# Patient Record
Sex: MALE | Race: WHITE | Employment: OTHER | URBAN - METROPOLITAN AREA
[De-identification: names, ages, dates, MRNs, and addresses within clinical notes are randomized per-mention and may not be internally consistent; named-entity substitution may affect disease eponyms.]

---

## 2018-11-08 ENCOUNTER — HOSPITAL ENCOUNTER (OUTPATIENT)
Dept: MRI IMAGING | Age: 73
Discharge: HOME | End: 2018-11-08
Payer: MEDICARE

## 2018-11-08 DIAGNOSIS — M79.604 RIGHT LEG PAIN: ICD-10-CM

## 2018-11-08 DIAGNOSIS — M54.50 LUMBAR PAIN: ICD-10-CM

## 2018-11-08 PROCEDURE — 72148 MRI LUMBAR SPINE W/O DYE: CPT

## 2018-11-20 PROBLEM — I71.40 ABDOMINAL AORTIC ANEURYSM (AAA) WITHOUT RUPTURE: Status: ACTIVE | Noted: 2018-11-20

## 2018-11-28 ENCOUNTER — HOSPITAL ENCOUNTER (OUTPATIENT)
Dept: CT IMAGING | Age: 73
Discharge: HOME | End: 2018-11-28
Payer: MEDICARE

## 2018-11-28 DIAGNOSIS — Z87.891 PERSONAL HISTORY OF TOBACCO USE: ICD-10-CM

## 2018-11-28 DIAGNOSIS — I71.40 ABDOMINAL AORTIC ANEURYSM WITHOUT RUPTURE: ICD-10-CM

## 2018-11-28 DIAGNOSIS — I71.40 ABDOMINAL AORTIC ANEURYSM (AAA) WITHOUT RUPTURE: ICD-10-CM

## 2018-11-28 DIAGNOSIS — Z87.891 PERSONAL HISTORY OF TOBACCO USE, PRESENTING HAZARDS TO HEALTH: ICD-10-CM

## 2018-11-28 LAB
GFR AFRICAN AMERICAN: >60 ML/MIN/1.73M2
GFR NON-AFRICAN AMERICAN: >60 ML/MIN/1.73M2
POC CREATININE: 0.8 MG/DL (ref 0.9–1.3)

## 2018-11-28 PROCEDURE — G0297 LDCT FOR LUNG CA SCREEN: HCPCS

## 2018-11-28 PROCEDURE — 6360000004 HC RX CONTRAST MEDICATION: Performed by: SURGERY

## 2018-11-28 PROCEDURE — 2580000003 HC RX 258: Performed by: SURGERY

## 2018-11-28 PROCEDURE — 74174 CTA ABD&PLVS W/CONTRAST: CPT

## 2018-11-28 RX ORDER — SODIUM CHLORIDE 0.9 % (FLUSH) 0.9 %
10 SYRINGE (ML) INJECTION PRN
Status: DISCONTINUED | OUTPATIENT
Start: 2018-11-28 | End: 2018-11-29 | Stop reason: HOSPADM

## 2018-11-28 RX ADMIN — SODIUM CHLORIDE, PRESERVATIVE FREE 10 ML: 5 INJECTION INTRAVENOUS at 12:14

## 2018-11-28 RX ADMIN — IOPAMIDOL 100 ML: 755 INJECTION, SOLUTION INTRAVENOUS at 12:14

## 2018-12-12 PROBLEM — Z72.0 TOBACCO ABUSE: Status: ACTIVE | Noted: 2018-12-12

## 2019-02-28 LAB
ALBUMIN SERPL-MCNC: NORMAL G/DL
ALP BLD-CCNC: NORMAL U/L
ALT SERPL-CCNC: NORMAL U/L
ANION GAP SERPL CALCULATED.3IONS-SCNC: NORMAL MMOL/L
AST SERPL-CCNC: NORMAL U/L
BILIRUB SERPL-MCNC: NORMAL MG/DL (ref 0.1–1.4)
BUN BLDV-MCNC: NORMAL MG/DL
CALCIUM SERPL-MCNC: NORMAL MG/DL
CHLORIDE BLD-SCNC: NORMAL MMOL/L
CHOLESTEROL, TOTAL: 179 MG/DL
CHOLESTEROL/HDL RATIO: NORMAL
CO2: NORMAL MMOL/L
CREAT SERPL-MCNC: 0.8 MG/DL
GFR CALCULATED: NORMAL
GLUCOSE BLD-MCNC: NORMAL MG/DL
HDLC SERPL-MCNC: 38 MG/DL (ref 35–70)
LDL CHOLESTEROL CALCULATED: 62 MG/DL (ref 0–160)
POTASSIUM SERPL-SCNC: 4 MMOL/L
SODIUM BLD-SCNC: NORMAL MMOL/L
TOTAL PROTEIN: NORMAL
TRIGL SERPL-MCNC: 397 MG/DL
VLDLC SERPL CALC-MCNC: 79 MG/DL

## 2019-03-11 ENCOUNTER — HOSPITAL ENCOUNTER (OUTPATIENT)
Dept: CARDIAC REHAB | Age: 74
Discharge: HOME | End: 2019-03-11
Payer: MEDICARE

## 2019-03-11 LAB
LV EF: 58 %
LVEF MODALITY: NORMAL

## 2019-03-11 PROCEDURE — 93306 TTE W/DOPPLER COMPLETE: CPT

## 2019-04-16 ENCOUNTER — HOSPITAL ENCOUNTER (OUTPATIENT)
Dept: GENERAL RADIOLOGY | Age: 74
Discharge: HOME | End: 2019-04-16
Payer: MEDICARE

## 2019-04-16 ENCOUNTER — HOSPITAL ENCOUNTER (OUTPATIENT)
Age: 74
Discharge: HOME | End: 2019-04-16
Payer: MEDICARE

## 2019-04-16 DIAGNOSIS — R07.81 RIB PAIN ON RIGHT SIDE: ICD-10-CM

## 2019-04-16 PROCEDURE — 71101 X-RAY EXAM UNILAT RIBS/CHEST: CPT

## 2019-06-05 RX ORDER — LISINOPRIL AND HYDROCHLOROTHIAZIDE 20; 12.5 MG/1; MG/1
1 TABLET ORAL DAILY
Qty: 90 TABLET | Refills: 0 | Status: SHIPPED | OUTPATIENT
Start: 2019-06-05 | End: 2019-07-24 | Stop reason: SDUPTHER

## 2019-06-05 RX ORDER — SIMVASTATIN 40 MG
TABLET ORAL
Qty: 90 TABLET | Refills: 0 | Status: SHIPPED | OUTPATIENT
Start: 2019-06-05 | End: 2019-07-24 | Stop reason: SDUPTHER

## 2019-07-05 DIAGNOSIS — K76.0 NON-ALCOHOLIC FATTY LIVER DISEASE: ICD-10-CM

## 2019-07-05 DIAGNOSIS — C44.311 BASAL CELL CARCINOMA OF NOSE: ICD-10-CM

## 2019-07-05 DIAGNOSIS — I10 ESSENTIAL HYPERTENSION: ICD-10-CM

## 2019-07-05 DIAGNOSIS — F17.200 SMOKER: ICD-10-CM

## 2019-07-05 RX ORDER — HYDROCODONE BITARTRATE AND ACETAMINOPHEN 7.5; 325 MG/1; MG/1
1 TABLET ORAL EVERY 4 HOURS PRN
COMMUNITY
End: 2019-07-24

## 2019-07-22 LAB
A/G RATIO: 2.6 (ref 1.1–2.2)
ALBUMIN SERPL-MCNC: 4.7 G/DL (ref 3.4–5)
ALP BLD-CCNC: 70 U/L (ref 40–129)
ALT SERPL-CCNC: 25 U/L (ref 10–40)
ANION GAP SERPL CALCULATED.3IONS-SCNC: 16 MMOL/L (ref 3–16)
AST SERPL-CCNC: 21 U/L (ref 15–37)
BASOPHILS ABSOLUTE: 0.1 K/UL (ref 0–0.2)
BASOPHILS RELATIVE PERCENT: 0.7 %
BILIRUB SERPL-MCNC: 0.4 MG/DL (ref 0–1)
BUN BLDV-MCNC: 11 MG/DL (ref 7–20)
CALCIUM SERPL-MCNC: 10.3 MG/DL (ref 8.3–10.6)
CHLORIDE BLD-SCNC: 102 MMOL/L (ref 99–110)
CHOLESTEROL, FASTING: 162 MG/DL (ref 0–199)
CO2: 23 MMOL/L (ref 21–32)
CREAT SERPL-MCNC: 0.7 MG/DL (ref 0.8–1.3)
EOSINOPHILS ABSOLUTE: 0 K/UL (ref 0–0.6)
EOSINOPHILS RELATIVE PERCENT: 0.5 %
GFR AFRICAN AMERICAN: >60
GFR NON-AFRICAN AMERICAN: >60
GLOBULIN: 1.8 G/DL
GLUCOSE FASTING: 151 MG/DL (ref 70–99)
HCT VFR BLD CALC: 45.9 % (ref 40.5–52.5)
HDLC SERPL-MCNC: 44 MG/DL (ref 40–60)
HEMOGLOBIN: 15.4 G/DL (ref 13.5–17.5)
LDL CHOLESTEROL CALCULATED: 87 MG/DL
LYMPHOCYTES ABSOLUTE: 2.4 K/UL (ref 1–5.1)
LYMPHOCYTES RELATIVE PERCENT: 32.7 %
MCH RBC QN AUTO: 32.1 PG (ref 26–34)
MCHC RBC AUTO-ENTMCNC: 33.5 G/DL (ref 31–36)
MCV RBC AUTO: 95.9 FL (ref 80–100)
MONOCYTES ABSOLUTE: 0.4 K/UL (ref 0–1.3)
MONOCYTES RELATIVE PERCENT: 5.8 %
NEUTROPHILS ABSOLUTE: 4.3 K/UL (ref 1.7–7.7)
NEUTROPHILS RELATIVE PERCENT: 60.3 %
PDW BLD-RTO: 13.4 % (ref 12.4–15.4)
PLATELET # BLD: 116 K/UL (ref 135–450)
PMV BLD AUTO: 10.8 FL (ref 5–10.5)
POTASSIUM SERPL-SCNC: 4.1 MMOL/L (ref 3.5–5.1)
RBC # BLD: 4.79 M/UL (ref 4.2–5.9)
SODIUM BLD-SCNC: 141 MMOL/L (ref 136–145)
TOTAL PROTEIN: 6.5 G/DL (ref 6.4–8.2)
TRIGLYCERIDE, FASTING: 153 MG/DL (ref 0–150)
TSH SERPL DL<=0.05 MIU/L-ACNC: 2 UIU/ML (ref 0.27–4.2)
VLDLC SERPL CALC-MCNC: 31 MG/DL
WBC # BLD: 7.2 K/UL (ref 4–11)

## 2019-07-23 LAB
ESTIMATED AVERAGE GLUCOSE: 151.3 MG/DL
HBA1C MFR BLD: 6.9 %

## 2019-07-24 ENCOUNTER — OFFICE VISIT (OUTPATIENT)
Dept: FAMILY MEDICINE CLINIC | Age: 74
End: 2019-07-24
Payer: MEDICARE

## 2019-07-24 VITALS
WEIGHT: 175.2 LBS | SYSTOLIC BLOOD PRESSURE: 152 MMHG | HEART RATE: 72 BPM | BODY MASS INDEX: 29.19 KG/M2 | DIASTOLIC BLOOD PRESSURE: 78 MMHG | HEIGHT: 65 IN

## 2019-07-24 DIAGNOSIS — F17.200 SMOKER: ICD-10-CM

## 2019-07-24 DIAGNOSIS — E11.9 TYPE 2 DIABETES MELLITUS WITHOUT COMPLICATION, WITHOUT LONG-TERM CURRENT USE OF INSULIN (HCC): ICD-10-CM

## 2019-07-24 DIAGNOSIS — E78.49 OTHER HYPERLIPIDEMIA: ICD-10-CM

## 2019-07-24 DIAGNOSIS — D69.6 THROMBOCYTOPENIA (HCC): ICD-10-CM

## 2019-07-24 DIAGNOSIS — I10 ESSENTIAL HYPERTENSION: Primary | ICD-10-CM

## 2019-07-24 PROCEDURE — 99214 OFFICE O/P EST MOD 30 MIN: CPT | Performed by: FAMILY MEDICINE

## 2019-07-24 RX ORDER — SIMVASTATIN 40 MG
40 TABLET ORAL NIGHTLY
Qty: 90 TABLET | Refills: 1 | Status: SHIPPED | OUTPATIENT
Start: 2019-07-24 | End: 2019-12-04 | Stop reason: SDUPTHER

## 2019-07-24 RX ORDER — GABAPENTIN 300 MG/1
300 CAPSULE ORAL 3 TIMES DAILY
Qty: 270 CAPSULE | Refills: 1 | Status: SHIPPED | OUTPATIENT
Start: 2019-07-24 | End: 2020-02-11

## 2019-07-24 RX ORDER — LISINOPRIL AND HYDROCHLOROTHIAZIDE 20; 12.5 MG/1; MG/1
1 TABLET ORAL DAILY
Qty: 90 TABLET | Refills: 1 | Status: SHIPPED | OUTPATIENT
Start: 2019-07-24 | End: 2019-12-04 | Stop reason: SDUPTHER

## 2019-07-24 ASSESSMENT — ENCOUNTER SYMPTOMS
ABDOMINAL PAIN: 0
CHEST TIGHTNESS: 0
SHORTNESS OF BREATH: 0

## 2019-07-24 ASSESSMENT — PATIENT HEALTH QUESTIONNAIRE - PHQ9
SUM OF ALL RESPONSES TO PHQ9 QUESTIONS 1 & 2: 0
SUM OF ALL RESPONSES TO PHQ QUESTIONS 1-9: 0
1. LITTLE INTEREST OR PLEASURE IN DOING THINGS: 0
2. FEELING DOWN, DEPRESSED OR HOPELESS: 0
SUM OF ALL RESPONSES TO PHQ QUESTIONS 1-9: 0

## 2019-07-24 NOTE — PROGRESS NOTES
7/24/2019    Digna Barnes    Chief Complaint   Patient presents with    3 Month Follow-Up     WOULD LIKE TO DISCUSS AMITRYPTILLINE. SHOULD HE STILL TAKE? HAD LABS DRAWN 7/22/19.  Hypertension    Hyperlipidemia       HPI  History was obtained from patient. Petra Mark is a 72 y.o. male who presents today with 3 of routine follow-up. Denies unusual chest pain or shortness of breath. Still unfortunately smoking daily. Patient remains active and had labs done in the last couple days. All labs look good- A1c was 6.9% on diet control. CBC was normal other than a platelet count= 743,575 no history of bleeding diathesis. We are out of a Pneumovax 23, but he will need that with next visit. Please note, he saw the vascular surgeon 6 months ago in terms of his abdominal aortic aneurysm and was told he was good for a year for recheck. REVIEW OF SYMPTOMS    Review of Systems   Constitutional: Negative for appetite change and fatigue. Respiratory: Negative for chest tightness and shortness of breath. Cardiovascular: Negative for chest pain. Gastrointestinal: Negative for abdominal pain. Musculoskeletal: Negative for arthralgias and myalgias. Skin: Negative for rash. Neurological: Negative for dizziness, speech difficulty and headaches. Psychiatric/Behavioral: Negative for confusion, decreased concentration and dysphoric mood. The patient is not nervous/anxious. PAST MEDICAL HISTORY  Past Medical History:   Diagnosis Date    Basal cell carcinoma of nose     Dr Glen Leyva hypertension     Hyperlipidemia     Hypertension     Non-alcoholic fatty liver disease      ?  Osteoarthritis     Smoker     Spinal stenosis     Type 2 diabetes mellitus without complication (HCC)        FAMILY HISTORY  No family history on file.     SOCIAL HISTORY  Social History     Socioeconomic History    Marital status:      Spouse name: None    Number of children: None    Years of education: None    Highest education level: None   Occupational History    None   Social Needs    Financial resource strain: None    Food insecurity:     Worry: None     Inability: None    Transportation needs:     Medical: None     Non-medical: None   Tobacco Use    Smoking status: Current Every Day Smoker     Packs/day: 1.00     Years: 56.00     Pack years: 56.00     Types: Cigarettes    Smokeless tobacco: Never Used   Substance and Sexual Activity    Alcohol use: Yes     Comment: occas    Drug use: No    Sexual activity: None   Lifestyle    Physical activity:     Days per week: None     Minutes per session: None    Stress: None   Relationships    Social connections:     Talks on phone: None     Gets together: None     Attends Zoroastrianism service: None     Active member of club or organization: None     Attends meetings of clubs or organizations: None     Relationship status: None    Intimate partner violence:     Fear of current or ex partner: None     Emotionally abused: None     Physically abused: None     Forced sexual activity: None   Other Topics Concern    None   Social History Narrative    None        SURGICAL HISTORY  Past Surgical History:   Procedure Laterality Date    OTHER SURGICAL HISTORY  12/09/2016    excision of basal cell carcinoma of nose with complex repair    OTHER SURGICAL HISTORY      epidual steroid injections L3-L4    SINUS SURGERY         Quality & Risk Score Accuracy    Last edited 07/24/19 14:35 EDT by Javid Jacobs MD               CURRENT MEDICATIONS  Current Outpatient Medications   Medication Sig Dispense Refill    lisinopril-hydrochlorothiazide (PRINZIDE;ZESTORETIC) 20-12.5 MG per tablet Take 1 tablet by mouth daily 90 tablet 1    simvastatin (ZOCOR) 40 MG tablet Take 1 tablet by mouth nightly 90 tablet 1    gabapentin (NEURONTIN) 300 MG capsule Take 1 capsule by mouth 3 times daily for 180 days.  270 capsule 1    aspirin 81 MG tablet Take 81 mg by mouth daily       No

## 2019-08-27 ENCOUNTER — HOSPITAL ENCOUNTER (OUTPATIENT)
Age: 74
Discharge: HOME | End: 2019-08-27
Payer: MEDICARE

## 2019-08-27 LAB
BASOPHILS ABSOLUTE: 0.1 K/CU MM
BASOPHILS RELATIVE PERCENT: 0.7 % (ref 0–1)
DIFFERENTIAL TYPE: ABNORMAL
EOSINOPHILS ABSOLUTE: 0 K/CU MM
EOSINOPHILS RELATIVE PERCENT: 0.4 % (ref 0–3)
HCT VFR BLD CALC: 45.8 % (ref 42–52)
HEMOGLOBIN: 15.6 GM/DL (ref 13.5–18)
IMMATURE NEUTROPHIL %: 0.4 % (ref 0–0.43)
LYMPHOCYTES ABSOLUTE: 2.9 K/CU MM
LYMPHOCYTES RELATIVE PERCENT: 36.1 % (ref 24–44)
MCH RBC QN AUTO: 31.8 PG (ref 27–31)
MCHC RBC AUTO-ENTMCNC: 34.1 % (ref 32–36)
MCV RBC AUTO: 93.5 FL (ref 78–100)
MONOCYTES ABSOLUTE: 0.7 K/CU MM
MONOCYTES RELATIVE PERCENT: 9 % (ref 0–4)
PDW BLD-RTO: 12.3 % (ref 11.7–14.9)
PLATELET # BLD: 130 K/CU MM (ref 140–440)
PMV BLD AUTO: 10.6 FL (ref 7.5–11.1)
RBC # BLD: 4.9 M/CU MM (ref 4.6–6.2)
SEGMENTED NEUTROPHILS ABSOLUTE COUNT: 4.4 K/CU MM
SEGMENTED NEUTROPHILS RELATIVE PERCENT: 53.4 % (ref 36–66)
TOTAL IMMATURE NEUTOROPHIL: 0.03 K/CU MM
WBC # BLD: 8.1 K/CU MM (ref 4–10.5)

## 2019-08-27 PROCEDURE — 36415 COLL VENOUS BLD VENIPUNCTURE: CPT

## 2019-08-27 PROCEDURE — 85025 COMPLETE CBC W/AUTO DIFF WBC: CPT

## 2019-12-04 ENCOUNTER — TELEPHONE (OUTPATIENT)
Dept: FAMILY MEDICINE CLINIC | Age: 74
End: 2019-12-04

## 2019-12-04 RX ORDER — LISINOPRIL AND HYDROCHLOROTHIAZIDE 20; 12.5 MG/1; MG/1
1 TABLET ORAL DAILY
Qty: 90 TABLET | Refills: 0 | Status: SHIPPED | OUTPATIENT
Start: 2019-12-04 | End: 2020-02-11 | Stop reason: SDUPTHER

## 2019-12-04 RX ORDER — SIMVASTATIN 40 MG
40 TABLET ORAL NIGHTLY
Qty: 90 TABLET | Refills: 0 | Status: SHIPPED | OUTPATIENT
Start: 2019-12-04 | End: 2020-02-11 | Stop reason: SDUPTHER

## 2019-12-12 ENCOUNTER — HOSPITAL ENCOUNTER (OUTPATIENT)
Dept: CT IMAGING | Age: 74
Discharge: HOME | End: 2019-12-12
Payer: MEDICARE

## 2019-12-12 DIAGNOSIS — Z87.891 PERSONAL HISTORY OF TOBACCO USE: ICD-10-CM

## 2019-12-12 PROCEDURE — G0297 LDCT FOR LUNG CA SCREEN: HCPCS

## 2019-12-18 PROBLEM — I35.0 NONRHEUMATIC AORTIC VALVE STENOSIS: Status: ACTIVE | Noted: 2019-12-18

## 2019-12-27 ENCOUNTER — HOSPITAL ENCOUNTER (OUTPATIENT)
Dept: NON INVASIVE DIAGNOSTICS | Age: 74
Discharge: HOME | End: 2019-12-27
Payer: MEDICARE

## 2019-12-27 DIAGNOSIS — I35.0 NONRHEUMATIC AORTIC VALVE STENOSIS: ICD-10-CM

## 2019-12-27 LAB
LV EF: 53 %
LVEF MODALITY: NORMAL

## 2019-12-27 PROCEDURE — 93306 TTE W/DOPPLER COMPLETE: CPT

## 2019-12-31 ENCOUNTER — TELEPHONE (OUTPATIENT)
Dept: CARDIOLOGY CLINIC | Age: 74
End: 2019-12-31

## 2020-02-11 ENCOUNTER — OFFICE VISIT (OUTPATIENT)
Dept: FAMILY MEDICINE CLINIC | Age: 75
End: 2020-02-11
Payer: MEDICARE

## 2020-02-11 VITALS
BODY MASS INDEX: 26.84 KG/M2 | SYSTOLIC BLOOD PRESSURE: 128 MMHG | HEART RATE: 68 BPM | WEIGHT: 167 LBS | DIASTOLIC BLOOD PRESSURE: 80 MMHG | OXYGEN SATURATION: 94 % | HEIGHT: 66 IN

## 2020-02-11 DIAGNOSIS — I10 ESSENTIAL HYPERTENSION: ICD-10-CM

## 2020-02-11 DIAGNOSIS — Z12.5 ENCOUNTER FOR SCREENING FOR MALIGNANT NEOPLASM OF PROSTATE: ICD-10-CM

## 2020-02-11 DIAGNOSIS — E11.9 TYPE 2 DIABETES MELLITUS WITHOUT COMPLICATION, WITHOUT LONG-TERM CURRENT USE OF INSULIN (HCC): ICD-10-CM

## 2020-02-11 DIAGNOSIS — E78.49 OTHER HYPERLIPIDEMIA: ICD-10-CM

## 2020-02-11 DIAGNOSIS — R35.1 NOCTURIA: ICD-10-CM

## 2020-02-11 LAB
A/G RATIO: 1.9 (ref 1.1–2.2)
ALBUMIN SERPL-MCNC: 4.5 G/DL (ref 3.4–5)
ALP BLD-CCNC: 71 U/L (ref 40–129)
ALT SERPL-CCNC: 32 U/L (ref 10–40)
ANION GAP SERPL CALCULATED.3IONS-SCNC: 13 MMOL/L (ref 3–16)
AST SERPL-CCNC: 19 U/L (ref 15–37)
BASOPHILS ABSOLUTE: 0 K/UL (ref 0–0.2)
BASOPHILS RELATIVE PERCENT: 0.3 %
BILIRUB SERPL-MCNC: 0.4 MG/DL (ref 0–1)
BUN BLDV-MCNC: 18 MG/DL (ref 7–20)
CALCIUM SERPL-MCNC: 11 MG/DL (ref 8.3–10.6)
CHLORIDE BLD-SCNC: 99 MMOL/L (ref 99–110)
CHOLESTEROL, TOTAL: 223 MG/DL (ref 0–199)
CO2: 28 MMOL/L (ref 21–32)
CREAT SERPL-MCNC: 0.7 MG/DL (ref 0.8–1.3)
EOSINOPHILS ABSOLUTE: 0 K/UL (ref 0–0.6)
EOSINOPHILS RELATIVE PERCENT: 0.1 %
GFR AFRICAN AMERICAN: >60
GFR NON-AFRICAN AMERICAN: >60
GLOBULIN: 2.4 G/DL
GLUCOSE BLD-MCNC: 161 MG/DL (ref 70–99)
HCT VFR BLD CALC: 47.5 % (ref 40.5–52.5)
HDLC SERPL-MCNC: 50 MG/DL (ref 40–60)
HEMOGLOBIN: 16.1 G/DL (ref 13.5–17.5)
LDL CHOLESTEROL CALCULATED: 115 MG/DL
LYMPHOCYTES ABSOLUTE: 3 K/UL (ref 1–5.1)
LYMPHOCYTES RELATIVE PERCENT: 26.7 %
MCH RBC QN AUTO: 31.8 PG (ref 26–34)
MCHC RBC AUTO-ENTMCNC: 33.9 G/DL (ref 31–36)
MCV RBC AUTO: 93.8 FL (ref 80–100)
MONOCYTES ABSOLUTE: 0.8 K/UL (ref 0–1.3)
MONOCYTES RELATIVE PERCENT: 7.4 %
NEUTROPHILS ABSOLUTE: 7.4 K/UL (ref 1.7–7.7)
NEUTROPHILS RELATIVE PERCENT: 65.5 %
PDW BLD-RTO: 13.3 % (ref 12.4–15.4)
PLATELET # BLD: 142 K/UL (ref 135–450)
PMV BLD AUTO: 9.2 FL (ref 5–10.5)
POTASSIUM SERPL-SCNC: 4 MMOL/L (ref 3.5–5.1)
PROSTATE SPECIFIC ANTIGEN: 0.61 NG/ML (ref 0–4)
RBC # BLD: 5.07 M/UL (ref 4.2–5.9)
SODIUM BLD-SCNC: 140 MMOL/L (ref 136–145)
TOTAL PROTEIN: 6.9 G/DL (ref 6.4–8.2)
TRIGL SERPL-MCNC: 289 MG/DL (ref 0–150)
TSH REFLEX FT4: 1.37 UIU/ML (ref 0.27–4.2)
VLDLC SERPL CALC-MCNC: 58 MG/DL
WBC # BLD: 11.4 K/UL (ref 4–11)

## 2020-02-11 PROCEDURE — 99214 OFFICE O/P EST MOD 30 MIN: CPT | Performed by: PHYSICIAN ASSISTANT

## 2020-02-11 RX ORDER — GABAPENTIN 300 MG/1
CAPSULE ORAL
Qty: 450 CAPSULE | Refills: 0
Start: 2020-02-11 | End: 2020-03-12

## 2020-02-11 RX ORDER — METHYLPREDNISOLONE 4 MG/1
2 TABLET ORAL DAILY
COMMUNITY
Start: 2020-02-11 | End: 2020-02-11 | Stop reason: CLARIF

## 2020-02-11 RX ORDER — LISINOPRIL AND HYDROCHLOROTHIAZIDE 20; 12.5 MG/1; MG/1
1 TABLET ORAL DAILY
Qty: 90 TABLET | Refills: 0 | Status: ON HOLD
Start: 2020-02-11 | End: 2020-03-09 | Stop reason: HOSPADM

## 2020-02-11 RX ORDER — GABAPENTIN 300 MG/1
300 CAPSULE ORAL 3 TIMES DAILY
Qty: 270 CAPSULE | Refills: 1 | Status: CANCELLED | OUTPATIENT
Start: 2020-02-11 | End: 2020-08-09

## 2020-02-11 RX ORDER — SIMVASTATIN 40 MG
40 TABLET ORAL NIGHTLY
Qty: 90 TABLET | Refills: 0 | Status: SHIPPED
Start: 2020-02-11 | End: 2020-02-12 | Stop reason: ALTCHOICE

## 2020-02-11 ASSESSMENT — PATIENT HEALTH QUESTIONNAIRE - PHQ9
1. LITTLE INTEREST OR PLEASURE IN DOING THINGS: 0
2. FEELING DOWN, DEPRESSED OR HOPELESS: 0
SUM OF ALL RESPONSES TO PHQ QUESTIONS 1-9: 0
SUM OF ALL RESPONSES TO PHQ QUESTIONS 1-9: 0
SUM OF ALL RESPONSES TO PHQ9 QUESTIONS 1 & 2: 0

## 2020-02-11 ASSESSMENT — ENCOUNTER SYMPTOMS
CONSTIPATION: 0
BLOOD IN STOOL: 0
SHORTNESS OF BREATH: 0
DIARRHEA: 0

## 2020-02-11 NOTE — PROGRESS NOTES
Physical Exam  Vitals signs and nursing note reviewed. Constitutional:       Appearance: Normal appearance. He is normal weight. HENT:      Head: Normocephalic and atraumatic. Cardiovascular:      Rate and Rhythm: Normal rate and regular rhythm. Pulses: Normal pulses. Heart sounds: Normal heart sounds. Pulmonary:      Effort: Pulmonary effort is normal.      Breath sounds: Normal breath sounds. Neurological:      Mental Status: He is oriented to person, place, and time. Mental status is at baseline. Psychiatric:         Mood and Affect: Mood normal.         Behavior: Behavior normal.         DM Foot exam:  Visual inspection:  Deformity/amputation: absent  Skin lesions/pre-ulcerative calluses: absent  Edema: right- negative, left- negative    Sensory exam:  Monofilament sensation: normal  (minimum of 5 random plantar locations tested, avoiding callused areas - > 1 area with absence of sensation is + for neuropathy)    Plus at least one of the following:  Pulses: normal    ASSESSMENT & PLAN    1. Type 2 diabetes mellitus without complication, without long-term current use of insulin (HCC)  Reevaluate control of diabetes on blood work. Patient encouraged to get his diabetic eye exam completed. Diabetic foot exam completed today without any abnormalities  - Comprehensive Metabolic Panel; Future  - TSH WITH REFLEX TO FT4; Future  - Hemoglobin A1C; Future    2. Essential hypertension  Currently well controlled, refill medications  - lisinopril-hydrochlorothiazide (PRINZIDE;ZESTORETIC) 20-12.5 MG per tablet; Take 1 tablet by mouth daily MUST MAKE APPT FOR FURTHER REFILLS  Dispense: 90 tablet; Refill: 0  - CBC Auto Differential; Future  - Comprehensive Metabolic Panel; Future    3. Tobacco abuse  Patient encouraged in complete tobacco cessation. 4. Other hyperlipidemia  Reevaluate on blood work. - simvastatin (ZOCOR) 40 MG tablet;  Take 1 tablet by mouth nightly MUST MAKE APPT FOR FURTHER REFILLS  Dispense: 90 tablet; Refill: 0  - Lipid Panel; Future  - TSH WITH REFLEX TO FT4; Future    5. Nocturia  - Psa screening; Future    6. Spinal stenosis of lumbar region without neurogenic claudication  Adjusted sick on the gabapentin since the 600 mg in the morning and night worked better for the patient in regards to his pain. - gabapentin (NEURONTIN) 300 MG capsule; Take 2 pills AM and PM, and 1 pill mid-day  Dispense: 450 capsule; Refill: 0    7. Abdominal aortic aneurysm (AAA) without rupture [facility])  I sent a message to his cardiovascular surgeon, Dr. Brina Desouza, about reimaging and when he is due for reevaluation. 8. Encounter for screening for malignant neoplasm of prostate   - Psa screening; Future         Electronically signed by Gerardo Granados PA-C on 2/11/2020    Please note that this chart was generated using dragon dictation software. Although every effort was made to ensure the accuracy of this automated transcription, some errors in transcription may have occurred.

## 2020-02-12 LAB
ESTIMATED AVERAGE GLUCOSE: 200.1 MG/DL
HBA1C MFR BLD: 8.6 %

## 2020-02-12 RX ORDER — ATORVASTATIN CALCIUM 40 MG/1
40 TABLET, FILM COATED ORAL DAILY
Qty: 30 TABLET | Refills: 2 | Status: SHIPPED | OUTPATIENT
Start: 2020-02-12 | End: 2020-05-15 | Stop reason: SDUPTHER

## 2020-02-13 ENCOUNTER — INITIAL CONSULT (OUTPATIENT)
Dept: CARDIOLOGY CLINIC | Age: 75
End: 2020-02-13
Payer: MEDICARE

## 2020-02-13 VITALS
HEIGHT: 66 IN | DIASTOLIC BLOOD PRESSURE: 80 MMHG | WEIGHT: 171.2 LBS | HEART RATE: 88 BPM | SYSTOLIC BLOOD PRESSURE: 120 MMHG | BODY MASS INDEX: 27.51 KG/M2

## 2020-02-13 PROCEDURE — G8482 FLU IMMUNIZE ORDER/ADMIN: HCPCS | Performed by: INTERNAL MEDICINE

## 2020-02-13 PROCEDURE — 99214 OFFICE O/P EST MOD 30 MIN: CPT | Performed by: INTERNAL MEDICINE

## 2020-02-13 PROCEDURE — G8417 CALC BMI ABV UP PARAM F/U: HCPCS | Performed by: INTERNAL MEDICINE

## 2020-02-13 PROCEDURE — 93000 ELECTROCARDIOGRAM COMPLETE: CPT | Performed by: INTERNAL MEDICINE

## 2020-02-13 PROCEDURE — G8427 DOCREV CUR MEDS BY ELIG CLIN: HCPCS | Performed by: INTERNAL MEDICINE

## 2020-02-13 RX ORDER — SIMVASTATIN 40 MG
40 TABLET ORAL NIGHTLY
COMMUNITY
End: 2020-03-03

## 2020-02-13 NOTE — LETTER
CLINICAL STAFF DOCUMENTATION    Irene Joseph  1945  [id number]    Have you had any Chest Pain - Yes, aching pain not constant.  Last a few minutes     Have you had any Shortness of Breath - No    Have you had any dizziness - No    Have you had any palpitations - No    Do you have any edema - No    Check Venous \"LEG PROBLEM Questionnaire\"    Do you have a surgery or procedure scheduled in the near future - No  BE SURE TO GIVE THIS INFORMATION to [facility]    Ask patient if they want to sign up for [facility] if they are not already signed up    Check to see if we have an E-MAIL on file for the patient    Check medication list thoroughly!!!  BE SURE TO ASK PATIENT IF THEY NEED MEDICATION REFILLS

## 2020-02-13 NOTE — PROGRESS NOTES
abuse Z72.0    Essential hypertension I10    Smoker Z21.613    Non-alcoholic fatty liver disease K76.0    Basal cell carcinoma of nose C44.311    Type 2 diabetes mellitus without complication, without long-term current use of insulin (HCC) E11.9    Other hyperlipidemia E78.49    Nonrheumatic aortic valve stenosis I35.0       Assessment & Plan:    - CP; C    - VHD  Has severe AS  Lt and tr hc    -  Hypertension: Patients blood pressure is normal. Patient is advised about low sodium diet. Present medical regimen will not be changed. -  LIPID MANAGEMENT:  Available lipid  lab data reviewed  and patient was given dietary advice. NCEP- ATP III guidelines reviewed with patient. -   Changes  in medicines made: No                         -   DIABETES MELLITUS: Available pertinent lab data reviewed   and  patient was given dietary advice . Advised to check blood glucose level on a regular basis. -   Changes  in medicines made:  Shruit Ortiz MA  [facility]

## 2020-02-17 ENCOUNTER — TELEPHONE (OUTPATIENT)
Dept: FAMILY MEDICINE CLINIC | Age: 75
End: 2020-02-17

## 2020-02-17 NOTE — TELEPHONE ENCOUNTER
Reduce to one pill daily, if symptoms improve may just need to taper up gradually.  If he wants he can stop until procedure and then restart at just one a day and then give call report on how he is feeling after a week

## 2020-02-18 ENCOUNTER — HOSPITAL ENCOUNTER (OUTPATIENT)
Dept: GENERAL RADIOLOGY | Age: 75
Discharge: HOME | End: 2020-02-18
Payer: MEDICARE

## 2020-02-18 ENCOUNTER — HOSPITAL ENCOUNTER (OUTPATIENT)
Age: 75
Discharge: HOME | End: 2020-02-18
Payer: MEDICARE

## 2020-02-18 LAB
ABO/RH: NORMAL
ANION GAP SERPL CALCULATED.3IONS-SCNC: 12 MMOL/L (ref 4–16)
ANTIBODY SCREEN: NEGATIVE
APTT: 25.9 SECONDS (ref 25.1–37.1)
BASOPHILS ABSOLUTE: 0 K/CU MM
BASOPHILS RELATIVE PERCENT: 0.4 % (ref 0–1)
BUN BLDV-MCNC: 15 MG/DL (ref 6–23)
CALCIUM SERPL-MCNC: 10.1 MG/DL (ref 8.3–10.6)
CHLORIDE BLD-SCNC: 96 MMOL/L (ref 99–110)
CO2: 28 MMOL/L (ref 21–32)
COMMENT: NORMAL
CREAT SERPL-MCNC: 0.8 MG/DL (ref 0.9–1.3)
DIFFERENTIAL TYPE: ABNORMAL
EOSINOPHILS ABSOLUTE: 0 K/CU MM
EOSINOPHILS RELATIVE PERCENT: 0.3 % (ref 0–3)
GFR AFRICAN AMERICAN: >60 ML/MIN/1.73M2
GFR NON-AFRICAN AMERICAN: >60 ML/MIN/1.73M2
GLUCOSE BLD-MCNC: 195 MG/DL (ref 70–99)
HCT VFR BLD CALC: 46.8 % (ref 42–52)
HEMOGLOBIN: 15.4 GM/DL (ref 13.5–18)
IMMATURE NEUTROPHIL %: 0.8 % (ref 0–0.43)
INR BLD: 0.85 INDEX
LYMPHOCYTES ABSOLUTE: 2.3 K/CU MM
LYMPHOCYTES RELATIVE PERCENT: 23.6 % (ref 24–44)
MCH RBC QN AUTO: 31.4 PG (ref 27–31)
MCHC RBC AUTO-ENTMCNC: 32.9 % (ref 32–36)
MCV RBC AUTO: 95.5 FL (ref 78–100)
MONOCYTES ABSOLUTE: 0.7 K/CU MM
MONOCYTES RELATIVE PERCENT: 7.1 % (ref 0–4)
NUCLEATED RBC %: 0 %
PDW BLD-RTO: 12.7 % (ref 11.7–14.9)
PLATELET # BLD: 133 K/CU MM (ref 140–440)
PMV BLD AUTO: 12.2 FL (ref 7.5–11.1)
POTASSIUM SERPL-SCNC: 4.1 MMOL/L (ref 3.5–5.1)
PROTHROMBIN TIME: 10.3 SECONDS (ref 11.7–14.5)
RBC # BLD: 4.9 M/CU MM (ref 4.6–6.2)
SEGMENTED NEUTROPHILS ABSOLUTE COUNT: 6.7 K/CU MM
SEGMENTED NEUTROPHILS RELATIVE PERCENT: 67.8 % (ref 36–66)
SODIUM BLD-SCNC: 136 MMOL/L (ref 135–145)
TOTAL IMMATURE NEUTOROPHIL: 0.08 K/CU MM
TOTAL NUCLEATED RBC: 0 K/CU MM
WBC # BLD: 9.9 K/CU MM (ref 4–10.5)

## 2020-02-18 PROCEDURE — 85025 COMPLETE CBC W/AUTO DIFF WBC: CPT

## 2020-02-18 PROCEDURE — 80048 BASIC METABOLIC PNL TOTAL CA: CPT

## 2020-02-18 PROCEDURE — 71046 X-RAY EXAM CHEST 2 VIEWS: CPT

## 2020-02-18 PROCEDURE — 85610 PROTHROMBIN TIME: CPT

## 2020-02-18 PROCEDURE — 86850 RBC ANTIBODY SCREEN: CPT

## 2020-02-18 PROCEDURE — 86900 BLOOD TYPING SEROLOGIC ABO: CPT

## 2020-02-18 PROCEDURE — 36415 COLL VENOUS BLD VENIPUNCTURE: CPT

## 2020-02-18 PROCEDURE — 86901 BLOOD TYPING SEROLOGIC RH(D): CPT

## 2020-02-18 PROCEDURE — 85730 THROMBOPLASTIN TIME PARTIAL: CPT

## 2020-02-19 ENCOUNTER — TELEPHONE (OUTPATIENT)
Dept: CARDIOLOGY CLINIC | Age: 75
End: 2020-02-19

## 2020-02-19 NOTE — TELEPHONE ENCOUNTER
Returned patient's call and answered questions about procedure. Patient originally scheduled for 1 PM, arrival @ 11 AM.  Due to cath lab availability, needing to change to procedure @ 11 AM, arrival @ 9 AM.    Patient not agreeable. States he can't call and change his transportation and he can't get up early and drive here from [facility]. Arrival time can not be before 10 AM.  Patient also states that he feels completely fine, everyone is always asking him if he has CP or SOB and he does not. Rescheduled procedure for :    2/26 @ 2 PM, arrival @ 12 PM    Patient states this is fine.

## 2020-02-25 ENCOUNTER — TELEPHONE (OUTPATIENT)
Dept: CARDIOLOGY CLINIC | Age: 75
End: 2020-02-25

## 2020-02-26 ENCOUNTER — HOSPITAL ENCOUNTER (OUTPATIENT)
Dept: CARDIAC CATH/INVASIVE PROCEDURES | Age: 75
Discharge: HOME | End: 2020-02-26
Attending: INTERNAL MEDICINE | Admitting: INTERNAL MEDICINE
Payer: MEDICARE

## 2020-02-26 VITALS
DIASTOLIC BLOOD PRESSURE: 118 MMHG | OXYGEN SATURATION: 96 % | WEIGHT: 171 LBS | SYSTOLIC BLOOD PRESSURE: 145 MMHG | HEIGHT: 66 IN | HEART RATE: 87 BPM | RESPIRATION RATE: 18 BRPM | BODY MASS INDEX: 27.48 KG/M2 | TEMPERATURE: 98 F

## 2020-02-26 LAB
BASE EXCESS MIXED: ABNORMAL (ref 0–1.2)
CARBON MONOXIDE, BLOOD: 9.5 % (ref 0–5)
CO2 CONTENT: 28.6 MMOL/L (ref 19–24)
COMMENT: ABNORMAL
HCO3 ARTERIAL: 27.3 MMOL/L (ref 18–23)
METHEMOGLOBIN ARTERIAL: 1 %
O2 SATURATION: 86.7 % (ref 96–97)
PCO2 ARTERIAL: 43 MMHG (ref 32–45)
PH BLOOD: 7.41 (ref 7.34–7.45)
PO2 ARTERIAL: 62 MMHG (ref 75–100)

## 2020-02-26 PROCEDURE — 6360000002 HC RX W HCPCS

## 2020-02-26 PROCEDURE — C1751 CATH, INF, PER/CENT/MIDLINE: HCPCS

## 2020-02-26 PROCEDURE — 93460 R&L HRT ART/VENTRICLE ANGIO: CPT

## 2020-02-26 PROCEDURE — 2580000003 HC RX 258: Performed by: INTERNAL MEDICINE

## 2020-02-26 PROCEDURE — 6360000004 HC RX CONTRAST MEDICATION

## 2020-02-26 PROCEDURE — 2709999900 HC NON-CHARGEABLE SUPPLY

## 2020-02-26 PROCEDURE — C1769 GUIDE WIRE: HCPCS

## 2020-02-26 PROCEDURE — C1894 INTRO/SHEATH, NON-LASER: HCPCS

## 2020-02-26 PROCEDURE — 2500000003 HC RX 250 WO HCPCS

## 2020-02-26 PROCEDURE — 82803 BLOOD GASES ANY COMBINATION: CPT

## 2020-02-26 PROCEDURE — 6370000000 HC RX 637 (ALT 250 FOR IP): Performed by: INTERNAL MEDICINE

## 2020-02-26 RX ORDER — SODIUM CHLORIDE 9 MG/ML
INJECTION, SOLUTION INTRAVENOUS CONTINUOUS
Status: DISCONTINUED | OUTPATIENT
Start: 2020-02-26 | End: 2020-02-26 | Stop reason: HOSPADM

## 2020-02-26 RX ORDER — DIAZEPAM 5 MG/1
5 TABLET ORAL ONCE
Status: COMPLETED | OUTPATIENT
Start: 2020-02-26 | End: 2020-02-26

## 2020-02-26 RX ORDER — DIPHENHYDRAMINE HCL 25 MG
25 TABLET ORAL ONCE
Status: COMPLETED | OUTPATIENT
Start: 2020-02-26 | End: 2020-02-26

## 2020-02-26 RX ADMIN — DIPHENHYDRAMINE HYDROCHLORIDE 25 MG: 25 TABLET ORAL at 12:20

## 2020-02-26 RX ADMIN — DIAZEPAM 5 MG: 5 TABLET ORAL at 12:20

## 2020-02-26 RX ADMIN — SODIUM CHLORIDE: 9 INJECTION, SOLUTION INTRAVENOUS at 12:20

## 2020-02-26 NOTE — FLOWSHEET NOTE
Discharge instructions reviewed with patient. Voices understanding. Ambulated without difficulty. Pt now states son in law will be picking him and his wife up at 36. States will not stay later. Risk of bleeding/groin management reinforced to pt. Pt acknowledges understanding.  Wife remains at bedside during d/c instructions

## 2020-02-26 NOTE — PROGRESS NOTES
Sat patient up in bed to 45degrees, will monitor right groin closely. Educated patient to let nurse know if he feels warm/wet on his groin. Dr. Traci Sharpe arrived and at bedside educating patient on CABG procedure.

## 2020-02-26 NOTE — CONSULTS
tablet 650 mg, 650 mg, Oral, Q4H PRN  atropine injection 0.5 mg, 0.5 mg, Intravenous, Once PRN  morphine (PF) injection 1 mg, 1 mg, Intravenous, Once PRN  furosemide (LASIX) 100 mg in dextrose 5 % 100 mL infusion, 5 mg/hr, Intravenous, Continuous  carvedilol (COREG) tablet 6.25 mg, 6.25 mg, Oral, BID WC  atorvastatin (LIPITOR) tablet 20 mg, 20 mg, Oral, Nightly  sodium chloride flush 0.9 % injection 10 mL, 10 mL, Intravenous, 2 times per day  sodium chloride flush 0.9 % injection 10 mL, 10 mL, Intravenous, PRN  nitroGLYCERIN (NITROSTAT) SL tablet 0.4 mg, 0.4 mg, Sublingual, Q5 Min PRN  aspirin EC tablet 81 mg, 81 mg, Oral, Daily  amiodarone (CORDARONE) tablet 200 mg, 200 mg, Oral, BID  [START ON 4/18/2017] fleet rectal enema 1 enema, 1 enema, Rectal, Once  Allergies:  Lithium and Naproxen    Social History:   Social History     Social History    Marital status: Single     Spouse name: N/A    Number of children: N/A    Years of education: N/A     Occupational History    Not on file. Social History Main Topics    Smoking status: Current Every Day Smoker     Packs/day: 1.25     Years: 35.00     Types: Cigarettes    Smokeless tobacco: Never Used    Alcohol use 0.0 oz/week     0 Standard drinks or equivalent per week      Comment: no more than 3-4 day    Drug use: No    Sexual activity: Not on file     Other Topics Concern    Not on file     Social History Narrative     Family History:  History reviewed. No pertinent family history.     REVIEW OF SYSTEMS:   Review of Systems - General ROS: negative  Psychological ROS: negative  Ophthalmic ROS: negative  ENT ROS: negative  Allergy and Immunology ROS: negative  Hematological and Lymphatic ROS: negative  Endocrine ROS: negative  Respiratory ROS: See HPI  Cardiovascular ROS: See HPI  Gastrointestinal ROS: negative  Genito-Urinary ROS: negative  Musculoskeletal ROS: negative  Neurological ROS: negative  Dermatological ROS: negative      Active Ambulatory Problems Diagnosis Date Noted    COPD, mild ([facility] [address] 11/25/2014    Interstitial lung disease ([facility] 11/25/2014    CHF (congestive heart failure) (HCC)     Hypertension     COPD (chronic obstructive pulmonary disease) (HCC)     Elevated brain natriuretic peptide (BNP) level 01/30/2017     Resolved Ambulatory Problems     Diagnosis Date Noted    No Resolved Ambulatory Problems     No Additional Past Medical History       PHYSICAL EXAM:  Physical Exam  VITALS:  BP (!) 183/103  Pulse 78  Temp 98.1 °F (36.7 °C) (Oral)   Resp 22  Ht 6' (1.829 m)  Wt 205 lb (93 kg)  SpO2 94%  BMI 27.8 kg/m2  24HR INTAKE/OUTPUT:    Intake/Output Summary (Last 24 hours) at 04/17/17 1714  Last data filed at 04/17/17 1502   Gross per 24 hour   Intake                0 ml   Output              400 ml   Net             -400 ml       General appearance: No apparent distress, appears stated age and cooperative. Skin: unremarkable  HEENT Normocephalic, atraumatic without obvious deformity. Neck: Supple, Trachea midline   Lungs: Good respiratory effort. Clear to auscultation, bilaterally  Heart: Regular rate/ rhythm, significant KATI  Abdomen: Soft, non-tender or non-distended   Extremities: No lower extremity edema warm well perfused  Neurologic: Alert, grossly intact  Mental status: normal affect        DATA:  Cardiac catheterization and echocardiogram reviewed in detail with cardiology. I then also discussed these findings in detail with the patient and his wife. IMPRESSION  There is no problem list on file for this patient.       Severe multivessel coronary disease and severe aortic stenosis with probable bicuspid aortic valve      RECOMMENDATIONS:    The risks and benefits of aortic valve replacement with coronary artery bypass grafting were discussed in detail with the patient he understands and agrees to proceed the risks include but not limited to infection, bleeding, stroke, death, pulmonary complications, renal complications, liver complications. We tentatively have him scheduled on Thursday for surgery. I did discuss with him that a tissue valve would be his best option due to his age. He understands and agrees to this. The patient has been followed by Dr. Shauna Warren who will assume his care. The patient and wife know this.

## 2020-02-26 NOTE — PROGRESS NOTES
Educated patient on keeping head flat on bed and not bending right leg. Despite education patient continues to raise head up off of bed. Patient stated that he is driving himself and his wife home today. Education given to patient regarding safety after procedure and reasons for not being able to drive. Suggested that a friend, family member, neighbor be called to ask for a ride. Patient continues to state that he can safely drive his wife home despite education given. Patient stated that he can just tell us his wifes daughter is coming to get them, and still drive himself home. Educated on discharge instructions multiple times. Dr. Dung Jasso aware of situation.

## 2020-02-26 NOTE — H&P
Cliff Cantu is a 69 y.o. male who was seen today for management of  htn              Here for [facility]                      HPI:   Patient is here for    - Hypertension,is  well controlled, pt is  compliant with medicines  - Hyperlipidimea, lipids are in acceptable range. Pt  is  compliant with medicines  - Diabetes mellitus, blood glucose level is  well controlled.  Pt is compliant with meds and diet'                   The patient does  have cardiac complaints of recurrent moderate exertional SOB , has mild mid sternal CP as well for sometime.     Elsi Arita has the following history recorded in care path:       Patient Active Problem List     Diagnosis Date Noted    Nonrheumatic aortic valve stenosis 12/18/2019    Type 2 diabetes mellitus without complication, without long-term current use of insulin ([facility] [address] 07/24/2019    Other hyperlipidemia 07/24/2019    Essential hypertension      Smoker      Non-alcoholic fatty liver disease      Basal cell carcinoma of nose      Tobacco abuse 12/12/2018    Abdominal aortic aneurysm (AAA) without rupture ([facility] [address] 11/20/2018      Current Facility-Administered Medications          Current Outpatient Medications   Medication Sig Dispense Refill    Omega-3 Fatty Acids (FISH OIL OMEGA-3 PO) Take by mouth        simvastatin (ZOCOR) 40 MG tablet Take 40 mg by mouth nightly        metFORMIN (GLUCOPHAGE) 500 MG tablet Take 2 tablets by mouth 2 times daily (with meals) 120 tablet 2    atorvastatin (LIPITOR) 40 MG tablet Take 1 tablet by mouth daily 30 tablet 2    lisinopril-hydrochlorothiazide (PRINZIDE;ZESTORETIC) 20-12.5 MG per tablet Take 1 tablet by mouth daily MUST MAKE APPT FOR FURTHER REFILLS 90 tablet 0    gabapentin (NEURONTIN) 300 MG capsule Take 2 pills AM and PM, and 1 pill mid-day 450 capsule 0    aspirin 81 MG tablet Take 81 mg by mouth daily          No current facility-administered medications for this visit.          Allergies: Amoxicillin and Other  Past Medical History        Past Medical History:   Diagnosis Date    Basal cell carcinoma of nose       Dr Peter Barba hypertension      Hyperlipidemia      Hypertension      Non-alcoholic fatty liver disease        ?    Osteoarthritis      Smoker      Spinal stenosis      Type 2 diabetes mellitus without complication (HCC)           Past Surgical History         Past Surgical History:   Procedure Laterality Date    OTHER SURGICAL HISTORY   12/09/2016     excision of basal cell carcinoma of nose with complex repair    OTHER SURGICAL HISTORY         epidual steroid injections L3-L4    SINUS SURGERY              As reviewed   Family History   History reviewed. No pertinent family history. Social History            Tobacco Use    Smoking status: Current Every Day Smoker       Packs/day: 1.00       Years: 56.00       Pack years: 56.00       Types: Cigarettes    Smokeless tobacco: Never Used   Substance Use Topics    Alcohol use: Yes       Comment: occas      Review of Systems:    Constitutional: Negative for diaphoresis and fatigue  Psychological:Negative for anxiety or depression  HENT: Negative for headaches, nasal congestion, sinus pain or vertigo  Eyes: Negative for visual disturbance.    Endocrine: Negative for polydipsia/polyuria  Respiratory: Negative for shortness of breath  Cardiovascular:  CP, SOB  Gastrointestinal: Negative for abdominal pain or heartburn  Genito-Urinary: Negative for urinary frequency/urgency  Musculoskeletal: Negative for muscle pain, muscular weakness, negative for pain in arm and leg or swelling in foot and leg  Neurological: Negative for dizziness, headaches, memory loss, numbness/tingling, visual changes, syncope  Dermatological: Negative for rash     Objective:  /80   Pulse 88   Ht 5' 6\" (1.676 m)   Wt 171 lb 3.2 oz (77.7 kg)   BMI 27.63 kg/m²       Wt Readings from Last 3 Encounters:   02/13/20 171 lb 3.2 oz (77.7 kg)   02/11/20 167 lb (75.8 kg)   01/15/20 170 lb (77.1 kg)      Body mass index is 27.63 kg/m². GENERAL - Alert, oriented, pleasant, in no apparent distress. EYES: No jaundice, no conjunctival pallor. SKIN: It is warm & dry. No rashes. No Echhymosis    HEENT - No clinically significant abnormalities seen. Neck - Supple. No jugular venous distention noted. No carotid bruits. Cardiovascular - Normal S1 and S2 with 3/6 KATI. Extremities - No cyanosis, clubbing, or significant edema. Pulmonary - No respiratory distress. No wheezes or rales. Abdomen - No masses, tenderness, or organomegaly. Musculoskeletal - No significant edema. No joint deformities. No muscle wasting. Neurologic - Cranial nerves II through XII are grossly intact. There were no gross focal neurologic abnormalities.     Lab Review   No results found for: CKTOTAL, CKMB, CKMBINDEX, TROPONINT  BNP:  No results found for: BNP  PT/INR:  No results found for: INR        Lab Results   Component Value Date     LABA1C 8.6 02/11/2020     LABA1C 6.9 07/22/2019            Lab Results   Component Value Date     WBC 11.4 (H) 02/11/2020     HCT 47.5 02/11/2020     MCV 93.8 02/11/2020      02/11/2020            Lab Results   Component Value Date     CHOL 223 (H) 02/11/2020     TRIG 289 (H) 02/11/2020     HDL 50 02/11/2020     LDLCALC 115 (H) 02/11/2020            Lab Results   Component Value Date     ALT 32 02/11/2020     AST 19 02/11/2020      BMP:    Lab Results   Component Value Date      02/11/2020     K 4.0 02/11/2020     CL 99 02/11/2020     CO2 28 02/11/2020     BUN 18 02/11/2020     CREATININE 0.7 02/11/2020      CMP:         Lab Results   Component Value Date      02/11/2020     K 4.0 02/11/2020     CL 99 02/11/2020     CO2 28 02/11/2020     BUN 18 02/11/2020     PROT 6.9 02/11/2020      TSH:          Lab Results   Component Value Date     TSH 2.00 07/22/2019         .     Impression:    1.  Essential hypertension            Patient Active Problem List   Diagnosis Code    Abdominal aortic aneurysm (AAA) without rupture (HCC) I71.4    Tobacco abuse Z72.0    Essential hypertension I10    Smoker D46.694    Non-alcoholic fatty liver disease K76.0    Basal cell carcinoma of nose C44.311    Type 2 diabetes mellitus without complication, without long-term current use of insulin (HCC) E11.9    Other hyperlipidemia E78.49    Nonrheumatic aortic valve stenosis I35.0         Assessment & Plan:     - CP; LHC     - VHD  Has severe AS  Lt and tr hc     -  Hypertension: Patients blood pressure is normal. Patient is advised about low sodium diet. Present medical regimen will not be changed. -  LIPID MANAGEMENT:  Available lipid  lab data reviewed  and patient was given dietary advice. NCEP- ATP III guidelines reviewed with patient. -   Changes  in medicines made: No                         -   DIABETES MELLITUS: Available pertinent lab data reviewed   and  patient was given dietary advice . Advised to check blood glucose level on a regular basis. -   Changes  in medicines made:  No

## 2020-02-27 ENCOUNTER — TELEPHONE (OUTPATIENT)
Dept: FAMILY MEDICINE CLINIC | Age: 75
End: 2020-02-27

## 2020-02-27 NOTE — TELEPHONE ENCOUNTER
I spoke with patient to get more information. He only took 3 doses of the metformin and had explosive diarrhea that was watery with abdominal pain and nausea. He would like to be placed on something different for him to start after his CABG. I will work on seeing what we can get covered for the patient and get it sent into his pharmacy.

## 2020-02-28 ENCOUNTER — ANESTHESIA EVENT (OUTPATIENT)
Dept: OPERATING ROOM | Age: 75
DRG: 220 | End: 2020-02-28
Payer: MEDICARE

## 2020-02-28 RX ORDER — CHLORHEXIDINE GLUCONATE 0.12 MG/ML
30 RINSE ORAL ONCE
Status: CANCELLED | OUTPATIENT
Start: 2020-03-04

## 2020-02-28 RX ORDER — SIMVASTATIN 40 MG
40 TABLET ORAL ONCE
Status: CANCELLED | OUTPATIENT
Start: 2020-03-04

## 2020-02-28 RX ORDER — CARVEDILOL 3.12 MG/1
3.12 TABLET ORAL ONCE
Status: CANCELLED | OUTPATIENT
Start: 2020-03-04

## 2020-02-28 NOTE — ANESTHESIA PRE PROCEDURE
Department of Anesthesiology  Preprocedure Note       Name:  Claudy Born   Age:  69 y.o.  :  1945                                          MRN:  [id number]         Date:  2020      Surgeon: Monica Cazares):  Tonny Snell MD    Procedure: CABG CORONARY ARTERY BYPASS AORTIC VALVE REP (N/A )    Medications prior to admission:   Prior to Admission medications    Medication Sig Start Date End Date Taking? Authorizing Provider   empagliflozin (JARDIANCE) 10 MG tablet Take 1 tablet by mouth daily 20   Gerardo Granados PA-C   Omega-3 Fatty Acids (FISH OIL OMEGA-3 PO) Take by mouth    Historical Provider, MD   simvastatin (ZOCOR) 40 MG tablet Take 40 mg by mouth nightly    Historical Provider, MD   atorvastatin (LIPITOR) 40 MG tablet Take 1 tablet by mouth daily 20   Gerardo Granados PA-C   lisinopril-hydrochlorothiazide (PRINZIDE;ZESTORETIC) 20-12.5 MG per tablet Take 1 tablet by mouth daily MUST MAKE APPT FOR FURTHER REFILLS 20   Gerarod Granados PA-C   gabapentin (NEURONTIN) 300 MG capsule Take 2 pills AM and PM, and 1 pill mid-day 20  Gerardo Granados PA-C   aspirin 81 MG tablet Take 81 mg by mouth daily    Historical Provider, MD       Current medications:    Current Outpatient Medications   Medication Sig Dispense Refill    empagliflozin (JARDIANCE) 10 MG tablet Take 1 tablet by mouth daily 30 tablet 2    Omega-3 Fatty Acids (FISH OIL OMEGA-3 PO) Take by mouth      simvastatin (ZOCOR) 40 MG tablet Take 40 mg by mouth nightly      atorvastatin (LIPITOR) 40 MG tablet Take 1 tablet by mouth daily 30 tablet 2    lisinopril-hydrochlorothiazide (PRINZIDE;ZESTORETIC) 20-12.5 MG per tablet Take 1 tablet by mouth daily MUST MAKE APPT FOR FURTHER REFILLS 90 tablet 0    gabapentin (NEURONTIN) 300 MG capsule Take 2 pills AM and PM, and 1 pill mid-day 450 capsule 0    aspirin 81 MG tablet Take 81 mg by mouth daily       No current facility-administered medications for this encounter. bilaterally, measuring up to 2.5 cm on the right and 2.0 cm on the left. CT abdm 3/3/2020  Pending results      Carotid US 3/3/2020  The right internal carotid artery demonstrates 0-50% stenosis .     The left internal carotid artery demonstrates borderline velocities within  the proximal and distal segments of the left internal carotid artery,  measuring just over 125 centimeters/second, though with a normal ratio and no  definite significant stenosis by grayscale imaging.  Findings are felt likely  to represent 0-50% stenosis.     Bilateral vertebral arteries are patent with flow in the normal direction. .                 Anesthesia Plan      general     ASA 4       Induction: intravenous. arterial line, central line, CVP, PA catheter and CHRISTOPHER  MIPS: Postoperative ventilation. Anesthetic plan and risks discussed with patient. MADDY Arreola - CNP Chart reviewed, pt. Interviewed and examined. Anesthesia Evaluation will follow by a certified practitioner prior to surgery. 2/28/2020      Pre Anesthesia Evaluation complete. Anesthesia plan, risks, benefits, alternatives, and personnel discussed with patient and/or legal guardian. Patient and/or legal guardian verbalized an understanding and agreed to proceed. Anesthesia plan discussed with care team members and agreed upon.   MADDY Aldridge - CRNA  3/4/2020

## 2020-03-03 ENCOUNTER — HOSPITAL ENCOUNTER (OUTPATIENT)
Dept: PULMONOLOGY | Age: 75
Discharge: HOME | DRG: 220 | End: 2020-03-03
Payer: MEDICARE

## 2020-03-03 ENCOUNTER — HOSPITAL ENCOUNTER (OUTPATIENT)
Dept: CT IMAGING | Age: 75
Discharge: HOME | DRG: 220 | End: 2020-03-03
Payer: MEDICARE

## 2020-03-03 ENCOUNTER — HOSPITAL ENCOUNTER (OUTPATIENT)
Dept: ULTRASOUND IMAGING | Age: 75
Discharge: HOME | DRG: 220 | End: 2020-03-03
Payer: MEDICARE

## 2020-03-03 ENCOUNTER — HOSPITAL ENCOUNTER (OUTPATIENT)
Dept: PREADMISSION TESTING | Age: 75
Setting detail: SURGERY ADMIT
Discharge: HOME | DRG: 220 | End: 2020-03-07
Payer: MEDICARE

## 2020-03-03 VITALS
SYSTOLIC BLOOD PRESSURE: 159 MMHG | OXYGEN SATURATION: 96 % | WEIGHT: 171 LBS | HEART RATE: 81 BPM | HEIGHT: 64 IN | BODY MASS INDEX: 29.19 KG/M2 | TEMPERATURE: 98.8 F | DIASTOLIC BLOOD PRESSURE: 84 MMHG | RESPIRATION RATE: 16 BRPM

## 2020-03-03 LAB
ANION GAP SERPL CALCULATED.3IONS-SCNC: 11 MMOL/L (ref 4–16)
APTT: 26.1 SECONDS (ref 25.1–37.1)
BACTERIA: ABNORMAL /HPF
BASOPHILS ABSOLUTE: 0.1 K/CU MM
BASOPHILS RELATIVE PERCENT: 1 % (ref 0–1)
BILIRUBIN URINE: NEGATIVE MG/DL
BLOOD, URINE: NEGATIVE
BUN BLDV-MCNC: 12 MG/DL (ref 6–23)
CALCIUM SERPL-MCNC: 9.9 MG/DL (ref 8.3–10.6)
CHLORIDE BLD-SCNC: 103 MMOL/L (ref 99–110)
CLARITY: CLEAR
CO2: 25 MMOL/L (ref 21–32)
COLOR: YELLOW
CREAT SERPL-MCNC: 0.7 MG/DL (ref 0.9–1.3)
DIFFERENTIAL TYPE: ABNORMAL
EOSINOPHILS ABSOLUTE: 0 K/CU MM
EOSINOPHILS RELATIVE PERCENT: 0.6 % (ref 0–3)
GFR AFRICAN AMERICAN: >60 ML/MIN/1.73M2
GFR NON-AFRICAN AMERICAN: >60 ML/MIN/1.73M2
GLUCOSE BLD-MCNC: 239 MG/DL (ref 70–99)
GLUCOSE, URINE: >500 MG/DL
HCT VFR BLD CALC: 44.8 % (ref 42–52)
HEMOGLOBIN: 14.8 GM/DL (ref 13.5–18)
IMMATURE NEUTROPHIL %: 0.9 % (ref 0–0.43)
INR BLD: 0.91 INDEX
KETONES, URINE: NEGATIVE MG/DL
LEUKOCYTE ESTERASE, URINE: NEGATIVE
LYMPHOCYTES ABSOLUTE: 1.9 K/CU MM
LYMPHOCYTES RELATIVE PERCENT: 27.2 % (ref 24–44)
MAGNESIUM: 1.8 MG/DL (ref 1.8–2.4)
MCH RBC QN AUTO: 31.2 PG (ref 27–31)
MCHC RBC AUTO-ENTMCNC: 33 % (ref 32–36)
MCV RBC AUTO: 94.5 FL (ref 78–100)
MONOCYTES ABSOLUTE: 0.6 K/CU MM
MONOCYTES RELATIVE PERCENT: 8.6 % (ref 0–4)
MUCUS: ABNORMAL HPF
NITRITE URINE, QUANTITATIVE: NEGATIVE
NON SQUAM EPI CELLS: <1 /HPF
NUCLEATED RBC %: 0 %
PDW BLD-RTO: 12.8 % (ref 11.7–14.9)
PH, URINE: 6 (ref 5–8)
PLATELET # BLD: 165 K/CU MM (ref 140–440)
PMV BLD AUTO: 10.9 FL (ref 7.5–11.1)
POTASSIUM SERPL-SCNC: 3.9 MMOL/L (ref 3.5–5.1)
PROTEIN UA: NEGATIVE MG/DL
PROTHROMBIN TIME: 11 SECONDS (ref 11.7–14.5)
RBC # BLD: 4.74 M/CU MM (ref 4.6–6.2)
RBC URINE: ABNORMAL /HPF (ref 0–3)
SEGMENTED NEUTROPHILS ABSOLUTE COUNT: 4.3 K/CU MM
SEGMENTED NEUTROPHILS RELATIVE PERCENT: 61.7 % (ref 36–66)
SODIUM BLD-SCNC: 139 MMOL/L (ref 135–145)
SPECIFIC GRAVITY UA: 1.01 (ref 1–1.03)
SQUAMOUS EPITHELIAL: <1 /HPF
TOTAL IMMATURE NEUTOROPHIL: 0.06 K/CU MM
TOTAL NUCLEATED RBC: 0 K/CU MM
TRICHOMONAS: ABNORMAL /HPF
UROBILINOGEN, URINE: NORMAL MG/DL (ref 0.2–1)
WBC # BLD: 7 K/CU MM (ref 4–10.5)
WBC UA: ABNORMAL /HPF (ref 0–2)

## 2020-03-03 PROCEDURE — 94010 BREATHING CAPACITY TEST: CPT

## 2020-03-03 PROCEDURE — 74150 CT ABDOMEN W/O CONTRAST: CPT

## 2020-03-03 PROCEDURE — 85610 PROTHROMBIN TIME: CPT

## 2020-03-03 PROCEDURE — 93880 EXTRACRANIAL BILAT STUDY: CPT

## 2020-03-03 PROCEDURE — 86901 BLOOD TYPING SEROLOGIC RH(D): CPT

## 2020-03-03 PROCEDURE — 86922 COMPATIBILITY TEST ANTIGLOB: CPT

## 2020-03-03 PROCEDURE — 85025 COMPLETE CBC W/AUTO DIFF WBC: CPT

## 2020-03-03 PROCEDURE — 80048 BASIC METABOLIC PNL TOTAL CA: CPT

## 2020-03-03 PROCEDURE — P9016 RBC LEUKOCYTES REDUCED: HCPCS

## 2020-03-03 PROCEDURE — 93971 EXTREMITY STUDY: CPT

## 2020-03-03 PROCEDURE — 36415 COLL VENOUS BLD VENIPUNCTURE: CPT

## 2020-03-03 PROCEDURE — 83735 ASSAY OF MAGNESIUM: CPT

## 2020-03-03 PROCEDURE — 81001 URINALYSIS AUTO W/SCOPE: CPT

## 2020-03-03 PROCEDURE — 86900 BLOOD TYPING SEROLOGIC ABO: CPT

## 2020-03-03 PROCEDURE — 87081 CULTURE SCREEN ONLY: CPT

## 2020-03-03 PROCEDURE — 85730 THROMBOPLASTIN TIME PARTIAL: CPT

## 2020-03-03 PROCEDURE — 86850 RBC ANTIBODY SCREEN: CPT

## 2020-03-03 RX ORDER — CEFAZOLIN SODIUM 2 G/100ML
2 INJECTION, SOLUTION INTRAVENOUS ONCE
Status: CANCELLED | OUTPATIENT
Start: 2020-03-04

## 2020-03-03 ASSESSMENT — LIFESTYLE VARIABLES: SMOKING_STATUS: 1

## 2020-03-03 NOTE — PROGRESS NOTES
Seen by cardiac rehab status post cardiac cath and prior to CABG AVR scheduled for tomorrow   Met with patient in [place]  Introduced self and cardiac education program  Escorted to TAVR office for teaching session   Patient states he has not had any heart issues until recently and has never had heart surgery   Teaching done on A&P of coronary arteries  On location of lesions  On formation of CAD  On meaning of ejection fraction  On symptoms of heart disease   On normal and abnormal valve function  On repair vs replacement  On tissue vs mechanical valve  On anticoagulation therapy  And on surgery to be performed      Teaching done on pre op routine including pre op testing and infection control   Discussed what to expect on day of surgery such as length of surgery   intra op updates   Immediate post op period   weaning from ventilator and  medication and equipment to expect   explained the partnership in developing a plan of care to manage post op pain and the importance of communication with health care providers    instructed on importance of deep breathing and using IS followed by controlled coughing and sternal precautions    explained the multidisciplinary approach and daily routine in CVICU   stressed commitment to following recovery program and projected length of stay      Discussed activity guidelines and weight restrictions   Explained the importance of lifestyle changes that enhance recovery   Stressed the need for regular follow up with cardiologist surgeon and PCP  Discussed the benefits of family support in home recovery  Teaching done on role of home health after discharge   Offered benefits of out patient cardiac after appropriate time frame   Discussed the return to wellness and full benefits of surgery      Patient is  and lives with wife  States she will be here day of surgery  She is able to cook and daughter will be able to take her to store  Wife does not drive  Discussed home recovery

## 2020-03-03 NOTE — PROGRESS NOTES
Spoke with Dr Josie Garcai notified of risk score of 3  1 point for age of 69  1 point for diabetes  1 point for aortic valve surgery   Notified of creat 0.7  Hct of 44.8  Of fact patient smokes daily but has normal spirometry  Of EF 50-55%  Of Aic 8.6  Of results of CT of Abd which shows aneurysm  4.7x4.3 cm No new orders at this time

## 2020-03-03 NOTE — PROGRESS NOTES
Cardiac Surgery Risk Factor Worksheet      STS Criteria  Possible Score Yes/No Score ICD 10 Notes   Emergency Case 6 No 0  Mag 1.8   Serum Creatinine      Ca 9.9   >1.2 0 No 0     >1.6 to <1.8mg/dl 1 No 0  Na 139   >. 1.9 4 No 0  K 3.9   Acute/Chronic Renal Failure/Renal Insufficiency 0   No 0 GFR  Stage of CKD   BUN 12  Creat 0.7   Left Ventricular Dysfunction(EF<40%) 3   No 0  EF 50-55%   Operative Mitral Valve Insufficiency 3   No 0  Moderate MR   Reoperation 3 No 0     Age >71 and <74 years 1  Yes   1  Age 69   Age >75 years 2 No 0     Prior Vascular Surgery 2   No 0     COPD +History of Patient Use of Bronchodilators 2   No 0 Acuity of  COPD Normal spirometry per flow loop   COPD 0 No 0     Current Smoker of History of Smoking  0   Yes 0  Smokes 1 and 1/2 pack per day    Anemia (Hemotocrit<0.34) 2   No 0  Hgb 14.8  Hct 44.8   ASA / Anticoagulants/ Bleeding Tendiencies / Antiplatelets 0   No 0  PT 11.0  INR 0.91   PTT 26.2  Plt 165   Operative Aortic Valve Stenosis 1 Yes 1  Severe aortic stenosis     Weight<143 lbs (  BMI<18.5) 1   No 0 Obesity with  BMI Ht 5'4\"  Wt 77.5 kg   BMI 29.4   Weight >200 lbs (BMI >30    0   No 0     Diabetes Oral or Insulin Therapy 1   Yes 1 Type & Control A1c 8.6   Cerebrovascular Disease 1   No 0  0-50% bilateral    Impaired Mobility 0 No 0     Walker/Cane/Wheelchair 0 No 0     Recent MI 0 No 0     Hypertension 0 Yes 0     Peripheral Vascular Disease 0 No 0     Lives Alone 0 No 0     Other (GI Auto Immune Hepatic etc) 0 Yes 0 Malnutrition &   Acuity    CHF & Type & Acuity Spinal stenosis   Fatty liver disease     Abdominal aortic aneurysm    TOTAL   3     Note The surgeon must be notified for all risk scores >7    Notified by Mehran Jovel RN    3/3/2020

## 2020-03-04 ENCOUNTER — APPOINTMENT (OUTPATIENT)
Dept: GENERAL RADIOLOGY | Age: 75
DRG: 220 | End: 2020-03-04
Attending: SURGERY
Payer: MEDICARE

## 2020-03-04 ENCOUNTER — ANESTHESIA (OUTPATIENT)
Dept: OPERATING ROOM | Age: 75
DRG: 220 | End: 2020-03-04
Payer: MEDICARE

## 2020-03-04 ENCOUNTER — HOSPITAL ENCOUNTER (INPATIENT)
Age: 75
LOS: 5 days | Discharge: HOME HEALTH | DRG: 220 | End: 2020-03-09
Attending: SURGERY | Admitting: SURGERY
Payer: MEDICARE

## 2020-03-04 VITALS
RESPIRATION RATE: 6 BRPM | DIASTOLIC BLOOD PRESSURE: 120 MMHG | OXYGEN SATURATION: 100 % | SYSTOLIC BLOOD PRESSURE: 159 MMHG

## 2020-03-04 PROBLEM — I25.10 CAD IN NATIVE ARTERY: Status: ACTIVE | Noted: 2020-03-04

## 2020-03-04 LAB
ANION GAP SERPL CALCULATED.3IONS-SCNC: 6 MMOL/L (ref 4–16)
APTT: 27.2 SECONDS (ref 25.1–37.1)
BASE EXCESS MIXED: 1 (ref 0–1.2)
BASE EXCESS MIXED: 1.1 (ref 0–1.2)
BASE EXCESS MIXED: 1.2 (ref 0–1.2)
BASE EXCESS MIXED: 1.2 (ref 0–1.2)
BASE EXCESS MIXED: 1.7 (ref 0–1.2)
BASE EXCESS MIXED: 2.4 (ref 0–1.2)
BASE EXCESS MIXED: 2.5 (ref 0–1.2)
BASE EXCESS MIXED: 2.9 (ref 0–1.2)
BASE EXCESS MIXED: 3.3 (ref 0–1.2)
BASE EXCESS MIXED: 3.5 (ref 0–1.2)
BASE EXCESS MIXED: 4 (ref 0–1.2)
BASE EXCESS MIXED: 4.3 (ref 0–1.2)
BASE EXCESS MIXED: 4.3 (ref 0–1.2)
BASE EXCESS MIXED: 4.7 (ref 0–1.2)
BASE EXCESS: ABNORMAL (ref 0–3.3)
BUN BLDV-MCNC: 14 MG/DL (ref 6–23)
CALCIUM SERPL-MCNC: 8.6 MG/DL (ref 8.3–10.6)
CHLORIDE BLD-SCNC: 113 MMOL/L (ref 99–110)
CO2 CONTENT: 22.2 MMOL/L (ref 19–24)
CO2 CONTENT: 22.9 MMOL/L (ref 19–24)
CO2 CONTENT: 22.9 MMOL/L (ref 19–24)
CO2 CONTENT: 23.1 MMOL/L (ref 19–24)
CO2 CONTENT: 23.4 MMOL/L (ref 19–24)
CO2: 21 MMOL/L (ref 21–32)
CO2: 22 MMOL/L (ref 21–32)
CO2: 23 MMOL/L (ref 21–32)
CO2: 24 MMOL/L (ref 21–32)
CO2: 26 MMOL/L (ref 21–32)
CO2: 27 MMOL/L (ref 21–32)
CO2: 27 MMOL/L (ref 21–32)
CO2: 28 MMOL/L (ref 21–32)
CO2: 28 MMOL/L (ref 21–32)
CREAT SERPL-MCNC: 0.7 MG/DL (ref 0.9–1.3)
CULTURE: NORMAL
GFR AFRICAN AMERICAN: >60 ML/MIN/1.73M2
GFR AFRICAN AMERICAN: >60 ML/MIN/1.73M2
GFR NON-AFRICAN AMERICAN: >60 ML/MIN/1.73M2
GFR NON-AFRICAN AMERICAN: >60 ML/MIN/1.73M2
GLUCOSE BLD-MCNC: 100 MG/DL (ref 70–99)
GLUCOSE BLD-MCNC: 104 MG/DL (ref 70–99)
GLUCOSE BLD-MCNC: 111 MG/DL (ref 70–99)
GLUCOSE BLD-MCNC: 128 MG/DL (ref 70–99)
GLUCOSE BLD-MCNC: 137 MG/DL (ref 70–99)
GLUCOSE BLD-MCNC: 138 MG/DL (ref 70–99)
GLUCOSE BLD-MCNC: 144 MG/DL (ref 70–99)
GLUCOSE BLD-MCNC: 157 MG/DL (ref 70–99)
GLUCOSE BLD-MCNC: 178 MG/DL (ref 70–99)
GLUCOSE BLD-MCNC: 182 MG/DL (ref 70–99)
GLUCOSE BLD-MCNC: 186 MG/DL (ref 70–99)
GLUCOSE BLD-MCNC: 195 MG/DL (ref 70–99)
GLUCOSE BLD-MCNC: 202 MG/DL (ref 70–99)
GLUCOSE BLD-MCNC: 208 MG/DL (ref 70–99)
HCO3 ARTERIAL: 21 MMOL/L (ref 18–23)
HCO3 ARTERIAL: 21.2 MMOL/L (ref 18–23)
HCO3 ARTERIAL: 21.6 MMOL/L (ref 18–23)
HCO3 ARTERIAL: 21.7 MMOL/L (ref 18–23)
HCO3 ARTERIAL: 21.9 MMOL/L (ref 18–23)
HCO3 ARTERIAL: 21.9 MMOL/L (ref 18–23)
HCO3 ARTERIAL: 22.1 MMOL/L (ref 18–23)
HCO3 ARTERIAL: 22.4 MMOL/L (ref 18–23)
HCO3 ARTERIAL: 22.6 MMOL/L (ref 18–23)
HCO3 ARTERIAL: 22.7 MMOL/L (ref 18–23)
HCO3 ARTERIAL: 24.6 MMOL/L (ref 18–23)
HCO3 ARTERIAL: 25.7 MMOL/L (ref 18–23)
HCO3 ARTERIAL: 25.9 MMOL/L (ref 18–23)
HCO3 ARTERIAL: 26.4 MMOL/L (ref 18–23)
HCO3 ARTERIAL: 26.4 MMOL/L (ref 18–23)
HCT VFR BLD CALC: 24 % (ref 42–52)
HCT VFR BLD CALC: 25 % (ref 42–52)
HCT VFR BLD CALC: 26 % (ref 42–52)
HCT VFR BLD CALC: 27 % (ref 42–52)
HCT VFR BLD CALC: 28 % (ref 42–52)
HCT VFR BLD CALC: 29 % (ref 42–52)
HCT VFR BLD CALC: 29 % (ref 42–52)
HCT VFR BLD CALC: 30 % (ref 42–52)
HCT VFR BLD CALC: 31 % (ref 42–52)
HCT VFR BLD CALC: 33 % (ref 42–52)
HCT VFR BLD CALC: 33 % (ref 42–52)
HCT VFR BLD CALC: 35.6 % (ref 42–52)
HCT VFR BLD CALC: 36 % (ref 42–52)
HCT VFR BLD CALC: 37 % (ref 42–52)
HEMOGLOBIN: 10.1 GM/DL (ref 13.5–18)
HEMOGLOBIN: 10.5 GM/DL (ref 13.5–18)
HEMOGLOBIN: 11.2 GM/DL (ref 13.5–18)
HEMOGLOBIN: 11.4 GM/DL (ref 13.5–18)
HEMOGLOBIN: 11.7 GM/DL (ref 13.5–18)
HEMOGLOBIN: 12.1 GM/DL (ref 13.5–18)
HEMOGLOBIN: 12.5 GM/DL (ref 13.5–18)
HEMOGLOBIN: 8.3 GM/DL (ref 13.5–18)
HEMOGLOBIN: 8.5 GM/DL (ref 13.5–18)
HEMOGLOBIN: 8.8 GM/DL (ref 13.5–18)
HEMOGLOBIN: 9.1 GM/DL (ref 13.5–18)
HEMOGLOBIN: 9.4 GM/DL (ref 13.5–18)
HEMOGLOBIN: 9.7 GM/DL (ref 13.5–18)
HEMOGLOBIN: 9.9 GM/DL (ref 13.5–18)
INR BLD: 1.08 INDEX
Lab: NORMAL
MAGNESIUM: 2.7 MG/DL (ref 1.8–2.4)
MCH RBC QN AUTO: 31.2 PG (ref 27–31)
MCHC RBC AUTO-ENTMCNC: 32.9 % (ref 32–36)
MCV RBC AUTO: 94.9 FL (ref 78–100)
O2 SATURATION: 100 % (ref 96–97)
O2 SATURATION: 93.6 % (ref 96–97)
O2 SATURATION: 94.3 % (ref 96–97)
O2 SATURATION: 94.4 % (ref 96–97)
O2 SATURATION: 94.6 % (ref 96–97)
O2 SATURATION: 94.6 % (ref 96–97)
O2 SATURATION: 94.7 % (ref 96–97)
O2 SATURATION: 94.7 % (ref 96–97)
O2 SATURATION: 96.3 % (ref 96–97)
O2 SATURATION: 96.6 % (ref 96–97)
O2 SATURATION: 97 % (ref 96–97)
O2 SATURATION: 99.9 % (ref 96–97)
O2 SATURATION: 99.9 % (ref 96–97)
PCO2 ARTERIAL: 33.2 MMHG (ref 32–45)
PCO2 ARTERIAL: 36.1 MMHG (ref 32–45)
PCO2 ARTERIAL: 38.1 MMHG (ref 32–45)
PCO2 ARTERIAL: 38.4 MMHG (ref 32–45)
PCO2 ARTERIAL: 38.4 MMHG (ref 32–45)
PCO2 ARTERIAL: 38.6 MMHG (ref 32–45)
PCO2 ARTERIAL: 38.8 MMHG (ref 32–45)
PCO2 ARTERIAL: 38.9 MMHG (ref 32–45)
PCO2 ARTERIAL: 39.2 MMHG (ref 32–45)
PCO2 ARTERIAL: 40.1 MMHG (ref 32–45)
PCO2 ARTERIAL: 41.5 MMHG (ref 32–45)
PCO2 ARTERIAL: 42 MMHG (ref 32–45)
PCO2 ARTERIAL: 43.2 MMHG (ref 32–45)
PCO2 ARTERIAL: 43.6 MMHG (ref 32–45)
PCO2 ARTERIAL: 45.9 MMHG (ref 32–45)
PDW BLD-RTO: 12.8 % (ref 11.7–14.9)
PH BLOOD: 7.29 (ref 7.34–7.45)
PH BLOOD: 7.32 (ref 7.34–7.45)
PH BLOOD: 7.35 (ref 7.34–7.45)
PH BLOOD: 7.36 (ref 7.34–7.45)
PH BLOOD: 7.37 (ref 7.34–7.45)
PH BLOOD: 7.37 (ref 7.34–7.45)
PH BLOOD: 7.38 (ref 7.34–7.45)
PH BLOOD: 7.39 (ref 7.34–7.45)
PH BLOOD: 7.39 (ref 7.34–7.45)
PH BLOOD: 7.4 (ref 7.34–7.45)
PH BLOOD: 7.41 (ref 7.34–7.45)
PH BLOOD: 7.42 (ref 7.34–7.45)
PH BLOOD: 7.48 (ref 7.34–7.45)
PLATELET # BLD: ABNORMAL K/CU MM (ref 140–440)
PMV BLD AUTO: 10.9 FL (ref 7.5–11.1)
PO2 ARTERIAL: 271.9 MMHG (ref 75–100)
PO2 ARTERIAL: 317.9 MMHG (ref 75–100)
PO2 ARTERIAL: 535.5 MMHG (ref 75–100)
PO2 ARTERIAL: 543.3 MMHG (ref 75–100)
PO2 ARTERIAL: 601.3 MMHG (ref 75–100)
PO2 ARTERIAL: 72.8 MMHG (ref 75–100)
PO2 ARTERIAL: 73.1 MMHG (ref 75–100)
PO2 ARTERIAL: 74.7 MMHG (ref 75–100)
PO2 ARTERIAL: 75.4 MMHG (ref 75–100)
PO2 ARTERIAL: 75.7 MMHG (ref 75–100)
PO2 ARTERIAL: 76.7 MMHG (ref 75–100)
PO2 ARTERIAL: 81.7 MMHG (ref 75–100)
PO2 ARTERIAL: 88.4 MMHG (ref 75–100)
PO2 ARTERIAL: 92.3 MMHG (ref 75–100)
PO2 ARTERIAL: 93.7 MMHG (ref 75–100)
POC ACT PLUS: 105 SEC
POC ACT PLUS: 393 SEC
POC ACT PLUS: 399 SEC
POC ACT PLUS: 421 SEC
POC ACT PLUS: 440 SEC
POC ACT PLUS: 457 SEC
POC ACT PLUS: 97 SEC
POC CALCIUM: 1.09 MMOL/L (ref 1.12–1.32)
POC CALCIUM: 1.16 MMOL/L (ref 1.12–1.32)
POC CALCIUM: 1.29 MMOL/L (ref 1.12–1.32)
POC CALCIUM: 1.3 MMOL/L (ref 1.12–1.32)
POC CALCIUM: 1.33 MMOL/L (ref 1.12–1.32)
POC CALCIUM: 1.35 MMOL/L (ref 1.12–1.32)
POC CALCIUM: 1.35 MMOL/L (ref 1.12–1.32)
POC CALCIUM: 1.39 MMOL/L (ref 1.12–1.32)
POC CALCIUM: 1.4 MMOL/L (ref 1.12–1.32)
POC CALCIUM: 1.45 MMOL/L (ref 1.12–1.32)
POC CALCIUM: 1.46 MMOL/L (ref 1.12–1.32)
POC CALCIUM: 1.51 MMOL/L (ref 1.12–1.32)
POC CALCIUM: 1.56 MMOL/L (ref 1.12–1.32)
POC CHLORIDE: 104 MMOL/L (ref 98–109)
POC CHLORIDE: 106 MMOL/L (ref 98–109)
POC CHLORIDE: 107 MMOL/L (ref 98–109)
POC CHLORIDE: 107 MMOL/L (ref 98–109)
POC CHLORIDE: 108 MMOL/L (ref 98–109)
POC CHLORIDE: 109 MMOL/L (ref 98–109)
POC CHLORIDE: 110 MMOL/L (ref 98–109)
POC CHLORIDE: 112 MMOL/L (ref 98–109)
POC CHLORIDE: 113 MMOL/L (ref 98–109)
POC CHLORIDE: 113 MMOL/L (ref 98–109)
POC CREATININE: 0.8 MG/DL (ref 0.9–1.3)
POC CREATININE: 0.9 MG/DL (ref 0.9–1.3)
POC CREATININE: 0.9 MG/DL (ref 0.9–1.3)
POC CREATININE: 1.1 MG/DL (ref 0.9–1.3)
POC CREATININE: 1.3 MG/DL (ref 0.9–1.3)
POC CREATININE: 1.4 MG/DL (ref 0.9–1.3)
POTASSIUM SERPL-SCNC: 3.2 MMOL/L (ref 3.5–4.5)
POTASSIUM SERPL-SCNC: 3.4 MMOL/L (ref 3.5–4.5)
POTASSIUM SERPL-SCNC: 3.5 MMOL/L (ref 3.5–4.5)
POTASSIUM SERPL-SCNC: 3.5 MMOL/L (ref 3.5–5.1)
POTASSIUM SERPL-SCNC: 3.6 MMOL/L (ref 3.5–4.5)
POTASSIUM SERPL-SCNC: 3.6 MMOL/L (ref 3.5–4.5)
POTASSIUM SERPL-SCNC: 3.7 MMOL/L (ref 3.5–4.5)
POTASSIUM SERPL-SCNC: 3.9 MMOL/L (ref 3.5–4.5)
POTASSIUM SERPL-SCNC: 4 MMOL/L (ref 3.5–4.5)
POTASSIUM SERPL-SCNC: 4 MMOL/L (ref 3.5–4.5)
POTASSIUM SERPL-SCNC: 4.1 MMOL/L (ref 3.5–4.5)
POTASSIUM SERPL-SCNC: 4.2 MMOL/L (ref 3.5–4.5)
POTASSIUM SERPL-SCNC: 4.3 MMOL/L (ref 3.5–4.5)
POTASSIUM SERPL-SCNC: 4.6 MMOL/L (ref 3.5–4.5)
PROTHROMBIN TIME: 13.1 SECONDS (ref 11.7–14.5)
RBC # BLD: 3.75 M/CU MM (ref 4.6–6.2)
SODIUM BLD-SCNC: 140 MMOL/L (ref 135–145)
SODIUM BLD-SCNC: 141 MMOL/L (ref 138–146)
SODIUM BLD-SCNC: 142 MMOL/L (ref 138–146)
SODIUM BLD-SCNC: 143 MMOL/L (ref 138–146)
SODIUM BLD-SCNC: 145 MMOL/L (ref 138–146)
SODIUM BLD-SCNC: 146 MMOL/L (ref 138–146)
SOURCE, BLOOD GAS: ABNORMAL
SPECIMEN: NORMAL
WBC # BLD: 13.3 K/CU MM (ref 4–10.5)

## 2020-03-04 PROCEDURE — 06BQ4ZZ EXCISION OF LEFT SAPHENOUS VEIN, PERCUTANEOUS ENDOSCOPIC APPROACH: ICD-10-PCS | Performed by: SURGERY

## 2020-03-04 PROCEDURE — 6360000002 HC RX W HCPCS: Performed by: SURGERY

## 2020-03-04 PROCEDURE — 6360000002 HC RX W HCPCS

## 2020-03-04 PROCEDURE — 02HP32Z INSERTION OF MONITORING DEVICE INTO PULMONARY TRUNK, PERCUTANEOUS APPROACH: ICD-10-PCS | Performed by: SURGERY

## 2020-03-04 PROCEDURE — 82962 GLUCOSE BLOOD TEST: CPT

## 2020-03-04 PROCEDURE — 82803 BLOOD GASES ANY COMBINATION: CPT

## 2020-03-04 PROCEDURE — 3600000018 HC SURGERY OHS ADDTL 15MIN: Performed by: SURGERY

## 2020-03-04 PROCEDURE — 85018 HEMOGLOBIN: CPT

## 2020-03-04 PROCEDURE — 6370000000 HC RX 637 (ALT 250 FOR IP): Performed by: PHYSICIAN ASSISTANT

## 2020-03-04 PROCEDURE — 4A1239Z MONITORING OF CARDIAC OUTPUT, PERCUTANEOUS APPROACH: ICD-10-PCS | Performed by: SURGERY

## 2020-03-04 PROCEDURE — 2580000003 HC RX 258: Performed by: SURGERY

## 2020-03-04 PROCEDURE — 3700000001 HC ADD 15 MINUTES (ANESTHESIA): Performed by: SURGERY

## 2020-03-04 PROCEDURE — 4A133B3 MONITORING OF ARTERIAL PRESSURE, PULMONARY, PERCUTANEOUS APPROACH: ICD-10-PCS | Performed by: SURGERY

## 2020-03-04 PROCEDURE — 71045 X-RAY EXAM CHEST 1 VIEW: CPT

## 2020-03-04 PROCEDURE — 2580000003 HC RX 258: Performed by: PHYSICIAN ASSISTANT

## 2020-03-04 PROCEDURE — 3600000008 HC SURGERY OHS BASE: Performed by: SURGERY

## 2020-03-04 PROCEDURE — 94750 HC PULMONARY COMPLIANCE STUDY: CPT

## 2020-03-04 PROCEDURE — 83735 ASSAY OF MAGNESIUM: CPT

## 2020-03-04 PROCEDURE — 85347 COAGULATION TIME ACTIVATED: CPT

## 2020-03-04 PROCEDURE — 94640 AIRWAY INHALATION TREATMENT: CPT

## 2020-03-04 PROCEDURE — C1729 CATH, DRAINAGE: HCPCS | Performed by: SURGERY

## 2020-03-04 PROCEDURE — 3700000000 HC ANESTHESIA ATTENDED CARE: Performed by: SURGERY

## 2020-03-04 PROCEDURE — 6370000000 HC RX 637 (ALT 250 FOR IP)

## 2020-03-04 PROCEDURE — 2709999900 HC NON-CHARGEABLE SUPPLY: Performed by: SURGERY

## 2020-03-04 PROCEDURE — 2720000010 HC SURG SUPPLY STERILE: Performed by: SURGERY

## 2020-03-04 PROCEDURE — 85027 COMPLETE CBC AUTOMATED: CPT

## 2020-03-04 PROCEDURE — 5A1221Z PERFORMANCE OF CARDIAC OUTPUT, CONTINUOUS: ICD-10-PCS | Performed by: SURGERY

## 2020-03-04 PROCEDURE — P9047 ALBUMIN (HUMAN), 25%, 50ML: HCPCS

## 2020-03-04 PROCEDURE — 94664 DEMO&/EVAL PT USE INHALER: CPT

## 2020-03-04 PROCEDURE — 7100000000 HC PACU RECOVERY - FIRST 15 MIN

## 2020-03-04 PROCEDURE — 94002 VENT MGMT INPAT INIT DAY: CPT

## 2020-03-04 PROCEDURE — 6370000000 HC RX 637 (ALT 250 FOR IP): Performed by: SURGERY

## 2020-03-04 PROCEDURE — P9045 ALBUMIN (HUMAN), 5%, 250 ML: HCPCS

## 2020-03-04 PROCEDURE — 02100Z9 BYPASS CORONARY ARTERY, ONE ARTERY FROM LEFT INTERNAL MAMMARY, OPEN APPROACH: ICD-10-PCS | Performed by: SURGERY

## 2020-03-04 PROCEDURE — 2580000003 HC RX 258

## 2020-03-04 PROCEDURE — 2700000000 HC OXYGEN THERAPY PER DAY

## 2020-03-04 PROCEDURE — 85610 PROTHROMBIN TIME: CPT

## 2020-03-04 PROCEDURE — 2500000003 HC RX 250 WO HCPCS: Performed by: PHYSICIAN ASSISTANT

## 2020-03-04 PROCEDURE — 82330 ASSAY OF CALCIUM: CPT

## 2020-03-04 PROCEDURE — 80048 BASIC METABOLIC PNL TOTAL CA: CPT

## 2020-03-04 PROCEDURE — 02RF0JZ REPLACEMENT OF AORTIC VALVE WITH SYNTHETIC SUBSTITUTE, OPEN APPROACH: ICD-10-PCS | Performed by: SURGERY

## 2020-03-04 PROCEDURE — B24BZZ4 ULTRASONOGRAPHY OF HEART WITH AORTA, TRANSESOPHAGEAL: ICD-10-PCS | Performed by: SURGERY

## 2020-03-04 PROCEDURE — P9045 ALBUMIN (HUMAN), 5%, 250 ML: HCPCS | Performed by: SURGERY

## 2020-03-04 PROCEDURE — 84132 ASSAY OF SERUM POTASSIUM: CPT

## 2020-03-04 PROCEDURE — 85730 THROMBOPLASTIN TIME PARTIAL: CPT

## 2020-03-04 PROCEDURE — C1751 CATH, INF, PER/CENT/MIDLINE: HCPCS | Performed by: SURGERY

## 2020-03-04 PROCEDURE — 6360000002 HC RX W HCPCS: Performed by: NURSE ANESTHETIST, CERTIFIED REGISTERED

## 2020-03-04 PROCEDURE — 6360000002 HC RX W HCPCS: Performed by: PHYSICIAN ASSISTANT

## 2020-03-04 PROCEDURE — 88305 TISSUE EXAM BY PATHOLOGIST: CPT

## 2020-03-04 PROCEDURE — 2100000000 HC CCU R&B

## 2020-03-04 PROCEDURE — 94761 N-INVAS EAR/PLS OXIMETRY MLT: CPT

## 2020-03-04 PROCEDURE — 2500000003 HC RX 250 WO HCPCS

## 2020-03-04 PROCEDURE — 85014 HEMATOCRIT: CPT

## 2020-03-04 PROCEDURE — 2580000003 HC RX 258: Performed by: NURSE ANESTHETIST, CERTIFIED REGISTERED

## 2020-03-04 PROCEDURE — C1894 INTRO/SHEATH, NON-LASER: HCPCS | Performed by: SURGERY

## 2020-03-04 PROCEDURE — P9045 ALBUMIN (HUMAN), 5%, 250 ML: HCPCS | Performed by: PHYSICIAN ASSISTANT

## 2020-03-04 PROCEDURE — 2500000003 HC RX 250 WO HCPCS: Performed by: SURGERY

## 2020-03-04 PROCEDURE — 84295 ASSAY OF SERUM SODIUM: CPT

## 2020-03-04 PROCEDURE — 2500000003 HC RX 250 WO HCPCS: Performed by: NURSE ANESTHETIST, CERTIFIED REGISTERED

## 2020-03-04 PROCEDURE — 2780000006 HC MISC HEART VALVE: Performed by: SURGERY

## 2020-03-04 DEVICE — VALVE AORT DIA25MM H17.5MM ORIFICE DIA22.5MM SUT RNG: Type: IMPLANTABLE DEVICE | Status: FUNCTIONAL

## 2020-03-04 RX ORDER — AMIODARONE HYDROCHLORIDE 200 MG/1
200 TABLET ORAL DAILY
Status: DISCONTINUED | OUTPATIENT
Start: 2020-03-08 | End: 2020-03-07

## 2020-03-04 RX ORDER — ATORVASTATIN CALCIUM 40 MG/1
40 TABLET, FILM COATED ORAL ONCE
Status: DISCONTINUED | OUTPATIENT
Start: 2020-03-04 | End: 2020-03-04 | Stop reason: SDUPTHER

## 2020-03-04 RX ORDER — ONDANSETRON 2 MG/ML
4 INJECTION INTRAMUSCULAR; INTRAVENOUS EVERY 8 HOURS PRN
Status: DISCONTINUED | OUTPATIENT
Start: 2020-03-04 | End: 2020-03-09 | Stop reason: HOSPADM

## 2020-03-04 RX ORDER — DEXTROSE MONOHYDRATE 25 G/50ML
12.5 INJECTION, SOLUTION INTRAVENOUS PRN
Status: DISCONTINUED | OUTPATIENT
Start: 2020-03-04 | End: 2020-03-09 | Stop reason: HOSPADM

## 2020-03-04 RX ORDER — PROTAMINE SULFATE 10 MG/ML
INJECTION, SOLUTION INTRAVENOUS PRN
Status: DISCONTINUED | OUTPATIENT
Start: 2020-03-04 | End: 2020-03-04 | Stop reason: SDUPTHER

## 2020-03-04 RX ORDER — ATORVASTATIN CALCIUM 20 MG/1
20 TABLET, FILM COATED ORAL NIGHTLY
Status: DISCONTINUED | OUTPATIENT
Start: 2020-03-05 | End: 2020-03-09 | Stop reason: HOSPADM

## 2020-03-04 RX ORDER — ASPIRIN 81 MG/1
81 TABLET ORAL DAILY
Status: DISCONTINUED | OUTPATIENT
Start: 2020-03-04 | End: 2020-03-09 | Stop reason: HOSPADM

## 2020-03-04 RX ORDER — PANTOPRAZOLE SODIUM 40 MG/1
40 TABLET, DELAYED RELEASE ORAL DAILY
Status: DISCONTINUED | OUTPATIENT
Start: 2020-03-04 | End: 2020-03-09 | Stop reason: HOSPADM

## 2020-03-04 RX ORDER — M-VIT,TX,IRON,MINS/CALC/FOLIC 27MG-0.4MG
1 TABLET ORAL
Status: DISCONTINUED | OUTPATIENT
Start: 2020-03-05 | End: 2020-03-09 | Stop reason: HOSPADM

## 2020-03-04 RX ORDER — SODIUM CHLORIDE 0.9 % (FLUSH) 0.9 %
10 SYRINGE (ML) INJECTION EVERY 12 HOURS SCHEDULED
Status: DISCONTINUED | OUTPATIENT
Start: 2020-03-04 | End: 2020-03-09 | Stop reason: HOSPADM

## 2020-03-04 RX ORDER — PHENYLEPHRINE HYDROCHLORIDE 10 MG/ML
INJECTION INTRAVENOUS PRN
Status: DISCONTINUED | OUTPATIENT
Start: 2020-03-04 | End: 2020-03-04 | Stop reason: SDUPTHER

## 2020-03-04 RX ORDER — CEFAZOLIN SODIUM 2 G/100ML
2 INJECTION, SOLUTION INTRAVENOUS EVERY 8 HOURS
Status: COMPLETED | OUTPATIENT
Start: 2020-03-04 | End: 2020-03-05

## 2020-03-04 RX ORDER — DEXAMETHASONE SODIUM PHOSPHATE 4 MG/ML
INJECTION, SOLUTION INTRA-ARTICULAR; INTRALESIONAL; INTRAMUSCULAR; INTRAVENOUS; SOFT TISSUE PRN
Status: DISCONTINUED | OUTPATIENT
Start: 2020-03-04 | End: 2020-03-04 | Stop reason: SDUPTHER

## 2020-03-04 RX ORDER — METOPROLOL TARTRATE 5 MG/5ML
2.5 INJECTION INTRAVENOUS ONCE
Status: COMPLETED | OUTPATIENT
Start: 2020-03-04 | End: 2020-03-04

## 2020-03-04 RX ORDER — SODIUM CHLORIDE 0.9 % (FLUSH) 0.9 %
10 SYRINGE (ML) INJECTION PRN
Status: DISCONTINUED | OUTPATIENT
Start: 2020-03-04 | End: 2020-03-09 | Stop reason: HOSPADM

## 2020-03-04 RX ORDER — ALBUMIN, HUMAN INJ 5% 5 %
25 SOLUTION INTRAVENOUS PRN
Status: DISCONTINUED | OUTPATIENT
Start: 2020-03-04 | End: 2020-03-09 | Stop reason: HOSPADM

## 2020-03-04 RX ORDER — METOPROLOL TARTRATE 5 MG/5ML
2.5 INJECTION INTRAVENOUS EVERY 10 MIN PRN
Status: DISCONTINUED | OUTPATIENT
Start: 2020-03-04 | End: 2020-03-09 | Stop reason: HOSPADM

## 2020-03-04 RX ORDER — SIMVASTATIN 40 MG
40 TABLET ORAL ONCE
Status: COMPLETED | OUTPATIENT
Start: 2020-03-04 | End: 2020-03-04

## 2020-03-04 RX ORDER — ALBUMIN, HUMAN INJ 5% 5 %
SOLUTION INTRAVENOUS
Status: COMPLETED
Start: 2020-03-04 | End: 2020-03-04

## 2020-03-04 RX ORDER — MIDAZOLAM HYDROCHLORIDE 1 MG/ML
INJECTION INTRAMUSCULAR; INTRAVENOUS PRN
Status: DISCONTINUED | OUTPATIENT
Start: 2020-03-04 | End: 2020-03-04 | Stop reason: SDUPTHER

## 2020-03-04 RX ORDER — ACETAMINOPHEN 650 MG/1
650 SUPPOSITORY RECTAL EVERY 4 HOURS PRN
Status: DISCONTINUED | OUTPATIENT
Start: 2020-03-04 | End: 2020-03-09 | Stop reason: HOSPADM

## 2020-03-04 RX ORDER — FUROSEMIDE 10 MG/ML
20 INJECTION INTRAMUSCULAR; INTRAVENOUS
Status: ACTIVE | OUTPATIENT
Start: 2020-03-04 | End: 2020-03-04

## 2020-03-04 RX ORDER — HEPARIN SODIUM 1000 [USP'U]/ML
INJECTION, SOLUTION INTRAVENOUS; SUBCUTANEOUS PRN
Status: DISCONTINUED | OUTPATIENT
Start: 2020-03-04 | End: 2020-03-04 | Stop reason: SDUPTHER

## 2020-03-04 RX ORDER — CHLORHEXIDINE GLUCONATE 0.12 MG/ML
30 RINSE ORAL ONCE
Status: COMPLETED | OUTPATIENT
Start: 2020-03-04 | End: 2020-03-04

## 2020-03-04 RX ORDER — ACETAMINOPHEN 325 MG/1
650 TABLET ORAL EVERY 4 HOURS PRN
Status: DISCONTINUED | OUTPATIENT
Start: 2020-03-04 | End: 2020-03-09 | Stop reason: HOSPADM

## 2020-03-04 RX ORDER — SODIUM CHLORIDE 9 MG/ML
INJECTION, SOLUTION INTRAVENOUS CONTINUOUS PRN
Status: DISCONTINUED | OUTPATIENT
Start: 2020-03-04 | End: 2020-03-04 | Stop reason: SDUPTHER

## 2020-03-04 RX ORDER — FENTANYL CITRATE 50 UG/ML
25 INJECTION, SOLUTION INTRAMUSCULAR; INTRAVENOUS
Status: ACTIVE | OUTPATIENT
Start: 2020-03-04 | End: 2020-03-05

## 2020-03-04 RX ORDER — POLYETHYLENE GLYCOL 3350 17 G/17G
17 POWDER, FOR SOLUTION ORAL DAILY
Status: DISCONTINUED | OUTPATIENT
Start: 2020-03-04 | End: 2020-03-09 | Stop reason: HOSPADM

## 2020-03-04 RX ORDER — ACETAMINOPHEN 650 MG/1
SUPPOSITORY RECTAL
Status: DISPENSED
Start: 2020-03-04 | End: 2020-03-05

## 2020-03-04 RX ORDER — SODIUM CHLORIDE, SODIUM LACTATE, POTASSIUM CHLORIDE, CALCIUM CHLORIDE 600; 310; 30; 20 MG/100ML; MG/100ML; MG/100ML; MG/100ML
INJECTION, SOLUTION INTRAVENOUS ONCE
Status: COMPLETED | OUTPATIENT
Start: 2020-03-04 | End: 2020-03-04

## 2020-03-04 RX ORDER — CARVEDILOL 3.12 MG/1
3.12 TABLET ORAL ONCE
Status: COMPLETED | OUTPATIENT
Start: 2020-03-04 | End: 2020-03-04

## 2020-03-04 RX ORDER — SUFENTANIL CITRATE 50 UG/ML
INJECTION EPIDURAL; INTRAVENOUS PRN
Status: DISCONTINUED | OUTPATIENT
Start: 2020-03-04 | End: 2020-03-04 | Stop reason: SDUPTHER

## 2020-03-04 RX ORDER — HYDRALAZINE HYDROCHLORIDE 20 MG/ML
5 INJECTION INTRAMUSCULAR; INTRAVENOUS EVERY 5 MIN PRN
Status: DISCONTINUED | OUTPATIENT
Start: 2020-03-04 | End: 2020-03-09 | Stop reason: HOSPADM

## 2020-03-04 RX ORDER — KETOROLAC TROMETHAMINE 30 MG/ML
15 INJECTION, SOLUTION INTRAMUSCULAR; INTRAVENOUS EVERY 6 HOURS PRN
Status: DISPENSED | OUTPATIENT
Start: 2020-03-04 | End: 2020-03-09

## 2020-03-04 RX ORDER — MAGNESIUM SULFATE IN WATER 40 MG/ML
2 INJECTION, SOLUTION INTRAVENOUS PRN
Status: DISCONTINUED | OUTPATIENT
Start: 2020-03-04 | End: 2020-03-09 | Stop reason: HOSPADM

## 2020-03-04 RX ORDER — ALBUMIN, HUMAN INJ 5% 5 %
12.5 SOLUTION INTRAVENOUS ONCE
Status: COMPLETED | OUTPATIENT
Start: 2020-03-04 | End: 2020-03-04

## 2020-03-04 RX ORDER — PROPOFOL 10 MG/ML
INJECTION, EMULSION INTRAVENOUS PRN
Status: DISCONTINUED | OUTPATIENT
Start: 2020-03-04 | End: 2020-03-04 | Stop reason: SDUPTHER

## 2020-03-04 RX ORDER — ROCURONIUM BROMIDE 10 MG/ML
INJECTION, SOLUTION INTRAVENOUS PRN
Status: DISCONTINUED | OUTPATIENT
Start: 2020-03-04 | End: 2020-03-04 | Stop reason: SDUPTHER

## 2020-03-04 RX ORDER — HYDROCODONE BITARTRATE AND ACETAMINOPHEN 5; 325 MG/1; MG/1
1 TABLET ORAL EVERY 4 HOURS PRN
Status: DISCONTINUED | OUTPATIENT
Start: 2020-03-04 | End: 2020-03-07

## 2020-03-04 RX ORDER — AMIODARONE HYDROCHLORIDE 200 MG/1
200 TABLET ORAL 3 TIMES DAILY
Status: DISPENSED | OUTPATIENT
Start: 2020-03-04 | End: 2020-03-08

## 2020-03-04 RX ORDER — IPRATROPIUM BROMIDE AND ALBUTEROL SULFATE 2.5; .5 MG/3ML; MG/3ML
1 SOLUTION RESPIRATORY (INHALATION)
Status: DISCONTINUED | OUTPATIENT
Start: 2020-03-04 | End: 2020-03-09 | Stop reason: HOSPADM

## 2020-03-04 RX ORDER — ACETAMINOPHEN 325 MG/1
650 TABLET ORAL EVERY 4 HOURS PRN
Status: DISCONTINUED | OUTPATIENT
Start: 2020-03-04 | End: 2020-03-04

## 2020-03-04 RX ORDER — DEXTROSE MONOHYDRATE 50 MG/ML
100 INJECTION, SOLUTION INTRAVENOUS PRN
Status: DISCONTINUED | OUTPATIENT
Start: 2020-03-04 | End: 2020-03-09 | Stop reason: HOSPADM

## 2020-03-04 RX ORDER — BISACODYL 10 MG
10 SUPPOSITORY, RECTAL RECTAL DAILY PRN
Status: DISCONTINUED | OUTPATIENT
Start: 2020-03-04 | End: 2020-03-09 | Stop reason: HOSPADM

## 2020-03-04 RX ORDER — POTASSIUM CHLORIDE 29.8 MG/ML
20 INJECTION INTRAVENOUS PRN
Status: DISCONTINUED | OUTPATIENT
Start: 2020-03-04 | End: 2020-03-09 | Stop reason: HOSPADM

## 2020-03-04 RX ORDER — LIDOCAINE HYDROCHLORIDE 20 MG/ML
INJECTION, SOLUTION INTRAVENOUS PRN
Status: DISCONTINUED | OUTPATIENT
Start: 2020-03-04 | End: 2020-03-04 | Stop reason: SDUPTHER

## 2020-03-04 RX ORDER — SODIUM CHLORIDE, SODIUM LACTATE, POTASSIUM CHLORIDE, CALCIUM CHLORIDE 600; 310; 30; 20 MG/100ML; MG/100ML; MG/100ML; MG/100ML
INJECTION, SOLUTION INTRAVENOUS CONTINUOUS PRN
Status: DISCONTINUED | OUTPATIENT
Start: 2020-03-04 | End: 2020-03-04 | Stop reason: SDUPTHER

## 2020-03-04 RX ORDER — FENTANYL CITRATE 50 UG/ML
INJECTION, SOLUTION INTRAMUSCULAR; INTRAVENOUS
Status: DISPENSED
Start: 2020-03-04 | End: 2020-03-05

## 2020-03-04 RX ORDER — SODIUM CHLORIDE 450 MG/100ML
INJECTION, SOLUTION INTRAVENOUS CONTINUOUS
Status: DISCONTINUED | OUTPATIENT
Start: 2020-03-04 | End: 2020-03-09 | Stop reason: HOSPADM

## 2020-03-04 RX ORDER — FUROSEMIDE 10 MG/ML
20 INJECTION INTRAMUSCULAR; INTRAVENOUS ONCE
Status: COMPLETED | OUTPATIENT
Start: 2020-03-04 | End: 2020-03-04

## 2020-03-04 RX ORDER — SODIUM CHLORIDE, SODIUM LACTATE, POTASSIUM CHLORIDE, CALCIUM CHLORIDE 600; 310; 30; 20 MG/100ML; MG/100ML; MG/100ML; MG/100ML
INJECTION, SOLUTION INTRAVENOUS
Status: COMPLETED
Start: 2020-03-04 | End: 2020-03-04

## 2020-03-04 RX ORDER — HYDROCODONE BITARTRATE AND ACETAMINOPHEN 5; 325 MG/1; MG/1
2 TABLET ORAL EVERY 4 HOURS PRN
Status: DISCONTINUED | OUTPATIENT
Start: 2020-03-04 | End: 2020-03-07

## 2020-03-04 RX ORDER — NITROGLYCERIN 20 MG/100ML
10 INJECTION INTRAVENOUS CONTINUOUS PRN
Status: DISCONTINUED | OUTPATIENT
Start: 2020-03-04 | End: 2020-03-09 | Stop reason: HOSPADM

## 2020-03-04 RX ORDER — SODIUM PHOSPHATE, DIBASIC AND SODIUM PHOSPHATE, MONOBASIC 7; 19 G/133ML; G/133ML
1 ENEMA RECTAL DAILY PRN
Status: DISCONTINUED | OUTPATIENT
Start: 2020-03-04 | End: 2020-03-09 | Stop reason: HOSPADM

## 2020-03-04 RX ORDER — ONDANSETRON 2 MG/ML
INJECTION INTRAMUSCULAR; INTRAVENOUS PRN
Status: DISCONTINUED | OUTPATIENT
Start: 2020-03-04 | End: 2020-03-04 | Stop reason: SDUPTHER

## 2020-03-04 RX ORDER — CARVEDILOL 3.12 MG/1
3.12 TABLET ORAL 2 TIMES DAILY
Status: DISCONTINUED | OUTPATIENT
Start: 2020-03-04 | End: 2020-03-09 | Stop reason: HOSPADM

## 2020-03-04 RX ORDER — CEFAZOLIN SODIUM 2 G/50ML
2 SOLUTION INTRAVENOUS ONCE
Status: COMPLETED | OUTPATIENT
Start: 2020-03-04 | End: 2020-03-04

## 2020-03-04 RX ORDER — NICOTINE POLACRILEX 4 MG
15 LOZENGE BUCCAL PRN
Status: DISCONTINUED | OUTPATIENT
Start: 2020-03-04 | End: 2020-03-09 | Stop reason: HOSPADM

## 2020-03-04 RX ADMIN — NOREPINEPHRINE BITARTRATE 2 MCG/MIN: 1 INJECTION, SOLUTION, CONCENTRATE INTRAVENOUS at 18:25

## 2020-03-04 RX ADMIN — IPRATROPIUM BROMIDE AND ALBUTEROL SULFATE 1 AMPULE: .5; 3 SOLUTION RESPIRATORY (INHALATION) at 19:23

## 2020-03-04 RX ADMIN — POTASSIUM CHLORIDE 20 MEQ: 29.8 INJECTION, SOLUTION INTRAVENOUS at 15:11

## 2020-03-04 RX ADMIN — HYDROCODONE BITARTRATE AND ACETAMINOPHEN 2 TABLET: 5; 325 TABLET ORAL at 22:36

## 2020-03-04 RX ADMIN — SODIUM CHLORIDE, PRESERVATIVE FREE 10 ML: 5 INJECTION INTRAVENOUS at 22:03

## 2020-03-04 RX ADMIN — CEFAZOLIN SODIUM 2 G: 2 SOLUTION INTRAVENOUS at 10:14

## 2020-03-04 RX ADMIN — ACETAMINOPHEN 650 MG: 650 SUPPOSITORY RECTAL at 15:00

## 2020-03-04 RX ADMIN — SODIUM CHLORIDE 2 UNITS/HR: 9 INJECTION, SOLUTION INTRAVENOUS at 15:14

## 2020-03-04 RX ADMIN — PROPOFOL 20 MG: 10 INJECTION, EMULSION INTRAVENOUS at 10:05

## 2020-03-04 RX ADMIN — SUFENTANIL CITRATE 10 MCG: 50 INJECTION EPIDURAL; INTRAVENOUS at 10:11

## 2020-03-04 RX ADMIN — SUFENTANIL CITRATE 20 MCG: 50 INJECTION EPIDURAL; INTRAVENOUS at 10:05

## 2020-03-04 RX ADMIN — SUFENTANIL CITRATE 10 MCG: 50 INJECTION EPIDURAL; INTRAVENOUS at 10:34

## 2020-03-04 RX ADMIN — MAGNESIUM SULFATE HEPTAHYDRATE 2 G: 40 INJECTION, SOLUTION INTRAVENOUS at 19:33

## 2020-03-04 RX ADMIN — SUFENTANIL CITRATE 10 MCG: 50 INJECTION EPIDURAL; INTRAVENOUS at 10:08

## 2020-03-04 RX ADMIN — FENTANYL CITRATE 25 MCG: 50 INJECTION INTRAMUSCULAR; INTRAVENOUS at 14:49

## 2020-03-04 RX ADMIN — SODIUM CHLORIDE, POTASSIUM CHLORIDE, SODIUM LACTATE AND CALCIUM CHLORIDE: 600; 310; 30; 20 INJECTION, SOLUTION INTRAVENOUS at 08:10

## 2020-03-04 RX ADMIN — ALBUMIN (HUMAN) 12.5 G: 12.5 INJECTION, SOLUTION INTRAVENOUS at 20:29

## 2020-03-04 RX ADMIN — DEXAMETHASONE SODIUM PHOSPHATE 4 MG: 4 INJECTION, SOLUTION INTRAMUSCULAR; INTRAVENOUS at 10:51

## 2020-03-04 RX ADMIN — AMINOCAPROIC ACID 5 G: 250 INJECTION, SOLUTION INTRAVENOUS at 10:26

## 2020-03-04 RX ADMIN — AMIODARONE HYDROCHLORIDE 150 MG: 50 INJECTION, SOLUTION INTRAVENOUS at 22:12

## 2020-03-04 RX ADMIN — SUFENTANIL CITRATE 10 MCG: 50 INJECTION EPIDURAL; INTRAVENOUS at 10:10

## 2020-03-04 RX ADMIN — AMINOCAPROIC ACID 1 G: 250 INJECTION, SOLUTION INTRAVENOUS at 12:26

## 2020-03-04 RX ADMIN — Medication: at 23:38

## 2020-03-04 RX ADMIN — MIDAZOLAM 1 MG: 1 INJECTION INTRAMUSCULAR; INTRAVENOUS at 09:55

## 2020-03-04 RX ADMIN — SIMVASTATIN 40 MG: 40 TABLET, FILM COATED ORAL at 08:16

## 2020-03-04 RX ADMIN — PROTAMINE SULFATE 300 MG: 10 INJECTION, SOLUTION INTRAVENOUS at 13:29

## 2020-03-04 RX ADMIN — ONDANSETRON 4 MG: 2 INJECTION INTRAMUSCULAR; INTRAVENOUS at 13:55

## 2020-03-04 RX ADMIN — FUROSEMIDE 20 MG: 10 INJECTION, SOLUTION INTRAVENOUS at 22:05

## 2020-03-04 RX ADMIN — HEPARIN SODIUM 30000 UNITS: 1000 INJECTION INTRAVENOUS; SUBCUTANEOUS at 11:01

## 2020-03-04 RX ADMIN — POTASSIUM CHLORIDE 20 MEQ: 29.8 INJECTION, SOLUTION INTRAVENOUS at 19:32

## 2020-03-04 RX ADMIN — MIDAZOLAM 2 MG: 1 INJECTION INTRAMUSCULAR; INTRAVENOUS at 13:20

## 2020-03-04 RX ADMIN — CHLORHEXIDINE GLUCONATE 0.12% ORAL RINSE 30 ML: 1.2 LIQUID ORAL at 08:09

## 2020-03-04 RX ADMIN — SUFENTANIL CITRATE 10 MCG: 50 INJECTION EPIDURAL; INTRAVENOUS at 10:07

## 2020-03-04 RX ADMIN — SUFENTANIL CITRATE 10 MCG: 50 INJECTION EPIDURAL; INTRAVENOUS at 10:35

## 2020-03-04 RX ADMIN — ROCURONIUM BROMIDE 30 MG: 10 INJECTION INTRAVENOUS at 13:20

## 2020-03-04 RX ADMIN — LIDOCAINE HYDROCHLORIDE 100 MG: 20 INJECTION, SOLUTION INTRAVENOUS at 10:05

## 2020-03-04 RX ADMIN — METOPROLOL TARTRATE 2.5 MG: 5 INJECTION, SOLUTION INTRAVENOUS at 19:00

## 2020-03-04 RX ADMIN — CEFAZOLIN SODIUM 2 G: 2 INJECTION, SOLUTION INTRAVENOUS at 19:46

## 2020-03-04 RX ADMIN — ROCURONIUM BROMIDE 60 MG: 10 INJECTION INTRAVENOUS at 10:07

## 2020-03-04 RX ADMIN — SODIUM CHLORIDE: 4.5 INJECTION, SOLUTION INTRAVENOUS at 15:19

## 2020-03-04 RX ADMIN — PHENYLEPHRINE HYDROCHLORIDE 50 MCG: 10 INJECTION INTRAVENOUS at 10:53

## 2020-03-04 RX ADMIN — ALBUMIN (HUMAN) 25 G: 12.5 INJECTION, SOLUTION INTRAVENOUS at 17:40

## 2020-03-04 RX ADMIN — KETOROLAC TROMETHAMINE 15 MG: 30 INJECTION, SOLUTION INTRAMUSCULAR; INTRAVENOUS at 17:54

## 2020-03-04 RX ADMIN — SODIUM CHLORIDE, POTASSIUM CHLORIDE, SODIUM LACTATE AND CALCIUM CHLORIDE: 600; 310; 30; 20 INJECTION, SOLUTION INTRAVENOUS at 10:28

## 2020-03-04 RX ADMIN — SUFENTANIL CITRATE 20 MCG: 50 INJECTION EPIDURAL; INTRAVENOUS at 10:06

## 2020-03-04 RX ADMIN — ALBUMIN (HUMAN) 12.5 G: 12.5 INJECTION, SOLUTION INTRAVENOUS at 15:20

## 2020-03-04 RX ADMIN — AMIODARONE HYDROCHLORIDE 200 MG: 200 TABLET ORAL at 21:50

## 2020-03-04 RX ADMIN — CARVEDILOL 3.12 MG: 3.12 TABLET, FILM COATED ORAL at 08:09

## 2020-03-04 RX ADMIN — ROCURONIUM BROMIDE 60 MG: 10 INJECTION INTRAVENOUS at 11:10

## 2020-03-04 RX ADMIN — SODIUM CHLORIDE, SODIUM LACTATE, POTASSIUM CHLORIDE, CALCIUM CHLORIDE: 600; 310; 30; 20 INJECTION, SOLUTION INTRAVENOUS at 08:10

## 2020-03-04 RX ADMIN — MIDAZOLAM 1 MG: 1 INJECTION INTRAMUSCULAR; INTRAVENOUS at 10:05

## 2020-03-04 RX ADMIN — SODIUM CHLORIDE: 9 INJECTION, SOLUTION INTRAVENOUS at 10:05

## 2020-03-04 RX ADMIN — PHENYLEPHRINE HYDROCHLORIDE 50 MCG: 10 INJECTION INTRAVENOUS at 10:51

## 2020-03-04 RX ADMIN — AMINOCAPROIC ACID 1 G: 250 INJECTION, SOLUTION INTRAVENOUS at 11:26

## 2020-03-04 RX ADMIN — PROPOFOL 20 MG: 10 INJECTION, EMULSION INTRAVENOUS at 10:07

## 2020-03-04 RX ADMIN — AMINOCAPROIC ACID 1 G: 250 INJECTION, SOLUTION INTRAVENOUS at 13:26

## 2020-03-04 RX ADMIN — CARVEDILOL 3.12 MG: 3.12 TABLET, FILM COATED ORAL at 21:50

## 2020-03-04 RX ADMIN — Medication 1 EACH: at 08:09

## 2020-03-04 RX ADMIN — IPRATROPIUM BROMIDE AND ALBUTEROL SULFATE 1 AMPULE: .5; 3 SOLUTION RESPIRATORY (INHALATION) at 17:15

## 2020-03-04 RX ADMIN — PROPOFOL 20 MG: 10 INJECTION, EMULSION INTRAVENOUS at 10:35

## 2020-03-04 ASSESSMENT — PULMONARY FUNCTION TESTS
PIF_VALUE: 22
PIF_VALUE: 1
PIF_VALUE: 0
PIF_VALUE: 19
PIF_VALUE: 0
PIF_VALUE: 20
PIF_VALUE: 15
PIF_VALUE: 20
PIF_VALUE: 19
PIF_VALUE: 2
PIF_VALUE: 0
PIF_VALUE: 20
PIF_VALUE: 1
PIF_VALUE: 0
PIF_VALUE: 0
PIF_VALUE: 14
PIF_VALUE: 14
PIF_VALUE: 21
PIF_VALUE: 0
PIF_VALUE: 19
PIF_VALUE: 15
PIF_VALUE: 20
PIF_VALUE: 1
PIF_VALUE: 20
PIF_VALUE: 0
PIF_VALUE: 20
PIF_VALUE: 0
PIF_VALUE: 0
PIF_VALUE: 6
PIF_VALUE: 0
PIF_VALUE: 20
PIF_VALUE: 0
PIF_VALUE: 8
PIF_VALUE: 20
PIF_VALUE: 0
PIF_VALUE: 1
PIF_VALUE: 14
PIF_VALUE: 23
PIF_VALUE: 15
PIF_VALUE: 16
PIF_VALUE: 16
PIF_VALUE: 19
PIF_VALUE: 15
PIF_VALUE: 19
PIF_VALUE: 0
PIF_VALUE: 0
PIF_VALUE: 27
PIF_VALUE: 0
PIF_VALUE: 0
PIF_VALUE: 12
PIF_VALUE: 14
PIF_VALUE: 0
PIF_VALUE: 0
PIF_VALUE: 14
PIF_VALUE: 0
PIF_VALUE: 0
PIF_VALUE: 18
PIF_VALUE: 21
PIF_VALUE: 19
PIF_VALUE: 0
PIF_VALUE: 19
PIF_VALUE: 14
PIF_VALUE: 22
PIF_VALUE: 0
PIF_VALUE: 18
PIF_VALUE: 15
PIF_VALUE: 0
PIF_VALUE: 23
PIF_VALUE: 0
PIF_VALUE: 20
PIF_VALUE: 0
PIF_VALUE: 0
PIF_VALUE: 14
PIF_VALUE: 15
PIF_VALUE: 0
PIF_VALUE: 19
PIF_VALUE: 0
PIF_VALUE: 19
PIF_VALUE: 18
PIF_VALUE: 0
PIF_VALUE: 0
PIF_VALUE: 22
PIF_VALUE: 0
PIF_VALUE: 6
PIF_VALUE: 20
PIF_VALUE: 15
PIF_VALUE: 16
PIF_VALUE: 23
PIF_VALUE: 0
PIF_VALUE: 15
PIF_VALUE: 14
PIF_VALUE: 1
PIF_VALUE: 9
PIF_VALUE: 21
PIF_VALUE: 23
PIF_VALUE: 0
PIF_VALUE: 1
PIF_VALUE: 0
PIF_VALUE: 19
PIF_VALUE: 0
PIF_VALUE: 21
PIF_VALUE: 19
PIF_VALUE: 0
PIF_VALUE: 0
PIF_VALUE: 17
PIF_VALUE: 26
PIF_VALUE: 16
PIF_VALUE: 0
PIF_VALUE: 14
PIF_VALUE: 23
PIF_VALUE: 0
PIF_VALUE: 15
PIF_VALUE: 0
PIF_VALUE: 20
PIF_VALUE: 0
PIF_VALUE: 20
PIF_VALUE: 19
PIF_VALUE: 0
PIF_VALUE: 0
PIF_VALUE: 16
PIF_VALUE: 20
PIF_VALUE: 20
PIF_VALUE: 1
PIF_VALUE: 0
PIF_VALUE: 15
PIF_VALUE: 21
PIF_VALUE: 19
PIF_VALUE: 0
PIF_VALUE: 5
PIF_VALUE: 14
PIF_VALUE: 19
PIF_VALUE: 19
PIF_VALUE: 0
PIF_VALUE: 0
PIF_VALUE: 16
PIF_VALUE: 0
PIF_VALUE: 20
PIF_VALUE: 0
PIF_VALUE: 0
PIF_VALUE: 10
PIF_VALUE: 0
PIF_VALUE: 0
PIF_VALUE: 23
PIF_VALUE: 0
PIF_VALUE: 0
PIF_VALUE: 23
PIF_VALUE: 0
PIF_VALUE: 14
PIF_VALUE: 0
PIF_VALUE: 14
PIF_VALUE: 19
PIF_VALUE: 22
PIF_VALUE: 0
PIF_VALUE: 0
PIF_VALUE: 1
PIF_VALUE: 0
PIF_VALUE: 1
PIF_VALUE: 0
PIF_VALUE: 0
PIF_VALUE: 18
PIF_VALUE: 23
PIF_VALUE: 28
PIF_VALUE: 1
PIF_VALUE: 14
PIF_VALUE: 19
PIF_VALUE: 0
PIF_VALUE: 0
PIF_VALUE: 22
PIF_VALUE: 19
PIF_VALUE: 0
PIF_VALUE: 15
PIF_VALUE: 22
PIF_VALUE: 19
PIF_VALUE: 11
PIF_VALUE: 0
PIF_VALUE: 14
PIF_VALUE: 15
PIF_VALUE: 0
PIF_VALUE: 26
PIF_VALUE: 5
PIF_VALUE: 19
PIF_VALUE: 0
PIF_VALUE: 21
PIF_VALUE: 31
PIF_VALUE: 16
PIF_VALUE: 23
PIF_VALUE: 21
PIF_VALUE: 0
PIF_VALUE: 6
PIF_VALUE: 0
PIF_VALUE: 22
PIF_VALUE: 14
PIF_VALUE: 14
PIF_VALUE: 16
PIF_VALUE: 10
PIF_VALUE: 0
PIF_VALUE: 19
PIF_VALUE: 14
PIF_VALUE: 0
PIF_VALUE: 20
PIF_VALUE: 14
PIF_VALUE: 0
PIF_VALUE: 20
PIF_VALUE: 0
PIF_VALUE: 0
PIF_VALUE: 19
PIF_VALUE: 0
PIF_VALUE: 23
PIF_VALUE: 0
PIF_VALUE: 18
PIF_VALUE: 15
PIF_VALUE: 14
PIF_VALUE: 0
PIF_VALUE: 14
PIF_VALUE: 15
PIF_VALUE: 1
PIF_VALUE: 0
PIF_VALUE: 15
PIF_VALUE: 0
PIF_VALUE: 20
PIF_VALUE: 0
PIF_VALUE: 28
PIF_VALUE: 19
PIF_VALUE: 19
PIF_VALUE: 1
PIF_VALUE: 0
PIF_VALUE: 17
PIF_VALUE: 18
PIF_VALUE: 16
PIF_VALUE: 0
PIF_VALUE: 19
PIF_VALUE: 23
PIF_VALUE: 0
PIF_VALUE: 18
PIF_VALUE: 0
PIF_VALUE: 20
PIF_VALUE: 18
PIF_VALUE: 2
PIF_VALUE: 0
PIF_VALUE: 14
PIF_VALUE: 22
PIF_VALUE: 0
PIF_VALUE: 24
PIF_VALUE: 0
PIF_VALUE: 0
PIF_VALUE: 18
PIF_VALUE: 1
PIF_VALUE: 0
PIF_VALUE: 0
PIF_VALUE: 20
PIF_VALUE: 0
PIF_VALUE: 0
PIF_VALUE: 15
PIF_VALUE: 0
PIF_VALUE: 19
PIF_VALUE: 24
PIF_VALUE: 23
PIF_VALUE: 0
PIF_VALUE: 20
PIF_VALUE: 0
PIF_VALUE: 18
PIF_VALUE: 18

## 2020-03-04 ASSESSMENT — PAIN DESCRIPTION - PAIN TYPE
TYPE: SURGICAL PAIN
TYPE: SURGICAL PAIN;CHRONIC PAIN
TYPE: SURGICAL PAIN
TYPE: SURGICAL PAIN

## 2020-03-04 ASSESSMENT — PAIN DESCRIPTION - ONSET
ONSET: GRADUAL

## 2020-03-04 ASSESSMENT — PAIN SCALES - GENERAL
PAINLEVEL_OUTOF10: 3
PAINLEVEL_OUTOF10: 9
PAINLEVEL_OUTOF10: 9
PAINLEVEL_OUTOF10: 0
PAINLEVEL_OUTOF10: 3
PAINLEVEL_OUTOF10: 4
PAINLEVEL_OUTOF10: 0
PAINLEVEL_OUTOF10: 0
PAINLEVEL_OUTOF10: 7
PAINLEVEL_OUTOF10: 3

## 2020-03-04 ASSESSMENT — PAIN DESCRIPTION - ORIENTATION
ORIENTATION: MID

## 2020-03-04 ASSESSMENT — PAIN DESCRIPTION - LOCATION
LOCATION: STERNUM
LOCATION: STERNUM
LOCATION: STERNUM;BACK
LOCATION: STERNUM

## 2020-03-04 ASSESSMENT — PAIN DESCRIPTION - FREQUENCY
FREQUENCY: CONTINUOUS
FREQUENCY: INTERMITTENT

## 2020-03-04 ASSESSMENT — PAIN - FUNCTIONAL ASSESSMENT
PAIN_FUNCTIONAL_ASSESSMENT: ACTIVITIES ARE NOT PREVENTED
PAIN_FUNCTIONAL_ASSESSMENT: ACTIVITIES ARE NOT PREVENTED
PAIN_FUNCTIONAL_ASSESSMENT: 0-10
PAIN_FUNCTIONAL_ASSESSMENT: PREVENTS OR INTERFERES SOME ACTIVE ACTIVITIES AND ADLS

## 2020-03-04 ASSESSMENT — PAIN DESCRIPTION - PROGRESSION
CLINICAL_PROGRESSION: GRADUALLY WORSENING
CLINICAL_PROGRESSION: GRADUALLY IMPROVING
CLINICAL_PROGRESSION: GRADUALLY WORSENING
CLINICAL_PROGRESSION: GRADUALLY WORSENING

## 2020-03-04 ASSESSMENT — PAIN DESCRIPTION - DESCRIPTORS
DESCRIPTORS: ACHING;SORE
DESCRIPTORS: ACHING;SORE
DESCRIPTORS: ACHING;DISCOMFORT
DESCRIPTORS: ACHING;DISCOMFORT

## 2020-03-04 NOTE — PROGRESS NOTES
Patient heart rate 150 sinus tach, blood pressure 68/43. Patient awake, alert sitting up in bed talking to this nurse and his wife. Updated Dr. Kalpesh Shepherd is updated and aware. New order to give one bottle of Albumin.

## 2020-03-04 NOTE — PLAN OF CARE
Problem: Discharge Planning:  Goal: Discharged to appropriate level of care  Description  Discharged to appropriate level of care  Outcome: Ongoing     Problem: Cardiac Output - Decreased:  Goal: Cardiac output within specified parameters  Description  Cardiac output within specified parameters  Outcome: Ongoing  Goal: Hemodynamic stability will improve  Description  Hemodynamic stability will improve  Outcome: Ongoing     Problem: Infection - Surgical Site:  Goal: Will show no infection signs and symptoms  Description  Will show no infection signs and symptoms  Outcome: Ongoing     Problem: Pain - Acute:  Goal: Pain level will decrease  Description  Pain level will decrease  Outcome: Ongoing     Problem: Venous Thromboembolism:  Goal: Will show no signs or symptoms of venous thromboembolism  Description  Will show no signs or symptoms of venous thromboembolism  Outcome: Ongoing  Goal: Absence of signs or symptoms of impaired coagulation  Description  Absence of signs or symptoms of impaired coagulation  Outcome: Ongoing     Problem: Anxiety:  Goal: Level of anxiety will decrease  Description  Level of anxiety will decrease  Outcome: Ongoing     Problem: Fluid Volume - Imbalance:  Goal: Ability to achieve a balanced intake and output will improve  Description  Ability to achieve a balanced intake and output will improve  Outcome: Ongoing  Goal: Chest tube drainage is within specified parameters  Description  Chest tube drainage is within specified parameters  Outcome: Ongoing     Problem: Gas Exchange - Impaired:  Goal: Levels of oxygenation will improve  Description  Levels of oxygenation will improve  Outcome: Ongoing  Goal: Ability to maintain adequate ventilation will improve  Description  Ability to maintain adequate ventilation will improve  Outcome: Ongoing     Problem: Tissue Perfusion - Cardiopulmonary, Altered:  Goal: Absence of angina  Description  Absence of angina  Outcome: Ongoing  Goal: Hemodynamic stability will improve  Description  Hemodynamic stability will improve  Outcome: Ongoing  Goal: Will show no evidence of cardiac arrhythmias  Description  Will show no evidence of cardiac arrhythmias  Outcome: Ongoing     Problem: Pain:  Goal: Pain level will decrease  Description  Pain level will decrease  Outcome: Ongoing  Goal: Control of acute pain  Description  Control of acute pain  Outcome: Ongoing  Goal: Control of chronic pain  Description  Control of chronic pain  Outcome: Ongoing     Problem: Tobacco Use:  Goal: Will participate in inpatient tobacco-use cessation counseling  Description  Will participate in inpatient tobacco-use cessation counseling  Outcome: Ongoing     Problem: Tissue Perfusion - Cardiopulmonary, Altered:  Goal: Absence of angina  Description  Absence of angina  Outcome: Ongoing  Goal: Hemodynamic stability will improve  Description  Hemodynamic stability will improve  Outcome: Ongoing  Goal: Will show no evidence of cardiac arrhythmias  Description  Will show no evidence of cardiac arrhythmias  Outcome: Ongoing

## 2020-03-04 NOTE — PROGRESS NOTES
Spoke with Dr. Abdullahi Cortes over phone, new order to give 2.5 mg IV Lopressor.  BP- 83/50, HR- 140

## 2020-03-04 NOTE — PROGRESS NOTES
Patient extubated to 3 lpm nc and given a breathing treatment. Belkis Bruner RN at bedside. Will continue to monitor.

## 2020-03-04 NOTE — PROGRESS NOTES
Patient meets parameters  for extubation. Patient is able to follow commands, able to lift head off of pillow, and Nif -20 and RSBI 32  at this time. Patient tolerating CPAP. ABG's WNL. Patient educated on Extubation criteria. Respiratory at bedside. Patient extubated, 4 liters of oxygen applied per NC. Breathing treatment administered per MAR. Vital signs per protocol. Will continue to monitor patient.      Electronically signed by Melissa Cisneros RN on 3/4/2020 at 5:15 PM

## 2020-03-05 ENCOUNTER — APPOINTMENT (OUTPATIENT)
Dept: GENERAL RADIOLOGY | Age: 75
DRG: 220 | End: 2020-03-05
Attending: SURGERY
Payer: MEDICARE

## 2020-03-05 LAB
ANION GAP SERPL CALCULATED.3IONS-SCNC: 7 MMOL/L (ref 4–16)
BUN BLDV-MCNC: 16 MG/DL (ref 6–23)
CALCIUM IONIZED: 5.08 MG/DL (ref 4.48–5.28)
CALCIUM SERPL-MCNC: 8.6 MG/DL (ref 8.3–10.6)
CHLORIDE BLD-SCNC: 109 MMOL/L (ref 99–110)
CO2: 22 MMOL/L (ref 21–32)
CREAT SERPL-MCNC: 0.9 MG/DL (ref 0.9–1.3)
GFR AFRICAN AMERICAN: >60 ML/MIN/1.73M2
GFR NON-AFRICAN AMERICAN: >60 ML/MIN/1.73M2
GLUCOSE BLD-MCNC: 128 MG/DL (ref 70–99)
GLUCOSE BLD-MCNC: 144 MG/DL (ref 70–99)
GLUCOSE BLD-MCNC: 148 MG/DL (ref 70–99)
GLUCOSE BLD-MCNC: 150 MG/DL (ref 70–99)
GLUCOSE BLD-MCNC: 176 MG/DL (ref 70–99)
GLUCOSE BLD-MCNC: 179 MG/DL (ref 70–99)
GLUCOSE BLD-MCNC: 183 MG/DL (ref 70–99)
GLUCOSE BLD-MCNC: 245 MG/DL (ref 70–99)
GLUCOSE BLD-MCNC: 78 MG/DL (ref 70–99)
GLUCOSE BLD-MCNC: 79 MG/DL (ref 70–99)
GLUCOSE BLD-MCNC: 85 MG/DL (ref 70–99)
GLUCOSE BLD-MCNC: 85 MG/DL (ref 70–99)
GLUCOSE BLD-MCNC: 87 MG/DL (ref 70–99)
GLUCOSE BLD-MCNC: 87 MG/DL (ref 70–99)
GLUCOSE BLD-MCNC: 92 MG/DL (ref 70–99)
GLUCOSE BLD-MCNC: 94 MG/DL (ref 70–99)
GLUCOSE BLD-MCNC: 96 MG/DL (ref 70–99)
HCT VFR BLD CALC: 26.9 % (ref 42–52)
HEMOGLOBIN: 9.1 GM/DL (ref 13.5–18)
IONIZED CA: 1.27 MMOL/L (ref 1.12–1.32)
MAGNESIUM: 2.3 MG/DL (ref 1.8–2.4)
MCH RBC QN AUTO: 32.2 PG (ref 27–31)
MCHC RBC AUTO-ENTMCNC: 33.8 % (ref 32–36)
MCV RBC AUTO: 95.1 FL (ref 78–100)
PDW BLD-RTO: 13.3 % (ref 11.7–14.9)
PLATELET # BLD: 89 K/CU MM (ref 140–440)
PMV BLD AUTO: 11.6 FL (ref 7.5–11.1)
POTASSIUM SERPL-SCNC: 3.8 MMOL/L (ref 3.5–5.1)
RBC # BLD: 2.83 M/CU MM (ref 4.6–6.2)
SODIUM BLD-SCNC: 138 MMOL/L (ref 135–145)
WBC # BLD: 13.7 K/CU MM (ref 4–10.5)

## 2020-03-05 PROCEDURE — 6370000000 HC RX 637 (ALT 250 FOR IP): Performed by: PHYSICIAN ASSISTANT

## 2020-03-05 PROCEDURE — 94761 N-INVAS EAR/PLS OXIMETRY MLT: CPT

## 2020-03-05 PROCEDURE — 97166 OT EVAL MOD COMPLEX 45 MIN: CPT

## 2020-03-05 PROCEDURE — 97530 THERAPEUTIC ACTIVITIES: CPT

## 2020-03-05 PROCEDURE — 94150 VITAL CAPACITY TEST: CPT

## 2020-03-05 PROCEDURE — 2700000000 HC OXYGEN THERAPY PER DAY

## 2020-03-05 PROCEDURE — 97162 PT EVAL MOD COMPLEX 30 MIN: CPT

## 2020-03-05 PROCEDURE — 97116 GAIT TRAINING THERAPY: CPT

## 2020-03-05 PROCEDURE — 2580000003 HC RX 258: Performed by: PHYSICIAN ASSISTANT

## 2020-03-05 PROCEDURE — 6360000002 HC RX W HCPCS: Performed by: PHYSICIAN ASSISTANT

## 2020-03-05 PROCEDURE — 82330 ASSAY OF CALCIUM: CPT

## 2020-03-05 PROCEDURE — 2100000000 HC CCU R&B

## 2020-03-05 PROCEDURE — 82962 GLUCOSE BLOOD TEST: CPT

## 2020-03-05 PROCEDURE — 71045 X-RAY EXAM CHEST 1 VIEW: CPT

## 2020-03-05 PROCEDURE — 83735 ASSAY OF MAGNESIUM: CPT

## 2020-03-05 PROCEDURE — 94640 AIRWAY INHALATION TREATMENT: CPT

## 2020-03-05 PROCEDURE — 85027 COMPLETE CBC AUTOMATED: CPT

## 2020-03-05 PROCEDURE — 2580000003 HC RX 258

## 2020-03-05 PROCEDURE — 80048 BASIC METABOLIC PNL TOTAL CA: CPT

## 2020-03-05 PROCEDURE — 6360000002 HC RX W HCPCS

## 2020-03-05 PROCEDURE — 6370000000 HC RX 637 (ALT 250 FOR IP): Performed by: SURGERY

## 2020-03-05 PROCEDURE — 6360000002 HC RX W HCPCS: Performed by: SURGERY

## 2020-03-05 RX ORDER — LISINOPRIL AND HYDROCHLOROTHIAZIDE 20; 12.5 MG/1; MG/1
TABLET ORAL
Status: ON HOLD | COMMUNITY
Start: 2019-07-24 | End: 2020-03-09 | Stop reason: HOSPADM

## 2020-03-05 RX ORDER — FUROSEMIDE 10 MG/ML
20 INJECTION INTRAMUSCULAR; INTRAVENOUS ONCE
Status: COMPLETED | OUTPATIENT
Start: 2020-03-05 | End: 2020-03-05

## 2020-03-05 RX ORDER — LIDOCAINE 4 G/G
1 PATCH TOPICAL DAILY
Status: DISCONTINUED | OUTPATIENT
Start: 2020-03-05 | End: 2020-03-09 | Stop reason: HOSPADM

## 2020-03-05 RX ORDER — GABAPENTIN 300 MG/1
600 CAPSULE ORAL 2 TIMES DAILY
COMMUNITY
Start: 2019-07-24 | End: 2020-05-18

## 2020-03-05 RX ORDER — SODIUM CHLORIDE 9 MG/ML
INJECTION, SOLUTION INTRAVENOUS
Status: COMPLETED
Start: 2020-03-05 | End: 2020-03-05

## 2020-03-05 RX ORDER — POTASSIUM CHLORIDE 29.8 MG/ML
20 INJECTION INTRAVENOUS ONCE
Status: COMPLETED | OUTPATIENT
Start: 2020-03-05 | End: 2020-03-05

## 2020-03-05 RX ORDER — ATORVASTATIN CALCIUM 40 MG/1
TABLET, FILM COATED ORAL
Status: ON HOLD | COMMUNITY
Start: 2020-02-12 | End: 2020-03-09 | Stop reason: HOSPADM

## 2020-03-05 RX ORDER — FUROSEMIDE 10 MG/ML
INJECTION INTRAMUSCULAR; INTRAVENOUS
Status: COMPLETED
Start: 2020-03-05 | End: 2020-03-05

## 2020-03-05 RX ADMIN — KETOROLAC TROMETHAMINE 15 MG: 30 INJECTION, SOLUTION INTRAMUSCULAR; INTRAVENOUS at 23:15

## 2020-03-05 RX ADMIN — KETOROLAC TROMETHAMINE 15 MG: 30 INJECTION, SOLUTION INTRAMUSCULAR; INTRAVENOUS at 07:36

## 2020-03-05 RX ADMIN — PANTOPRAZOLE SODIUM 40 MG: 40 TABLET, DELAYED RELEASE ORAL at 08:59

## 2020-03-05 RX ADMIN — SODIUM CHLORIDE: 4.5 INJECTION, SOLUTION INTRAVENOUS at 23:05

## 2020-03-05 RX ADMIN — IPRATROPIUM BROMIDE AND ALBUTEROL SULFATE 1 AMPULE: .5; 3 SOLUTION RESPIRATORY (INHALATION) at 08:00

## 2020-03-05 RX ADMIN — FUROSEMIDE 20 MG: 10 INJECTION INTRAMUSCULAR; INTRAVENOUS at 09:58

## 2020-03-05 RX ADMIN — HYDROCODONE BITARTRATE AND ACETAMINOPHEN 2 TABLET: 5; 325 TABLET ORAL at 07:35

## 2020-03-05 RX ADMIN — SODIUM CHLORIDE 500 ML: 9 INJECTION, SOLUTION INTRAVENOUS at 05:09

## 2020-03-05 RX ADMIN — Medication: at 10:05

## 2020-03-05 RX ADMIN — ASPIRIN 81 MG: 81 TABLET ORAL at 08:59

## 2020-03-05 RX ADMIN — AMIODARONE HYDROCHLORIDE 200 MG: 200 TABLET ORAL at 15:10

## 2020-03-05 RX ADMIN — FUROSEMIDE 20 MG: 10 INJECTION, SOLUTION INTRAVENOUS at 09:58

## 2020-03-05 RX ADMIN — SODIUM CHLORIDE, PRESERVATIVE FREE 10 ML: 5 INJECTION INTRAVENOUS at 21:37

## 2020-03-05 RX ADMIN — Medication: at 21:25

## 2020-03-05 RX ADMIN — HYDROCODONE BITARTRATE AND ACETAMINOPHEN 2 TABLET: 5; 325 TABLET ORAL at 12:27

## 2020-03-05 RX ADMIN — SODIUM CHLORIDE 5.5 UNITS/HR: 9 INJECTION, SOLUTION INTRAVENOUS at 13:44

## 2020-03-05 RX ADMIN — SODIUM CHLORIDE: 4.5 INJECTION, SOLUTION INTRAVENOUS at 06:53

## 2020-03-05 RX ADMIN — SODIUM CHLORIDE, PRESERVATIVE FREE 10 ML: 5 INJECTION INTRAVENOUS at 15:11

## 2020-03-05 RX ADMIN — MULTIPLE VITAMINS W/ MINERALS TAB 1 TABLET: TAB at 09:25

## 2020-03-05 RX ADMIN — CARVEDILOL 3.12 MG: 3.12 TABLET, FILM COATED ORAL at 21:25

## 2020-03-05 RX ADMIN — AMIODARONE HYDROCHLORIDE 200 MG: 200 TABLET ORAL at 21:25

## 2020-03-05 RX ADMIN — POLYETHYLENE GLYCOL (3350) 17 G: 17 POWDER, FOR SOLUTION ORAL at 08:59

## 2020-03-05 RX ADMIN — CEFAZOLIN SODIUM 2 G: 2 INJECTION, SOLUTION INTRAVENOUS at 02:23

## 2020-03-05 RX ADMIN — ATORVASTATIN CALCIUM 20 MG: 20 TABLET, FILM COATED ORAL at 21:25

## 2020-03-05 RX ADMIN — POTASSIUM CHLORIDE 20 MEQ: 29.8 INJECTION, SOLUTION INTRAVENOUS at 06:52

## 2020-03-05 RX ADMIN — IPRATROPIUM BROMIDE AND ALBUTEROL SULFATE 1 AMPULE: .5; 3 SOLUTION RESPIRATORY (INHALATION) at 15:14

## 2020-03-05 RX ADMIN — IPRATROPIUM BROMIDE AND ALBUTEROL SULFATE 1 AMPULE: .5; 3 SOLUTION RESPIRATORY (INHALATION) at 20:27

## 2020-03-05 RX ADMIN — IPRATROPIUM BROMIDE AND ALBUTEROL SULFATE 1 AMPULE: .5; 3 SOLUTION RESPIRATORY (INHALATION) at 11:52

## 2020-03-05 RX ADMIN — AMIODARONE HYDROCHLORIDE 200 MG: 200 TABLET ORAL at 08:59

## 2020-03-05 ASSESSMENT — PAIN DESCRIPTION - LOCATION
LOCATION: CHEST
LOCATION: CHEST
LOCATION: BACK;CHEST

## 2020-03-05 ASSESSMENT — PAIN SCALES - GENERAL
PAINLEVEL_OUTOF10: 5
PAINLEVEL_OUTOF10: 5
PAINLEVEL_OUTOF10: 7
PAINLEVEL_OUTOF10: 0
PAINLEVEL_OUTOF10: 0
PAINLEVEL_OUTOF10: 4
PAINLEVEL_OUTOF10: 10
PAINLEVEL_OUTOF10: 5
PAINLEVEL_OUTOF10: 10
PAINLEVEL_OUTOF10: 5
PAINLEVEL_OUTOF10: 5
PAINLEVEL_OUTOF10: 6
PAINLEVEL_OUTOF10: 10

## 2020-03-05 ASSESSMENT — PAIN DESCRIPTION - PROGRESSION
CLINICAL_PROGRESSION: GRADUALLY WORSENING
CLINICAL_PROGRESSION: GRADUALLY WORSENING
CLINICAL_PROGRESSION: NOT CHANGED

## 2020-03-05 ASSESSMENT — PAIN DESCRIPTION - FREQUENCY
FREQUENCY: INTERMITTENT

## 2020-03-05 ASSESSMENT — PAIN DESCRIPTION - ONSET
ONSET: PROGRESSIVE
ONSET: ON-GOING
ONSET: ON-GOING

## 2020-03-05 ASSESSMENT — PAIN - FUNCTIONAL ASSESSMENT
PAIN_FUNCTIONAL_ASSESSMENT: ACTIVITIES ARE NOT PREVENTED

## 2020-03-05 ASSESSMENT — PAIN DESCRIPTION - ORIENTATION
ORIENTATION: MID

## 2020-03-05 ASSESSMENT — PAIN DESCRIPTION - PAIN TYPE
TYPE: SURGICAL PAIN

## 2020-03-05 ASSESSMENT — PAIN DESCRIPTION - DESCRIPTORS
DESCRIPTORS: ACHING
DESCRIPTORS: ACHING
DESCRIPTORS: ACHING;SORE

## 2020-03-05 NOTE — OP NOTE
identified and the LIMA was anastomosed to the LAD with a continuous suture of 7-0 Prolene. The aorta was opened obliquely and the incision was extended into the noncoronary sinuses. Stay sutures were placed. Aortic valve was inspected. The aortic valve leaflets were excised. The annulus was debrided using pituitary rongeurs  The left ventricle was copiously irrigated with saline solution. The annulus was then sized and a 25mm valve was selected. Valve sutures were placed around the annulus. These were interrupted horizontal mattress sutures of 2-0 Ethibond with felt pledgets. All sutures were passed from the ventricular side to the aortic side of the annulus and then through the sewing ring of the valve. All sutures were tied and cut. The patient was rewarmed. The aortotomy was closed in two layers with a running 4-0 Prolene. The patient was placed in Trendelenburg position. The aortic root vent was placed on suction and the aortic cross-clamp was removed and bulldog removed from LIMA. The patient was then taken off cardiopulmonary bypass. After remaining stable off bypass, the venous cannula was removed from the right atrium, and protamine was given to reverse the heparin effect. Protamine was given to reverse the heparin effect. The arterial cannula was removed from the aorta and the pursestring sutures were tightened and reinforced in the usual fashion. After carefully checking for hemostasis, two 24-[place] drains and pacing wires were placed in the pericardium and brought out through a separate stab wound and sutured in place. The incision was then closed using interrupted stainless steel wires for the sternum, running 0 Vicryl for the deep subcutaneous tissue, running 3-0 Vicryl for the superficial subcutaneous tissue, and a running subcuticular suture of 4-0 Vicryl for the skin. Sterile dressings were placed over the incisions.   The patient tolerated the procedure well and was transported to the CVICU in stable condition. Instrument and sponge counts were correct at the conclusion of the case, running 0 Vicryl for the deep subcutaneous tissue, running 3-0 Vicryl for the superficial subcutaneous tissue, and a running subcuticular suture of 4-0 Vicryl for the skin. Sterile dressings were placed over the incisions. The patient tolerated the procedure well and was transported to the CCU in stable condition. Instrument and sponge counts were correct at the conclusion of the case. Blood loss was 200. Patient's family was advised regarding intraoperative findings.       Marti Husbands

## 2020-03-05 NOTE — PROGRESS NOTES
Occupational Therapy      [facility] ACUTE CARE OCCUPATIONAL THERAPY EVALUATION    Jacquelin Signs, 1945, 2007/2007-A, 3/5/2020    Discharge Recommendation: Home with initial 24 hour supervision/assistance      History:  [place]:  There were no encounter diagnoses. Subjective:  Patient states: \"I feel much better after those breathing treatments, they really help! \"  Pain: Pt reported 9/10 pain this date \"everywhere\"  Communication with other providers: PT Nelida Kennedy RN [employer]  Restrictions: General Precautions, Fall Risk, Telemetry, Pulse Ox, BP cuff, Chest tube, [place], A Line, [place], Triple Lumen, Sternal precautions    Home Setup/Prior level of function:  Social/Functional History  Lives With: Spouse  Type of Home: House  Home Layout: Two level, Able to Live on Main level with bedroom/bathroom  Home Access: Stairs to enter without rails  Entrance Stairs - Number of Steps: threshold   Bathroom Shower/Tub: Walk-in shower (2 steps into the bathroom with a handrail )  Bathroom Toilet: Standard  Bathroom Equipment: Grab bars in shower, Shower chair(pt was not using the shower chair PTA)  Home Equipment: Cane, 4 wheeled walker, Rolling walker  ADL Assistance: Independent  Homemaking Assistance: Independent  Ambulation Assistance: Independent  Transfer Assistance: Independent  Active : Yes  Occupation: Retired  Type of occupation: [employer], 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 Pleasant and agreeable to OT evaluation.    · Vision: WFL (Glasses)   · [facility]  · Vitals: Stable vitals throughout session    Body Systems and functions:  · ROM: 0-90' BL shoulder flexion this date due to sternal precautions, WFL BL UEs distally   · Strength: 4+/5 BL UEs all major muscle groups    · Sensation: WFL  · Tone: Normal  · Coordination: WFL  · Perception: WNL    Activities of Daily Living (ADLs):  · Feeding: Independent   · Grooming: SBA (Seated this date) 6=100%(CN)]     Treatment:  Pt performed functional transfers with cues for adherence to sternal precautions. Pt performed functional mobility of a HH distance. Pt educated on sternal precautions. Pt educated on role of OT, POC, and importance of OOB activity. Safety Measures: Gait belt used, Left in chair, Alarm in place, Needs in reach     Assessment:  Pt is a 69year old male with a past medical history of AAA (abdominal aortic aneurysm) ([facility] [address] Basal cell carcinoma of nose, CAD (coronary artery disease), Essential hypertension, Hyperlipidemia, Hypertension, Non-alcoholic fatty liver disease, Osteoarthritis, Spinal stenosis, Tremor, and Type 2 diabetes mellitus without complication ([facility] [address] Pt admitted for CABG x1 and AVR on 3-4. Pt lives with wife in [facility] house with 1st floor bed/bath. Prior to admission Pt was fully independent in ADLs, IADLs, and driving. Pt performed well for POD 1 and is safe from a self-care and mobility standpoint to return home with initial 24/7 supervision/assistance. Complexity: Moderate  Prognosis: Good  Plan: 3x/week      Goals:  1. Pt will complete all aspects of bed mobility for EOB/OOB ADLs Min A with good adherence to sternal precautions   2. Pt will complete UB/LB bathing CGA seated  3. Pt will complete all aspects of LB dressing Min A with use of AE PRN  4. Pt will complete all functional transfers to and from bed, chair, toilet, shower chair SBA with good adherence to sternal precautions   5. Pt will ambulate HH distance to bathroom for toileting SBA without AD   6. Pt will complete all aspects of toileting task SBA   7. Pt will complete oral hygiene/grooming routine in standing at sink SBA  8. Pt will complete ther ex/ther act with focus on cardiac rehab exercises, safety and adherence to sternal precautions, and adaptive techniques for ADLs PRN  9.  Pt will recall sternal precautions with 100% accuracy         Time:   Time in: 903  Time out: 933  Timed treatment minutes: 15  Total time: 30      Electronically signed by:  Ricco Santiago, S/OT     [id number] , MOTR/L #[id number]

## 2020-03-05 NOTE — PROGRESS NOTES
PATIENT NAME: Otoniel Ramirez    TODAY'S DATE: 03/05/20    SUBJECTIVE:    Pt is POD # 1 s/p CABG/AVR. Pt c/o incisional pain. Minimal SOB. Took a short walk. OBJECTIVE:   VITALS:    Vitals:    03/05/20 1152   BP:    Pulse:    Resp: 20   Temp:    SpO2: 90%     INTAKE/OUTPUT:    Date 03/05/20 0000 - 03/05/20 2359   Shift 9018-7671 6122-6197 4281-1014 24 Hour Total   INTAKE   P.O. 80 200  280   I. V.(mL/kg/hr) 7894(5.8)   1523   IV Piggyback 350   350   Shift Total(mL/kg) 9828(64.4) 200(2.5)  4502(25.7)   OUTPUT   Urine(mL/kg/hr) 775(1.2) 615  1390   Chest Tube 110 150  260   Shift Total(mL/kg) 885(10.8) 765(9.4)  1650(20.2)   Weight (kg) 81.6 81.6 81.6 81.6      Patient Vitals for the past 96 hrs (Last 3 readings):   Weight   03/05/20 0530 179 lb 14.3 oz (81.6 kg)   03/04/20 2045 173 lb (78.5 kg)       EXAM:  Blood pressure (!) 107/57, pulse 91, temperature 99.3 °F (37.4 °C), temperature source Core, resp. rate 20, height 5' 4\" (1.626 m), weight 179 lb 14.3 oz (81.6 kg), SpO2 90 %. General appearance: No apparent distress, appears stated age and cooperative. Skin: unremarkable  HEENT Normocephalic, atraumatic without obvious deformity. Neck: Supple, Trachea midline   Lungs: poor respiratory effort. Clear to auscultation, bilaterally  Heart: Regular rate/ rhythm inc c/d/i  Abdomen: Soft, non-tender or non-distended   Extremities: min edema warm well perfused  Neurologic: Alert, grossly intact  Mental status: normal affect      Data:  CBC:   Recent Labs     03/03/20  0837  03/04/20  1450  03/04/20  2135 03/04/20  2341 03/05/20  0515   WBC 7.0  --  13.3*  --   --   --  13.7*   HGB 14.8   < > 11.7*   < > 8.3* 8.5* 9.1*   HCT 44.8   < > 35.6*   < > 24.0* 25.0* 26.9*     --  140  RESULTS CONFIRMED BY SMEAR REVIEW    --   --   --  89*    < > = values in this interval not displayed.      BMP:    Recent Labs     03/03/20  0837  03/04/20  1450  03/04/20  2135 03/04/20  2341 03/05/20  0515      < > 140   < > 146 145 138   K 3.9   < > 3.5   < > 4.0 3.5 3.8     --  113*  --   --   --  109   CO2 25   < > 21   < > 24 24 22   BUN 12  --  14  --   --   --  16   CREATININE 0.7*   < > 0.7*   < > 1.3 1.1 0.9   GLUCOSE 239*  --  144*  --   --   --  78    < > = values in this interval not displayed. Hepatic: No results for input(s): AST, ALT, ALB, BILITOT, ALKPHOS in the last 72 hours. Mag:      Recent Labs     03/03/20  0837 03/04/20  1450 03/05/20  0515   MG 1.8 2.7* 2.3      Phos:   No results for input(s): PHOS in the last 72 hours. INR:   Recent Labs     03/03/20  0837 03/04/20  1450   INR 0.91 1.08       Radiology Review:  CXR  Impression:     Stable chest.    Stable chest tubes.  No definite pneumothorax. Stable mild cardiomegaly. Mild pulmonary vascular congestion. Low lung volumes. Mild atelectasis at the left lung base. Stable mild bilateral pleural effusions. ASSESSMENT AND PLAN:    Patient Active Problem List   Diagnosis    Abdominal aortic aneurysm (AAA) without rupture (HCC)    Tobacco abuse    Essential hypertension    Smoker    Non-alcoholic fatty liver disease    Basal cell carcinoma of nose    Type 2 diabetes mellitus without complication, without long-term current use of insulin (HCC)    Other hyperlipidemia    Nonrheumatic aortic valve stenosis    CAD in native artery       S/P CABG AVR    Cardio: on levo 3, wean as tolerated. Hold coreg for HOTN. On PO amio. Pulm: stable on RA, encourage IS  GI: stable  Renal: creatinine stable 0.9, adequate UOP, wt up. Got lasix last night. Tubes/Lines: keep tubes and lines until off pressors.    Wound: stable    Ramonita Seals PA-C

## 2020-03-05 NOTE — PROGRESS NOTES
Physical Therapy  [facility] ACUTE CARE PHYSICAL THERAPY EVALUATION  Elsi Arita, 1945, 2007/2007-A, 3/5/2020    History  [place]:  There were no encounter diagnoses. Patient  has a past medical history of AAA (abdominal aortic aneurysm) ([facility] [address] Basal cell carcinoma of nose, CAD (coronary artery disease), Essential hypertension, Hyperlipidemia, Hypertension, Non-alcoholic fatty liver disease, Osteoarthritis, Spinal stenosis, Tremor, and Type 2 diabetes mellitus without complication ([facility] [address] Patient  has a past surgical history that includes sinus surgery (5327'G); other surgical history (12/09/2016); other surgical history (01/2020); Cardiac catheterization (02/26/2020); and Coronary artery bypass graft (N/A, 03/04/2020). Subjective:  Patient states: \"Those breathing treatments are great! \"  Pain:  9/10 sternum   Communication with other providers:  Bandar MOTTA.  Co-eval with Balbina SANTANA + student Mariano SANTANA   Restrictions: sternal precautions, fall risk, central line, swan tolu, portable tele, pulse ox, A-line LUE, chest tube, valdez     Home Setup/Prior level of function  Social/Functional History  Lives With: Spouse  Type of Home: House  Home Layout: Two level, Able to Live on Main level with bedroom/bathroom  Home Access: Stairs to enter without rails  Entrance Stairs - Number of Steps: threshold   Bathroom Shower/Tub: Walk-in shower(2 steps into the bathroom with a handrail )  Bathroom Toilet: Standard  Bathroom Equipment: Grab bars in shower, Shower chair(pt was not using the shower chair PTA)  Home Equipment: Cane, 4 wheeled walker, Rolling walker  ADL Assistance: Independent  Homemaking Assistance: Independent  Ambulation Assistance: Independent  Transfer Assistance: Independent  Active : Yes  Occupation: Retired  Type of occupation: [employer], [employer], 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 Agreeable to work with therapy. Dec carry over needing moderate VCs for maintaining sternal precautions with positioning self in chair. · Vision:  WFL, glasses   · Hearing:  [facility]   · Cardiopulmonary:  Room air vitals remained stable. · Orientation: [facility]     Musculoskeletal  · ROM R/L:  WFL BLEs  · Strength R/L:  BLEs grossly 4+/5. Dec strength observed in function and endurance. · Neuro:  [facility]     Mobility/treatment:   · Rolling L/R:  NT   · Supine to sit:  NT, sitting in recliner upon arrival   · Transfers:   · Sit to stand: CGA from recliner. · Stand to sit: CGA to recliner. · Step pivot: CGA with CW   · Sitting balance:  SBA at edge of recliner   · Standing balance:  CGA at CW   · Gait: 200ft with CW CGA for safety. Fair to slower pace, dec bilat step length, no major LOB or deviations noted. · Educated pt on POC, role of PT, DME, sternal precautions, discharge recommendations. VCs for sequencing, posture, weight shift, balance, UE/LE placement to inc safety and indep with mobility. [facility] 6 Clicks Inpatient Mobility:  AM-PAC Inpatient Mobility Raw Score : 18    Safety: patient left in chair, call light within reach, RN notified, gait belt used. Assessment: Body structures, Functions, Activity limitations: Decreased functional mobility ; Decreased safe awareness; Decreased endurance; Decreased balance; Increased pain; Decreased strength  Pt is a 69year old male admitted for a planned CABG x 1 and AVR. Recommend home with 24/7 supervision/assist and  PT once medically stable. At baseline, he is indep with gross mobility and ADLs. He performed well this date though slightly below his typical baseline. He would benefit from continued therapy to address his current deficits, dec potential fall risk, and restore function. Complexity: Moderate  Prognosis: Good, no significant barriers to participation at this time.    Plan Times per week: 6+/week  Discharge

## 2020-03-05 NOTE — PROGRESS NOTES
Seen by cardiac rehab status post CABG AVR  POD 1  Patient is awake alert and up in chair  Introduced post op education   Teaching done on ankle pumps for DVT prevention  Returned demonstration  Teaching done on need for hourly use of IS followed by controlled coughing   Has been using independently       Teaching done on need for adequate caloric and protein intake for strength and wound healing   Advised to avoid fried foods  Cautioned to avoid meats with high sodium content     Teaching done on need for adequate fiber and hydration for bowel motility  Used Understanding Heart Surgery book to illustrate cardiac diet  Voiced understanding to all information provided  Will follow

## 2020-03-05 NOTE — PROGRESS NOTES
Dr. Trish Trevizo at patient bedside, new order to give Magnesium 2 g IV and Potassium 20 MEQ IV. Patient labs potassium 3.9 and magnesium 2.7.

## 2020-03-05 NOTE — PROGRESS NOTES
Dr. Benja Campbell at bedside for morning rounds. Pt examined.  Orders received:    Keep lines   Wean Levo MAP>65  Lasix 20 mg IVP x1  Lidocaine 4% patch for pain  OOB today

## 2020-03-06 ENCOUNTER — APPOINTMENT (OUTPATIENT)
Dept: GENERAL RADIOLOGY | Age: 75
DRG: 220 | End: 2020-03-06
Attending: SURGERY
Payer: MEDICARE

## 2020-03-06 LAB
ANION GAP SERPL CALCULATED.3IONS-SCNC: 8 MMOL/L (ref 4–16)
BUN BLDV-MCNC: 23 MG/DL (ref 6–23)
CALCIUM IONIZED: 5.28 MG/DL (ref 4.48–5.28)
CALCIUM SERPL-MCNC: 8.8 MG/DL (ref 8.3–10.6)
CHLORIDE BLD-SCNC: 108 MMOL/L (ref 99–110)
CO2: 22 MMOL/L (ref 21–32)
CREAT SERPL-MCNC: 0.8 MG/DL (ref 0.9–1.3)
GFR AFRICAN AMERICAN: >60 ML/MIN/1.73M2
GFR NON-AFRICAN AMERICAN: >60 ML/MIN/1.73M2
GLUCOSE BLD-MCNC: 102 MG/DL (ref 70–99)
GLUCOSE BLD-MCNC: 102 MG/DL (ref 70–99)
GLUCOSE BLD-MCNC: 109 MG/DL (ref 70–99)
GLUCOSE BLD-MCNC: 110 MG/DL (ref 70–99)
GLUCOSE BLD-MCNC: 113 MG/DL (ref 70–99)
GLUCOSE BLD-MCNC: 115 MG/DL (ref 70–99)
GLUCOSE BLD-MCNC: 147 MG/DL (ref 70–99)
GLUCOSE BLD-MCNC: 165 MG/DL (ref 70–99)
GLUCOSE BLD-MCNC: 167 MG/DL (ref 70–99)
GLUCOSE BLD-MCNC: 191 MG/DL (ref 70–99)
GLUCOSE BLD-MCNC: 87 MG/DL (ref 70–99)
GLUCOSE BLD-MCNC: 93 MG/DL (ref 70–99)
GLUCOSE BLD-MCNC: 96 MG/DL (ref 70–99)
GLUCOSE BLD-MCNC: 96 MG/DL (ref 70–99)
HCT VFR BLD CALC: 24.5 % (ref 42–52)
HEMOGLOBIN: 8 GM/DL (ref 13.5–18)
IONIZED CA: 1.32 MMOL/L (ref 1.12–1.32)
MAGNESIUM: 2 MG/DL (ref 1.8–2.4)
MCH RBC QN AUTO: 31.9 PG (ref 27–31)
MCHC RBC AUTO-ENTMCNC: 32.7 % (ref 32–36)
MCV RBC AUTO: 97.6 FL (ref 78–100)
PDW BLD-RTO: 13.8 % (ref 11.7–14.9)
PLATELET # BLD: 81 K/CU MM (ref 140–440)
PMV BLD AUTO: 11.1 FL (ref 7.5–11.1)
POTASSIUM SERPL-SCNC: 4 MMOL/L (ref 3.5–5.1)
RBC # BLD: 2.51 M/CU MM (ref 4.6–6.2)
SODIUM BLD-SCNC: 138 MMOL/L (ref 135–145)
WBC # BLD: 10.8 K/CU MM (ref 4–10.5)

## 2020-03-06 PROCEDURE — 2700000000 HC OXYGEN THERAPY PER DAY

## 2020-03-06 PROCEDURE — 83735 ASSAY OF MAGNESIUM: CPT

## 2020-03-06 PROCEDURE — 97116 GAIT TRAINING THERAPY: CPT

## 2020-03-06 PROCEDURE — 94761 N-INVAS EAR/PLS OXIMETRY MLT: CPT

## 2020-03-06 PROCEDURE — 6370000000 HC RX 637 (ALT 250 FOR IP): Performed by: PHYSICIAN ASSISTANT

## 2020-03-06 PROCEDURE — 6360000002 HC RX W HCPCS: Performed by: THORACIC SURGERY (CARDIOTHORACIC VASCULAR SURGERY)

## 2020-03-06 PROCEDURE — 2580000003 HC RX 258: Performed by: PHYSICIAN ASSISTANT

## 2020-03-06 PROCEDURE — 82330 ASSAY OF CALCIUM: CPT

## 2020-03-06 PROCEDURE — 2100000000 HC CCU R&B

## 2020-03-06 PROCEDURE — 94640 AIRWAY INHALATION TREATMENT: CPT

## 2020-03-06 PROCEDURE — 97110 THERAPEUTIC EXERCISES: CPT

## 2020-03-06 PROCEDURE — 82962 GLUCOSE BLOOD TEST: CPT

## 2020-03-06 PROCEDURE — 97530 THERAPEUTIC ACTIVITIES: CPT

## 2020-03-06 PROCEDURE — 85027 COMPLETE CBC AUTOMATED: CPT

## 2020-03-06 PROCEDURE — 6360000002 HC RX W HCPCS: Performed by: PHYSICIAN ASSISTANT

## 2020-03-06 PROCEDURE — 2580000003 HC RX 258: Performed by: THORACIC SURGERY (CARDIOTHORACIC VASCULAR SURGERY)

## 2020-03-06 PROCEDURE — 71045 X-RAY EXAM CHEST 1 VIEW: CPT

## 2020-03-06 PROCEDURE — 94150 VITAL CAPACITY TEST: CPT

## 2020-03-06 PROCEDURE — 80048 BASIC METABOLIC PNL TOTAL CA: CPT

## 2020-03-06 PROCEDURE — 6370000000 HC RX 637 (ALT 250 FOR IP): Performed by: SURGERY

## 2020-03-06 RX ORDER — FUROSEMIDE 10 MG/ML
20 INJECTION INTRAMUSCULAR; INTRAVENOUS 2 TIMES DAILY
Status: DISCONTINUED | OUTPATIENT
Start: 2020-03-06 | End: 2020-03-07

## 2020-03-06 RX ORDER — SODIUM CHLORIDE 9 MG/ML
INJECTION, SOLUTION INTRAVENOUS
Status: DISPENSED
Start: 2020-03-06 | End: 2020-03-07

## 2020-03-06 RX ADMIN — AMIODARONE HYDROCHLORIDE 200 MG: 200 TABLET ORAL at 09:30

## 2020-03-06 RX ADMIN — HYDROCODONE BITARTRATE AND ACETAMINOPHEN 2 TABLET: 5; 325 TABLET ORAL at 04:00

## 2020-03-06 RX ADMIN — MULTIPLE VITAMINS W/ MINERALS TAB 1 TABLET: TAB at 08:17

## 2020-03-06 RX ADMIN — IPRATROPIUM BROMIDE AND ALBUTEROL SULFATE 1 AMPULE: .5; 3 SOLUTION RESPIRATORY (INHALATION) at 15:32

## 2020-03-06 RX ADMIN — SODIUM CHLORIDE, PRESERVATIVE FREE 10 ML: 5 INJECTION INTRAVENOUS at 20:51

## 2020-03-06 RX ADMIN — FUROSEMIDE 20 MG: 10 INJECTION, SOLUTION INTRAVENOUS at 13:52

## 2020-03-06 RX ADMIN — Medication: at 20:51

## 2020-03-06 RX ADMIN — SODIUM CHLORIDE, PRESERVATIVE FREE 10 ML: 5 INJECTION INTRAVENOUS at 09:30

## 2020-03-06 RX ADMIN — ACETAMINOPHEN 650 MG: 325 TABLET ORAL at 17:30

## 2020-03-06 RX ADMIN — HYDROCODONE BITARTRATE AND ACETAMINOPHEN 2 TABLET: 5; 325 TABLET ORAL at 08:13

## 2020-03-06 RX ADMIN — IPRATROPIUM BROMIDE AND ALBUTEROL SULFATE 1 AMPULE: .5; 3 SOLUTION RESPIRATORY (INHALATION) at 20:02

## 2020-03-06 RX ADMIN — AMIODARONE HYDROCHLORIDE 200 MG: 200 TABLET ORAL at 20:52

## 2020-03-06 RX ADMIN — HYDROCODONE BITARTRATE AND ACETAMINOPHEN 1 TABLET: 5; 325 TABLET ORAL at 20:52

## 2020-03-06 RX ADMIN — ASPIRIN 81 MG: 81 TABLET ORAL at 08:17

## 2020-03-06 RX ADMIN — Medication: at 08:18

## 2020-03-06 RX ADMIN — MAGNESIUM SULFATE HEPTAHYDRATE 2 G: 40 INJECTION, SOLUTION INTRAVENOUS at 08:17

## 2020-03-06 RX ADMIN — AMIODARONE HYDROCHLORIDE 1 MG/MIN: 50 INJECTION, SOLUTION INTRAVENOUS at 18:45

## 2020-03-06 RX ADMIN — PANTOPRAZOLE SODIUM 40 MG: 40 TABLET, DELAYED RELEASE ORAL at 08:17

## 2020-03-06 RX ADMIN — ATORVASTATIN CALCIUM 20 MG: 20 TABLET, FILM COATED ORAL at 20:51

## 2020-03-06 RX ADMIN — MAGNESIUM SULFATE HEPTAHYDRATE 2 G: 40 INJECTION, SOLUTION INTRAVENOUS at 17:30

## 2020-03-06 RX ADMIN — AMIODARONE HYDROCHLORIDE 150 MG: 50 INJECTION, SOLUTION INTRAVENOUS at 18:35

## 2020-03-06 RX ADMIN — IPRATROPIUM BROMIDE AND ALBUTEROL SULFATE 1 AMPULE: .5; 3 SOLUTION RESPIRATORY (INHALATION) at 07:46

## 2020-03-06 RX ADMIN — IPRATROPIUM BROMIDE AND ALBUTEROL SULFATE 1 AMPULE: .5; 3 SOLUTION RESPIRATORY (INHALATION) at 11:27

## 2020-03-06 RX ADMIN — POLYETHYLENE GLYCOL (3350) 17 G: 17 POWDER, FOR SOLUTION ORAL at 08:17

## 2020-03-06 RX ADMIN — KETOROLAC TROMETHAMINE 15 MG: 30 INJECTION, SOLUTION INTRAMUSCULAR; INTRAVENOUS at 08:15

## 2020-03-06 RX ADMIN — AMIODARONE HYDROCHLORIDE 200 MG: 200 TABLET ORAL at 13:37

## 2020-03-06 RX ADMIN — FUROSEMIDE 20 MG: 10 INJECTION, SOLUTION INTRAVENOUS at 17:50

## 2020-03-06 ASSESSMENT — PAIN SCALES - GENERAL
PAINLEVEL_OUTOF10: 10
PAINLEVEL_OUTOF10: 2
PAINLEVEL_OUTOF10: 0
PAINLEVEL_OUTOF10: 5
PAINLEVEL_OUTOF10: 0
PAINLEVEL_OUTOF10: 7
PAINLEVEL_OUTOF10: 0
PAINLEVEL_OUTOF10: 2
PAINLEVEL_OUTOF10: 0
PAINLEVEL_OUTOF10: 10

## 2020-03-06 ASSESSMENT — PAIN DESCRIPTION - DESCRIPTORS
DESCRIPTORS: ACHING;SORE
DESCRIPTORS: ACHING;SORE

## 2020-03-06 ASSESSMENT — PAIN DESCRIPTION - LOCATION
LOCATION: CHEST;BACK
LOCATION: CHEST

## 2020-03-06 ASSESSMENT — PAIN DESCRIPTION - ORIENTATION
ORIENTATION: MID
ORIENTATION: MID

## 2020-03-06 ASSESSMENT — PAIN DESCRIPTION - FREQUENCY
FREQUENCY: INTERMITTENT
FREQUENCY: CONTINUOUS

## 2020-03-06 ASSESSMENT — PAIN - FUNCTIONAL ASSESSMENT: PAIN_FUNCTIONAL_ASSESSMENT: ACTIVITIES ARE NOT PREVENTED

## 2020-03-06 ASSESSMENT — PAIN DESCRIPTION - PAIN TYPE
TYPE: SURGICAL PAIN
TYPE: SURGICAL PAIN

## 2020-03-06 ASSESSMENT — PAIN DESCRIPTION - PROGRESSION
CLINICAL_PROGRESSION: GRADUALLY WORSENING
CLINICAL_PROGRESSION: GRADUALLY WORSENING

## 2020-03-06 ASSESSMENT — PAIN DESCRIPTION - ONSET
ONSET: ON-GOING
ONSET: GRADUAL

## 2020-03-06 NOTE — PROGRESS NOTES
Chavez HDZ at bedside.  Orders received;    Okay to DC SG, Introducer and Art line if pressor off and MAP>65  OK to DC chest tube after third walk

## 2020-03-06 NOTE — PROGRESS NOTES
Order received to remove chest tube per Starla HDZ. Pt informed of CT removal process. CT removed from suction. Sites cleaned with betadine. Pt instructed to take deep breath and hold. CT pulled. Instructed pt to breath. Vaseline gauze and ABD applied. Drsg secured occlusively with silk tape. Pt tolerated well.

## 2020-03-06 NOTE — PROGRESS NOTES
Dr. Marlene Spencer updated: rhythm change Afib: rate 's, temp 101.2, increased O2 to 3 liter for Sat>90.      Orders received:  Magnesium 2 grams IV

## 2020-03-06 NOTE — CONSULTS
Reason for Consult? Radha Corona is a 69y.o. year old male admitted with CAD post op CABG AVR  Allergies: Metformin and related; Amoxicillin; and Other  On insulin drip for BG control. Endocrinology consulted  HgBA1c:    Lab Results   Component Value Date    LABA1C 8.6 02/11/2020 7- A1C 6.9%  States \"They have said that I had Pre Diabetes\"  Does not recall that he has taken medications for Diabetes medications or monitored BG in the past.  Reports that he is \"Having problems with memory since last night\"  This patient's last creatinine was   Recent Labs     03/06/20  0615   CREATININE 0.8*        Recent Blood sugars have been   Lab Results   Component Value Date    POCGLU 102 03/06/2020    POCGLU 93 03/06/2020    POCGLU 87 03/06/2020    POCGLU 150 03/05/2020    POCGLU 183 03/05/2020    POCGLU 148 03/05/2020    POCGLU 128 03/05/2020    POCGLU 144 03/05/2020     Lab Results   Component Value Date    GLUCOSE 96 03/06/2020    GLUCOSE 78 03/05/2020    GLUCOSE 144 03/04/2020    GLUCOSE 239 03/03/2020    GLUCOSE 195 02/18/2020    GLUCOSE 161 02/11/2020          Janett Flores  has a past medical history of AAA (abdominal aortic aneurysm) ([facility] [address] Basal cell carcinoma of nose, CAD (coronary artery disease), Essential hypertension, Hyperlipidemia, Hypertension, Non-alcoholic fatty liver disease, Osteoarthritis, Spinal stenosis, Tremor, and Type 2 diabetes mellitus without complication ([facility] [address] Current smoker but willing to stop. States that spouse also smokes which makes it more difficult for him to quit. States that he lives with spouse. Spouse drives States \"you don't want to drive with her\"  Reports family support system does not have knowledge of Diabetes care. Patient is receptive to Home Care support for ongoing Diabetes Education. Will need BG monitor, test strips at discharge. Will need insulin injection teaching if discharged home on insulin and injection supplies.   Staff notified of

## 2020-03-06 NOTE — PROGRESS NOTES
Physical Therapy    Physical Therapy Treatment Note  Name: Champ Gaona MRN: [id number] :   1945   Date:  3/6/2020   Admission Date: 3/4/2020 Room:  -A   Restrictions/Precautions:        Cardiac  Communication with other providers:  Clari Allen RN states pt is ok to see for therapy  Subjective:  Patient states:  He is very particular with neatness around him  Pain:   Location, Type, Intensity (0/10 to 10/10):  0/10  Objective:    Observation:  Pt was resting in the bed  Treatment, including education/measures:  Transfers  Supine to sit :min A of 1 and VC's  Scooting :min A of 1  Rolling :min a of 1 and VC's  Sit to stand :min A of 1  Stand to sit :min A of 1  Gait:  Pt amb with CW for 300 ft with min a of 1 and  3 standing rest breaks  Pt needed VC's for pathway, PLB and posture  Gait Comments: with VC's' and TC's for B LE placement, walker placement and sequence throughout ambulation; with VC's and TC's to maintain upright posture in order to avoid COM shifting outside of CRISTINO; with VC's for PLB throughout ambulation  Vital Signs  HR: 73, B/P 103/61, O2 93% on NC O2 on 2L  Education:  Pt was instructed in Sternal Precautions, Purpose of Exercise Program, Gina Scale and Signs and Symptoms of Exercise Intolerance per Cardiac Protocol  Pt was instructed in Intermediate Cardiac ex program:sitting  Overhead Side Stretch x 10  Ankle Pumps/ Heel Raises x 10  Marching Seated x 10  Forward Arm Raises x 10  Side Arm Raises x 10  Arm Crosses x 10  Arm Circles Forwards and Backwards x 10  SittingTrunkTwist x 10  Safety  Patient left safely in the chair, with nursing in the room. Gait belt was used for transfers and gait.   Assessment / Impression:     Patient's tolerance of treatment:  Good, pt is moving in slow motion today but follows directions   Adverse Reaction: none  Significant change in status and impact:  none  Barriers to improvement:  Sob, endurance  Plan for Next Session:    Will cont to progress ex's

## 2020-03-06 NOTE — PROGRESS NOTES
Dr. Belkis Flores sent perfect serve regarding consult for A1C >8. Pt s/p CABG/AVR. Dr. Belkis Flores informed patient in need of diabetic supplies at discharge.

## 2020-03-06 NOTE — CARE COORDINATION
Patient has no preferences in [facility]; [facility] with referral. Cristino Solo RN    Will need BG monitor, test strips at discharge. Will need insulin injection teaching if discharged home on insulin and injection supplies. Staff notified of discharge needs.

## 2020-03-06 NOTE — PLAN OF CARE
Nutrition Problem: Inadequate oral intake  Intervention: Food and/or Nutrient Delivery: Modify current diet, Vitamin Supplement, Mineral Supplement, Start ONS  Nutritional Goals:  Patient will meet at least 75% of estimated nutrient needs during los with meals and supplements provided

## 2020-03-07 ENCOUNTER — APPOINTMENT (OUTPATIENT)
Dept: GENERAL RADIOLOGY | Age: 75
DRG: 220 | End: 2020-03-07
Attending: SURGERY
Payer: MEDICARE

## 2020-03-07 LAB
ANION GAP SERPL CALCULATED.3IONS-SCNC: 11 MMOL/L (ref 4–16)
BUN BLDV-MCNC: 26 MG/DL (ref 6–23)
CALCIUM IONIZED: 5.08 MG/DL (ref 4.48–5.28)
CALCIUM SERPL-MCNC: 8.6 MG/DL (ref 8.3–10.6)
CHLORIDE BLD-SCNC: 104 MMOL/L (ref 99–110)
CO2: 23 MMOL/L (ref 21–32)
CREAT SERPL-MCNC: 0.9 MG/DL (ref 0.9–1.3)
GFR AFRICAN AMERICAN: >60 ML/MIN/1.73M2
GFR NON-AFRICAN AMERICAN: >60 ML/MIN/1.73M2
GLUCOSE BLD-MCNC: 101 MG/DL (ref 70–99)
GLUCOSE BLD-MCNC: 101 MG/DL (ref 70–99)
GLUCOSE BLD-MCNC: 102 MG/DL (ref 70–99)
GLUCOSE BLD-MCNC: 102 MG/DL (ref 70–99)
GLUCOSE BLD-MCNC: 104 MG/DL (ref 70–99)
GLUCOSE BLD-MCNC: 115 MG/DL (ref 70–99)
GLUCOSE BLD-MCNC: 154 MG/DL (ref 70–99)
GLUCOSE BLD-MCNC: 175 MG/DL (ref 70–99)
GLUCOSE BLD-MCNC: 81 MG/DL (ref 70–99)
GLUCOSE BLD-MCNC: 95 MG/DL (ref 70–99)
GLUCOSE BLD-MCNC: 98 MG/DL (ref 70–99)
HCT VFR BLD CALC: 23.6 % (ref 42–52)
HEMOGLOBIN: 7.9 GM/DL (ref 13.5–18)
IONIZED CA: 1.27 MMOL/L (ref 1.12–1.32)
MAGNESIUM: 2.6 MG/DL (ref 1.8–2.4)
MCH RBC QN AUTO: 32 PG (ref 27–31)
MCHC RBC AUTO-ENTMCNC: 33.5 % (ref 32–36)
MCV RBC AUTO: 95.5 FL (ref 78–100)
PDW BLD-RTO: 13.6 % (ref 11.7–14.9)
PLATELET # BLD: 87 K/CU MM (ref 140–440)
PMV BLD AUTO: 11.1 FL (ref 7.5–11.1)
POTASSIUM SERPL-SCNC: 3.4 MMOL/L (ref 3.5–5.1)
POTASSIUM SERPL-SCNC: 4 MMOL/L (ref 3.5–5.1)
RBC # BLD: 2.47 M/CU MM (ref 4.6–6.2)
SODIUM BLD-SCNC: 138 MMOL/L (ref 135–145)
WBC # BLD: 10.5 K/CU MM (ref 4–10.5)

## 2020-03-07 PROCEDURE — 2580000003 HC RX 258: Performed by: PHYSICIAN ASSISTANT

## 2020-03-07 PROCEDURE — 82962 GLUCOSE BLOOD TEST: CPT

## 2020-03-07 PROCEDURE — 80048 BASIC METABOLIC PNL TOTAL CA: CPT

## 2020-03-07 PROCEDURE — 2700000000 HC OXYGEN THERAPY PER DAY

## 2020-03-07 PROCEDURE — 83735 ASSAY OF MAGNESIUM: CPT

## 2020-03-07 PROCEDURE — 97116 GAIT TRAINING THERAPY: CPT

## 2020-03-07 PROCEDURE — 6370000000 HC RX 637 (ALT 250 FOR IP): Performed by: INTERNAL MEDICINE

## 2020-03-07 PROCEDURE — 6370000000 HC RX 637 (ALT 250 FOR IP): Performed by: PHYSICIAN ASSISTANT

## 2020-03-07 PROCEDURE — 82330 ASSAY OF CALCIUM: CPT

## 2020-03-07 PROCEDURE — 94761 N-INVAS EAR/PLS OXIMETRY MLT: CPT

## 2020-03-07 PROCEDURE — 2100000000 HC CCU R&B

## 2020-03-07 PROCEDURE — 36592 COLLECT BLOOD FROM PICC: CPT

## 2020-03-07 PROCEDURE — 94640 AIRWAY INHALATION TREATMENT: CPT

## 2020-03-07 PROCEDURE — 6360000002 HC RX W HCPCS: Performed by: PHYSICIAN ASSISTANT

## 2020-03-07 PROCEDURE — 6360000002 HC RX W HCPCS: Performed by: THORACIC SURGERY (CARDIOTHORACIC VASCULAR SURGERY)

## 2020-03-07 PROCEDURE — 84132 ASSAY OF SERUM POTASSIUM: CPT

## 2020-03-07 PROCEDURE — 97110 THERAPEUTIC EXERCISES: CPT

## 2020-03-07 PROCEDURE — 2580000003 HC RX 258: Performed by: THORACIC SURGERY (CARDIOTHORACIC VASCULAR SURGERY)

## 2020-03-07 PROCEDURE — 85027 COMPLETE CBC AUTOMATED: CPT

## 2020-03-07 PROCEDURE — 6370000000 HC RX 637 (ALT 250 FOR IP): Performed by: SURGERY

## 2020-03-07 RX ORDER — FUROSEMIDE 10 MG/ML
20 INJECTION INTRAMUSCULAR; INTRAVENOUS 3 TIMES DAILY
Status: DISCONTINUED | OUTPATIENT
Start: 2020-03-07 | End: 2020-03-09 | Stop reason: HOSPADM

## 2020-03-07 RX ORDER — POTASSIUM CHLORIDE 29.8 MG/ML
20 INJECTION INTRAVENOUS ONCE
Status: COMPLETED | OUTPATIENT
Start: 2020-03-07 | End: 2020-03-07

## 2020-03-07 RX ORDER — HYDROCODONE BITARTRATE AND ACETAMINOPHEN 5; 325 MG/1; MG/1
2 TABLET ORAL EVERY 6 HOURS PRN
Status: DISCONTINUED | OUTPATIENT
Start: 2020-03-07 | End: 2020-03-09 | Stop reason: HOSPADM

## 2020-03-07 RX ORDER — GLIPIZIDE 5 MG/1
5 TABLET ORAL
Status: DISCONTINUED | OUTPATIENT
Start: 2020-03-07 | End: 2020-03-09 | Stop reason: HOSPADM

## 2020-03-07 RX ORDER — AMIODARONE HYDROCHLORIDE 200 MG/1
200 TABLET ORAL 2 TIMES DAILY
Status: DISCONTINUED | OUTPATIENT
Start: 2020-03-08 | End: 2020-03-09 | Stop reason: HOSPADM

## 2020-03-07 RX ORDER — HYDROCODONE BITARTRATE AND ACETAMINOPHEN 5; 325 MG/1; MG/1
1 TABLET ORAL EVERY 6 HOURS PRN
Status: DISCONTINUED | OUTPATIENT
Start: 2020-03-07 | End: 2020-03-09 | Stop reason: HOSPADM

## 2020-03-07 RX ORDER — BISACODYL 10 MG
10 SUPPOSITORY, RECTAL RECTAL ONCE
Status: COMPLETED | OUTPATIENT
Start: 2020-03-07 | End: 2020-03-07

## 2020-03-07 RX ADMIN — CARVEDILOL 3.12 MG: 3.12 TABLET, FILM COATED ORAL at 21:37

## 2020-03-07 RX ADMIN — INSULIN LISPRO 1 UNITS: 100 INJECTION, SOLUTION INTRAVENOUS; SUBCUTANEOUS at 17:02

## 2020-03-07 RX ADMIN — FUROSEMIDE 20 MG: 10 INJECTION, SOLUTION INTRAVENOUS at 14:35

## 2020-03-07 RX ADMIN — SODIUM CHLORIDE, PRESERVATIVE FREE 10 ML: 5 INJECTION INTRAVENOUS at 03:23

## 2020-03-07 RX ADMIN — POLYETHYLENE GLYCOL (3350) 17 G: 17 POWDER, FOR SOLUTION ORAL at 08:19

## 2020-03-07 RX ADMIN — AMIODARONE HYDROCHLORIDE 200 MG: 200 TABLET ORAL at 08:19

## 2020-03-07 RX ADMIN — ATORVASTATIN CALCIUM 20 MG: 20 TABLET, FILM COATED ORAL at 21:37

## 2020-03-07 RX ADMIN — HYDROCODONE BITARTRATE AND ACETAMINOPHEN 2 TABLET: 5; 325 TABLET ORAL at 14:51

## 2020-03-07 RX ADMIN — HYDROCODONE BITARTRATE AND ACETAMINOPHEN 2 TABLET: 5; 325 TABLET ORAL at 23:53

## 2020-03-07 RX ADMIN — BISACODYL 10 MG: 10 SUPPOSITORY RECTAL at 14:10

## 2020-03-07 RX ADMIN — GLIPIZIDE 5 MG: 5 TABLET ORAL at 17:02

## 2020-03-07 RX ADMIN — Medication: at 21:37

## 2020-03-07 RX ADMIN — AMIODARONE HYDROCHLORIDE 0.5 MG/MIN: 50 INJECTION, SOLUTION INTRAVENOUS at 03:11

## 2020-03-07 RX ADMIN — SODIUM CHLORIDE, PRESERVATIVE FREE 10 ML: 5 INJECTION INTRAVENOUS at 08:25

## 2020-03-07 RX ADMIN — POTASSIUM CHLORIDE 20 MEQ: 29.8 INJECTION, SOLUTION INTRAVENOUS at 09:39

## 2020-03-07 RX ADMIN — HYDROCODONE BITARTRATE AND ACETAMINOPHEN 2 TABLET: 5; 325 TABLET ORAL at 06:17

## 2020-03-07 RX ADMIN — IPRATROPIUM BROMIDE AND ALBUTEROL SULFATE 1 AMPULE: .5; 3 SOLUTION RESPIRATORY (INHALATION) at 05:03

## 2020-03-07 RX ADMIN — AMIODARONE HYDROCHLORIDE 200 MG: 200 TABLET ORAL at 14:35

## 2020-03-07 RX ADMIN — FUROSEMIDE 20 MG: 10 INJECTION, SOLUTION INTRAVENOUS at 21:37

## 2020-03-07 RX ADMIN — IPRATROPIUM BROMIDE AND ALBUTEROL SULFATE 1 AMPULE: .5; 3 SOLUTION RESPIRATORY (INHALATION) at 08:04

## 2020-03-07 RX ADMIN — HYDROCODONE BITARTRATE AND ACETAMINOPHEN 1 TABLET: 5; 325 TABLET ORAL at 02:15

## 2020-03-07 RX ADMIN — AMIODARONE HYDROCHLORIDE 200 MG: 200 TABLET ORAL at 21:37

## 2020-03-07 RX ADMIN — SODIUM CHLORIDE, PRESERVATIVE FREE 10 ML: 5 INJECTION INTRAVENOUS at 21:37

## 2020-03-07 RX ADMIN — IPRATROPIUM BROMIDE AND ALBUTEROL SULFATE 1 AMPULE: .5; 3 SOLUTION RESPIRATORY (INHALATION) at 11:27

## 2020-03-07 RX ADMIN — IPRATROPIUM BROMIDE AND ALBUTEROL SULFATE 1 AMPULE: .5; 3 SOLUTION RESPIRATORY (INHALATION) at 16:56

## 2020-03-07 RX ADMIN — ASPIRIN 81 MG: 81 TABLET ORAL at 08:18

## 2020-03-07 RX ADMIN — Medication: at 08:19

## 2020-03-07 RX ADMIN — FUROSEMIDE 20 MG: 10 INJECTION, SOLUTION INTRAVENOUS at 08:19

## 2020-03-07 RX ADMIN — PANTOPRAZOLE SODIUM 40 MG: 40 TABLET, DELAYED RELEASE ORAL at 08:19

## 2020-03-07 RX ADMIN — INSULIN LISPRO 1 UNITS: 100 INJECTION, SOLUTION INTRAVENOUS; SUBCUTANEOUS at 12:10

## 2020-03-07 RX ADMIN — IPRATROPIUM BROMIDE AND ALBUTEROL SULFATE 1 AMPULE: .5; 3 SOLUTION RESPIRATORY (INHALATION) at 19:19

## 2020-03-07 RX ADMIN — MULTIPLE VITAMINS W/ MINERALS TAB 1 TABLET: TAB at 08:19

## 2020-03-07 RX ADMIN — CARVEDILOL 3.12 MG: 3.12 TABLET, FILM COATED ORAL at 08:19

## 2020-03-07 RX ADMIN — KETOROLAC TROMETHAMINE 15 MG: 30 INJECTION, SOLUTION INTRAMUSCULAR; INTRAVENOUS at 03:23

## 2020-03-07 ASSESSMENT — PAIN DESCRIPTION - ONSET
ONSET: ON-GOING
ONSET: ON-GOING
ONSET: AWAKENED FROM SLEEP
ONSET: ON-GOING
ONSET: AWAKENED FROM SLEEP

## 2020-03-07 ASSESSMENT — PAIN DESCRIPTION - DESCRIPTORS
DESCRIPTORS: ACHING;DISCOMFORT;SORE
DESCRIPTORS: ACHING;DISCOMFORT;SORE
DESCRIPTORS: DISCOMFORT
DESCRIPTORS: SORE
DESCRIPTORS: ACHING;DISCOMFORT;SORE
DESCRIPTORS: DISCOMFORT;ACHING;SORE

## 2020-03-07 ASSESSMENT — PAIN DESCRIPTION - ORIENTATION
ORIENTATION: RIGHT;MID
ORIENTATION: MID
ORIENTATION: RIGHT;MID;LOWER
ORIENTATION: MID

## 2020-03-07 ASSESSMENT — PAIN DESCRIPTION - PROGRESSION
CLINICAL_PROGRESSION: GRADUALLY IMPROVING
CLINICAL_PROGRESSION: GRADUALLY WORSENING
CLINICAL_PROGRESSION: GRADUALLY IMPROVING
CLINICAL_PROGRESSION: NOT CHANGED
CLINICAL_PROGRESSION: NOT CHANGED
CLINICAL_PROGRESSION: GRADUALLY IMPROVING
CLINICAL_PROGRESSION: GRADUALLY WORSENING
CLINICAL_PROGRESSION: GRADUALLY IMPROVING
CLINICAL_PROGRESSION: GRADUALLY WORSENING

## 2020-03-07 ASSESSMENT — PAIN SCALES - GENERAL
PAINLEVEL_OUTOF10: 7
PAINLEVEL_OUTOF10: 7
PAINLEVEL_OUTOF10: 3
PAINLEVEL_OUTOF10: 5
PAINLEVEL_OUTOF10: 0
PAINLEVEL_OUTOF10: 5
PAINLEVEL_OUTOF10: 7
PAINLEVEL_OUTOF10: 0
PAINLEVEL_OUTOF10: 4
PAINLEVEL_OUTOF10: 0
PAINLEVEL_OUTOF10: 0
PAINLEVEL_OUTOF10: 7
PAINLEVEL_OUTOF10: 0
PAINLEVEL_OUTOF10: 10

## 2020-03-07 ASSESSMENT — PAIN DESCRIPTION - LOCATION
LOCATION: BACK;CHEST
LOCATION: BACK;CHEST
LOCATION: CHEST;BACK

## 2020-03-07 ASSESSMENT — PAIN - FUNCTIONAL ASSESSMENT
PAIN_FUNCTIONAL_ASSESSMENT: ACTIVITIES ARE NOT PREVENTED
PAIN_FUNCTIONAL_ASSESSMENT: PREVENTS OR INTERFERES SOME ACTIVE ACTIVITIES AND ADLS
PAIN_FUNCTIONAL_ASSESSMENT: ACTIVITIES ARE NOT PREVENTED
PAIN_FUNCTIONAL_ASSESSMENT: ACTIVITIES ARE NOT PREVENTED

## 2020-03-07 ASSESSMENT — PAIN DESCRIPTION - FREQUENCY
FREQUENCY: CONTINUOUS

## 2020-03-07 ASSESSMENT — PAIN DESCRIPTION - PAIN TYPE
TYPE: SURGICAL PAIN;CHRONIC PAIN
TYPE: SURGICAL PAIN
TYPE: SURGICAL PAIN;CHRONIC PAIN

## 2020-03-07 NOTE — PROGRESS NOTES
This RN spoke with Ryne HDZ, New Orders received. Discontinued Strict bed rest.  O2 trend study for home O2 if needed. OK to give AM Carvedilol. Patient Educated on Discontinued Insulin drip and new orders per Dr. Salas [place]. Patient verbalizes good understanding at this time.

## 2020-03-07 NOTE — PROGRESS NOTES
03/05/20  0515 03/06/20  0615 03/07/20  0605   MG 2.3 2.0 2.6*      Phos:   No results for input(s): PHOS in the last 72 hours. INR:   Recent Labs     03/04/20  1450   INR 1.08       Radiology Review:  CXR  Impression:     1. Low lung volumes with bilateral pleural effusions, left side greater than  right, with associated bibasilar atelectasis, not appreciably changed. 2. Mild pulmonary vascular congestion, stable. 3. No evidence of a pneumothorax. ASSESSMENT AND PLAN:    Patient Active Problem List   Diagnosis    Abdominal aortic aneurysm (AAA) without rupture (HCC)    Tobacco abuse    Essential hypertension    Smoker    Non-alcoholic fatty liver disease    Basal cell carcinoma of nose    Type 2 diabetes mellitus without complication, without long-term current use of insulin (HCC)    Other hyperlipidemia    Nonrheumatic aortic valve stenosis    CAD in native artery       S/P CABG AVR    Cardio: a fib, converted to NSR with amio bolus and gtt, continue amio gtt x 24 hrs. Continue coreg and PO amio. Pulm: req O2 at night, home O2 night trend ordered. Encourage IS. GI: suppository today  Renal: creatinine stable 0.9, adequate UOP, increase lasix to TID.    Tubes/Lines: DC pacing wires  Wound: stable    Anticipate DC home 1-2 days    Nika Vicente PA-C

## 2020-03-07 NOTE — PROGRESS NOTES
Progress Note( Dr. Missael Forde)  3/7/2020  Subjective:   Admit Date: 3/4/2020  PCP: Micheal Mckenzie MD    Admitted For : Chest pain/ CABG 3/4/2020    Consulted For: post op day 3     Interval History: Feels better more ambulatory     Denies any chest pains,   Denies SOB . Denies nausea or vomiting. No new bowel or bladder symptoms. Intake/Output Summary (Last 24 hours) at 3/7/2020 0719  Last data filed at 3/7/2020 0600  Gross per 24 hour   Intake 1758.44 ml   Output 1415 ml   Net 343.44 ml       DATA    CBC:   Recent Labs     03/05/20  0515 03/06/20  0615 03/07/20  0605   WBC 13.7* 10.8* 10.5   HGB 9.1* 8.0* 7.9*   PLT 89* 81* 87*    CMP:  Recent Labs     03/04/20  1450  03/04/20  2341 03/05/20  0515 03/06/20  0615      < > 145 138 138   K 3.5   < > 3.5 3.8 4.0   *  --   --  109 108   CO2 21   < > 24 22 22   BUN 14  --   --  16 23   CREATININE 0.7*   < > 1.1 0.9 0.8*   CALCIUM 8.6  --   --  8.6 8.8    < > = values in this interval not displayed. Lipids:   Lab Results   Component Value Date    CHOL 223 02/11/2020    HDL 50 02/11/2020    TRIG 289 02/11/2020     Glucose:  Recent Labs     03/07/20  0315 03/07/20  0451 03/07/20  0608   POCGLU 102* 95 98     [id number]:  Lab Results   Component Value Date    LABA1C 8.6 02/11/2020     High Sensitivity TSH: No results found for: TSHHS  Free T3: No results found for: FT3  Free T4:No results found for: T4FREE    Ct Abdomen Wo Contrast Additional Contrast? None    Result Date: 3/3/2020  EXAMINATION: CT ABDOMEN WITHOUT CONTRAST 3/3/2020 10:58 am TECHNIQUE: CT of the abdomen was performed without the administration of intravenous contrast. Multiplanar reformatted images are provided for review. Dose modulation, iterative reconstruction, and/or weight based adjustment of the mA/kV was utilized to reduce the radiation dose to as low as reasonably achievable.  COMPARISON: 11/28/2018 HISTORY: ORDERING SYSTEM PROVIDED HISTORY: Monitor AAA TECHNOLOGIST PROVIDED HISTORY: Reason for exam:->Monitor AAA Additional Contrast?->None Reason for Exam: Monitor AAA Acuity: Acute Type of Exam: Initial FINDINGS: Lower Chest: Lung bases clear Organs: Mild low-attenuation of the liver with sparing adjacent to the gallbladder. 1.5 cm low-attenuation right adrenal nodule. 5 mm left renal cyst.  Right kidney, left adrenal, spleen, pancreas, gallbladder have an unremarkable noncontrast appearance. GI/Bowel: Colonic diverticula Peritoneum/Retroperitoneum: Aneurysmal dilatation of the infrarenal abdominal aorta, measuring 4.7 x 4.3 cm in the axial plane measured at the same level as previously. No retroperitoneal adenopathy Bones/Soft Tissues: No significant bony abnormality     1. 4.7 x 4.3 cm abdominal aortic aneurysm,  increased in size over the interval 2. Mild hepatic steatosis 3. Right adrenal adenoma 4. Colonic diverticulosis RECOMMENDATIONS: 4.7 cm AAA Recommend vascular consultation and follow-up every 6 months. Reference: J Vasc Surg 2009 Oct;50(4 Suppl):S2-49. Xr Chest Portable    Result Date: 3/6/2020  EXAMINATION: ONE XRAY VIEW OF THE CHEST 3/6/2020 5:25 am COMPARISON: 03/05/2020 HISTORY: ORDERING SYSTEM PROVIDED HISTORY: TECHNOLOGIST PROVIDED HISTORY: Reason for Exam:->Post op open heart surgery Reason for Exam: post op open heart Acuity: Unknown Type of Exam: Subsequent/Follow-up Additional signs and symptoms: post op open heart Relevant Medical/Surgical History: post op open heart FINDINGS: The cardiac silhouette and mediastinal contours are stable. Median sternotomy wires are noted. Left subclavian central venous line is now noted. The [place]-Grant catheter has been removed. There is a mediastinal drain and left-sided chest tube. No evidence of a pneumothorax. The lung volumes are low with mild pulmonary vascular congestion, small bilateral pleural effusions, and associated bibasilar atelectasis, left side greater than right.  Calcified granulomas noted in the enlarged but stable. Endotracheal tube and enteric tubes have been removed. Mediastinal drain and left-sided thoracostomy tube remain in place. Left-sided subclavian central venous catheter remains in place with tip at the distal superior vena cava. Left-sided subclavian swans Grant catheter also remains in place with tip in the expected location of the main pulmonary artery. Postoperative changes of valve replacement. There is central pulmonary vascular congestion and mild patchy interstitial opacities favored to reflect edema. Left effusion and atelectasis. No pneumothorax. Postoperative changes of median sternotomy. 1. Removal of endotracheal tube and enteric tube. Remainder of lines and tubes appear grossly stable as above. 2. Findings suggestive of mild pulmonary edema and likely small left effusion and atelectasis. Xr Chest Portable    Result Date: 3/4/2020  EXAMINATION: ONE XRAY VIEW OF THE CHEST 3/4/2020 2:28 pm COMPARISON: 02/18/2020. HISTORY: ORDERING SYSTEM PROVIDED HISTORY: Post op open heart surgery TECHNOLOGIST PROVIDED HISTORY: Reason for exam:->Post op open heart surgery Reason for Exam: Post op open heart surgery Acuity: Acute Type of Exam: Initial Additional signs and symptoms: na Relevant Medical/Surgical History: cad, diabetes FINDINGS: There are low lung volumes. There are postsurgical changes of median sternotomy. The endotracheal tube is seen with its tip above the lianne. Nasogastric tube courses below the diaphragm. There is a left subclavian central venous line seen with its tip in the superior vena cava. There is also a left subclavian [place]-Grant catheter with tip in the main pulmonary artery. There are chest tubes in place. The heart size and pulmonary vasculature are stable. There is increased density involving the lower lung zones. No pneumothoraces are seen. There are scattered calcified granulomas.      1. Status post open heart surgery with tubes and lines as 4\" (1.626 m)   Wt 180 lb 12.4 oz (82 kg)   SpO2 94%   BMI 31.03 kg/m²   General appearance: alert and cooperative with exam  Neck: no JVD or bruit  Thyroid : Normal lobes   Lungs: Has Vesicular Breath sounds removed chest   Heart:  regular rate and rhythm  Abdomen: soft, non-tender; bowel sounds normal; no masses,  no organomegaly  Musculoskeletal: Normal  Extremities: extremities normal, , no edema  Neurologic:  Awake, alert, oriented to name, place and time. Cranial nerves II-XII are grossly intact. Motor is  intact. Sensory is intact. ,  and gait is normal.    Assessment:     Patient Active Problem List:     Abdominal aortic aneurysm (AAA) without rupture (HCC)     Tobacco abuse     Essential hypertension     Smoker     Non-alcoholic fatty liver disease     Basal cell carcinoma of nose     Type 2 diabetes mellitus without complication, without long-term current use of insulin (HCC)     Other hyperlipidemia     Nonrheumatic aortic valve stenosis     CAD in native artery/ CABG       Plan:     1. Reviewed POC blood glucose . Labs and X ray results   2. Reviewed Current Medicines  3. D.C IV insulin infusion    4. On meal/ Correction bolus Humalog/ Basal Lantus Insulin regim  5. Monitor Blood glucose frequently   6. Modified  the dose of Insulin/ other medicines as needed   7. Will follow     .      Mónica Yao MD

## 2020-03-07 NOTE — FLOWSHEET NOTE
Spoke with Dr. Brady Wills via phone, states to leave in pacer wires tonight, and RN may pull tomorrow AM after 0800. Patient to have PA/Lat CXR tomorrow after pacer wires have been removed. Clarissa Sellers RN aware.     Han Bradley RN 5:56 PM

## 2020-03-08 ENCOUNTER — APPOINTMENT (OUTPATIENT)
Dept: GENERAL RADIOLOGY | Age: 75
DRG: 220 | End: 2020-03-08
Attending: SURGERY
Payer: MEDICARE

## 2020-03-08 LAB
ANION GAP SERPL CALCULATED.3IONS-SCNC: 12 MMOL/L (ref 4–16)
BUN BLDV-MCNC: 23 MG/DL (ref 6–23)
CALCIUM IONIZED: 5 MG/DL (ref 4.48–5.28)
CALCIUM SERPL-MCNC: 9.1 MG/DL (ref 8.3–10.6)
CHLORIDE BLD-SCNC: 102 MMOL/L (ref 99–110)
CO2: 25 MMOL/L (ref 21–32)
CREAT SERPL-MCNC: 0.9 MG/DL (ref 0.9–1.3)
GFR AFRICAN AMERICAN: >60 ML/MIN/1.73M2
GFR NON-AFRICAN AMERICAN: >60 ML/MIN/1.73M2
GLUCOSE BLD-MCNC: 102 MG/DL (ref 70–99)
GLUCOSE BLD-MCNC: 103 MG/DL (ref 70–99)
GLUCOSE BLD-MCNC: 117 MG/DL (ref 70–99)
GLUCOSE BLD-MCNC: 140 MG/DL (ref 70–99)
GLUCOSE BLD-MCNC: 153 MG/DL (ref 70–99)
GLUCOSE BLD-MCNC: 155 MG/DL (ref 70–99)
IONIZED CA: 1.25 MMOL/L (ref 1.12–1.32)
MAGNESIUM: 2 MG/DL (ref 1.8–2.4)
POTASSIUM SERPL-SCNC: 3.7 MMOL/L (ref 3.5–5.1)
SODIUM BLD-SCNC: 139 MMOL/L (ref 135–145)

## 2020-03-08 PROCEDURE — 6370000000 HC RX 637 (ALT 250 FOR IP): Performed by: SURGERY

## 2020-03-08 PROCEDURE — 6370000000 HC RX 637 (ALT 250 FOR IP): Performed by: PHYSICIAN ASSISTANT

## 2020-03-08 PROCEDURE — 2580000003 HC RX 258: Performed by: PHYSICIAN ASSISTANT

## 2020-03-08 PROCEDURE — 82962 GLUCOSE BLOOD TEST: CPT

## 2020-03-08 PROCEDURE — 94762 N-INVAS EAR/PLS OXIMTRY CONT: CPT

## 2020-03-08 PROCEDURE — 94761 N-INVAS EAR/PLS OXIMETRY MLT: CPT

## 2020-03-08 PROCEDURE — 6360000002 HC RX W HCPCS: Performed by: PHYSICIAN ASSISTANT

## 2020-03-08 PROCEDURE — 36592 COLLECT BLOOD FROM PICC: CPT

## 2020-03-08 PROCEDURE — 94640 AIRWAY INHALATION TREATMENT: CPT

## 2020-03-08 PROCEDURE — 2100000000 HC CCU R&B

## 2020-03-08 PROCEDURE — 83735 ASSAY OF MAGNESIUM: CPT

## 2020-03-08 PROCEDURE — 82330 ASSAY OF CALCIUM: CPT

## 2020-03-08 PROCEDURE — 80048 BASIC METABOLIC PNL TOTAL CA: CPT

## 2020-03-08 PROCEDURE — 71046 X-RAY EXAM CHEST 2 VIEWS: CPT

## 2020-03-08 PROCEDURE — 6370000000 HC RX 637 (ALT 250 FOR IP): Performed by: INTERNAL MEDICINE

## 2020-03-08 RX ORDER — LORAZEPAM 2 MG/ML
INJECTION INTRAMUSCULAR
Status: DISPENSED
Start: 2020-03-08 | End: 2020-03-09

## 2020-03-08 RX ADMIN — SODIUM CHLORIDE, PRESERVATIVE FREE 10 ML: 5 INJECTION INTRAVENOUS at 20:22

## 2020-03-08 RX ADMIN — IPRATROPIUM BROMIDE AND ALBUTEROL SULFATE 1 AMPULE: .5; 3 SOLUTION RESPIRATORY (INHALATION) at 11:24

## 2020-03-08 RX ADMIN — FUROSEMIDE 20 MG: 10 INJECTION, SOLUTION INTRAVENOUS at 08:24

## 2020-03-08 RX ADMIN — HYDROCODONE BITARTRATE AND ACETAMINOPHEN 2 TABLET: 5; 325 TABLET ORAL at 08:24

## 2020-03-08 RX ADMIN — CARVEDILOL 3.12 MG: 3.12 TABLET, FILM COATED ORAL at 08:23

## 2020-03-08 RX ADMIN — INSULIN LISPRO 1 UNITS: 100 INJECTION, SOLUTION INTRAVENOUS; SUBCUTANEOUS at 16:56

## 2020-03-08 RX ADMIN — ATORVASTATIN CALCIUM 20 MG: 20 TABLET, FILM COATED ORAL at 20:22

## 2020-03-08 RX ADMIN — INSULIN LISPRO 1 UNITS: 100 INJECTION, SOLUTION INTRAVENOUS; SUBCUTANEOUS at 11:57

## 2020-03-08 RX ADMIN — ASPIRIN 81 MG: 81 TABLET ORAL at 08:39

## 2020-03-08 RX ADMIN — PANTOPRAZOLE SODIUM 40 MG: 40 TABLET, DELAYED RELEASE ORAL at 08:23

## 2020-03-08 RX ADMIN — FUROSEMIDE 20 MG: 10 INJECTION, SOLUTION INTRAVENOUS at 14:25

## 2020-03-08 RX ADMIN — IPRATROPIUM BROMIDE AND ALBUTEROL SULFATE 1 AMPULE: .5; 3 SOLUTION RESPIRATORY (INHALATION) at 15:37

## 2020-03-08 RX ADMIN — Medication: at 20:21

## 2020-03-08 RX ADMIN — AMIODARONE HYDROCHLORIDE 200 MG: 200 TABLET ORAL at 08:24

## 2020-03-08 RX ADMIN — IPRATROPIUM BROMIDE AND ALBUTEROL SULFATE 1 AMPULE: .5; 3 SOLUTION RESPIRATORY (INHALATION) at 20:16

## 2020-03-08 RX ADMIN — SODIUM CHLORIDE, PRESERVATIVE FREE 10 ML: 5 INJECTION INTRAVENOUS at 08:39

## 2020-03-08 RX ADMIN — MAGNESIUM SULFATE HEPTAHYDRATE 2 G: 40 INJECTION, SOLUTION INTRAVENOUS at 08:24

## 2020-03-08 RX ADMIN — AMIODARONE HYDROCHLORIDE 200 MG: 200 TABLET ORAL at 20:21

## 2020-03-08 RX ADMIN — MULTIPLE VITAMINS W/ MINERALS TAB 1 TABLET: TAB at 08:23

## 2020-03-08 RX ADMIN — HYDROCODONE BITARTRATE AND ACETAMINOPHEN 2 TABLET: 5; 325 TABLET ORAL at 20:21

## 2020-03-08 RX ADMIN — IPRATROPIUM BROMIDE AND ALBUTEROL SULFATE 1 AMPULE: .5; 3 SOLUTION RESPIRATORY (INHALATION) at 07:24

## 2020-03-08 RX ADMIN — FUROSEMIDE 20 MG: 10 INJECTION, SOLUTION INTRAVENOUS at 20:21

## 2020-03-08 RX ADMIN — Medication: at 08:23

## 2020-03-08 RX ADMIN — GLIPIZIDE 5 MG: 5 TABLET ORAL at 16:56

## 2020-03-08 RX ADMIN — POTASSIUM CHLORIDE 20 MEQ: 29.8 INJECTION, SOLUTION INTRAVENOUS at 08:27

## 2020-03-08 RX ADMIN — CARVEDILOL 3.12 MG: 3.12 TABLET, FILM COATED ORAL at 20:21

## 2020-03-08 RX ADMIN — KETOROLAC TROMETHAMINE 15 MG: 30 INJECTION, SOLUTION INTRAMUSCULAR; INTRAVENOUS at 08:24

## 2020-03-08 ASSESSMENT — PAIN SCALES - GENERAL
PAINLEVEL_OUTOF10: 10
PAINLEVEL_OUTOF10: 0
PAINLEVEL_OUTOF10: 0
PAINLEVEL_OUTOF10: 7
PAINLEVEL_OUTOF10: 5
PAINLEVEL_OUTOF10: 0
PAINLEVEL_OUTOF10: 2
PAINLEVEL_OUTOF10: 10
PAINLEVEL_OUTOF10: 0

## 2020-03-08 ASSESSMENT — PAIN DESCRIPTION - ONSET
ONSET: ON-GOING
ONSET: ON-GOING

## 2020-03-08 ASSESSMENT — PAIN DESCRIPTION - PAIN TYPE: TYPE: ACUTE PAIN

## 2020-03-08 ASSESSMENT — PAIN DESCRIPTION - DESCRIPTORS
DESCRIPTORS: ACHING
DESCRIPTORS: ACHING

## 2020-03-08 ASSESSMENT — PAIN DESCRIPTION - LOCATION
LOCATION: CHEST
LOCATION: CHEST

## 2020-03-08 ASSESSMENT — PAIN DESCRIPTION - ORIENTATION
ORIENTATION: MID
ORIENTATION: MID

## 2020-03-08 ASSESSMENT — PAIN - FUNCTIONAL ASSESSMENT: PAIN_FUNCTIONAL_ASSESSMENT: ACTIVITIES ARE NOT PREVENTED

## 2020-03-08 ASSESSMENT — PAIN DESCRIPTION - FREQUENCY
FREQUENCY: INTERMITTENT
FREQUENCY: INTERMITTENT

## 2020-03-08 ASSESSMENT — PAIN DESCRIPTION - PROGRESSION: CLINICAL_PROGRESSION: GRADUALLY IMPROVING

## 2020-03-08 NOTE — PROGRESS NOTES
Progress Note( Dr. Salas Robins)  3/8/2020  Subjective:   Admit Date: 3/4/2020  PCP: Kris Sommers MD    Admitted For : Chest pain/ CABG 3/4/2020    Consulted For: post op day 4    Interval History: Feels better more ambulatory     Denies any chest pains,   Denies SOB . Denies nausea or vomiting. No new bowel or bladder symptoms. Intake/Output Summary (Last 24 hours) at 3/8/2020 1014  Last data filed at 3/7/2020 2346  Gross per 24 hour   Intake 736 ml   Output 875 ml   Net -139 ml       DATA    CBC:   Recent Labs     03/06/20  0615 03/07/20  0605   WBC 10.8* 10.5   HGB 8.0* 7.9*   PLT 81* 87*    CMP:  Recent Labs     03/06/20  0615 03/07/20  0605 03/07/20  1340 03/08/20  0530    138  --  139   K 4.0 3.4* 4.0 3.7    104  --  102   CO2 22 23  --  25   BUN 23 26*  --  23   CREATININE 0.8* 0.9  --  0.9   CALCIUM 8.8 8.6  --  9.1     Lipids:   Lab Results   Component Value Date    CHOL 223 02/11/2020    HDL 50 02/11/2020    TRIG 289 02/11/2020     Glucose:  Recent Labs     03/07/20  2129 03/08/20  0315 03/08/20  0808   POCGLU 115* 103* 117*     [id number]:  Lab Results   Component Value Date    LABA1C 8.6 02/11/2020     High Sensitivity TSH: No results found for: TSHHS  Free T3: No results found for: FT3  Free T4:No results found for: T4FREE    Ct Abdomen Wo Contrast Additional Contrast? None    Result Date: 3/3/2020  EXAMINATION: CT ABDOMEN WITHOUT CONTRAST 3/3/2020 10:58 am TECHNIQUE: CT of the abdomen was performed without the administration of intravenous contrast. Multiplanar reformatted images are provided for review. Dose modulation, iterative reconstruction, and/or weight based adjustment of the mA/kV was utilized to reduce the radiation dose to as low as reasonably achievable.  COMPARISON: 11/28/2018 HISTORY: ORDERING SYSTEM PROVIDED HISTORY: Monitor AAA TECHNOLOGIST PROVIDED HISTORY: Reason for exam:->Monitor AAA Additional Contrast?->None Reason for Exam: Monitor AAA Acuity: Acute Type of Exam: Initial FINDINGS: Lower Chest: Lung bases clear Organs: Mild low-attenuation of the liver with sparing adjacent to the gallbladder. 1.5 cm low-attenuation right adrenal nodule. 5 mm left renal cyst.  Right kidney, left adrenal, spleen, pancreas, gallbladder have an unremarkable noncontrast appearance. GI/Bowel: Colonic diverticula Peritoneum/Retroperitoneum: Aneurysmal dilatation of the infrarenal abdominal aorta, measuring 4.7 x 4.3 cm in the axial plane measured at the same level as previously. No retroperitoneal adenopathy Bones/Soft Tissues: No significant bony abnormality     1. 4.7 x 4.3 cm abdominal aortic aneurysm,  increased in size over the interval 2. Mild hepatic steatosis 3. Right adrenal adenoma 4. Colonic diverticulosis RECOMMENDATIONS: 4.7 cm AAA Recommend vascular consultation and follow-up every 6 months. Reference: J Vasc Surg 2009 Oct;50(4 Suppl):S2-49. Xr Chest Portable    Result Date: 3/6/2020  EXAMINATION: ONE XRAY VIEW OF THE CHEST 3/6/2020 5:25 am COMPARISON: 03/05/2020 HISTORY: ORDERING SYSTEM PROVIDED HISTORY: TECHNOLOGIST PROVIDED HISTORY: Reason for Exam:->Post op open heart surgery Reason for Exam: post op open heart Acuity: Unknown Type of Exam: Subsequent/Follow-up Additional signs and symptoms: post op open heart Relevant Medical/Surgical History: post op open heart FINDINGS: The cardiac silhouette and mediastinal contours are stable. Median sternotomy wires are noted. Left subclavian central venous line is now noted. The [place]-Grant catheter has been removed. There is a mediastinal drain and left-sided chest tube. No evidence of a pneumothorax. The lung volumes are low with mild pulmonary vascular congestion, small bilateral pleural effusions, and associated bibasilar atelectasis, left side greater than right. Calcified granulomas noted in the left lung apex. The visualized osseous structures are unremarkable.      1. Low lung volumes with bilateral pleural effusions, left side greater than right, with associated bibasilar atelectasis, not appreciably changed. 2. Mild pulmonary vascular congestion, stable. 3. No evidence of a pneumothorax. Xr Chest Portable    Result Date: 3/5/2020  EXAMINATION: ONE XRAY VIEW OF THE CHEST 3/5/2020 5:27 am COMPARISON: March 4, 2020 HISTORY: ORDERING SYSTEM PROVIDED HISTORY: TECHNOLOGIST PROVIDED HISTORY: Reason for Exam:->Post op open heart surgery Reason for Exam: post op open heart surgery Acuity: Acute Type of Exam: Initial Mechanism of Injury: post op open heart surgery Relevant Medical/Surgical History: post op open heart surgery FINDINGS: The patient is status post median sternotomy. The left subclavian [place]-Grant catheter is stable. A left subclavian catheter is seen with its tip at the superior cavoatrial junction. There are stable chest tubes. The cardiomediastinal silhouette is stable. There are low lung volumes. There is mild pulmonary vascular congestion. There is mild atelectasis at the left lung base. There are mild bilateral pleural effusions. There is no pneumothorax. There is no acute osseous abnormality. Stable chest. Stable chest tubes. No definite pneumothorax. Stable mild cardiomegaly. Mild pulmonary vascular congestion. Low lung volumes. Mild atelectasis at the left lung base. Stable mild bilateral pleural effusions. Xr Chest Portable    Result Date: 3/4/2020  EXAMINATION: ONE XRAY VIEW OF THE CHEST 3/4/2020 7:05 pm COMPARISON: Chest x-ray March 4, 2020 HISTORY: ORDERING SYSTEM PROVIDED HISTORY: post cabge recheck TECHNOLOGIST PROVIDED HISTORY: Reason for exam:->post cabge recheck Reason for Exam: pot op cabg recheck Acuity: Acute Type of Exam: Initial Mechanism of Injury: pot op cabg recheck Relevant Medical/Surgical History: pot op cabg recheck FINDINGS: Cardiac silhouette is enlarged but stable. Endotracheal tube and enteric tubes have been removed.   Mediastinal drain and left-sided 3/3/2020  EXAMINATION: ULTRASOUND EVALUATION OF THE CAROTID ARTERIES 3/3/2020 COMPARISON: None. HISTORY: ORDERING SYSTEM PROVIDED HISTORY: for CABG and AVR on 3/4/2020 TECHNOLOGIST PROVIDED HISTORY: Reason for exam:->for CABG and AVR on 3/4/2020 Reason for Exam: pre op FINDINGS: RIGHT: The right common carotid artery demonstrates peak systolic velocities of 59, 74 cm/sec in the mid and carotid bulb segments respectively. The right internal carotid artery demonstrates the systolic velocities of 87, 76, 113 cm/sec in the proximal, mid and distal segments respectively. The external carotid artery is patent. The vertebral artery demonstrates normal antegrade flow. Mild hyperechoic plaque seen within the common carotid artery, at the level of the carotid bulb, proximal ECA as well as ICA. ICA/CCA ratio of 1.9. LEFT: The left common carotid artery demonstrates peak systolic velocities of 011, 101 cm/sec in the mid and carotid bulb segments respectively. The left internal carotid artery demonstrates the systolic velocities of 659, 104, 128 cm/sec in the proximal, mid and distal segments respectively. The external carotid artery is patent. The vertebral artery demonstrates normal antegrade flow. Hyperechoic atherosclerotic plaque seen within the common carotid artery, at the level of the carotid bulb, as well as the proximal ICA. ICA/CCA ratio of 1.2. The right internal carotid artery demonstrates 0-50% stenosis . The left internal carotid artery demonstrates borderline velocities within the proximal and distal segments of the left internal carotid artery, measuring just over 125 centimeters/second, though with a normal ratio and no definite significant stenosis by grayscale imaging. Findings are felt likely to represent 0-50% stenosis. Bilateral vertebral arteries are patent with flow in the normal direction.      Us Lower Extremity Unilateral Vein Mapping    Result Date: 3/3/2020  EXAMINATION: ULTRASOUND OF THE JVD or bruit  Thyroid : Normal lobes   Lungs: Has Vesicular Breath sounds removed chest   Heart:  regular rate and rhythm  Abdomen: soft, non-tender; bowel sounds normal; no masses,  no organomegaly  Musculoskeletal: Normal  Extremities: extremities normal, , no edema  Neurologic:  Awake, alert, oriented to name, place and time. Cranial nerves II-XII are grossly intact. Motor is  intact. Sensory is intact. ,  and gait is normal.    Assessment:     Patient Active Problem List:     Abdominal aortic aneurysm (AAA) without rupture (HCC)     Tobacco abuse     Essential hypertension     Smoker     Non-alcoholic fatty liver disease     Basal cell carcinoma of nose     Type 2 diabetes mellitus without complication, without long-term current use of insulin (HCC)     Other hyperlipidemia     Nonrheumatic aortic valve stenosis     CAD in native artery/ CABG       Plan:     1. Reviewed POC blood glucose . Labs and X ray results   2. Reviewed Current Medicines  3. D.C IV insulin infusion    4. On meal/ Correction bolus Humalog/ OHGD  regime  5. Monitor Blood glucose frequently   6. Modified  the dose of Insulin/ other medicines as needed   7. Will follow     .      Idalmis Mensah MD

## 2020-03-08 NOTE — PROGRESS NOTES
Order received to DC pacer wire. Pt informed and removal process explained. Wires removed without difficulty. Pt tolerated well.island dressing applied. Tele strip recorded and placed in chart. Will continue to monitor.

## 2020-03-08 NOTE — PROGRESS NOTES
The Pt turned his call light on not long after refusing to ambulate. Stated that he will go ahead and take a walk even though he does not want to. Walked in the unit without a walker. Tolerated well.

## 2020-03-08 NOTE — CONSULTS
related; Amoxicillin; and Other    Family History:       Problem Relation Age of Onset    Lung Cancer Mother     Heart Disease Father     High Blood Pressure Father     High Cholesterol Father     Lung Cancer Father      REVIEW OF SYSTEMS:  Review of System Done as noted above     PHYSICAL EXAM:      Vitals:    BP 99/62   Pulse 74   Temp 98.8 °F (37.1 °C) (Oral)   Resp 15   Ht 5' 4\" (1.626 m)   Wt 180 lb 12.4 oz (82 kg)   SpO2 91%   BMI 31.03 kg/m²     CONSTITUTIONAL:  awake, alert, cooperative, appears stated age   EYES:  vision intact Fundoscopic Exam not performed   ENT:Normal  NECK:  Supple, No JVD. Thyroid Exam:Normal   LUNGS:  Has Vesicular Breath Sounds, has chest tubes   CARDIOVASCULAR:  Normal apical impulse, regular rate and rhythm, normal S1 and S2, no S3 or S4, and has no  murmur   ABDOMEN:  No scars, normal bowel sounds, soft, non-distended, non-tender, no masses palpated, no hepatolienomegaly  Musculoskeletal: Normal  Extremities: Normal, peripheral pulses normal, , has no edema   NEUROLOGIC:  Awake, alert, oriented to name, place and time. Cranial nerves II-XII are grossly intact. Motor is  intact. Sensory is intact.  ,  and gait is normal.    DATA:    CBC:   Recent Labs     03/05/20  0515 03/06/20  0615 03/07/20  0605   WBC 13.7* 10.8* 10.5   HGB 9.1* 8.0* 7.9*   PLT 89* 81* 87*    CMP:  Recent Labs     03/05/20  0515 03/06/20  0615 03/07/20  0605 03/07/20  1340    138 138  --    K 3.8 4.0 3.4* 4.0    108 104  --    CO2 22 22 23  --    BUN 16 23 26*  --    CREATININE 0.9 0.8* 0.9  --    CALCIUM 8.6 8.8 8.6  --      Lipids:   Lab Results   Component Value Date    CHOL 223 02/11/2020    HDL 50 02/11/2020    TRIG 289 02/11/2020     Glucose:   Recent Labs     03/07/20  1154 03/07/20  1656 03/07/20  2129   POCGLU 154* 175* 115*     Hemoglobin A1C:   Lab Results   Component Value Date    LABA1C 8.6 02/11/2020     Free T4: No results found for: T4FREE  Free T3: No results found Subsequent/Follow-up Additional signs and symptoms: post op open heart Relevant Medical/Surgical History: post op open heart FINDINGS: The cardiac silhouette and mediastinal contours are stable. Median sternotomy wires are noted. Left subclavian central venous line is now noted. The [place]-Grant catheter has been removed. There is a mediastinal drain and left-sided chest tube. No evidence of a pneumothorax. The lung volumes are low with mild pulmonary vascular congestion, small bilateral pleural effusions, and associated bibasilar atelectasis, left side greater than right. Calcified granulomas noted in the left lung apex. The visualized osseous structures are unremarkable. 1. Low lung volumes with bilateral pleural effusions, left side greater than right, with associated bibasilar atelectasis, not appreciably changed. 2. Mild pulmonary vascular congestion, stable. 3. No evidence of a pneumothorax. Xr Chest Portable    Result Date: 3/5/2020  EXAMINATION: ONE XRAY VIEW OF THE CHEST 3/5/2020 5:27 am COMPARISON: March 4, 2020 HISTORY: ORDERING SYSTEM PROVIDED HISTORY: TECHNOLOGIST PROVIDED HISTORY: Reason for Exam:->Post op open heart surgery Reason for Exam: post op open heart surgery Acuity: Acute Type of Exam: Initial Mechanism of Injury: post op open heart surgery Relevant Medical/Surgical History: post op open heart surgery FINDINGS: The patient is status post median sternotomy. The left subclavian [place]-Grant catheter is stable. A left subclavian catheter is seen with its tip at the superior cavoatrial junction. There are stable chest tubes. The cardiomediastinal silhouette is stable. There are low lung volumes. There is mild pulmonary vascular congestion. There is mild atelectasis at the left lung base. There are mild bilateral pleural effusions. There is no pneumothorax. There is no acute osseous abnormality. Stable chest. Stable chest tubes. No definite pneumothorax.  Stable mild cardiomegaly. Mild pulmonary vascular congestion. Low lung volumes. Mild atelectasis at the left lung base. Stable mild bilateral pleural effusions. Xr Chest Portable    Result Date: 3/4/2020  EXAMINATION: ONE XRAY VIEW OF THE CHEST 3/4/2020 7:05 pm COMPARISON: Chest x-ray March 4, 2020 HISTORY: ORDERING SYSTEM PROVIDED HISTORY: post cabge recheck TECHNOLOGIST PROVIDED HISTORY: Reason for exam:->post cabge recheck Reason for Exam: pot op cabg recheck Acuity: Acute Type of Exam: Initial Mechanism of Injury: pot op cabg recheck Relevant Medical/Surgical History: pot op cabg recheck FINDINGS: Cardiac silhouette is enlarged but stable. Endotracheal tube and enteric tubes have been removed. Mediastinal drain and left-sided thoracostomy tube remain in place. Left-sided subclavian central venous catheter remains in place with tip at the distal superior vena cava. Left-sided subclavian [place] catheter also remains in place with tip in the expected location of the main pulmonary artery. Postoperative changes of valve replacement. There is central pulmonary vascular congestion and mild patchy interstitial opacities favored to reflect edema. Left effusion and atelectasis. No pneumothorax. Postoperative changes of median sternotomy. 1. Removal of endotracheal tube and enteric tube. Remainder of lines and tubes appear grossly stable as above. 2. Findings suggestive of mild pulmonary edema and likely small left effusion and atelectasis. Xr Chest Portable    Result Date: 3/4/2020  EXAMINATION: ONE XRAY VIEW OF THE CHEST 3/4/2020 2:28 pm COMPARISON: 02/18/2020. HISTORY: ORDERING SYSTEM PROVIDED HISTORY: Post op open heart surgery TECHNOLOGIST PROVIDED HISTORY: Reason for exam:->Post op open heart surgery Reason for Exam: Post op open heart surgery Acuity: Acute Type of Exam: Initial Additional signs and symptoms: na Relevant Medical/Surgical History: cad, diabetes FINDINGS: There are low lung volumes. There are postsurgical changes of median sternotomy. The endotracheal tube is seen with its tip above the lianne. Nasogastric tube courses below the diaphragm. There is a left subclavian central venous line seen with its tip in the superior vena cava. There is also a left subclavian [place]-Grant catheter with tip in the main pulmonary artery. There are chest tubes in place. The heart size and pulmonary vasculature are stable. There is increased density involving the lower lung zones. No pneumothoraces are seen. There are scattered calcified granulomas. 1. Status post open heart surgery with tubes and lines as discussed above. 2. Low lung volumes with bibasilar atelectasis. Vl Dup Carotid Bilateral    Result Date: 3/3/2020  EXAMINATION: ULTRASOUND EVALUATION OF THE CAROTID ARTERIES 3/3/2020 COMPARISON: None. HISTORY: ORDERING SYSTEM PROVIDED HISTORY: for CABG and AVR on 3/4/2020 TECHNOLOGIST PROVIDED HISTORY: Reason for exam:->for CABG and AVR on 3/4/2020 Reason for Exam: pre op FINDINGS: RIGHT: The right common carotid artery demonstrates peak systolic velocities of 59, 74 cm/sec in the mid and carotid bulb segments respectively. The right internal carotid artery demonstrates the systolic velocities of 87, 76, 113 cm/sec in the proximal, mid and distal segments respectively. The external carotid artery is patent. The vertebral artery demonstrates normal antegrade flow. Mild hyperechoic plaque seen within the common carotid artery, at the level of the carotid bulb, proximal ECA as well as ICA. ICA/CCA ratio of 1.9. LEFT: The left common carotid artery demonstrates peak systolic velocities of 219, 101 cm/sec in the mid and carotid bulb segments respectively. The left internal carotid artery demonstrates the systolic velocities of 593, 104, 128 cm/sec in the proximal, mid and distal segments respectively. The external carotid artery is patent. The vertebral artery demonstrates normal antegrade flow. Hyperechoic atherosclerotic plaque seen within the common carotid artery, at the level of the carotid bulb, as well as the proximal ICA. ICA/CCA ratio of 1.2. The right internal carotid artery demonstrates 0-50% stenosis . The left internal carotid artery demonstrates borderline velocities within the proximal and distal segments of the left internal carotid artery, measuring just over 125 centimeters/second, though with a normal ratio and no definite significant stenosis by grayscale imaging. Findings are felt likely to represent 0-50% stenosis. Bilateral vertebral arteries are patent with flow in the normal direction. Us Lower Extremity Unilateral Vein Mapping    Result Date: 3/3/2020  EXAMINATION: ULTRASOUND OF THE BILATERAL LOWER EXTREMITIES FOR VEIN MAPPING 3/3/2020 10:40 am TECHNIQUE: Sonographic evaluation of the bilateral greater saphenous and lesser saphenous veins was performed. Color flow Doppler imaging was also acquired. COMPARISON: None HISTORY: ORDERING SYSTEM PROVIDED HISTORY: scheduled for cabg and avr 3/4/2020 TECHNOLOGIST PROVIDED HISTORY: Reason for exam:->scheduled for cabg and avr 3/4/2020 Reason for Exam: pre op FINDINGS: Patent left greater saphenous vein with normal compressibility and phasic flow. No significant tortuosity noted by the technologist.  Vein diameters as below: Saphenofemoral junction:  7.3 mm Proximal thigh:  2.8 mm Mid thigh:  2.1 mm Distal thigh:  1.6 mm Knee:  2.0 mm Proximal le.3 mm Mid le.9 mm Distal le.6 mm     Patent left greater saphenous vein with diameters ranging from 1.6-7.3 mm as above.        Scheduled Medicines   Medications:    insulin lispro  0-6 Units Subcutaneous TID WC    insulin lispro  0-3 Units Subcutaneous 2 times per day    glipiZIDE  5 mg Oral BID AC    furosemide  20 mg Intravenous TID    amiodarone  200 mg Oral BID    lidocaine  1 patch Transdermal Daily    sodium chloride flush  10 mL Intravenous 2 times per day   

## 2020-03-08 NOTE — PROGRESS NOTES
Pt refusing to walk this shift. States that he is tired and resting well and will not get up and walk now. Also continually taking off the O2 sat probe from his finger.

## 2020-03-08 NOTE — PROGRESS NOTES
Dr. Trenton Saleem sent perfect serve message reminding patient will need full diabetic supplies for home. Discharge in morning.

## 2020-03-09 VITALS
WEIGHT: 180 LBS | HEIGHT: 64 IN | BODY MASS INDEX: 30.73 KG/M2 | DIASTOLIC BLOOD PRESSURE: 48 MMHG | SYSTOLIC BLOOD PRESSURE: 102 MMHG | RESPIRATION RATE: 18 BRPM | OXYGEN SATURATION: 93 % | TEMPERATURE: 98 F | HEART RATE: 87 BPM

## 2020-03-09 LAB
ANION GAP SERPL CALCULATED.3IONS-SCNC: 11 MMOL/L (ref 4–16)
BUN BLDV-MCNC: 22 MG/DL (ref 6–23)
CALCIUM IONIZED: 4.88 MG/DL (ref 4.48–5.28)
CALCIUM SERPL-MCNC: 9 MG/DL (ref 8.3–10.6)
CHLORIDE BLD-SCNC: 104 MMOL/L (ref 99–110)
CO2: 27 MMOL/L (ref 21–32)
CREAT SERPL-MCNC: 0.8 MG/DL (ref 0.9–1.3)
GFR AFRICAN AMERICAN: >60 ML/MIN/1.73M2
GFR NON-AFRICAN AMERICAN: >60 ML/MIN/1.73M2
GLUCOSE BLD-MCNC: 103 MG/DL (ref 70–99)
GLUCOSE BLD-MCNC: 103 MG/DL (ref 70–99)
GLUCOSE BLD-MCNC: 178 MG/DL (ref 70–99)
GLUCOSE BLD-MCNC: 97 MG/DL (ref 70–99)
IONIZED CA: 1.22 MMOL/L (ref 1.12–1.32)
POTASSIUM SERPL-SCNC: 3.5 MMOL/L (ref 3.5–5.1)
SODIUM BLD-SCNC: 142 MMOL/L (ref 135–145)

## 2020-03-09 PROCEDURE — 6370000000 HC RX 637 (ALT 250 FOR IP): Performed by: SURGERY

## 2020-03-09 PROCEDURE — 82330 ASSAY OF CALCIUM: CPT

## 2020-03-09 PROCEDURE — 94618 PULMONARY STRESS TESTING: CPT

## 2020-03-09 PROCEDURE — 97535 SELF CARE MNGMENT TRAINING: CPT

## 2020-03-09 PROCEDURE — 80048 BASIC METABOLIC PNL TOTAL CA: CPT

## 2020-03-09 PROCEDURE — 82962 GLUCOSE BLOOD TEST: CPT

## 2020-03-09 PROCEDURE — 36592 COLLECT BLOOD FROM PICC: CPT

## 2020-03-09 PROCEDURE — 6370000000 HC RX 637 (ALT 250 FOR IP): Performed by: PHYSICIAN ASSISTANT

## 2020-03-09 PROCEDURE — 6360000002 HC RX W HCPCS: Performed by: PHYSICIAN ASSISTANT

## 2020-03-09 PROCEDURE — 97116 GAIT TRAINING THERAPY: CPT

## 2020-03-09 PROCEDURE — 94640 AIRWAY INHALATION TREATMENT: CPT

## 2020-03-09 PROCEDURE — 97110 THERAPEUTIC EXERCISES: CPT

## 2020-03-09 PROCEDURE — 6370000000 HC RX 637 (ALT 250 FOR IP): Performed by: INTERNAL MEDICINE

## 2020-03-09 PROCEDURE — 94761 N-INVAS EAR/PLS OXIMETRY MLT: CPT

## 2020-03-09 PROCEDURE — 97530 THERAPEUTIC ACTIVITIES: CPT

## 2020-03-09 PROCEDURE — 94760 N-INVAS EAR/PLS OXIMETRY 1: CPT

## 2020-03-09 RX ORDER — HYDROCODONE BITARTRATE AND ACETAMINOPHEN 5; 325 MG/1; MG/1
1 TABLET ORAL EVERY 8 HOURS PRN
Qty: 10 TABLET | Refills: 0 | Status: SHIPPED | OUTPATIENT
Start: 2020-03-09 | End: 2020-04-06 | Stop reason: SDUPTHER

## 2020-03-09 RX ORDER — FUROSEMIDE 20 MG/1
20 TABLET ORAL 2 TIMES DAILY
Qty: 60 TABLET | Refills: 1 | Status: SHIPPED | OUTPATIENT
Start: 2020-03-09 | End: 2020-03-12 | Stop reason: SDUPTHER

## 2020-03-09 RX ORDER — CARVEDILOL 3.12 MG/1
3.12 TABLET ORAL 2 TIMES DAILY
Qty: 60 TABLET | Refills: 3 | Status: SHIPPED | OUTPATIENT
Start: 2020-03-09 | End: 2020-04-06 | Stop reason: SDUPTHER

## 2020-03-09 RX ORDER — AMIODARONE HYDROCHLORIDE 200 MG/1
200 TABLET ORAL 2 TIMES DAILY
Qty: 30 TABLET | Refills: 3 | Status: SHIPPED | OUTPATIENT
Start: 2020-03-09 | End: 2020-04-06 | Stop reason: SDUPTHER

## 2020-03-09 RX ORDER — GLIPIZIDE 5 MG/1
5 TABLET ORAL
Qty: 60 TABLET | Refills: 3 | Status: SHIPPED | OUTPATIENT
Start: 2020-03-09 | End: 2020-04-06 | Stop reason: SDUPTHER

## 2020-03-09 RX ADMIN — IPRATROPIUM BROMIDE AND ALBUTEROL SULFATE 1 AMPULE: .5; 3 SOLUTION RESPIRATORY (INHALATION) at 12:29

## 2020-03-09 RX ADMIN — IPRATROPIUM BROMIDE AND ALBUTEROL SULFATE 1 AMPULE: .5; 3 SOLUTION RESPIRATORY (INHALATION) at 07:54

## 2020-03-09 RX ADMIN — GLIPIZIDE 5 MG: 5 TABLET ORAL at 08:25

## 2020-03-09 RX ADMIN — PANTOPRAZOLE SODIUM 40 MG: 40 TABLET, DELAYED RELEASE ORAL at 08:22

## 2020-03-09 RX ADMIN — MULTIPLE VITAMINS W/ MINERALS TAB 1 TABLET: TAB at 08:22

## 2020-03-09 RX ADMIN — HYDROCODONE BITARTRATE AND ACETAMINOPHEN 2 TABLET: 5; 325 TABLET ORAL at 05:16

## 2020-03-09 RX ADMIN — INSULIN LISPRO 1 UNITS: 100 INJECTION, SOLUTION INTRAVENOUS; SUBCUTANEOUS at 12:11

## 2020-03-09 RX ADMIN — Medication: at 08:22

## 2020-03-09 RX ADMIN — FUROSEMIDE 20 MG: 10 INJECTION, SOLUTION INTRAVENOUS at 13:16

## 2020-03-09 RX ADMIN — FUROSEMIDE 20 MG: 10 INJECTION, SOLUTION INTRAVENOUS at 08:22

## 2020-03-09 RX ADMIN — AMIODARONE HYDROCHLORIDE 200 MG: 200 TABLET ORAL at 08:22

## 2020-03-09 RX ADMIN — ASPIRIN 81 MG: 81 TABLET ORAL at 08:22

## 2020-03-09 RX ADMIN — CARVEDILOL 3.12 MG: 3.12 TABLET, FILM COATED ORAL at 08:22

## 2020-03-09 ASSESSMENT — PAIN SCALES - GENERAL: PAINLEVEL_OUTOF10: 7

## 2020-03-09 NOTE — PROGRESS NOTES
car.  Comments: DO NOT sit in one position more than 2 hours     Activities - Allowed:    · Use your breathing exerciser (incentive spirometer) 5 times every hour while awake, for the fist week. Second week and on use Incentive spirometer 5 times before each meal and at bedtime. To conserve energy use the incentive spirometer first, then arm/leg exercises and then walk around your house (increasing distances as you tolerate it) before each meal and bedtime and continue for 4 weeks. · Keep up your exercises started by physical therapy for 4 weeks, 3x/day. · Put on your white stockings (LEILA hose) every morning and remove them at night. · You may climb stairs when you feel strong enough. You may resume sexual activity when you can climb two flights of stairs without getting short of breath. · You may have visitors; keep the number limited for the first few weeks. NO ONE with cold or flu symptoms or any sign of infection should visit. · Female patients may wear a bra to help keep breasts from pulling incision apart when you lie down. · Remember to continue to \"splint\" your chest as instructed after surgery, using your hands and your \"heart\" pillow when coughing or sneezing. · Remember to weigh daily and report rapid weight gain (greater than 2 lbs in one day or 5 lbs in 3 days) to surgeon   Comments:  Walk 4 times a day gradually increasing distance         Wound Care:  · Inspect your incisions daily for signs of redness, warmth, lumps or pus like drainage. If incision becomes very painful, has pus drainage, or you have chills or fever, take your temperature. Call the surgeon if temperature is >101 degrees or greater. · Leave your incision open to air. Wash daily with antibacterial soap (ex: Dial or safeguard). Bar soap should be for your use only or use a pump container. · DO NOT apply lotions, creams or oils to your incisions.   Leg where graft was removed may swell; elevating this leg may help decrease swelling. · You may shower. DO NOT take tub baths until ALL of your incisions are healed. Use separate wash cloth on chest incision and leg incision to prevent cross contamination. Wash incisions prior to the rest of your body. Comments: Take temperature daily     Valve Care:          Notify any new doctors that you had valve surgery  They will order antibiotics if      Necessary before any procedures          Regular dental care is important  Notify your dentist of any valve surgery  wait 3       Months post op before any elective dental surgery            If you have an MRI let technician you have had valve surgery      ABSOLUTELY NO SMOKING:  If you have this habit it is VERY important that you quit! [place] [address] offers classes on smoking cessation and we can provide you with this information. [facility]: [phone]. NOTIFY YOUR DOCTOR:   · If you have swelling in feet, ankles, and sometimes abdomen, an ongoing cough, fatigue, and/or loss of energy call your surgeon. · Weigh yourself in the morning before eating breakfast and after urinating. Notify your surgeon if you have a weight gain of more than 3 (three) pounds within a day or two. · If your bowel movements are sluggish or have stopped. · If you have difficulty breathing. · If you develop angina (chest pain)  Or the same type of discomfort you had before surgery. · You may take nitroglycerine as prescribed, BUT you must still notify your cardiologist and your surgeon. Call 911 if pain is NOT relieved after taking 3 (three) nitroglycerine tablets, 5 minuets apart. · DO NOT DRIVE YOURSELF AND DO NOT HAVE SOMEONE DRIVE YOU TO THE EMERGENCY ROM. CALL 911. Comments: Follow up care:    Call to make appointments today when you get home.     Referrals:  [address] [place] [employer] [facility]    Given to Patient (When applicable):  {Yes Incentive Spirometer yes Valuables given to patient    yes Pillow N/A Surgical Bra yes CHF information N/A Valve Card   yes LEILA hose N/A Surgical Binder    yes \"Straight from the Heart\" Book yes Nutritional Information   Comments:IF THERE ARE IMMEDIATE CONCERNS OR LIFE THREATENING EMERGENCY, CALL 911    For non-emergency questions-24 hours a day-please call   [facility] Cardiac Intensive Care Unit:  [phone]    [facility] Cardiac Wellness (inpatient): [phone] Monday-Friday 8-5PM.  Leave a message and they will return your call in a timely manner. [facility] Cardiac Rehab (outpatient):   [phone]  Cardiac Rehab will contact you within 6-8 weeks after discharge. [employer] is a support group for cardiac patients. They met the 3rd Tuesday of each month from 6-8pm in Assembly Room A on the ground level here at [facility]. (No meetings in June or July). All cardiac patients are encouraged and welcome to attend. Any questions, please contact [phone]. Thank you for choosing [facility] as your open heart care provider. You may receive a telephone call from the [address] [place] [address] Vascular Intensive Care Unit within 48 hours of discharge inquiring as to your health and care received during your hospitalization. Please bring these instructions and a list of you medications  with you to your doctors' appointments.     Electronically signed by Molly Epps RN on 3/9/2020 at 12:30 PM    .

## 2020-03-09 NOTE — PROGRESS NOTES
Pt transported to car via [facility]. Out Pharmacy to call [address] to fill Glucometer since we do not carry them here. If not patient also instructed to go to [facility] for one over the counter. Education printed for patient regarding glucometer use, carb counting, and sick days.

## 2020-03-09 NOTE — PROGRESS NOTES
Physical Therapy    Physical Therapy Treatment Note  Name: Sherron Kimball MRN: [id number] :   1945   Date:  3/9/2020   Admission Date: 3/4/2020 Room:  A   Restrictions/Precautions:        cardiac  Communication with other providers:  Shelli Cuellar RN states pt is ok to see for therapy  Subjective:  Patient states:  He likes things to be orderly and not kaotic  Pain:   Location, Type, Intensity (0/10 to 10/10):  0/10  Objective:    Observation:  Pt was sitting up in the chair using his arms to re-adjust himself . Treatment, including education/measures:  Gave extra education on sternal precautions with ADL. Vital Signs  HR: 83, B/P 108/80, O2 97% on room air  Education:  Pt was instructed in Sternal Precautions, Purpose of Exercise Program, Gina Scale and Signs and Symptoms of Exercise Intolerance per Cardiac Protocol  Pt was instructed in Intermediate Cardiac ex program:sitting  Overhead Side Stretch x 10  Ankle Pumps/ Heel Raises x 10  Marching Seated x 10  Forward Arm Raises x 10  Side Arm Raises x 10  Pt states that is enough I have to go to the bathroom, pt amb with CBA to supervision for 20 ft and stood while using the restroom. Gait:  Pt amb with no AD for 450 ft with CGA  Pt needed VC's for pathway   Pt amb up and down 2 steps with 1 HR and CGA using step to pattern  Transfers  Sit to supine :CGA and VC's  Scooting :VC's   Rolling :VC's  Sit to stand :Sup  Stand to sit :Sup  Safety  Patient left safely in the bed, with call light/phone in reach pt refused alarm. Gait belt was used for transfers and gait.   Assessment / Impression:     Patient's tolerance of treatment:  Good, pt will Verbally repeat the precautions but not apply precautions   Adverse Reaction: none  Significant change in status and impact:  none  Barriers to improvement:  Not following sternal precautions  Plan for Next Session:    Will cont to work towards pt's goals per his tolerance  Time in:  0845  Time out:  09  Timed treatment minutes:  70  Total treatment time:  79  Previously filed items:  Social/Functional History  Lives With: Spouse  Type of Home: House  Home Layout: Two level, Able to Live on Main level with bedroom/bathroom  Home Access: Stairs to enter without rails  Entrance Stairs - Number of Steps: threshold   Bathroom Shower/Tub: Walk-in shower(2 steps into the bathroom with a handrail )  Bathroom Toilet: Standard  Bathroom Equipment: Grab bars in shower, Shower chair(pt was not using the shower chair PTA)  Home Equipment: 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

## 2020-03-09 NOTE — PROGRESS NOTES
Occupational Therapy  . Occupational Therapy Treatment Note  Name: Claudy Varghese MRN: [id number] :   1945   Date:  3/9/2020   Admission Date: 3/4/2020 Room:  -A     Restrictions/Precautions:    general precautions, fall risk, sternal precautions,     Communication with other providers:  Per chart review ok for tx. Subjective:  Patient states:  \"I am sleeping. \" Patient joking through out tx,  Pain:   Location, Type, Intensity (0/10 to 10/10):  4/10 sternal area. Objective:    Observation:  patient supine with Hob raised. Amenable to therapy. Did not adhere to all sternal precautions,   Objective Measures:  Telemetry- GR- 71, /63    Treatment, including education:  ADL activity training was instructed today. Cues were given for safety, sequence, UE/LE placement, visual cues, and balance. Activities performed today included  toileting, hand hygiene    Facial hygiene- SBA  toileting- with use of urinal in bed- SUP    Therapeutic activity training was instructed today. Cues were given for safety, sequence, UE/LE placement, awareness, and balance. Activities performed today included bed mobility training, sup-sit, sit-stand, SPT. Supine with Hob raised to EOB- SBA vcs to not use BUE  scooting- Mod A  Stand for EOB- CGA for safety. SPT- CGA  Sit to recliner- CGA    Patient educated on role of OT , benefits of OT and rationale for therapeutic intervention. Pt was instructed in Sternal Precautions only recalled 1 precautions. All therapeutic intervention performed c emphasis on dynamic balance / standing tolerance to increase strength, endurance and activity tolerance for increased Hawkins c ADL tasks and func transfers / mobility. Patient left safely in bedside chair at end of session, with call light in reach, alarm on and nursing aware. Gait belt was used for func transfers / mobility.         Assessment / Impression:        Patient's tolerance of treatment: well  Adverse Reaction: none  Significant change in status and impact:  none  Barriers to improvement:  Weakness, pain, unable to follow sternal precautions    Plan for Next Session:    Continue with OT POC  Time in:  1127  Time out:  1150  Timed treatment minutes:  23  Total treatment time:  23    Electronically signed by:    MELVIN Irwin  3/9/2020, 11:27 AM    Previously filed values:           Goals:  1. Pt will complete all aspects of bed mobility for EOB/OOB ADLs Min A with good adherence to sternal precautions   2. Pt will complete UB/LB bathing CGA seated  3. Pt will complete all aspects of LB dressing Min A with use of AE PRN  4. Pt will complete all functional transfers to and from bed, chair, toilet, shower chair SBA with good adherence to sternal precautions   5. Pt will ambulate HH distance to bathroom for toileting SBA without AD   6. Pt will complete all aspects of toileting task SBA   7. Pt will complete oral hygiene/grooming routine in standing at sink SBA  8. Pt will complete ther ex/ther act with focus on cardiac rehab exercises, safety and adherence to sternal precautions, and adaptive techniques for ADLs PRN  9.  Pt will recall sternal precautions with 100% accuracy

## 2020-03-09 NOTE — PROGRESS NOTES
cleaning and inspection  Use of antibacterial soap  And separate washcloths  Instructed to take temperature daily and report temp over 101 to surgeon  Given Dial soap   Teaching done on SBE prophylaxis  Stressed notification on any future providers of any invasive porcedures  Stressed regular dental care Teaching done on emotional healing after major surgery   Teaching done on issues that would warrant a call to MD  Stressed for the first 30 day all questions and concerns should be addressed to surgeon   Stressed the absolute cessation of all tobacco products  Given literature on REACH program  Teaching done on timing and importance of follow up appointments  Has and appointment with surgeon on April 8 and with cardiologist on March 12     Offered benefits of out patient cardiac rehab  Patient is agreeable  Referral made  Phone numbers provided for follow up questions as they arise   Stressed calling CVICU number first as there will always be an open heart nurse available  Plan is for discharge home today to be followed by home health   Given copy of discharge instructions   Voiced understanding to all information provided  All questions answered to his satisfaction   Support given

## 2020-03-09 NOTE — PROGRESS NOTES
Pt did not qualify for Home O2. All 3 steps were completed. Less than 1 minute of desaturations for overnight trend.

## 2020-03-09 NOTE — PROGRESS NOTES
3/9/2020 8:40 AM  Patient Room #: 2007/2007-A  Patient Name: Champ Gaona    (Step 1 Done by RN if possible otherwise call Pulmonary Diagnostics)  1. Place patient on room air at rest for at least 30 minutes. If patient falls below 88% before 30 minutes then you can record the level and stop. Record room air saturation level _94_ %. If patient is at 88% or below, they will qualify for home oxygen and you can stop. If level does not fall below 88%, fill in level above. If indicated continue to Step 2. Signature:_Jess Ramirez, RRT Date: _03/09/2020__  (Step 2&3 Done by P)  2. Ambulate patient on room air until saturation falls below 89%. Record level of room air saturation with ambulation_93__ %. Next, place patient back on ___lpm oxygen and ambulate, record level __%. (Note:  this level must show improvement from room air level done with ambulation.)  If patients saturation on room air with ambulation is 88% or below AND patient shows improvement with oxygen during ambulation, they will qualify for home oxygen and you can stop. If patient does not drop below 89%, then patient should have an overnight oximetry trending on room air to see if level falls below 88%. Complete level in Step 3 below. 3. Room air overnight oximetry level 88 % for_<1__  cumulative minutes. If patients room air oxygen level is < 89% for at least 5 cumulative minutes, patient will qualify for home oxygen and you can stop. (Attach Night Trending Report)    Complete order below: Diagnosis:__________  Home oxygen at:  Length of Need: ? Lifetime ?  3 Months     ___lpm or __%   via  [] nasal cannula  []mask  [] other:___         []continuous []  with activity  []  Nocturnal   [] Portable Tanks []  Concentrator        Therapist Signature:_Jess Ramirez, QUIANA      Date:  _03/09/2020__  Physician Signature:  __Electronically Signed in EMR_    Date: ____    Physician Printed Name:  Ordering User: Laura Brito NAHEED  NPI:  [id number]    [] Patient Qualifies      [x] Patient Does NOT qualify

## 2020-03-09 NOTE — PROGRESS NOTES
PATIENT NAME: Hamilton Camarillo    TODAY'S DATE: 03/08/20    Reason for visit: Status post AVR CABG    SUBJECTIVE:    Pt has some complaints but is doing very well. OBJECTIVE:   VITALS:    Vitals:    03/08/20 2202   BP: 94/63   Pulse: 76   Resp: 12   Temp:    SpO2: 93%     INTAKE/OUTPUT:    Date 03/08/20 0000 - 03/08/20 2359   Shift [id number] 9482-0934 9567-0552 24 Hour Total   INTAKE   P.O.  240 240 480   IV Piggyback   97.9 97.9   Shift Total(mL/kg)  240(2.9) 337. 9(4.1) 577. 9(7)   OUTPUT   Urine(mL/kg/hr)  450(0.7) 175 625   Shift Total(mL/kg)  450(5.5) 175(2.1) 625(7.6)   Weight (kg) 82.4 82.4 82.4 82.4        EXAM:  Blood pressure 94/63, pulse 76, temperature 98.3 °F (36.8 °C), temperature source Oral, resp. rate 12, height 5' 4\" (1.626 m), weight 181 lb 10.5 oz (82.4 kg), SpO2 93 %. General appearance: No apparent distress, appears stated age and cooperative. Skin: unremarkable  HEENT Normocephalic, atraumatic without obvious deformity. Neck: Supple, Trachea midline   Lungs: Good respiratory effort. Clear to auscultation, bilaterally  Heart: Regular rate/ rhythm sternum Inc c/d/i  Abdomen: Soft, non-tender or non-distended   Extremities: Minimal lower extremity edema warm well perfused  Neurologic: Alert, grossly intact  Mental status: normal affect      Data:  CBC:   Recent Labs     03/06/20  0615 03/07/20  0605   WBC 10.8* 10.5   HGB 8.0* 7.9*   HCT 24.5* 23.6*   PLT 81* 87*     BMP:    Recent Labs     03/06/20  0615 03/07/20  0605 03/07/20  1340 03/08/20  0530    138  --  139   K 4.0 3.4* 4.0 3.7    104  --  102   CO2 22 23  --  25   BUN 23 26*  --  23   CREATININE 0.8* 0.9  --  0.9   GLUCOSE 96 81  --  102*     Hepatic: No results for input(s): AST, ALT, ALB, BILITOT, ALKPHOS in the last 72 hours. Mag:      Recent Labs     03/06/20  0615 03/07/20  0605 03/08/20  0530   MG 2.0 2.6* 2.0      Phos:   No results for input(s): PHOS in the last 72 hours.    INR: No results for input(s): INR in

## 2020-03-09 NOTE — PROGRESS NOTES
Discharge instructions given to patient, all questions answered. , meds to come to bedside. to  glucometer at [address] pharmacy. Ride will be here after 5:00.

## 2020-03-10 LAB
ABO/RH: NORMAL
ANTIBODY SCREEN: NEGATIVE
COMMENT: NORMAL
COMPONENT: NORMAL
CROSSMATCH RESULT: NORMAL
STATUS: NORMAL
THERMAL AMPLITUDE STUDY: NORMAL
TRANSFUSION STATUS: NORMAL
UNIT DIVISION: 0
UNIT NUMBER: NORMAL

## 2020-03-12 ENCOUNTER — OFFICE VISIT (OUTPATIENT)
Dept: CARDIOLOGY CLINIC | Age: 75
End: 2020-03-12
Payer: MEDICARE

## 2020-03-12 VITALS
SYSTOLIC BLOOD PRESSURE: 122 MMHG | HEART RATE: 80 BPM | HEIGHT: 64 IN | WEIGHT: 167.6 LBS | DIASTOLIC BLOOD PRESSURE: 70 MMHG | BODY MASS INDEX: 28.61 KG/M2

## 2020-03-12 PROCEDURE — G8417 CALC BMI ABV UP PARAM F/U: HCPCS | Performed by: INTERNAL MEDICINE

## 2020-03-12 PROCEDURE — 4004F PT TOBACCO SCREEN RCVD TLK: CPT | Performed by: INTERNAL MEDICINE

## 2020-03-12 PROCEDURE — G8427 DOCREV CUR MEDS BY ELIG CLIN: HCPCS | Performed by: INTERNAL MEDICINE

## 2020-03-12 PROCEDURE — 1111F DSCHRG MED/CURRENT MED MERGE: CPT | Performed by: INTERNAL MEDICINE

## 2020-03-12 PROCEDURE — G8482 FLU IMMUNIZE ORDER/ADMIN: HCPCS | Performed by: INTERNAL MEDICINE

## 2020-03-12 PROCEDURE — 3017F COLORECTAL CA SCREEN DOC REV: CPT | Performed by: INTERNAL MEDICINE

## 2020-03-12 PROCEDURE — 99214 OFFICE O/P EST MOD 30 MIN: CPT | Performed by: INTERNAL MEDICINE

## 2020-03-12 PROCEDURE — 93000 ELECTROCARDIOGRAM COMPLETE: CPT | Performed by: INTERNAL MEDICINE

## 2020-03-12 PROCEDURE — 1123F ACP DISCUSS/DSCN MKR DOCD: CPT | Performed by: INTERNAL MEDICINE

## 2020-03-12 PROCEDURE — 4040F PNEUMOC VAC/ADMIN/RCVD: CPT | Performed by: INTERNAL MEDICINE

## 2020-03-12 RX ORDER — NICOTINE 21 MG/24HR
1 PATCH, TRANSDERMAL 24 HOURS TRANSDERMAL DAILY
Qty: 42 PATCH | Refills: 0 | Status: SHIPPED | OUTPATIENT
Start: 2020-03-12 | End: 2020-03-17

## 2020-03-12 RX ORDER — NICOTINE 21 MG/24HR
1 PATCH, TRANSDERMAL 24 HOURS TRANSDERMAL DAILY
Qty: 14 PATCH | Refills: 5 | Status: SHIPPED | OUTPATIENT
Start: 2020-03-12 | End: 2020-03-17

## 2020-03-12 RX ORDER — FUROSEMIDE 20 MG/1
20 TABLET ORAL 2 TIMES DAILY
Qty: 60 TABLET | Refills: 5 | Status: SHIPPED | OUTPATIENT
Start: 2020-03-12 | End: 2020-04-06 | Stop reason: SDUPTHER

## 2020-03-12 NOTE — LETTER
Jer Ha  1945  [id number]    Have you had any Chest Pain - No       Have you had any Shortness of Breath - No       Have you had any dizziness - Yes, since TAVR  If Yes DO ORTHOSTATIC BP - when do you feel dizzy with position change   How long does it last seconds    Have you had any palpitations - No       Is the patient on any of the following medications - Amiodarone (Cordarone  If Yes DO EKG    Do you have any edema - swelling in NONE    If Yes - CHECK TO SEE IF THE EDEMA IS PITTING    Check Venous \"LEG PROBLEM Questionnaire\"    Do you have a surgery or procedure scheduled in the near future - No  If Yes- DO EKG      Ask patient if they want to sign [facility] for [facility] if they are not already signed up    Check to see if we have an E-MAIL on file for the patient    Check medication list thoroughly!!!  BE SURE TO ASK PATIENT IF THEY NEED MEDICATION REFILLS

## 2020-03-17 ENCOUNTER — OFFICE VISIT (OUTPATIENT)
Dept: FAMILY MEDICINE CLINIC | Age: 75
End: 2020-03-17
Payer: MEDICARE

## 2020-03-17 VITALS
OXYGEN SATURATION: 95 % | HEIGHT: 65 IN | WEIGHT: 166.6 LBS | HEART RATE: 73 BPM | BODY MASS INDEX: 27.76 KG/M2 | DIASTOLIC BLOOD PRESSURE: 62 MMHG | SYSTOLIC BLOOD PRESSURE: 118 MMHG

## 2020-03-17 DIAGNOSIS — E11.9 TYPE 2 DIABETES MELLITUS WITHOUT COMPLICATION, WITHOUT LONG-TERM CURRENT USE OF INSULIN (HCC): ICD-10-CM

## 2020-03-17 DIAGNOSIS — Z95.2 S/P AVR (AORTIC VALVE REPLACEMENT): ICD-10-CM

## 2020-03-17 DIAGNOSIS — I10 ESSENTIAL HYPERTENSION: ICD-10-CM

## 2020-03-17 DIAGNOSIS — R53.83 FATIGUE, UNSPECIFIED TYPE: ICD-10-CM

## 2020-03-17 DIAGNOSIS — Z95.1 S/P CABG (CORONARY ARTERY BYPASS GRAFT): ICD-10-CM

## 2020-03-17 LAB
A/G RATIO: 1.9 (ref 1.1–2.2)
ALBUMIN SERPL-MCNC: 4.2 G/DL (ref 3.4–5)
ALP BLD-CCNC: 109 U/L (ref 40–129)
ALT SERPL-CCNC: 24 U/L (ref 10–40)
ANION GAP SERPL CALCULATED.3IONS-SCNC: 16 MMOL/L (ref 3–16)
AST SERPL-CCNC: 18 U/L (ref 15–37)
BASOPHILS ABSOLUTE: 0.1 K/UL (ref 0–0.2)
BASOPHILS RELATIVE PERCENT: 1 %
BILIRUB SERPL-MCNC: 0.3 MG/DL (ref 0–1)
BUN BLDV-MCNC: 13 MG/DL (ref 7–20)
CALCIUM SERPL-MCNC: 10.3 MG/DL (ref 8.3–10.6)
CHLORIDE BLD-SCNC: 103 MMOL/L (ref 99–110)
CHOLESTEROL, TOTAL: 146 MG/DL (ref 0–199)
CO2: 24 MMOL/L (ref 21–32)
CREAT SERPL-MCNC: 0.8 MG/DL (ref 0.8–1.3)
EOSINOPHILS ABSOLUTE: 0.2 K/UL (ref 0–0.6)
EOSINOPHILS RELATIVE PERCENT: 1.3 %
GFR AFRICAN AMERICAN: >60
GFR NON-AFRICAN AMERICAN: >60
GLOBULIN: 2.2 G/DL
GLUCOSE BLD-MCNC: 73 MG/DL (ref 70–99)
HCT VFR BLD CALC: 34.2 % (ref 40.5–52.5)
HDLC SERPL-MCNC: 30 MG/DL (ref 40–60)
HEMOGLOBIN: 11.4 G/DL (ref 13.5–17.5)
LDL CHOLESTEROL CALCULATED: 76 MG/DL
LYMPHOCYTES ABSOLUTE: 2.8 K/UL (ref 1–5.1)
LYMPHOCYTES RELATIVE PERCENT: 23.1 %
MCH RBC QN AUTO: 31 PG (ref 26–34)
MCHC RBC AUTO-ENTMCNC: 33.4 G/DL (ref 31–36)
MCV RBC AUTO: 92.7 FL (ref 80–100)
MONOCYTES ABSOLUTE: 0.8 K/UL (ref 0–1.3)
MONOCYTES RELATIVE PERCENT: 6.9 %
NEUTROPHILS ABSOLUTE: 8.2 K/UL (ref 1.7–7.7)
NEUTROPHILS RELATIVE PERCENT: 67.7 %
PDW BLD-RTO: 15.1 % (ref 12.4–15.4)
PLATELET # BLD: 351 K/UL (ref 135–450)
PMV BLD AUTO: 8.2 FL (ref 5–10.5)
POTASSIUM SERPL-SCNC: 4 MMOL/L (ref 3.5–5.1)
RBC # BLD: 3.69 M/UL (ref 4.2–5.9)
SODIUM BLD-SCNC: 143 MMOL/L (ref 136–145)
T4 FREE: 1.6 NG/DL (ref 0.9–1.8)
TOTAL PROTEIN: 6.4 G/DL (ref 6.4–8.2)
TRIGL SERPL-MCNC: 198 MG/DL (ref 0–150)
TSH SERPL DL<=0.05 MIU/L-ACNC: 6.02 UIU/ML (ref 0.27–4.2)
VLDLC SERPL CALC-MCNC: 40 MG/DL
WBC # BLD: 12.2 K/UL (ref 4–11)

## 2020-03-17 PROCEDURE — G8427 DOCREV CUR MEDS BY ELIG CLIN: HCPCS | Performed by: FAMILY MEDICINE

## 2020-03-17 PROCEDURE — 4040F PNEUMOC VAC/ADMIN/RCVD: CPT | Performed by: FAMILY MEDICINE

## 2020-03-17 PROCEDURE — 1123F ACP DISCUSS/DSCN MKR DOCD: CPT | Performed by: FAMILY MEDICINE

## 2020-03-17 PROCEDURE — 1111F DSCHRG MED/CURRENT MED MERGE: CPT | Performed by: FAMILY MEDICINE

## 2020-03-17 PROCEDURE — 2022F DILAT RTA XM EVC RTNOPTHY: CPT | Performed by: FAMILY MEDICINE

## 2020-03-17 PROCEDURE — G8482 FLU IMMUNIZE ORDER/ADMIN: HCPCS | Performed by: FAMILY MEDICINE

## 2020-03-17 PROCEDURE — 99214 OFFICE O/P EST MOD 30 MIN: CPT | Performed by: FAMILY MEDICINE

## 2020-03-17 PROCEDURE — 3052F HG A1C>EQUAL 8.0%<EQUAL 9.0%: CPT | Performed by: FAMILY MEDICINE

## 2020-03-17 PROCEDURE — 4004F PT TOBACCO SCREEN RCVD TLK: CPT | Performed by: FAMILY MEDICINE

## 2020-03-17 PROCEDURE — 3017F COLORECTAL CA SCREEN DOC REV: CPT | Performed by: FAMILY MEDICINE

## 2020-03-17 PROCEDURE — G8417 CALC BMI ABV UP PARAM F/U: HCPCS | Performed by: FAMILY MEDICINE

## 2020-03-17 RX ORDER — DOCUSATE SODIUM 100 MG/1
100 CAPSULE, LIQUID FILLED ORAL DAILY PRN
Qty: 30 CAPSULE | Refills: 0 | Status: SHIPPED | OUTPATIENT
Start: 2020-03-17 | End: 2020-06-23 | Stop reason: SDUPTHER

## 2020-03-17 ASSESSMENT — ENCOUNTER SYMPTOMS
CONSTIPATION: 1
ABDOMINAL PAIN: 0
COUGH: 0
SHORTNESS OF BREATH: 0

## 2020-03-17 NOTE — PROGRESS NOTES
Lifestyle    Physical activity     Days per week: None     Minutes per session: None    Stress: None   Relationships    Social connections     Talks on phone: None     Gets together: None     Attends Mu-ism service: None     Active member of club or organization: None     Attends meetings of clubs or organizations: None     Relationship status: None    Intimate partner violence     Fear of current or ex partner: None     Emotionally abused: None     Physically abused: None     Forced sexual activity: None   Other Topics Concern    None   Social History Narrative    None        SURGICAL HISTORY  Past Surgical History:   Procedure Laterality Date    CABG WITH AORTIC VALVE REPAIR N/A 03/04/2020    Dr. Phoebe Schumacher CABG WITH AORTIC VALVE REPLACEMENT N/A 3/4/2020    CABG CORONARY ARTERY BYPASS x1 SALEEM TO LAD, AORTIC VALVE REPACEMENT performed by Rei Villalpando MD at [address]  02/26/2020    critical LAD lesion and moderate to severe right coronary artery lesion    OTHER SURGICAL HISTORY  12/09/2016    excision of basal cell carcinoma of nose with complex repair    OTHER SURGICAL HISTORY  01/2020    epidual steroid injections L3-L4    SINUS SURGERY  1970's       CURRENT MEDICATIONS  Current Outpatient Medications   Medication Sig Dispense Refill    furosemide (LASIX) 20 MG tablet Take 1 tablet by mouth 2 times daily 60 tablet 5    amiodarone (CORDARONE) 200 MG tablet Take 1 tablet by mouth 2 times daily 30 tablet 3    glipiZIDE (GLUCOTROL) 5 MG tablet Take 1 tablet by mouth 2 times daily (before meals) 60 tablet 3    carvedilol (COREG) 3.125 MG tablet Take 1 tablet by mouth 2 times daily 60 tablet 3    blood glucose monitor kit and supplies Test 3 times a day & as needed for symptoms of irregular blood glucose. 1 kit 0    aspirin 81 MG tablet Take by mouth      gabapentin (NEURONTIN) 300 MG capsule Take 600 mg by mouth 2 times daily.       atorvastatin (LIPITOR) 40 MG tablet Take 1 tablet by mouth daily 30 tablet 2     No current facility-administered medications for this visit. ALLERGIES  Allergies   Allergen Reactions    Metformin And Related Diarrhea     Severe diarrhea, abd pain, and nausea    Amoxicillin Diarrhea    Other Nausea And Vomiting     ANTI-INFLAMMATORIES  hypertension       PHYSICAL EXAM    Physical Exam  Vitals signs and nursing note reviewed. Constitutional:       General: He is not in acute distress. Appearance: He is well-developed. He is not diaphoretic. HENT:      Head: Normocephalic and atraumatic. Cardiovascular:      Rate and Rhythm: Normal rate and regular rhythm. Heart sounds: Normal heart sounds. Pulmonary:      Effort: Pulmonary effort is normal. No respiratory distress. Breath sounds: Normal breath sounds. Chest:          Comments: Well-healing surgical incision status post aortic valve replacement and CABG. No signs of cellulitis, or wound dehiscence  Abdominal:      General: Bowel sounds are normal.      Palpations: Abdomen is soft. Tenderness: There is no abdominal tenderness. Skin:     General: Skin is warm and dry. Neurological:      Mental Status: He is alert and oriented to person, place, and time. Psychiatric:         Thought Content: Thought content normal.         ASSESSMENT & PLAN    1. Type 2 diabetes mellitus without complication, without long-term current use of insulin [facility])  Did confirm with the patient that there were diabetic testing supplies at [place] and that they just needed to see his insurance card to be able to process that and get him those supplies. He is to continue monitoring his blood sugars daily at home and continue taking medications as previously prescribed. Patient will have lab work completed today and medication dosages may be changed based on the lab results.   Patient will be updated on lab results once they are completed and notified of any medication changes if necessary.  - Comprehensive Metabolic Panel; Future    2. Essential hypertension  Continue taking medications as prescribed and refills will be provided as necessary. Patient will have lab work completed today and medication dosages may be changed based on the lab results. Patient will be updated on lab results once they are completed and notified of any medication changes if necessary.  - Comprehensive Metabolic Panel; Future  - CBC Auto Differential; Future  - Lipid Panel; Future    3. S/P AVR (aortic valve replacement)  Continue following with cardiology/cardiothoracic surgery. Continue to monitor surgical incision. Patient will have lab work completed today and medication dosages may be changed based on the lab results. Patient will be updated on lab results once they are completed and notified of any medication changes if necessary.  - Comprehensive Metabolic Panel; Future  - CBC Auto Differential; Future  - Lipid Panel; Future    4. S/P CABG (coronary artery bypass graft)  See above (#3).  - Comprehensive Metabolic Panel; Future  - CBC Auto Differential; Future  - Lipid Panel; Future    5. Fatigue, unspecified type  Patient will have lab work completed today and medication dosages may be changed based on the lab results. Patient will be updated on lab results once they are completed and notified of any medication changes if necessary.  - T4, Free; Future  - TSH without Reflex; Future    6. Constipation due to opioid therapy  For his constipation patient will be prescribed Colace 100 mg as outlined below. Patient was also instructed to drink plenty of water and use the MiraLAX he was previously prescribed as needed. - docusate sodium (COLACE) 100 MG capsule; Take 1 capsule by mouth daily as needed for Constipation  Dispense: 30 capsule; Refill: 0    Patient is to follow-up in approximately 4 to 6 weeks with Dr. Tish Duenas, unless otherwise needed in that time.   Pt is to call with any questions, concerns, or increase in symptoms. Pt verbalized understanding of and agreement with current plan of care. Electronically signed by ANDREINA Joseph on 3/17/2020      Please note that this chart was generated using dragon dictation software. Although every effort was made to ensure the accuracy of this automated transcription, some errors in transcription may have occurred.

## 2020-03-17 NOTE — PATIENT INSTRUCTIONS
using.  Where can I get more information? Your pharmacist can provide more information about docusate. Remember, keep this and all other medicines out of the reach of children, never share your medicines with others, and use this medication only for the indication prescribed. Every effort has been made to ensure that the information provided by [address] is accurate, up-to-date, and complete, but no guarantee is made to that effect. Drug information contained herein may be time sensitive. [facility] information has been compiled for use by healthcare practitioners and consumers in the [place] and therefore [facility] does not warrant that uses outside of the [place] are appropriate, unless specifically indicated otherwise. [facility]'s drug information does not endorse drugs, diagnose patients or recommend therapy. [facility]'s drug information is an informational resource designed to assist licensed healthcare practitioners in caring for their patients and/or to serve consumers viewing this service as a supplement to, and not a substitute for, the expertise, skill, knowledge and judgment of healthcare practitioners. The absence of a warning for a given drug or drug combination in no way should be construed to indicate that the drug or drug combination is safe, effective or appropriate for any given patient. [facility] does not assume any responsibility for any aspect of healthcare administered with the aid of information [facility] provides. The information contained herein is not intended to cover all possible uses, directions, precautions, warnings, drug interactions, allergic reactions, or adverse effects. If you have questions about the drugs you are taking, check with your doctor, nurse or pharmacist.  Copyright 4389-1300 [address] [place] [address] Dr SCHULTZ. Version: 4.01. Revision date: 7/8/2019. Care instructions adapted under license by [facility] ([facility]).  If you have questions about a medical condition or this

## 2020-03-18 NOTE — DISCHARGE SUMMARY
Discharge Summary     Patient ID  Patric Samayoa   1945  [id number]      Primary Care Doctor:  Sherri Allen MD    Admit date: 3/4/2020   Discharge date: 3/18/2020      Admitting Physician: Ronan Olmstead MD   Discharge Physician: Leon Jean-Baptiste PA-C    Discharge Diagnoses: Active Hospital Problems    Diagnosis Date Noted    CAD in native artery [I25.10] 03/04/2020    Type 2 diabetes mellitus without complication, without long-term current use of insulin ([facility] [address] [E11.9] 07/24/2019     Discharged Condition: stable. Hospital Course:    Underwent surgery of AVR, CABG x 1 after discussion and preparation. Post op ; monitored rhythm, O2 sat, I/O, Labs, CXRs serially  Neuro status , BP and vital signs monitored  Pt a fib post op, converted to NSR with amio. Started on diet and activity. At discharge, pt in NSR, on RA, ambulating    Special concerns: none  Further plans after discharge: f/u with dr. Shay Peña in 2 weeks    VS at discharge   Vitals:    03/09/20 1511   BP:    Pulse: 87   Resp: 18   Temp:    SpO2:        Consults: endocrinology    Disposition: home    Patient Instructions:      Medication List      START taking these medications    amiodarone 200 MG tablet  Commonly known as:  CORDARONE  Take 1 tablet by mouth 2 times daily     blood glucose monitor kit and supplies  Test 3 times a day & as needed for symptoms of irregular blood glucose. carvedilol 3.125 MG tablet  Commonly known as:  COREG  Take 1 tablet by mouth 2 times daily     glipiZIDE 5 MG tablet  Commonly known as:  GLUCOTROL  Take 1 tablet by mouth 2 times daily (before meals)        CHANGE how you take these medications    aspirin 81 MG tablet  What changed:  Another medication with the same name was removed. Continue taking this medication, and follow the directions you see here.      atorvastatin 40 MG tablet  Commonly known as:  LIPITOR  Take 1 tablet by mouth daily  What changed:  Another medication with the same name was removed. Continue taking this medication, and follow the directions you see here. CONTINUE taking these medications    gabapentin 300 MG capsule  Commonly known as:  NEURONTIN        STOP taking these medications    lisinopril-hydroCHLOROthiazide 20-12.5 MG per tablet  Commonly known as:  PRINZIDE;ZESTORETIC        ASK your doctor about these medications    HYDROcodone-acetaminophen 5-325 MG per tablet  Commonly known as:  NORCO  Take 1 tablet by mouth every 8 hours as needed for Pain for up to 3 days. Ask about: Should I take this medication?            Where to Get Your Medications      These medications were sent to [address] [place] [address] 14, [address]    Phone:  [phone]   · amiodarone 200 MG tablet  · blood glucose monitor kit and supplies  · carvedilol 3.125 MG tablet  · glipiZIDE 5 MG tablet     You can get these medications from any pharmacy    Bring a paper prescription for each of these medications  · HYDROcodone-acetaminophen 5-325 MG per tablet       Activity: activity as tolerated and no lifting, Driving, or Strenuous exercise until seen by physician in office  Diet: cardiac diet  Wound Care: as directed    Follow-up as directed at discharge    Signed: Timothy Reyes    Time spent on discharge 35 minutes

## 2020-03-19 ENCOUNTER — TELEPHONE (OUTPATIENT)
Dept: FAMILY MEDICINE CLINIC | Age: 75
End: 2020-03-19

## 2020-04-06 RX ORDER — CARVEDILOL 3.12 MG/1
3.12 TABLET ORAL 2 TIMES DAILY
Qty: 60 TABLET | Refills: 3 | Status: SHIPPED | OUTPATIENT
Start: 2020-04-06 | End: 2020-06-04 | Stop reason: SDUPTHER

## 2020-04-06 RX ORDER — FUROSEMIDE 20 MG/1
20 TABLET ORAL 2 TIMES DAILY
Qty: 60 TABLET | Refills: 3 | Status: SHIPPED | OUTPATIENT
Start: 2020-04-06 | End: 2020-08-18 | Stop reason: SDUPTHER

## 2020-04-06 RX ORDER — HYDROCODONE BITARTRATE AND ACETAMINOPHEN 5; 325 MG/1; MG/1
1 TABLET ORAL EVERY 8 HOURS PRN
Qty: 10 TABLET | Refills: 0 | Status: SHIPPED | OUTPATIENT
Start: 2020-04-06 | End: 2020-04-24 | Stop reason: SDUPTHER

## 2020-04-06 RX ORDER — AMIODARONE HYDROCHLORIDE 200 MG/1
200 TABLET ORAL 2 TIMES DAILY
Qty: 30 TABLET | Refills: 3 | Status: SHIPPED | OUTPATIENT
Start: 2020-04-06 | End: 2020-04-24 | Stop reason: SDUPTHER

## 2020-04-06 RX ORDER — GLIPIZIDE 5 MG/1
5 TABLET ORAL
Qty: 60 TABLET | Refills: 3 | Status: SHIPPED | OUTPATIENT
Start: 2020-04-06 | End: 2020-06-04 | Stop reason: SDUPTHER

## 2020-04-06 NOTE — TELEPHONE ENCOUNTER
I called pt and he stated that he does not take jardiance anymore he takes glpizide per the hosp visit he had . While I was on the phone pt stated he needed refills of some of his meds .

## 2020-04-14 ENCOUNTER — VIRTUAL VISIT (OUTPATIENT)
Dept: FAMILY MEDICINE CLINIC | Age: 75
End: 2020-04-14
Payer: MEDICARE

## 2020-04-14 PROCEDURE — 3052F HG A1C>EQUAL 8.0%<EQUAL 9.0%: CPT | Performed by: FAMILY MEDICINE

## 2020-04-14 PROCEDURE — G2012 BRIEF CHECK IN BY MD/QHP: HCPCS | Performed by: FAMILY MEDICINE

## 2020-04-20 ENCOUNTER — APPOINTMENT (OUTPATIENT)
Dept: GENERAL RADIOLOGY | Age: 75
End: 2020-04-20
Payer: MEDICARE

## 2020-04-20 ENCOUNTER — APPOINTMENT (OUTPATIENT)
Dept: CT IMAGING | Age: 75
End: 2020-04-20
Payer: MEDICARE

## 2020-04-20 ENCOUNTER — HOSPITAL ENCOUNTER (EMERGENCY)
Age: 75
Discharge: HOME | End: 2020-04-20
Attending: EMERGENCY MEDICINE
Payer: MEDICARE

## 2020-04-20 VITALS
WEIGHT: 160 LBS | OXYGEN SATURATION: 93 % | TEMPERATURE: 98.7 F | RESPIRATION RATE: 20 BRPM | HEART RATE: 75 BPM | BODY MASS INDEX: 25.71 KG/M2 | SYSTOLIC BLOOD PRESSURE: 179 MMHG | HEIGHT: 66 IN | DIASTOLIC BLOOD PRESSURE: 76 MMHG

## 2020-04-20 PROCEDURE — 90715 TDAP VACCINE 7 YRS/> IM: CPT | Performed by: EMERGENCY MEDICINE

## 2020-04-20 PROCEDURE — 6370000000 HC RX 637 (ALT 250 FOR IP): Performed by: EMERGENCY MEDICINE

## 2020-04-20 PROCEDURE — 99284 EMERGENCY DEPT VISIT MOD MDM: CPT

## 2020-04-20 PROCEDURE — 70450 CT HEAD/BRAIN W/O DYE: CPT

## 2020-04-20 PROCEDURE — 2500000003 HC RX 250 WO HCPCS: Performed by: EMERGENCY MEDICINE

## 2020-04-20 PROCEDURE — 90471 IMMUNIZATION ADMIN: CPT | Performed by: EMERGENCY MEDICINE

## 2020-04-20 PROCEDURE — 71046 X-RAY EXAM CHEST 2 VIEWS: CPT

## 2020-04-20 PROCEDURE — 6360000002 HC RX W HCPCS: Performed by: EMERGENCY MEDICINE

## 2020-04-20 PROCEDURE — 73030 X-RAY EXAM OF SHOULDER: CPT

## 2020-04-20 PROCEDURE — 4500000027

## 2020-04-20 PROCEDURE — 70486 CT MAXILLOFACIAL W/O DYE: CPT

## 2020-04-20 RX ORDER — ACETAMINOPHEN 325 MG/1
650 TABLET ORAL ONCE
Status: COMPLETED | OUTPATIENT
Start: 2020-04-20 | End: 2020-04-20

## 2020-04-20 RX ORDER — LIDOCAINE HYDROCHLORIDE 10 MG/ML
5 INJECTION, SOLUTION EPIDURAL; INFILTRATION; INTRACAUDAL; PERINEURAL ONCE
Status: COMPLETED | OUTPATIENT
Start: 2020-04-20 | End: 2020-04-20

## 2020-04-20 RX ADMIN — LIDOCAINE HYDROCHLORIDE 5 ML: 10 INJECTION, SOLUTION EPIDURAL; INFILTRATION; INTRACAUDAL; PERINEURAL at 15:29

## 2020-04-20 RX ADMIN — ACETAMINOPHEN 650 MG: 325 TABLET ORAL at 15:29

## 2020-04-20 RX ADMIN — TETANUS TOXOID, REDUCED DIPHTHERIA TOXOID AND ACELLULAR PERTUSSIS VACCINE, ADSORBED 0.5 ML: 5; 2.5; 8; 8; 2.5 SUSPENSION INTRAMUSCULAR at 16:37

## 2020-04-20 ASSESSMENT — PAIN SCALES - GENERAL
PAINLEVEL_OUTOF10: 4
PAINLEVEL_OUTOF10: 4

## 2020-04-20 ASSESSMENT — PAIN DESCRIPTION - ORIENTATION: ORIENTATION: RIGHT

## 2020-04-20 ASSESSMENT — PAIN DESCRIPTION - PAIN TYPE: TYPE: ACUTE PAIN

## 2020-04-20 NOTE — ED NOTES
Dry sterile dressing applied to laceration. Discharge instructions given to pt. Pt verbalizes understanding.  Pt discharged ambulatory to private auto to wife     Sujit Chinchilla, [address] [facility]  04/20/20 2978

## 2020-04-20 NOTE — ED PROVIDER NOTES
injections L3-L4    SINUS SURGERY  1970's     Family History   Problem Relation Age of Onset    Lung Cancer Mother     Heart Disease Father     High Blood Pressure Father     High Cholesterol Father     Lung Cancer Father      Social History     Socioeconomic History    Marital status:      Spouse name: Not on file    Number of children: Not on file    Years of education: Not on file    Highest education level: Not on file   Occupational History    Not on file   Social Needs    Financial resource strain: Not on file    Food insecurity     Worry: Not on file     Inability: Not on file    Transportation needs     Medical: Not on file     Non-medical: Not on file   Tobacco Use    Smoking status: Current Every Day Smoker     Packs/day: 1.00     Years: 58.00     Pack years: 58.00     Types: Cigarettes     Start date: 1962    Smokeless tobacco: Never Used   Substance and Sexual Activity    Alcohol use: Yes     Comment: 1 glass wine a month    Drug use: No    Sexual activity: Not on file   Lifestyle    Physical activity     Days per week: Not on file     Minutes per session: Not on file    Stress: Not on file   Relationships    Social connections     Talks on phone: Not on file     Gets together: Not on file     Attends Zoroastrian service: Not on file     Active member of club or organization: Not on file     Attends meetings of clubs or organizations: Not on file     Relationship status: Not on file    Intimate partner violence     Fear of current or ex partner: Not on file     Emotionally abused: Not on file     Physically abused: Not on file     Forced sexual activity: Not on file   Other Topics Concern    Not on file   Social History Narrative    Not on file     No current facility-administered medications for this encounter.       Current Outpatient Medications   Medication Sig Dispense Refill    diphenhydrAMINE-APAP, sleep, (TYLENOL PM EXTRA STRENGTH)  MG/30ML LIQD Take by mouth tenderness right infraorbital pain no deformity         Mouth/Throat:      Mouth: Mucous membranes are moist.   Eyes:      General: No scleral icterus. Right eye: No discharge. Left eye: No discharge. Conjunctiva/sclera: Conjunctivae normal.      Pupils: Pupils are equal, round, and reactive to light. Neck:      Musculoskeletal: Normal range of motion and neck supple. Thyroid: No thyromegaly. Vascular: No JVD. Trachea: No tracheal deviation. Cardiovascular:      Rate and Rhythm: Normal rate and regular rhythm. Heart sounds: Normal heart sounds. No murmur. No friction rub. No gallop. Pulmonary:      Effort: Pulmonary effort is normal. No respiratory distress. Breath sounds: Normal breath sounds. No stridor. No wheezing or rales. Chest:      Chest wall: No tenderness. Abdominal:      General: Bowel sounds are normal. There is no distension. Palpations: Abdomen is soft. There is no mass. Tenderness: There is no abdominal tenderness. There is no guarding or rebound. Hernia: No hernia is present. Musculoskeletal: Normal range of motion. General: No tenderness or deformity. Lymphadenopathy:      Cervical: No cervical adenopathy. Skin:     General: Skin is warm and dry. Coloration: Skin is not pale. Findings: No erythema or rash. Neurological:      Mental Status: He is alert and oriented to person, place, and time. Cranial Nerves: No cranial nerve deficit. Sensory: No sensory deficit. Deep Tendon Reflexes: Reflexes are normal and symmetric. Reflexes normal.   Psychiatric:         Speech: Speech normal.         Behavior: Behavior normal.         Thought Content: Thought content normal.         Judgment: Judgment normal.         I have reviewed and interpreted all of the currently available lab results from this visit (ifapplicable):  No results found for this visit on 04/20/20.    Radiographs (if obtained):  [] The following radiograph wasinterpreted by myself in the absence of a radiologist:   [] Radiologist's Report Reviewed:  XR SHOULDER RIGHT (MIN 2 VIEWS)   Final Result   No acute fracture or dislocation. XR CHEST STANDARD (2 VW)   Final Result   No acute abnormalities. CT MAXILLOFACIAL WO CONTRAST   Final Result   1. No acute facial bone fractures. Small amount of soft tissue swelling   adjacent to the right orbit and right maxillary sinus. 2. Postsurgical changes of the left maxillary sinus. CT HEAD WO CONTRAST   Final Result   No acute intracranial abnormality. EKG (if obtained): (All EKG's are interpreted by myself in the absence of a cardiologist)    Chart review shows recent radiographs:  No results found. MDM:                Procedure Note - Laceration repair: Complex R upper eyelid laceration ( copious irrigation / plastic repair )  Questions were sought and answered and verbal consent was given by patient for the procedure. The area was prepped and draped in standard bedside fashion. The wound area was anesthetized with 5ml of Lidocaine 1% without epinephrine without added sodium bicarbonate. The wound was explored with No foreign bodies found. The wound was repaired with 6-0 Prolene; several sutures were used. The patient tolerated the procedure well without complications and my repeat neurovascular exam post-procedure is unchanged. Wound care and scar minimization education was provided. Instructions were given to return for increasing pain, redness, streaking, discharge, or any other worsening or worrisome concerns. Clinical Impression:  1. Injury of head, initial encounter    2. Facial trauma, initial encounter    3. Right eyelid laceration, initial encounter    4. Contusion of chest wall, unspecified laterality, initial encounter    5.  Contusion of multiple sites of right shoulder and upper arm, initial encounter      Disposition referral (if applicable):  Radha Cameron MD  [address] [place] [address]  [phone]    Go in 1 week  For suture removal    Disposition medications (if applicable):  Discharge Medication List as of 4/20/2020  4:43 PM              Higinio Mcdonnell DO, EVELYN      Comment: Please note this report has been produced using speech recognition software and maycontain errors related to that system including errors in grammar, punctuation, and spelling, as well as words and phrases that may be inappropriate. If there are any questions or concerns please feel free to contact thedictating provider for clarification.         Lidia Masker, DO  04/21/20 0054 [facility], DO  05/05/20 [address], DO  05/05/20 4896

## 2020-04-21 ENCOUNTER — TELEPHONE (OUTPATIENT)
Dept: SURGERY | Age: 75
End: 2020-04-21

## 2020-04-24 ENCOUNTER — HOSPITAL ENCOUNTER (OUTPATIENT)
Dept: CT IMAGING | Age: 75
Discharge: HOME | End: 2020-04-24
Payer: MEDICARE

## 2020-04-24 PROCEDURE — 74174 CTA ABD&PLVS W/CONTRAST: CPT

## 2020-04-24 PROCEDURE — 6360000004 HC RX CONTRAST MEDICATION: Performed by: SURGERY

## 2020-04-24 RX ORDER — HYDROCODONE BITARTRATE AND ACETAMINOPHEN 5; 325 MG/1; MG/1
1 TABLET ORAL EVERY 8 HOURS PRN
Qty: 10 TABLET | Refills: 0 | Status: SHIPPED | OUTPATIENT
Start: 2020-04-24 | End: 2020-04-27

## 2020-04-24 RX ORDER — AMIODARONE HYDROCHLORIDE 200 MG/1
200 TABLET ORAL 2 TIMES DAILY
Qty: 60 TABLET | Refills: 3 | Status: SHIPPED | OUTPATIENT
Start: 2020-04-24 | End: 2020-06-08 | Stop reason: SDUPTHER

## 2020-04-24 RX ADMIN — IOPAMIDOL 100 ML: 755 INJECTION, SOLUTION INTRAVENOUS at 14:08

## 2020-04-24 NOTE — TELEPHONE ENCOUNTER
Next 6/22/2020  Last 4/14/2020  Pharm Zeina Colon    Amiodarone is suppose to be disp-60 not 30 per the pt-- can we fix this?

## 2020-04-27 ENCOUNTER — HOSPITAL ENCOUNTER (EMERGENCY)
Age: 75
Discharge: HOME | End: 2020-04-27
Payer: MEDICARE

## 2020-04-27 VITALS
BODY MASS INDEX: 25.71 KG/M2 | SYSTOLIC BLOOD PRESSURE: 161 MMHG | HEART RATE: 74 BPM | RESPIRATION RATE: 16 BRPM | WEIGHT: 160 LBS | OXYGEN SATURATION: 97 % | HEIGHT: 66 IN | TEMPERATURE: 98.7 F | DIASTOLIC BLOOD PRESSURE: 81 MMHG

## 2020-04-27 PROCEDURE — 99281 EMR DPT VST MAYX REQ PHY/QHP: CPT

## 2020-04-28 ENCOUNTER — TELEPHONE (OUTPATIENT)
Dept: SURGERY | Age: 75
End: 2020-04-28

## 2020-04-28 ENCOUNTER — TELEPHONE (OUTPATIENT)
Dept: FAMILY MEDICINE CLINIC | Age: 75
End: 2020-04-28

## 2020-05-11 ENCOUNTER — TELEPHONE (OUTPATIENT)
Dept: SURGERY | Age: 75
End: 2020-05-11

## 2020-05-11 NOTE — TELEPHONE ENCOUNTER
You Patient resolved from the Care Transitions episode on 5.11.2020  Patient/family has been provided the following resources and education related to COVID-19:                         Signs, symptoms and red flags related to COVID-19            CDC exposure and quarantine guidelines            [employer] exposure contact - [phone]            Contact for their local Department of Health                 Patient currently reports that the following symptoms have improved:  unable to reach pt for 14 day fu call, left appropriate voice message and return phone number. No further outreach scheduled with this CTN/ACM. Episode of Care resolved. Patient has this CTN/ACM contact information if future needs arise.

## 2020-05-15 RX ORDER — HYDROCODONE BITARTRATE AND ACETAMINOPHEN 5; 325 MG/1; MG/1
1 TABLET ORAL EVERY 8 HOURS PRN
Qty: 10 TABLET | Refills: 0 | OUTPATIENT
Start: 2020-05-15 | End: 2020-05-18

## 2020-05-15 RX ORDER — ATORVASTATIN CALCIUM 40 MG/1
40 TABLET, FILM COATED ORAL DAILY
Qty: 30 TABLET | Refills: 2 | Status: SHIPPED | OUTPATIENT
Start: 2020-05-15 | End: 2020-07-16 | Stop reason: SDUPTHER

## 2020-05-15 NOTE — TELEPHONE ENCOUNTER
This was prescribed by the cardiothoracic surgeons originally for postop pain on 3/9/2020 after his bypass surgery and then for some reason patient called us for refills of it and without any question we have refilled it on 4/6/2020 and 3/9/2020. He is now over 2 months postop why are we refilling this medication for the patient? We have no notes about us prescribing it or why were prescribing it.

## 2020-05-18 ENCOUNTER — OFFICE VISIT (OUTPATIENT)
Dept: FAMILY MEDICINE CLINIC | Age: 75
End: 2020-05-18
Payer: MEDICARE

## 2020-05-18 VITALS
BODY MASS INDEX: 26.52 KG/M2 | DIASTOLIC BLOOD PRESSURE: 72 MMHG | TEMPERATURE: 97.5 F | HEIGHT: 66 IN | WEIGHT: 165 LBS | OXYGEN SATURATION: 94 % | SYSTOLIC BLOOD PRESSURE: 156 MMHG | HEART RATE: 66 BPM

## 2020-05-18 PROCEDURE — 99213 OFFICE O/P EST LOW 20 MIN: CPT | Performed by: FAMILY MEDICINE

## 2020-05-18 RX ORDER — TRAMADOL HYDROCHLORIDE 50 MG/1
50 TABLET ORAL EVERY 6 HOURS PRN
Qty: 35 TABLET | Refills: 0 | Status: SHIPPED | OUTPATIENT
Start: 2020-05-18 | End: 2020-06-22 | Stop reason: SDUPTHER

## 2020-05-18 ASSESSMENT — ENCOUNTER SYMPTOMS
TROUBLE SWALLOWING: 0
EYE PAIN: 0
SHORTNESS OF BREATH: 0
ABDOMINAL PAIN: 0
VOMITING: 0
DIARRHEA: 0
BLOOD IN STOOL: 0
CHEST TIGHTNESS: 0
NAUSEA: 0
WHEEZING: 0

## 2020-05-18 NOTE — PROGRESS NOTES
blood glucose monitor kit and supplies Test 3 times a day & as needed for symptoms of irregular blood glucose. 1 kit 0     No current facility-administered medications for this visit. ALLERGIES  Allergies   Allergen Reactions    Metformin And Related Diarrhea     Severe diarrhea, abd pain, and nausea    Amoxicillin Diarrhea    Other Nausea And Vomiting     ANTI-INFLAMMATORIES  hypertension       PHYSICAL EXAM    BP (!) 156/72 (Site: Right Upper Arm, Position: Sitting, Cuff Size: Large Adult)   Pulse 66   Temp 97.5 °F (36.4 °C)   Ht 5' 6\" (1.676 m)   Wt 165 lb (74.8 kg)   SpO2 94%   BMI 26.63 kg/m²     Physical Exam  Vitals signs and nursing note reviewed. Constitutional:       General: He is not in acute distress. Appearance: Normal appearance. He is well-developed. He is not ill-appearing. HENT:      Head: Normocephalic and atraumatic. Mouth/Throat:      Mouth: Mucous membranes are moist.      Pharynx: Oropharynx is clear. Eyes:      Pupils: Pupils are equal, round, and reactive to light. Neck:      Musculoskeletal: Normal range of motion and neck supple. Cardiovascular:      Rate and Rhythm: Normal rate and regular rhythm. Heart sounds: Normal heart sounds. Pulmonary:      Effort: Pulmonary effort is normal.      Breath sounds: Normal breath sounds. Abdominal:      Palpations: Abdomen is soft. Musculoskeletal:      Comments: Patient with cyst slight tenderness over the right shoulder girdle muscles especially especially around the joint no subdeltoid subdeltoid specific tenderness noted. No atrophy no rash. Does have pain with the generalized movement especially abduction external rotation. Should be noted he is right-handed   Skin:     General: Skin is warm and dry. Neurological:      General: No focal deficit present. Mental Status: He is alert and oriented to person, place, and time. Mental status is at baseline.       Cranial Nerves: No cranial nerve

## 2020-05-20 ENCOUNTER — VIRTUAL VISIT (OUTPATIENT)
Dept: FAMILY MEDICINE CLINIC | Age: 75
End: 2020-05-20
Payer: MEDICARE

## 2020-05-20 VITALS — WEIGHT: 164.9 LBS | HEIGHT: 66 IN | BODY MASS INDEX: 26.5 KG/M2

## 2020-05-20 PROCEDURE — G0438 PPPS, INITIAL VISIT: HCPCS | Performed by: FAMILY MEDICINE

## 2020-05-20 ASSESSMENT — LIFESTYLE VARIABLES: HOW OFTEN DO YOU HAVE A DRINK CONTAINING ALCOHOL: 0

## 2020-05-20 ASSESSMENT — PATIENT HEALTH QUESTIONNAIRE - PHQ9
SUM OF ALL RESPONSES TO PHQ QUESTIONS 1-9: 2
SUM OF ALL RESPONSES TO PHQ QUESTIONS 1-9: 2

## 2020-05-20 NOTE — PATIENT INSTRUCTIONS
Personalized Preventive Plan for Lana Baires - 5/20/2020  Medicare offers a range of preventive health benefits. Some of the tests and screenings are paid in full while other may be subject to a deductible, co-insurance, and/or copay. Some of these benefits include a comprehensive review of your medical history including lifestyle, illnesses that may run in your family, and various assessments and screenings as appropriate. After reviewing your medical record and screening and assessments performed today your provider may have ordered immunizations, labs, imaging, and/or referrals for you. A list of these orders (if applicable) as well as your Preventive Care list are included within your After Visit Summary for your review. Other Preventive Recommendations:    · A preventive eye exam performed by an eye specialist is recommended every 1-2 years to screen for glaucoma; cataracts, macular degeneration, and other eye disorders. · A preventive dental visit is recommended every 6 months. · Try to get at least 150 minutes of exercise per week or 10,000 steps per day on a pedometer . · Order or download the FREE \"Exercise & Physical Activity: Your Everyday Guide\" from [employer]. Call 8-427.586.2562 or search [employer] online. · You need 2565-7306 mg of calcium and 6263-1942 IU of vitamin D per day. It is possible to meet your calcium requirement with diet alone, but a vitamin D supplement is usually necessary to meet this goal.  · When exposed to the sun, use a sunscreen that protects against both UVA and UVB radiation with an SPF of 30 or greater. Reapply every 2 to 3 hours or after sweating, drying off with a towel, or swimming. · Always wear a seat belt when traveling in a car. Always wear a helmet when riding a bicycle or motorcycle.

## 2020-05-20 NOTE — PROGRESS NOTES
Medicare Annual Wellness Visit  Name: Wyatt Franklin Date: 2020   MRN: [id number] Sex: Male   Age: 69 y.o. Ethnicity: Non-/Non    : 1945 Race: Daphne Storm is here for Medicare AWV    Screenings for behavioral, psychosocial and functional/safety risks, and cognitive dysfunction are all negative except as indicated below. These results, as well as other patient data from the [address] [facility], [place], operated by [facility] [address] form, are documented in Flowsheets linked to this Encounter. Allergies   Allergen Reactions    Metformin And Related Diarrhea     Severe diarrhea, abd pain, and nausea    Amoxicillin Diarrhea    Other Nausea And Vomiting     ANTI-INFLAMMATORIES  hypertension       Prior to Visit Medications    Medication Sig Taking? Authorizing Provider   traMADol (ULTRAM) 50 MG tablet Take 1 tablet by mouth every 6 hours as needed for Pain for up to 21 days. Intended supply: 7 days. Take lowest dose possible to manage pain Yes Madisyn Brennan MD   atorvastatin (LIPITOR) 40 MG tablet Take 1 tablet by mouth daily Yes Madisyn Brennan MD   amiodarone (CORDARONE) 200 MG tablet Take 1 tablet by mouth 2 times daily Yes Madisyn Brennan MD   diphenhydrAMINE-APAP, sleep, (TYLENOL PM EXTRA STRENGTH)  MG/30ML LIQD Take by mouth nightly Yes Historical Provider, MD   carvedilol (COREG) 3.125 MG tablet Take 1 tablet by mouth 2 times daily Yes Madisyn Brennan MD   furosemide (LASIX) 20 MG tablet Take 1 tablet by mouth 2 times daily Yes Madisyn Brennan MD   glipiZIDE (GLUCOTROL) 5 MG tablet Take 1 tablet by mouth 2 times daily (before meals) Yes Madisyn Brennan MD   docusate sodium (COLACE) 100 MG capsule Take 1 capsule by mouth daily as needed for Constipation Yes ANDREINA William   blood glucose monitor kit and supplies Test 3 times a day & as needed for symptoms of irregular blood glucose.  Yes Yoel Cuevas PA-C   aspirin 81 MG tablet Take by mouth Yes Historical Provider, MD       Past Medical History:   Diagnosis Date    AAA (abdominal aortic aneurysm) ([facility] [address] 2018    Infrarenal    Basal cell carcinoma of nose 2016    Dr Juventino Correa CAD (coronary artery disease)     Dr. Mathew Villeda - severe aortic stenosis with a bicuspid aortic valve    Essential hypertension     Hyperlipidemia     Hypertension     Follows with PCP    Non-alcoholic fatty liver disease     Osteoarthritis     Knees, lower back, shoulders,    Spinal stenosis     had epidural steroid injection 1/8/2020 - lumbar    Tremor     Left arm only    Type 2 diabetes mellitus without complication ([facility] [address]     Controlling with diet       Past Surgical History:   Procedure Laterality Date    CABG WITH AORTIC VALVE REPAIR N/A 03/04/2020    Dr. Dustin Martinez CABG WITH AORTIC VALVE REPLACEMENT N/A 3/4/2020    CABG CORONARY ARTERY BYPASS x1 SALEEM TO LAD, AORTIC VALVE REPACEMENT performed by Darold Cockayne, MD at [address] [facility]  02/26/2020    critical LAD lesion and moderate to severe right coronary artery lesion    KNEE SURGERY      quad injury from a fall    OTHER SURGICAL HISTORY  12/09/2016    excision of basal cell carcinoma of nose with complex repair    OTHER SURGICAL HISTORY  01/2020    epidual steroid injections L3-L4    SINUS SURGERY  1970's       Family History   Problem Relation Age of Onset    Lung Cancer Mother     Heart Disease Father     High Blood Pressure Father     High Cholesterol Father     Lung Cancer Father        CareTeam (Including outside providers/suppliers regularly involved in providing care):   Patient Care Team:  Laisha Laguerre MD as PCP - General  Laisha Laguerre MD as PCP - [facility] Empaneled Provider  Donnell May as     Wt Readings from Last 3 Encounters:   05/20/20 164 lb 14.5 oz (74.8 kg)   05/18/20 165 lb (74.8 kg)   05/06/20 164 lb (74.4 kg)     Vitals:    05/20/20 1025   Weight: 164 lb 14.5 oz (74.8 kg)   Height: 5' had a fall and saw Dr. Werner Rodriguez the other day for it and has had some pain still, etc.     Health Habits/Nutrition:  Health Habits/Nutrition  Do you exercise for at least 20 minutes 2-3 times per week?: (!) No  Have you lost any weight without trying in the past 3 months?: (!) Yes  Do you eat fewer than 2 meals per day?: No  Have you seen a dentist within the past year?: (!) No  Body mass index is 26.63 kg/m². Health Habits/Nutrition Interventions:  · Dental exam overdue:  patient encouraged to make appointment with his/her dentist. Patient states that he cannot exercise due to his back pain. Hearing/Vision:  No exam data present  Hearing/Vision  Do you or your family notice any trouble with your hearing?: (!) Yes  Do you have difficulty driving, watching TV, or doing any of your daily activities because of your eyesight?: No  Have you had an eye exam within the past year?: Yes  Hearing/Vision Interventions:  · Hearing concerns:  patient declines any further evaluation/treatment for hearing issues. Patient states that his wife states that he is hard of hearing, but patient does not think so and declines a referral to audiology. Safety:  Safety  Do you have working smoke detectors?: Yes  Have all throw rugs been removed or fastened?: Yes  Do you have non-slip mats or surfaces in all bathtubs/showers?: (!) No  Do all of your stairways have a railing or banister?: Yes  Are your doorways, halls and stairs free of clutter?: Yes  Do you always fasten your seatbelt when you are in a car?: Yes  Safety Interventions:  · Home safety tips provided verbally due to being audio visit.     Personalized Preventive Plan   Current Health Maintenance Status  Immunization History   Administered Date(s) Administered    Influenza, High Dose (Fluzone 65 yrs and older) 10/18/2013, 10/15/2019    Influenza, Quadv, IM, (6 mo and older Fluzone, Flulaval, Fluarix and 3 yrs and older Afluria) 10/17/2018    Pneumococcal Conjugate waiver authority and the [employer] Act, this Virtual Visit was conducted with patient's (and/or legal guardian's) consent, to reduce the patient's risk of exposure to COVID-19 and provide necessary medical care. The patient (and/or legal guardian) has also been advised to contact this office for worsening conditions or problems, and seek emergency medical treatment and/or call 911 if deemed necessary. Patient identification was verified at the start of the visit: Yes    Total time spent for this encounter: 15 minutes    Services were provided through audio synchronous discussion virtually to substitute for in-person clinic visit. Patient and provider were located at their individual homes. --Jose Roa LPN on 1/82/4488 at 52:45 AM    An electronic signature was used to authenticate this note. This encounter was performed under Zeeshan mackay MDs, direct supervision, 5/20/2020.

## 2020-05-26 ENCOUNTER — ANESTHESIA EVENT (OUTPATIENT)
Dept: CARDIAC CATH/INVASIVE PROCEDURES | Age: 75
End: 2020-05-26

## 2020-05-26 ASSESSMENT — LIFESTYLE VARIABLES: SMOKING_STATUS: 1

## 2020-05-26 NOTE — ANESTHESIA PRE PROCEDURE
Department of Anesthesiology  Preprocedure Note       Name:  Jas Rivera   Age:  69 y.o.  :  1945                                          MRN:  [id number]         Date:  2020      Surgeon: * Surgery not found *    Procedure: ABDOMINAL AORTIC ANEURYSM REPAIR ENDOVASCULAR    Medications prior to admission:   Prior to Admission medications    Medication Sig Start Date End Date Taking? Authorizing Provider   traMADol (ULTRAM) 50 MG tablet Take 1 tablet by mouth every 6 hours as needed for Pain for up to 21 days. Intended supply: 7 days. Take lowest dose possible to manage pain 20  Alex Maria MD   atorvastatin (LIPITOR) 40 MG tablet Take 1 tablet by mouth daily 5/15/20   Alex Maria MD   amiodarone (CORDARONE) 200 MG tablet Take 1 tablet by mouth 2 times daily 20   Alex Maria MD   diphenhydrAMINE-APAP, sleep, (TYLENOL PM EXTRA STRENGTH)  MG/30ML LIQD Take by mouth nightly    Historical Provider, MD   carvedilol (COREG) 3.125 MG tablet Take 1 tablet by mouth 2 times daily 20   Alex Maria MD   furosemide (LASIX) 20 MG tablet Take 1 tablet by mouth 2 times daily 20   Alex Maria MD   glipiZIDE (GLUCOTROL) 5 MG tablet Take 1 tablet by mouth 2 times daily (before meals) 20   Alex Maria MD   docusate sodium (COLACE) 100 MG capsule Take 1 capsule by mouth daily as needed for Constipation 3/17/20   ANDREINA Darden   blood glucose monitor kit and supplies Test 3 times a day & as needed for symptoms of irregular blood glucose. 3/9/20   Javan Hicks PA-C   aspirin 81 MG tablet Take by mouth    Historical Provider, MD       Current medications:    Current Outpatient Medications   Medication Sig Dispense Refill    traMADol (ULTRAM) 50 MG tablet Take 1 tablet by mouth every 6 hours as needed for Pain for up to 21 days. Intended supply: 7 days.  Take lowest dose possible to manage pain 35 tablet 0    atorvastatin (LIPITOR) 40 MG tablet Take 1 tablet by mouth daily 30 tablet 2    amiodarone (CORDARONE) 200 MG tablet Take 1 tablet by mouth 2 times daily 60 tablet 3    diphenhydrAMINE-APAP, sleep, (TYLENOL PM EXTRA STRENGTH)  MG/30ML LIQD Take by mouth nightly      carvedilol (COREG) 3.125 MG tablet Take 1 tablet by mouth 2 times daily 60 tablet 3    furosemide (LASIX) 20 MG tablet Take 1 tablet by mouth 2 times daily 60 tablet 3    glipiZIDE (GLUCOTROL) 5 MG tablet Take 1 tablet by mouth 2 times daily (before meals) 60 tablet 3    docusate sodium (COLACE) 100 MG capsule Take 1 capsule by mouth daily as needed for Constipation 30 capsule 0    blood glucose monitor kit and supplies Test 3 times a day & as needed for symptoms of irregular blood glucose. 1 kit 0    aspirin 81 MG tablet Take by mouth       No current facility-administered medications for this visit. Allergies:     Allergies   Allergen Reactions    Metformin And Related Diarrhea     Severe diarrhea, abd pain, and nausea    Amoxicillin Diarrhea    Other Nausea And Vomiting     ANTI-INFLAMMATORIES  hypertension       Problem List:    Patient Active Problem List   Diagnosis Code    Abdominal aortic aneurysm (AAA) without rupture (HCC) I71.4    Tobacco abuse Z72.0    Essential hypertension I10    Smoker J67.182    Non-alcoholic fatty liver disease K76.0    Basal cell carcinoma of nose C44.311    Type 2 diabetes mellitus without complication, without long-term current use of insulin (HCC) E11.9    Other hyperlipidemia E78.49    Nonrheumatic aortic valve stenosis I35.0    CAD in native artery I25.10       Past Medical History:        Diagnosis Date    AAA (abdominal aortic aneurysm) ([facility] [address] 2018    Infrarenal    Basal cell carcinoma of nose 2016    Dr Love Pereira    CAD (coronary artery disease)     Dr. Luana Kamara - severe aortic stenosis with a bicuspid aortic valve    Essential hypertension     Hyperlipidemia     Hypertension     Follows with 3/2020): atrial fibrillation, hyperlipidemia      ECG reviewed      Echocardiogram reviewed  Stress test reviewed       Beta Blocker:  Dose within 24 Hrs      ROS comment: Cardiac cath 2020   SEVERE LAD STENOSIS   MILD CX AND RCA STENOSIS   MODERATE TO SEVERE AS      Echo 2020  EF 50-55%  Left ventricular function is normal   Grade II diastolic dysfunction. Moderate left ventricular hypertrophy. Right ventricular systolic pressure of 35 mm Hg. Moderate to severe aortic stenosis with mean gradient of 35 mmHg. Moderate mitral regurgitation is present. Mild tricuspid regurgitation is present. No evidence of pericardial effusion. EK2020  Sinus  Rhythm  - frequent ectopic ventricular beat s   # VECs = 3  -Nonspecific ST depression  -Nondiagnostic. Neuro/Psych:   (+) neuromuscular disease (spinal stenosis, s/p epidural injection . Hx left arm tremors . hx fall, FALL RISK. fall with stiches on right eyebrow, 2020):,              ROS comment: Mini-cog evaluation performed per anesthesia protocol,    > age 74. Scored: 3/5   Copy on chart for review. 2020 GI/Hepatic/Renal:   (+) liver disease (ALDRICH, LFT's WNL, 2020):, renal disease (painful slow urine, onset 2020. Denies hx UTI or hematuria. instructed to f/u  with PCP):,           Endo/Other:    (+) Diabetes (HbA1c    8.6,  2020 started on glipizide)Type II DM, poorly controlled, , : arthritis: OA., malignancy/cancer (BCC  on nose). Pt had PAT visit. ROS comment: Counseled on tight glycemic control with medication compliance to promote healing and prevent diabetic complications. Informed if BS elevated DOS, surgery could be delayed or cancelled.   Abdominal:           Vascular:   + PVD, aortic or cerebral (AAA  and bailey common iliac arteries aneurysms), .        ROS comment: CT abdm 2020  1. 4.4 cm infrarenal abdominal aortic aneurysm which is slightly larger  compared with the recent evaluation from

## 2020-05-29 ENCOUNTER — HOSPITAL ENCOUNTER (OUTPATIENT)
Dept: PREADMISSION TESTING | Age: 75
Discharge: HOME | End: 2020-06-02
Payer: MEDICARE

## 2020-05-29 ENCOUNTER — HOSPITAL ENCOUNTER (OUTPATIENT)
Dept: GENERAL RADIOLOGY | Age: 75
Discharge: HOME | End: 2020-05-29
Payer: MEDICARE

## 2020-05-29 VITALS
HEART RATE: 67 BPM | SYSTOLIC BLOOD PRESSURE: 165 MMHG | DIASTOLIC BLOOD PRESSURE: 76 MMHG | TEMPERATURE: 98.9 F | WEIGHT: 158 LBS | BODY MASS INDEX: 25.39 KG/M2 | OXYGEN SATURATION: 98 % | HEIGHT: 66 IN | RESPIRATION RATE: 16 BRPM

## 2020-05-29 LAB
ANION GAP SERPL CALCULATED.3IONS-SCNC: 12 MMOL/L (ref 4–16)
APTT: 27.2 SECONDS (ref 25.1–37.1)
BASOPHILS ABSOLUTE: 0.1 K/CU MM
BASOPHILS RELATIVE PERCENT: 0.8 % (ref 0–1)
BUN BLDV-MCNC: 17 MG/DL (ref 6–23)
CALCIUM SERPL-MCNC: 9.5 MG/DL (ref 8.3–10.6)
CHLORIDE BLD-SCNC: 106 MMOL/L (ref 99–110)
CO2: 26 MMOL/L (ref 21–32)
CREAT SERPL-MCNC: 0.9 MG/DL (ref 0.9–1.3)
DIFFERENTIAL TYPE: ABNORMAL
EKG ATRIAL RATE: 64 BPM
EKG DIAGNOSIS: NORMAL
EKG P AXIS: 24 DEGREES
EKG P-R INTERVAL: 248 MS
EKG Q-T INTERVAL: 442 MS
EKG QRS DURATION: 104 MS
EKG QTC CALCULATION (BAZETT): 455 MS
EKG R AXIS: 41 DEGREES
EKG T AXIS: 25 DEGREES
EKG VENTRICULAR RATE: 64 BPM
EOSINOPHILS ABSOLUTE: 0.2 K/CU MM
EOSINOPHILS RELATIVE PERCENT: 2.7 % (ref 0–3)
GFR AFRICAN AMERICAN: >60 ML/MIN/1.73M2
GFR NON-AFRICAN AMERICAN: >60 ML/MIN/1.73M2
GLUCOSE BLD-MCNC: 88 MG/DL (ref 70–99)
HCT VFR BLD CALC: 43.8 % (ref 42–52)
HEMOGLOBIN: 14.1 GM/DL (ref 13.5–18)
IMMATURE NEUTROPHIL %: 0.3 % (ref 0–0.43)
INR BLD: 0.95 INDEX
LYMPHOCYTES ABSOLUTE: 2 K/CU MM
LYMPHOCYTES RELATIVE PERCENT: 22.1 % (ref 24–44)
MCH RBC QN AUTO: 29.8 PG (ref 27–31)
MCHC RBC AUTO-ENTMCNC: 32.2 % (ref 32–36)
MCV RBC AUTO: 92.6 FL (ref 78–100)
MONOCYTES ABSOLUTE: 0.7 K/CU MM
MONOCYTES RELATIVE PERCENT: 7.6 % (ref 0–4)
NUCLEATED RBC %: 0 %
PDW BLD-RTO: 13.8 % (ref 11.7–14.9)
PLATELET # BLD: 140 K/CU MM (ref 140–440)
PMV BLD AUTO: 10.8 FL (ref 7.5–11.1)
POTASSIUM SERPL-SCNC: 4 MMOL/L (ref 3.5–5.1)
PROTHROMBIN TIME: 11.5 SECONDS (ref 11.7–14.5)
RBC # BLD: 4.73 M/CU MM (ref 4.6–6.2)
SEGMENTED NEUTROPHILS ABSOLUTE COUNT: 6 K/CU MM
SEGMENTED NEUTROPHILS RELATIVE PERCENT: 66.5 % (ref 36–66)
SODIUM BLD-SCNC: 144 MMOL/L (ref 135–145)
TOTAL IMMATURE NEUTOROPHIL: 0.03 K/CU MM
TOTAL NUCLEATED RBC: 0 K/CU MM
WBC # BLD: 9 K/CU MM (ref 4–10.5)

## 2020-05-29 PROCEDURE — 85025 COMPLETE CBC W/AUTO DIFF WBC: CPT

## 2020-05-29 PROCEDURE — 85730 THROMBOPLASTIN TIME PARTIAL: CPT

## 2020-05-29 PROCEDURE — 86900 BLOOD TYPING SEROLOGIC ABO: CPT

## 2020-05-29 PROCEDURE — 85610 PROTHROMBIN TIME: CPT

## 2020-05-29 PROCEDURE — 80048 BASIC METABOLIC PNL TOTAL CA: CPT

## 2020-05-29 PROCEDURE — 71046 X-RAY EXAM CHEST 2 VIEWS: CPT

## 2020-05-29 PROCEDURE — 93010 ELECTROCARDIOGRAM REPORT: CPT | Performed by: INTERNAL MEDICINE

## 2020-05-29 PROCEDURE — 93005 ELECTROCARDIOGRAM TRACING: CPT | Performed by: SURGERY

## 2020-05-29 PROCEDURE — 36415 COLL VENOUS BLD VENIPUNCTURE: CPT

## 2020-05-29 RX ORDER — VANCOMYCIN HYDROCHLORIDE 1 G/200ML
1000 INJECTION, SOLUTION INTRAVENOUS ONCE
Status: CANCELLED | OUTPATIENT
Start: 2020-06-03

## 2020-06-04 RX ORDER — GLIPIZIDE 5 MG/1
5 TABLET ORAL
Qty: 180 TABLET | Refills: 1 | Status: SHIPPED | OUTPATIENT
Start: 2020-06-04 | End: 2020-09-10

## 2020-06-04 RX ORDER — CARVEDILOL 3.12 MG/1
3.12 TABLET ORAL 2 TIMES DAILY
Qty: 180 TABLET | Refills: 1 | Status: SHIPPED | OUTPATIENT
Start: 2020-06-04 | End: 2021-01-11

## 2020-06-08 RX ORDER — AMIODARONE HYDROCHLORIDE 200 MG/1
200 TABLET ORAL 2 TIMES DAILY
Qty: 60 TABLET | Refills: 1 | Status: SHIPPED | OUTPATIENT
Start: 2020-06-08 | End: 2020-08-10 | Stop reason: SDUPTHER

## 2020-06-09 ENCOUNTER — HOSPITAL ENCOUNTER (OUTPATIENT)
Age: 75
Discharge: HOME | End: 2020-06-09
Payer: MEDICARE

## 2020-06-09 LAB
BACTERIA: ABNORMAL /HPF
BILIRUBIN URINE: NEGATIVE MG/DL
BLOOD, URINE: NEGATIVE
CAST TYPE: NEGATIVE /HPF
CLARITY: ABNORMAL
COLOR: YELLOW
CRYSTAL TYPE: NEGATIVE /HPF
EPITHELIAL CELLS, UA: ABNORMAL /HPF
GLUCOSE, URINE: NEGATIVE MG/DL
KETONES, URINE: NEGATIVE MG/DL
LEUKOCYTE ESTERASE, URINE: NEGATIVE
NITRITE URINE, QUANTITATIVE: NEGATIVE
PH, URINE: 6 (ref 5–8)
PROTEIN UA: NEGATIVE MG/DL
RBC URINE: NEGATIVE /HPF (ref 0–3)
SPECIFIC GRAVITY UA: 1.01 (ref 1–1.03)
UROBILINOGEN, URINE: 0.2 MG/DL (ref 0.2–1)
WBC UA: NEGATIVE /HPF (ref 0–2)

## 2020-06-09 PROCEDURE — U0002 COVID-19 LAB TEST NON-CDC: HCPCS

## 2020-06-09 PROCEDURE — 81001 URINALYSIS AUTO W/SCOPE: CPT

## 2020-06-10 LAB
SARS-COV-2: NOT DETECTED
SOURCE: NORMAL

## 2020-06-12 ENCOUNTER — ANESTHESIA (OUTPATIENT)
Dept: CARDIAC CATH/INVASIVE PROCEDURES | Age: 75
End: 2020-06-12

## 2020-06-12 ENCOUNTER — HOSPITAL ENCOUNTER (OUTPATIENT)
Dept: CARDIAC CATH/INVASIVE PROCEDURES | Age: 75
Discharge: HOME | End: 2020-06-12
Attending: SURGERY | Admitting: SURGERY
Payer: MEDICARE

## 2020-06-12 VITALS
SYSTOLIC BLOOD PRESSURE: 132 MMHG | HEART RATE: 69 BPM | OXYGEN SATURATION: 96 % | HEIGHT: 66 IN | RESPIRATION RATE: 19 BRPM | BODY MASS INDEX: 25.39 KG/M2 | DIASTOLIC BLOOD PRESSURE: 83 MMHG | WEIGHT: 158 LBS | TEMPERATURE: 98.1 F

## 2020-06-12 PROBLEM — I72.3 ILIAC ARTERY ANEURYSM, RIGHT (HCC): Status: ACTIVE | Noted: 2020-06-12

## 2020-06-12 PROCEDURE — 2709999900 HC NON-CHARGEABLE SUPPLY

## 2020-06-12 PROCEDURE — C1894 INTRO/SHEATH, NON-LASER: HCPCS

## 2020-06-12 PROCEDURE — 36246 INS CATH ABD/L-EXT ART 2ND: CPT

## 2020-06-12 PROCEDURE — C1769 GUIDE WIRE: HCPCS

## 2020-06-12 PROCEDURE — 2500000003 HC RX 250 WO HCPCS

## 2020-06-12 PROCEDURE — 75710 ARTERY X-RAYS ARM/LEG: CPT

## 2020-06-12 PROCEDURE — C1884 EMBOLIZATION PROTECT SYST: HCPCS

## 2020-06-12 PROCEDURE — 37242 VASC EMBOLIZE/OCCLUDE ARTERY: CPT

## 2020-06-12 PROCEDURE — 6360000002 HC RX W HCPCS

## 2020-06-12 PROCEDURE — 6360000004 HC RX CONTRAST MEDICATION

## 2020-06-12 RX ORDER — ACETAMINOPHEN 325 MG/1
650 TABLET ORAL EVERY 4 HOURS PRN
Status: DISCONTINUED | OUTPATIENT
Start: 2020-06-12 | End: 2020-06-12 | Stop reason: HOSPADM

## 2020-06-12 RX ORDER — CEFAZOLIN SODIUM 1 G/50ML
1 INJECTION, SOLUTION INTRAVENOUS ONCE
Status: DISCONTINUED | OUTPATIENT
Start: 2020-06-12 | End: 2020-06-12 | Stop reason: HOSPADM

## 2020-06-12 NOTE — PROGRESS NOTES
Discharge instructions reviewed with patient. Voices understanding. Ambulated without difficulty. Left femoral site remains free of bleeding or hematoma.

## 2020-06-12 NOTE — FLOWSHEET NOTE
Discharge instructions reviewed with patient. Voices understanding. Ambulated without difficulty. Continues to state: \"I want to drive home! \"
Notified Dr. Bethany Barba of patient insistence on driving himself home. Explained repeated attempts to educate patient on dangers of post-procedure driving and patient's refusal to accept ride assistance.
Patient requesting to speak with Dr. Jaki Condon; has many questions and anxiety about procedure. Both Dr. Jeevan Salinas at the bedside to discuss plan of care with patient.
229.9

## 2020-06-17 NOTE — H&P
tablet by mouth 2 times daily 60 tablet 3    furosemide (LASIX) 20 MG tablet Take 1 tablet by mouth 2 times daily 60 tablet 3    glipiZIDE (GLUCOTROL) 5 MG tablet Take 1 tablet by mouth 2 times daily (before meals) 60 tablet 3    docusate sodium (COLACE) 100 MG capsule Take 1 capsule by mouth daily as needed for Constipation 30 capsule 0    blood glucose monitor kit and supplies Test 3 times a day & as needed for symptoms of irregular blood glucose. 1 kit 0    aspirin 81 MG tablet Take by mouth        gabapentin (NEURONTIN) 300 MG capsule Take 600 mg by mouth 2 times daily.        atorvastatin (LIPITOR) 40 MG tablet Take 1 tablet by mouth daily 30 tablet 2      No current facility-administered medications for this visit. Family History   Family History   Problem Relation Age of Onset    Lung Cancer Mother      Heart Disease Father      High Blood Pressure Father      High Cholesterol Father      Lung Cancer Father              Review of Systems:        Review of Systems   Constitutional: Negative. HENT: Negative. Eyes: Negative. Respiratory: Negative. Cardiovascular: Negative. Gastrointestinal: Negative. Endocrine: Negative. Genitourinary: Negative. Musculoskeletal: Negative. Skin: Negative. Allergic/Immunologic: Negative. Neurological: Negative. Hematological: Negative. Psychiatric/Behavioral: Negative.       I have reviewed the above history components documented by staff and addended as noted.      Physical Exam:  /76   Pulse 75   Ht 5' 6\" (1.676 m)   Wt 164 lb (74.4 kg)   BMI 26.47 kg/m²   Physical Exam  Vitals signs and nursing note reviewed. Constitutional:       Appearance: He is well-developed. HENT:      Head: Normocephalic and atraumatic. Ears:      Comments: Hearing intact  Eyes:      Conjunctiva/sclera: Conjunctivae normal.   Neck:      Trachea: No tracheal deviation.    Cardiovascular:      Rate and Rhythm: Normal rate and

## 2020-06-22 ENCOUNTER — OFFICE VISIT (OUTPATIENT)
Dept: FAMILY MEDICINE CLINIC | Age: 75
End: 2020-06-22
Payer: MEDICARE

## 2020-06-22 VITALS
OXYGEN SATURATION: 95 % | HEIGHT: 66 IN | WEIGHT: 159 LBS | DIASTOLIC BLOOD PRESSURE: 72 MMHG | TEMPERATURE: 97.7 F | SYSTOLIC BLOOD PRESSURE: 138 MMHG | BODY MASS INDEX: 25.55 KG/M2 | HEART RATE: 58 BPM

## 2020-06-22 DIAGNOSIS — E11.9 TYPE 2 DIABETES MELLITUS WITHOUT COMPLICATION, WITHOUT LONG-TERM CURRENT USE OF INSULIN (HCC): ICD-10-CM

## 2020-06-22 PROBLEM — K59.09 OTHER CONSTIPATION: Status: ACTIVE | Noted: 2020-06-22

## 2020-06-22 PROBLEM — M47.817 SPONDYLOSIS OF LUMBOSACRAL REGION: Status: ACTIVE | Noted: 2020-06-22

## 2020-06-22 PROCEDURE — 3052F HG A1C>EQUAL 8.0%<EQUAL 9.0%: CPT | Performed by: FAMILY MEDICINE

## 2020-06-22 PROCEDURE — G8427 DOCREV CUR MEDS BY ELIG CLIN: HCPCS | Performed by: FAMILY MEDICINE

## 2020-06-22 PROCEDURE — 99214 OFFICE O/P EST MOD 30 MIN: CPT | Performed by: FAMILY MEDICINE

## 2020-06-22 PROCEDURE — 1123F ACP DISCUSS/DSCN MKR DOCD: CPT | Performed by: FAMILY MEDICINE

## 2020-06-22 PROCEDURE — 2022F DILAT RTA XM EVC RTNOPTHY: CPT | Performed by: FAMILY MEDICINE

## 2020-06-22 PROCEDURE — 3017F COLORECTAL CA SCREEN DOC REV: CPT | Performed by: FAMILY MEDICINE

## 2020-06-22 PROCEDURE — G8417 CALC BMI ABV UP PARAM F/U: HCPCS | Performed by: FAMILY MEDICINE

## 2020-06-22 PROCEDURE — 4040F PNEUMOC VAC/ADMIN/RCVD: CPT | Performed by: FAMILY MEDICINE

## 2020-06-22 PROCEDURE — 4004F PT TOBACCO SCREEN RCVD TLK: CPT | Performed by: FAMILY MEDICINE

## 2020-06-22 RX ORDER — TRAMADOL HYDROCHLORIDE 50 MG/1
50 TABLET ORAL EVERY 6 HOURS PRN
Qty: 35 TABLET | Refills: 0 | Status: SHIPPED | OUTPATIENT
Start: 2020-06-22 | End: 2020-07-13

## 2020-06-22 ASSESSMENT — ENCOUNTER SYMPTOMS
TROUBLE SWALLOWING: 0
ABDOMINAL PAIN: 0
DIARRHEA: 0
VOMITING: 0
WHEEZING: 0
BLOOD IN STOOL: 0
CHEST TIGHTNESS: 0
NAUSEA: 0
SHORTNESS OF BREATH: 0
EYE PAIN: 0

## 2020-06-22 NOTE — PROGRESS NOTES
6/22/2020    Vanita Pérez    Chief Complaint   Patient presents with    3 Month Follow-Up     had aneurysm worked on 6/12.  goes back wednesday.  Constipation     taking colace, but not quite enough    Abdominal Pain    Discuss Labs     UA    Weight Loss     lost 4-5 pounds in last 4-5 days. started after heart surgery    Osteoporosis     having lower back pain and right hip if walking. can't walk much    Arthritis       HPI  History was obtained from the patient. Bhupendra aguero is a 69 y.o. male who presents today with routine follow-up. Patient has known history of coronary disease and had recent endovascular AAA repair by Dr. Elisabeth Robbins. This was done on 6/12/2020 there is plans for further vascular surgery. Complains of constipation but needs to work on increasing fluids and taking both Colace and MiraLAX together more than just a day at a time. His back pain is about the same he has significant lumbosacral back discomfort with spinal stenosis. I believe he has had previous epidural steroid injections. Home blood sugars have not been checked too much but his last A1c was 8.6% in February 2020. He is due for recheck on A1c and refills. Needs to follow-up closely with his subspecialists also. He does use tramadol occasionally for pain from his back - review of PDMP has been appropriate. REVIEW OF SYMPTOMS    Review of Systems   Constitutional: Positive for fatigue. Negative for activity change. HENT: Negative for congestion, hearing loss, mouth sores and trouble swallowing. Eyes: Negative for pain and visual disturbance. Respiratory: Negative for chest tightness, shortness of breath and wheezing. Cardiovascular: Negative for chest pain and palpitations. Gastrointestinal: Negative for abdominal pain, blood in stool, diarrhea, nausea and vomiting. Endocrine: Positive for polydipsia. Negative for polyuria. Genitourinary: Negative for dysuria, frequency and urgency.    Musculoskeletal: month    Drug use: No    Sexual activity: None   Lifestyle    Physical activity     Days per week: None     Minutes per session: None    Stress: None   Relationships    Social connections     Talks on phone: None     Gets together: None     Attends [employer] service: None     Active member of club or organization: None     Attends meetings of clubs or organizations: None     Relationship status: None    Intimate partner violence     Fear of current or ex partner: None     Emotionally abused: None     Physically abused: None     Forced sexual activity: None   Other Topics Concern    None   Social History Narrative    None        SURGICAL HISTORY  Past Surgical History:   Procedure Laterality Date    CABG WITH AORTIC VALVE REPAIR N/A 03/04/2020    Dr. Magdy Rincon    CABG WITH AORTIC VALVE REPLACEMENT N/A 3/4/2020    CABG CORONARY ARTERY BYPASS x1 SALEEM TO LAD, AORTIC VALVE REPACEMENT performed by Desiree Munroe MD at [address]  02/26/2020    critical LAD lesion and moderate to severe right coronary artery lesion    KNEE SURGERY      quad injury from a fall    OTHER SURGICAL HISTORY  12/09/2016    excision of basal cell carcinoma of nose with complex repair    OTHER SURGICAL HISTORY  01/2020    epidual steroid injections L3-L4    SINUS SURGERY  1970's                 CURRENT MEDICATIONS  Current Outpatient Medications   Medication Sig Dispense Refill    traMADol (ULTRAM) 50 MG tablet Take 1 tablet by mouth every 6 hours as needed for Pain for up to 21 days. Intended supply: 7 days.  Take lowest dose possible to manage pain 35 tablet 0    amiodarone (CORDARONE) 200 MG tablet Take 1 tablet by mouth 2 times daily 60 tablet 1    carvedilol (COREG) 3.125 MG tablet Take 1 tablet by mouth 2 times daily 180 tablet 1    glipiZIDE (GLUCOTROL) 5 MG tablet Take 1 tablet by mouth 2 times daily (before meals) 180 tablet 1    atorvastatin (LIPITOR) 40 MG tablet Take 1 tablet by mouth daily baseline. Cranial Nerves: No cranial nerve deficit. Psychiatric:         Mood and Affect: Mood normal.         Behavior: Behavior normal.         Thought Content: Thought content normal.         ASSESSMENT & PLAN     Diagnosis Orders   1. Type 2 diabetes mellitus without complication, without long-term current use of insulin (HCC)  HEMOGLOBIN A1C   2. CAD in native artery     3. Acute pain of right shoulder  traMADol (ULTRAM) 50 MG tablet   4. Essential hypertension     5. Abdominal aortic aneurysm (AAA) without rupture (HCC)     6. Other constipation     7. Osteoarthritis of spine with radiculopathy, lumbosacral region     This point he is encouraged to push fluids use Colace and MiraLAX both over-the-counter together as needed for constipation. We will check an A1c today and provide refills on meds. He is to continue to follow-up closely with subspecialists and I will see him back in about 3 months sooner as needed. Return in about 3 months (around 9/22/2020).          Electronically signed by Alina Santiago MD on 6/22/2020

## 2020-06-23 ENCOUNTER — TELEPHONE (OUTPATIENT)
Dept: FAMILY MEDICINE CLINIC | Age: 75
End: 2020-06-23

## 2020-06-23 LAB
ESTIMATED AVERAGE GLUCOSE: 128.4 MG/DL
HBA1C MFR BLD: 6.1 %

## 2020-06-23 RX ORDER — DOCUSATE SODIUM 100 MG/1
100 CAPSULE, LIQUID FILLED ORAL DAILY PRN
Qty: 30 CAPSULE | Refills: 0 | Status: SHIPPED | OUTPATIENT
Start: 2020-06-23 | End: 2021-02-09

## 2020-06-30 ENCOUNTER — ANESTHESIA EVENT (OUTPATIENT)
Dept: CARDIAC CATH/INVASIVE PROCEDURES | Age: 75
DRG: 269 | End: 2020-06-30
Payer: MEDICARE

## 2020-06-30 ASSESSMENT — LIFESTYLE VARIABLES: SMOKING_STATUS: 1

## 2020-06-30 NOTE — ANESTHESIA PRE PROCEDURE
Department of Anesthesiology  Preprocedure Note       Name:  Serafin Lynn   Age:  69 y.o.  :  1945                                          MRN:  [id number]         Date:  2020      Surgeon: * Surgery not found *    Procedure: ABDOMINAL AORTIC ANEURYSM REPAIR ENDOVASCULAR    Medications prior to admission:   Prior to Admission medications    Medication Sig Start Date End Date Taking? Authorizing Provider   docusate sodium (COLACE) 100 MG capsule Take 1 capsule by mouth daily as needed for Constipation 20   Rehan Zuluaga MD   traMADol (ULTRAM) 50 MG tablet Take 1 tablet by mouth every 6 hours as needed for Pain for up to 21 days. Intended supply: 7 days. Take lowest dose possible to manage pain 20  Rehan Zuluaga MD   amiodarone (CORDARONE) 200 MG tablet Take 1 tablet by mouth 2 times daily 20   Rehan Zuluaga MD   carvedilol (COREG) 3.125 MG tablet Take 1 tablet by mouth 2 times daily 20   Rehan Zuluaga MD   glipiZIDE (GLUCOTROL) 5 MG tablet Take 1 tablet by mouth 2 times daily (before meals) 20   Rehan Zuluaga MD   atorvastatin (LIPITOR) 40 MG tablet Take 1 tablet by mouth daily 5/15/20   Rehan Zuluaga MD   diphenhydrAMINE-APAP, sleep, (TYLENOL PM EXTRA STRENGTH)  MG/30ML LIQD Take by mouth nightly    Historical Provider, MD   furosemide (LASIX) 20 MG tablet Take 1 tablet by mouth 2 times daily 20   Rehan Zuluaga MD   blood glucose monitor kit and supplies Test 3 times a day & as needed for symptoms of irregular blood glucose.  3/9/20   Rosalba Lott PA-C   aspirin 81 MG tablet Take by mouth    Historical Provider, MD       Current medications:    Current Outpatient Medications   Medication Sig Dispense Refill    docusate sodium (COLACE) 100 MG capsule Take 1 capsule by mouth daily as needed for Constipation 30 capsule 0    traMADol (ULTRAM) 50 MG tablet Take 1 tablet by mouth every 6 hours as needed for Pain for up to 21 days. Intended supply: 7 days. Take lowest dose possible to manage pain 35 tablet 0    amiodarone (CORDARONE) 200 MG tablet Take 1 tablet by mouth 2 times daily 60 tablet 1    carvedilol (COREG) 3.125 MG tablet Take 1 tablet by mouth 2 times daily 180 tablet 1    glipiZIDE (GLUCOTROL) 5 MG tablet Take 1 tablet by mouth 2 times daily (before meals) 180 tablet 1    atorvastatin (LIPITOR) 40 MG tablet Take 1 tablet by mouth daily 30 tablet 2    diphenhydrAMINE-APAP, sleep, (TYLENOL PM EXTRA STRENGTH)  MG/30ML LIQD Take by mouth nightly      furosemide (LASIX) 20 MG tablet Take 1 tablet by mouth 2 times daily 60 tablet 3    blood glucose monitor kit and supplies Test 3 times a day & as needed for symptoms of irregular blood glucose. 1 kit 0    aspirin 81 MG tablet Take by mouth       No current facility-administered medications for this visit. Allergies:     Allergies   Allergen Reactions    Metformin And Related Diarrhea     Severe diarrhea, abd pain, and nausea    Amoxicillin Diarrhea    Other Nausea And Vomiting     ANTI-INFLAMMATORIES  hypertension       Problem List:    Patient Active Problem List   Diagnosis Code    Abdominal aortic aneurysm (AAA) without rupture (HCC) I71.4    Tobacco abuse Z72.0    Essential hypertension I10    Smoker J60.896    Non-alcoholic fatty liver disease K76.0    Basal cell carcinoma of nose C44.311    Type 2 diabetes mellitus without complication, without long-term current use of insulin (HCC) E11.9    Other hyperlipidemia E78.49    Nonrheumatic aortic valve stenosis I35.0    CAD in native artery I25.10    Iliac artery aneurysm, right (HCC) I72.3    Other constipation K59.09    Spondylosis of lumbosacral region M47.817       Past Medical History:        Diagnosis Date    AAA (abdominal aortic aneurysm) ([facility] [address] 2018    Infrarenal    Basal cell carcinoma of nose 2016    Dr Purvi Sellers    CAD (coronary artery disease)     Dr. Yamini Osborne - severe aortic stenosis with a bicuspid aortic valve    Essential hypertension     Hyperlipidemia     Hypertension     Follows with PCP    Non-alcoholic fatty liver disease     Osteoarthritis     Knees, lower back, shoulders,    Spinal stenosis     had epidural steroid injection 1/8/2020 - lumbar    Tremor     Left arm only    Type 2 diabetes mellitus without complication ([facility] [address]     Controlling with diet       Past Surgical History:        Procedure Laterality Date    CABG WITH AORTIC VALVE REPAIR N/A 03/04/2020    Dr. Tahir Garcia CABG WITH AORTIC VALVE REPLACEMENT N/A 3/4/2020    CABG CORONARY ARTERY BYPASS x1 SALEEM TO LAD, AORTIC VALVE REPACEMENT performed by Jorge Fiore MD at [place] [address]  02/26/2020    critical LAD lesion and moderate to severe right coronary artery lesion    KNEE SURGERY      quad injury from a fall    OTHER SURGICAL HISTORY  12/09/2016    excision of basal cell carcinoma of nose with complex repair    OTHER SURGICAL HISTORY  01/2020    epidual steroid injections L3-L4    SINUS SURGERY  1970's       Social History:    Social History     Tobacco Use    Smoking status: Current Every Day Smoker     Packs/day: 1.00     Years: 58.00     Pack years: 58.00     Types: Cigarettes     Start date: 1962    Smokeless tobacco: Never Used   Substance Use Topics    Alcohol use: Yes     Comment: 1 glass wine a month                                Ready to quit: Not Answered  Counseling given: Not Answered      Vital Signs (Current): There were no vitals filed for this visit. BP Readings from Last 3 Encounters:   06/24/20 128/78   06/22/20 138/72   06/12/20 132/83       NPO Status:                                           1 sip  Coffee 0500                 Solids 12 hrs.                                      BMI:   Wt Readings from Last 3 Encounters:   06/24/20 160 lb (72.6 kg)   06/22/20 159 lb (72.1 kg)   06/12/20 158 lb (71.7 kg) There is no height or weight on file to calculate BMI. CBC  Lab Results   Component Value Date/Time    WBC 8.4 07/06/2020 09:55 AM    HGB 14.7 07/06/2020 09:55 AM    HCT 46.2 07/06/2020 09:55 AM     07/06/2020 09:55 AM     RENAL  Lab Results   Component Value Date/Time     07/06/2020 09:55 AM    K 4.1 07/06/2020 09:55 AM     07/06/2020 09:55 AM    CO2 25 07/06/2020 09:55 AM    BUN 13 07/06/2020 09:55 AM    CREATININE 0.9 07/06/2020 09:55 AM    GLUCOSE 106 (H) 07/06/2020 09:55 AM     COAGS  Lab Results   Component Value Date/Time    PROTIME 11.5 (L) 07/06/2020 09:55 AM    INR 0.95 07/06/2020 09:55 AM    APTT 25.8 07/06/2020 09:55 AM       Anesthesia Evaluation  Patient summary reviewed and Nursing notes reviewed  Airway: Mallampati: III  TM distance: >3 FB   Neck ROM: full  Mouth opening: > = 3 FB Dental:      Comment: Poor dentition, numerous missing teeth    Pulmonary:   (+) current smoker          Patient smoked on day of surgery. ROS comment: Smoker 1.5ppd x 58 years  Counseled on smoking cessation. PAT Airway Exam: no interval changes  Head/Neck movement: full  Thyromental Distance: >3 FB  History of difficult intubation:  None  Mallampati Classification:  Class II  Teeth:   Poor dentition. Missing and broken upper and lower teeth. Denies loose teeth   Able to lie flat     LUNGS:  No increased work of breathing, good air exchange, clear to auscultation bilaterally, no crackles or wheezing      COVID 19 screening  Denies recent fever or known COVID 19 exposure. Instructed to quarantine until surgery and report any new respiratory or fever symptoms to surgeon. Aware COVID testing must be completed before DOS. COVID swab: 7/6/2020    CXR: 7/6/2020  No acute cardiopulmonary abnormality. No significant interval change.    Cardiovascular:  Exercise tolerance: poor (<4 METS),   (+) hypertension (on beta blocker and ASA, to stop 6/6/2020):, valvular problems/murmurs aneurysms), .        ROS comment: CT abdm 4/2020  1. 4.4 cm infrarenal abdominal aortic aneurysm which is slightly larger  compared with the recent evaluation from 03/03/2020 and larger by about 0.5  cm compared with 2018.  No evidence of rupture.  Atherosclerotic disease in  the renal arteries worse on the left redemonstrated. 2. Bilateral common iliac artery aneurysms worse on the right which is mildly  increased compared with previous evaluations. 3. Redemonstration of diverticulosis. 4. Redemonstration of right adrenal adenoma. 5. Nonspecific arterial enhancement in the liver also previously noted and  remains stable.       Carotid US 3/3/2020  The right internal carotid artery demonstrates 0-50% stenosis .     The left internal carotid artery demonstrates borderline velocities within  the proximal and distal segments of the left internal carotid artery,  measuring just over 125 centimeters/second, though with a normal ratio and no  definite significant stenosis by grayscale imaging.  Findings are felt likely  to represent 0-50% stenosis.     Bilateral vertebral arteries are patent with flow in the normal direction. .                           Anesthesia Plan          Perla Amaya, MADDY - CNP Chart reviewed, pt. Interviewed and examined. Anesthesia Evaluation will follow by a certified practitioner prior to surgery.   6/30/2020

## 2020-07-02 NOTE — PROGRESS NOTES
7/2/20 - Notified of pre-testing on 7/6/20 @ 0900, refusing to arrive before 0900. Advised to bring insurance card, picture ID, list of medications and wear a mask.

## 2020-07-06 ENCOUNTER — HOSPITAL ENCOUNTER (OUTPATIENT)
Age: 75
Discharge: HOME | End: 2020-07-06
Payer: MEDICARE

## 2020-07-06 ENCOUNTER — HOSPITAL ENCOUNTER (OUTPATIENT)
Dept: PREADMISSION TESTING | Age: 75
Discharge: HOME | End: 2020-07-10
Payer: MEDICARE

## 2020-07-06 ENCOUNTER — HOSPITAL ENCOUNTER (OUTPATIENT)
Dept: GENERAL RADIOLOGY | Age: 75
Discharge: HOME | End: 2020-07-06
Payer: MEDICARE

## 2020-07-06 VITALS
OXYGEN SATURATION: 98 % | RESPIRATION RATE: 16 BRPM | DIASTOLIC BLOOD PRESSURE: 94 MMHG | HEART RATE: 62 BPM | TEMPERATURE: 97.3 F | WEIGHT: 158 LBS | SYSTOLIC BLOOD PRESSURE: 170 MMHG | HEIGHT: 66 IN | BODY MASS INDEX: 25.39 KG/M2

## 2020-07-06 LAB
ANION GAP SERPL CALCULATED.3IONS-SCNC: 11 MMOL/L (ref 4–16)
APTT: 25.8 SECONDS (ref 25.1–37.1)
BASOPHILS ABSOLUTE: 0.1 K/CU MM
BASOPHILS RELATIVE PERCENT: 1.1 % (ref 0–1)
BUN BLDV-MCNC: 13 MG/DL (ref 6–23)
CALCIUM SERPL-MCNC: 9.5 MG/DL (ref 8.3–10.6)
CHLORIDE BLD-SCNC: 103 MMOL/L (ref 99–110)
CO2: 25 MMOL/L (ref 21–32)
CREAT SERPL-MCNC: 0.9 MG/DL (ref 0.9–1.3)
DIFFERENTIAL TYPE: ABNORMAL
EKG ATRIAL RATE: 61 BPM
EKG DIAGNOSIS: NORMAL
EKG P AXIS: 55 DEGREES
EKG P-R INTERVAL: 238 MS
EKG Q-T INTERVAL: 464 MS
EKG QRS DURATION: 98 MS
EKG QTC CALCULATION (BAZETT): 467 MS
EKG R AXIS: 32 DEGREES
EKG T AXIS: 34 DEGREES
EKG VENTRICULAR RATE: 61 BPM
EOSINOPHILS ABSOLUTE: 0.1 K/CU MM
EOSINOPHILS RELATIVE PERCENT: 1.3 % (ref 0–3)
GFR AFRICAN AMERICAN: >60 ML/MIN/1.73M2
GFR NON-AFRICAN AMERICAN: >60 ML/MIN/1.73M2
GLUCOSE BLD-MCNC: 106 MG/DL (ref 70–99)
HCT VFR BLD CALC: 46.2 % (ref 42–52)
HEMOGLOBIN: 14.7 GM/DL (ref 13.5–18)
IMMATURE NEUTROPHIL %: 0.5 % (ref 0–0.43)
INR BLD: 0.95 INDEX
LYMPHOCYTES ABSOLUTE: 2.2 K/CU MM
LYMPHOCYTES RELATIVE PERCENT: 25.5 % (ref 24–44)
MCH RBC QN AUTO: 28.4 PG (ref 27–31)
MCHC RBC AUTO-ENTMCNC: 31.8 % (ref 32–36)
MCV RBC AUTO: 89.4 FL (ref 78–100)
MONOCYTES ABSOLUTE: 0.7 K/CU MM
MONOCYTES RELATIVE PERCENT: 7.7 % (ref 0–4)
NUCLEATED RBC %: 0 %
PDW BLD-RTO: 15 % (ref 11.7–14.9)
PLATELET # BLD: 157 K/CU MM (ref 140–440)
PMV BLD AUTO: 11 FL (ref 7.5–11.1)
POTASSIUM SERPL-SCNC: 4.1 MMOL/L (ref 3.5–5.1)
PROTHROMBIN TIME: 11.5 SECONDS (ref 11.7–14.5)
RBC # BLD: 5.17 M/CU MM (ref 4.6–6.2)
SEGMENTED NEUTROPHILS ABSOLUTE COUNT: 5.4 K/CU MM
SEGMENTED NEUTROPHILS RELATIVE PERCENT: 63.9 % (ref 36–66)
SODIUM BLD-SCNC: 139 MMOL/L (ref 135–145)
TOTAL IMMATURE NEUTOROPHIL: 0.04 K/CU MM
TOTAL NUCLEATED RBC: 0 K/CU MM
WBC # BLD: 8.4 K/CU MM (ref 4–10.5)

## 2020-07-06 PROCEDURE — 93010 ELECTROCARDIOGRAM REPORT: CPT | Performed by: INTERNAL MEDICINE

## 2020-07-06 PROCEDURE — 93005 ELECTROCARDIOGRAM TRACING: CPT | Performed by: SURGERY

## 2020-07-06 PROCEDURE — 86900 BLOOD TYPING SEROLOGIC ABO: CPT

## 2020-07-06 PROCEDURE — U0002 COVID-19 LAB TEST NON-CDC: HCPCS

## 2020-07-06 PROCEDURE — 36415 COLL VENOUS BLD VENIPUNCTURE: CPT

## 2020-07-06 PROCEDURE — 86850 RBC ANTIBODY SCREEN: CPT

## 2020-07-06 PROCEDURE — 85730 THROMBOPLASTIN TIME PARTIAL: CPT

## 2020-07-06 PROCEDURE — 86901 BLOOD TYPING SEROLOGIC RH(D): CPT

## 2020-07-06 PROCEDURE — 86922 COMPATIBILITY TEST ANTIGLOB: CPT

## 2020-07-06 PROCEDURE — 80048 BASIC METABOLIC PNL TOTAL CA: CPT

## 2020-07-06 PROCEDURE — 85610 PROTHROMBIN TIME: CPT

## 2020-07-06 PROCEDURE — 85025 COMPLETE CBC W/AUTO DIFF WBC: CPT

## 2020-07-06 PROCEDURE — 71046 X-RAY EXAM CHEST 2 VIEWS: CPT

## 2020-07-06 RX ORDER — POLYETHYLENE GLYCOL 3350 17 G/17G
17 POWDER, FOR SOLUTION ORAL DAILY
COMMUNITY

## 2020-07-06 RX ORDER — ACETAMINOPHEN/DIPHENHYDRAMINE 500MG-25MG
2 TABLET ORAL NIGHTLY PRN
COMMUNITY
End: 2020-08-14

## 2020-07-06 RX ORDER — CEFAZOLIN SODIUM 1 G/50ML
1 INJECTION, SOLUTION INTRAVENOUS ONCE
Status: CANCELLED | OUTPATIENT
Start: 2020-07-10

## 2020-07-06 NOTE — PROGRESS NOTES
PAT instructions---COVID testing done 7-6-20  Surgery:   7-10-20 @ 1200        Arrival time: 1000  Nothing to eat or drink after midnight unless instructed to take certain medications by the doctor or the nurse the AM of surgery  Arrive at the front information desk -1st floor /[facility] is on [address] [facility]  Please leave money and all other valuables at home. Wear comfortable clothing. If you wear contacts please bring a case. No make up. You may be asked to remove rings or body piercing. Please bring insurance cards and picture ID AM of procedure. Please bring any consent or paper work from your doctor. If you become ill,such as a cold, sore throat or fever contact your doctor. Please bathe or shower AM of procedure with provided soaps.   Medications to take AM of procedure:  Amiodarone and Carvedilol  Any questions call [facility]  [phone]

## 2020-07-07 LAB
SARS-COV-2: NOT DETECTED
SOURCE: NORMAL

## 2020-07-10 ENCOUNTER — HOSPITAL ENCOUNTER (INPATIENT)
Dept: CARDIAC CATH/INVASIVE PROCEDURES | Age: 75
LOS: 1 days | Discharge: HOME | DRG: 269 | End: 2020-07-11
Attending: SURGERY | Admitting: SURGERY
Payer: MEDICARE

## 2020-07-10 ENCOUNTER — ANESTHESIA (OUTPATIENT)
Dept: CARDIAC CATH/INVASIVE PROCEDURES | Age: 75
DRG: 269 | End: 2020-07-10
Payer: MEDICARE

## 2020-07-10 VITALS
SYSTOLIC BLOOD PRESSURE: 111 MMHG | DIASTOLIC BLOOD PRESSURE: 60 MMHG | TEMPERATURE: 99.1 F | OXYGEN SATURATION: 99 % | RESPIRATION RATE: 1 BRPM

## 2020-07-10 PROBLEM — Z98.890 STATUS POST AAA (ABDOMINAL AORTIC ANEURYSM) REPAIR: Status: ACTIVE | Noted: 2020-07-10

## 2020-07-10 PROBLEM — Z86.79 STATUS POST AAA (ABDOMINAL AORTIC ANEURYSM) REPAIR: Status: ACTIVE | Noted: 2020-07-10

## 2020-07-10 LAB
ACTIVATED CLOTTING TIME, LOW RANGE: 135 SEC
ACTIVATED CLOTTING TIME, LOW RANGE: 139 SEC
ACTIVATED CLOTTING TIME, LOW RANGE: 227 SEC
ACTIVATED CLOTTING TIME, LOW RANGE: 282 SEC
GLUCOSE BLD-MCNC: 123 MG/DL (ref 70–99)
GLUCOSE BLD-MCNC: 129 MG/DL (ref 70–99)
GLUCOSE BLD-MCNC: 186 MG/DL (ref 70–99)

## 2020-07-10 PROCEDURE — 04V03DZ RESTRICTION OF ABDOMINAL AORTA WITH INTRALUMINAL DEVICE, PERCUTANEOUS APPROACH: ICD-10-PCS | Performed by: SURGERY

## 2020-07-10 PROCEDURE — C1773 RET DEV, INSERTABLE: HCPCS

## 2020-07-10 PROCEDURE — 2580000003 HC RX 258: Performed by: NURSE ANESTHETIST, CERTIFIED REGISTERED

## 2020-07-10 PROCEDURE — C2628 CATHETER, OCCLUSION: HCPCS

## 2020-07-10 PROCEDURE — 2580000003 HC RX 258

## 2020-07-10 PROCEDURE — 3600000017 HC SURGERY HYBRID ADDL 15MIN

## 2020-07-10 PROCEDURE — 6360000002 HC RX W HCPCS: Performed by: SURGERY

## 2020-07-10 PROCEDURE — 3700000001 HC ADD 15 MINUTES (ANESTHESIA)

## 2020-07-10 PROCEDURE — C1769 GUIDE WIRE: HCPCS

## 2020-07-10 PROCEDURE — 6360000002 HC RX W HCPCS: Performed by: NURSE ANESTHETIST, CERTIFIED REGISTERED

## 2020-07-10 PROCEDURE — 2780000010 HC IMPLANT OTHER

## 2020-07-10 PROCEDURE — B41G1ZZ FLUOROSCOPY OF LEFT LOWER EXTREMITY ARTERIES USING LOW OSMOLAR CONTRAST: ICD-10-PCS | Performed by: SURGERY

## 2020-07-10 PROCEDURE — 2580000003 HC RX 258: Performed by: SURGERY

## 2020-07-10 PROCEDURE — 6370000000 HC RX 637 (ALT 250 FOR IP): Performed by: SURGERY

## 2020-07-10 PROCEDURE — C1887 CATHETER, GUIDING: HCPCS

## 2020-07-10 PROCEDURE — 2720000010 HC SURG SUPPLY STERILE

## 2020-07-10 PROCEDURE — 6360000002 HC RX W HCPCS

## 2020-07-10 PROCEDURE — C1874 STENT, COATED/COV W/DEL SYS: HCPCS

## 2020-07-10 PROCEDURE — 2500000003 HC RX 250 WO HCPCS: Performed by: NURSE ANESTHETIST, CERTIFIED REGISTERED

## 2020-07-10 PROCEDURE — 82962 GLUCOSE BLOOD TEST: CPT

## 2020-07-10 PROCEDURE — 3700000000 HC ANESTHESIA ATTENDED CARE

## 2020-07-10 PROCEDURE — 6360000004 HC RX CONTRAST MEDICATION

## 2020-07-10 PROCEDURE — C1760 CLOSURE DEV, VASC: HCPCS

## 2020-07-10 PROCEDURE — 7100000001 HC PACU RECOVERY - ADDTL 15 MIN

## 2020-07-10 PROCEDURE — 2709999900 HC NON-CHARGEABLE SUPPLY

## 2020-07-10 PROCEDURE — 3600000007 HC SURGERY HYBRID BASE

## 2020-07-10 PROCEDURE — 85347 COAGULATION TIME ACTIVATED: CPT

## 2020-07-10 PROCEDURE — B4101ZZ FLUOROSCOPY OF ABDOMINAL AORTA USING LOW OSMOLAR CONTRAST: ICD-10-PCS | Performed by: SURGERY

## 2020-07-10 PROCEDURE — 7100000000 HC PACU RECOVERY - FIRST 15 MIN

## 2020-07-10 PROCEDURE — C1894 INTRO/SHEATH, NON-LASER: HCPCS

## 2020-07-10 PROCEDURE — 2000000000 HC ICU R&B

## 2020-07-10 RX ORDER — SODIUM CHLORIDE 0.9 % (FLUSH) 0.9 %
10 SYRINGE (ML) INJECTION EVERY 12 HOURS SCHEDULED
Status: DISCONTINUED | OUTPATIENT
Start: 2020-07-10 | End: 2020-07-11 | Stop reason: HOSPADM

## 2020-07-10 RX ORDER — NICOTINE POLACRILEX 4 MG
15 LOZENGE BUCCAL PRN
Status: DISCONTINUED | OUTPATIENT
Start: 2020-07-10 | End: 2020-07-11 | Stop reason: HOSPADM

## 2020-07-10 RX ORDER — ACETAMINOPHEN 325 MG/1
650 TABLET ORAL EVERY 4 HOURS PRN
Status: DISCONTINUED | OUTPATIENT
Start: 2020-07-10 | End: 2020-07-11 | Stop reason: HOSPADM

## 2020-07-10 RX ORDER — DEXAMETHASONE SODIUM PHOSPHATE 4 MG/ML
INJECTION, SOLUTION INTRA-ARTICULAR; INTRALESIONAL; INTRAMUSCULAR; INTRAVENOUS; SOFT TISSUE PRN
Status: DISCONTINUED | OUTPATIENT
Start: 2020-07-10 | End: 2020-07-10 | Stop reason: SDUPTHER

## 2020-07-10 RX ORDER — CARVEDILOL 3.12 MG/1
3.12 TABLET ORAL 2 TIMES DAILY
Status: DISCONTINUED | OUTPATIENT
Start: 2020-07-10 | End: 2020-07-11 | Stop reason: HOSPADM

## 2020-07-10 RX ORDER — MORPHINE SULFATE 2 MG/ML
2 INJECTION, SOLUTION INTRAMUSCULAR; INTRAVENOUS EVERY 5 MIN PRN
Status: DISCONTINUED | OUTPATIENT
Start: 2020-07-10 | End: 2020-07-10

## 2020-07-10 RX ORDER — LABETALOL 20 MG/4 ML (5 MG/ML) INTRAVENOUS SYRINGE
10
Status: DISCONTINUED | OUTPATIENT
Start: 2020-07-10 | End: 2020-07-11 | Stop reason: HOSPADM

## 2020-07-10 RX ORDER — LABETALOL HYDROCHLORIDE 5 MG/ML
5 INJECTION, SOLUTION INTRAVENOUS EVERY 10 MIN PRN
Status: DISCONTINUED | OUTPATIENT
Start: 2020-07-10 | End: 2020-07-10

## 2020-07-10 RX ORDER — NICOTINE 21 MG/24HR
1 PATCH, TRANSDERMAL 24 HOURS TRANSDERMAL DAILY
Status: DISCONTINUED | OUTPATIENT
Start: 2020-07-10 | End: 2020-07-11 | Stop reason: HOSPADM

## 2020-07-10 RX ORDER — HYDROMORPHONE HCL 110MG/55ML
0.25 PATIENT CONTROLLED ANALGESIA SYRINGE INTRAVENOUS EVERY 5 MIN PRN
Status: DISCONTINUED | OUTPATIENT
Start: 2020-07-10 | End: 2020-07-10

## 2020-07-10 RX ORDER — GLIPIZIDE 5 MG/1
5 TABLET ORAL
Status: DISCONTINUED | OUTPATIENT
Start: 2020-07-10 | End: 2020-07-11 | Stop reason: HOSPADM

## 2020-07-10 RX ORDER — HYDRALAZINE HYDROCHLORIDE 20 MG/ML
10 INJECTION INTRAMUSCULAR; INTRAVENOUS
Status: DISCONTINUED | OUTPATIENT
Start: 2020-07-10 | End: 2020-07-11 | Stop reason: HOSPADM

## 2020-07-10 RX ORDER — HYDRALAZINE HYDROCHLORIDE 20 MG/ML
5 INJECTION INTRAMUSCULAR; INTRAVENOUS EVERY 10 MIN PRN
Status: DISCONTINUED | OUTPATIENT
Start: 2020-07-10 | End: 2020-07-10

## 2020-07-10 RX ORDER — ETOMIDATE 2 MG/ML
INJECTION INTRAVENOUS PRN
Status: DISCONTINUED | OUTPATIENT
Start: 2020-07-10 | End: 2020-07-10 | Stop reason: SDUPTHER

## 2020-07-10 RX ORDER — AMIODARONE HYDROCHLORIDE 200 MG/1
200 TABLET ORAL 2 TIMES DAILY
Status: DISCONTINUED | OUTPATIENT
Start: 2020-07-10 | End: 2020-07-11 | Stop reason: HOSPADM

## 2020-07-10 RX ORDER — SODIUM CHLORIDE 0.9 % (FLUSH) 0.9 %
10 SYRINGE (ML) INJECTION PRN
Status: DISCONTINUED | OUTPATIENT
Start: 2020-07-10 | End: 2020-07-11 | Stop reason: HOSPADM

## 2020-07-10 RX ORDER — GLYCOPYRROLATE 1 MG/5 ML
SYRINGE (ML) INTRAVENOUS PRN
Status: DISCONTINUED | OUTPATIENT
Start: 2020-07-10 | End: 2020-07-10 | Stop reason: SDUPTHER

## 2020-07-10 RX ORDER — SODIUM CHLORIDE 9 MG/ML
INJECTION, SOLUTION INTRAVENOUS CONTINUOUS PRN
Status: DISCONTINUED | OUTPATIENT
Start: 2020-07-10 | End: 2020-07-10 | Stop reason: SDUPTHER

## 2020-07-10 RX ORDER — ASPIRIN 81 MG/1
81 TABLET, CHEWABLE ORAL ONCE
Status: DISCONTINUED | OUTPATIENT
Start: 2020-07-10 | End: 2020-07-11 | Stop reason: HOSPADM

## 2020-07-10 RX ORDER — HYDROMORPHONE HCL 110MG/55ML
0.5 PATIENT CONTROLLED ANALGESIA SYRINGE INTRAVENOUS EVERY 5 MIN PRN
Status: DISCONTINUED | OUTPATIENT
Start: 2020-07-10 | End: 2020-07-10

## 2020-07-10 RX ORDER — SODIUM CHLORIDE 9 MG/ML
INJECTION, SOLUTION INTRAVENOUS CONTINUOUS
Status: DISCONTINUED | OUTPATIENT
Start: 2020-07-10 | End: 2020-07-11

## 2020-07-10 RX ORDER — PROPOFOL 10 MG/ML
INJECTION, EMULSION INTRAVENOUS PRN
Status: DISCONTINUED | OUTPATIENT
Start: 2020-07-10 | End: 2020-07-10 | Stop reason: SDUPTHER

## 2020-07-10 RX ORDER — FENTANYL CITRATE 50 UG/ML
25 INJECTION, SOLUTION INTRAMUSCULAR; INTRAVENOUS EVERY 5 MIN PRN
Status: DISCONTINUED | OUTPATIENT
Start: 2020-07-10 | End: 2020-07-10

## 2020-07-10 RX ORDER — ONDANSETRON 2 MG/ML
4 INJECTION INTRAMUSCULAR; INTRAVENOUS
Status: DISCONTINUED | OUTPATIENT
Start: 2020-07-10 | End: 2020-07-10

## 2020-07-10 RX ORDER — LIDOCAINE HYDROCHLORIDE 20 MG/ML
INJECTION, SOLUTION EPIDURAL; INFILTRATION; INTRACAUDAL; PERINEURAL PRN
Status: DISCONTINUED | OUTPATIENT
Start: 2020-07-10 | End: 2020-07-10 | Stop reason: SDUPTHER

## 2020-07-10 RX ORDER — ROCURONIUM BROMIDE 10 MG/ML
INJECTION, SOLUTION INTRAVENOUS PRN
Status: DISCONTINUED | OUTPATIENT
Start: 2020-07-10 | End: 2020-07-10 | Stop reason: SDUPTHER

## 2020-07-10 RX ORDER — DEXTROSE MONOHYDRATE 25 G/50ML
12.5 INJECTION, SOLUTION INTRAVENOUS PRN
Status: DISCONTINUED | OUTPATIENT
Start: 2020-07-10 | End: 2020-07-11 | Stop reason: HOSPADM

## 2020-07-10 RX ORDER — NEOSTIGMINE METHYLSULFATE 5 MG/5 ML
SYRINGE (ML) INTRAVENOUS PRN
Status: DISCONTINUED | OUTPATIENT
Start: 2020-07-10 | End: 2020-07-10 | Stop reason: SDUPTHER

## 2020-07-10 RX ORDER — PROTAMINE SULFATE 10 MG/ML
INJECTION, SOLUTION INTRAVENOUS PRN
Status: DISCONTINUED | OUTPATIENT
Start: 2020-07-10 | End: 2020-07-10 | Stop reason: SDUPTHER

## 2020-07-10 RX ORDER — CEFAZOLIN SODIUM 2 G/100ML
2 INJECTION, SOLUTION INTRAVENOUS EVERY 8 HOURS
Status: COMPLETED | OUTPATIENT
Start: 2020-07-10 | End: 2020-07-11

## 2020-07-10 RX ORDER — CEFAZOLIN SODIUM 1 G/50ML
1 INJECTION, SOLUTION INTRAVENOUS ONCE
Status: COMPLETED | OUTPATIENT
Start: 2020-07-10 | End: 2020-07-10

## 2020-07-10 RX ORDER — TRAMADOL HYDROCHLORIDE 50 MG/1
50 TABLET ORAL EVERY 6 HOURS PRN
Status: DISCONTINUED | OUTPATIENT
Start: 2020-07-10 | End: 2020-07-11 | Stop reason: HOSPADM

## 2020-07-10 RX ORDER — DOCUSATE SODIUM 100 MG/1
100 CAPSULE, LIQUID FILLED ORAL DAILY PRN
Status: DISCONTINUED | OUTPATIENT
Start: 2020-07-10 | End: 2020-07-11 | Stop reason: HOSPADM

## 2020-07-10 RX ORDER — FENTANYL CITRATE 50 UG/ML
INJECTION, SOLUTION INTRAMUSCULAR; INTRAVENOUS PRN
Status: DISCONTINUED | OUTPATIENT
Start: 2020-07-10 | End: 2020-07-10 | Stop reason: SDUPTHER

## 2020-07-10 RX ORDER — ATORVASTATIN CALCIUM 40 MG/1
40 TABLET, FILM COATED ORAL DAILY
Status: DISCONTINUED | OUTPATIENT
Start: 2020-07-10 | End: 2020-07-11 | Stop reason: HOSPADM

## 2020-07-10 RX ORDER — UREA 10 %
1 LOTION (ML) TOPICAL NIGHTLY PRN
Status: DISCONTINUED | OUTPATIENT
Start: 2020-07-10 | End: 2020-07-11 | Stop reason: HOSPADM

## 2020-07-10 RX ORDER — HEPARIN SODIUM 10000 [USP'U]/ML
INJECTION, SOLUTION INTRAVENOUS; SUBCUTANEOUS PRN
Status: DISCONTINUED | OUTPATIENT
Start: 2020-07-10 | End: 2020-07-10 | Stop reason: SDUPTHER

## 2020-07-10 RX ORDER — ONDANSETRON 2 MG/ML
INJECTION INTRAMUSCULAR; INTRAVENOUS PRN
Status: DISCONTINUED | OUTPATIENT
Start: 2020-07-10 | End: 2020-07-10 | Stop reason: SDUPTHER

## 2020-07-10 RX ORDER — DEXTROSE MONOHYDRATE 50 MG/ML
100 INJECTION, SOLUTION INTRAVENOUS PRN
Status: DISCONTINUED | OUTPATIENT
Start: 2020-07-10 | End: 2020-07-11 | Stop reason: HOSPADM

## 2020-07-10 RX ORDER — CEFAZOLIN SODIUM 2 G/50ML
2 SOLUTION INTRAVENOUS EVERY 8 HOURS
Status: DISCONTINUED | OUTPATIENT
Start: 2020-07-10 | End: 2020-07-10 | Stop reason: CLARIF

## 2020-07-10 RX ADMIN — PROPOFOL 50 MG: 10 INJECTION, EMULSION INTRAVENOUS at 13:14

## 2020-07-10 RX ADMIN — AMIODARONE HYDROCHLORIDE 200 MG: 200 TABLET ORAL at 21:25

## 2020-07-10 RX ADMIN — PHENYLEPHRINE HYDROCHLORIDE 150 MCG: 10 INJECTION INTRAVENOUS at 13:26

## 2020-07-10 RX ADMIN — Medication 1 MG: at 23:45

## 2020-07-10 RX ADMIN — FENTANYL CITRATE 50 MCG: 50 INJECTION INTRAMUSCULAR; INTRAVENOUS at 15:37

## 2020-07-10 RX ADMIN — PHENYLEPHRINE HYDROCHLORIDE 100 MCG: 10 INJECTION INTRAVENOUS at 13:43

## 2020-07-10 RX ADMIN — CARVEDILOL 3.12 MG: 3.12 TABLET, FILM COATED ORAL at 21:25

## 2020-07-10 RX ADMIN — ROCURONIUM BROMIDE 10 MG: 10 INJECTION INTRAVENOUS at 14:48

## 2020-07-10 RX ADMIN — Medication 0.2 MG: at 13:37

## 2020-07-10 RX ADMIN — PROTAMINE SULFATE 5 MG: 10 INJECTION, SOLUTION INTRAVENOUS at 15:15

## 2020-07-10 RX ADMIN — FENTANYL CITRATE 100 MCG: 50 INJECTION INTRAMUSCULAR; INTRAVENOUS at 13:14

## 2020-07-10 RX ADMIN — Medication 0.6 MG: at 15:27

## 2020-07-10 RX ADMIN — SODIUM CHLORIDE: 900 INJECTION INTRAVENOUS at 13:22

## 2020-07-10 RX ADMIN — SODIUM CHLORIDE: 900 INJECTION INTRAVENOUS at 12:10

## 2020-07-10 RX ADMIN — PROTAMINE SULFATE 20 MG: 10 INJECTION, SOLUTION INTRAVENOUS at 15:17

## 2020-07-10 RX ADMIN — CEFAZOLIN SODIUM 1 G: 1 INJECTION, SOLUTION INTRAVENOUS at 13:20

## 2020-07-10 RX ADMIN — ETOMIDATE 14 MG: 2 INJECTION, SOLUTION INTRAVENOUS at 13:14

## 2020-07-10 RX ADMIN — PHENYLEPHRINE HYDROCHLORIDE 100 MCG: 10 INJECTION INTRAVENOUS at 13:33

## 2020-07-10 RX ADMIN — PHENYLEPHRINE HYDROCHLORIDE 50 MCG/MIN: 10 INJECTION INTRAVENOUS at 13:49

## 2020-07-10 RX ADMIN — SODIUM CHLORIDE, PRESERVATIVE FREE 10 ML: 5 INJECTION INTRAVENOUS at 21:25

## 2020-07-10 RX ADMIN — CEFAZOLIN SODIUM 2 G: 2 INJECTION, SOLUTION INTRAVENOUS at 21:25

## 2020-07-10 RX ADMIN — SODIUM CHLORIDE: 9 INJECTION, SOLUTION INTRAVENOUS at 16:51

## 2020-07-10 RX ADMIN — HEPARIN SODIUM 2000 UNITS: 10000 INJECTION INTRAVENOUS; SUBCUTANEOUS at 13:56

## 2020-07-10 RX ADMIN — ROCURONIUM BROMIDE 10 MG: 10 INJECTION INTRAVENOUS at 14:02

## 2020-07-10 RX ADMIN — ATORVASTATIN CALCIUM 40 MG: 40 TABLET, FILM COATED ORAL at 21:25

## 2020-07-10 RX ADMIN — LIDOCAINE HYDROCHLORIDE 60 MG: 20 INJECTION, SOLUTION EPIDURAL; INFILTRATION; INTRACAUDAL; PERINEURAL at 13:14

## 2020-07-10 RX ADMIN — FENTANYL CITRATE 25 MCG: 50 INJECTION INTRAMUSCULAR; INTRAVENOUS at 15:41

## 2020-07-10 RX ADMIN — FENTANYL CITRATE 25 MCG: 50 INJECTION INTRAMUSCULAR; INTRAVENOUS at 15:57

## 2020-07-10 RX ADMIN — ONDANSETRON 4 MG: 2 INJECTION INTRAMUSCULAR; INTRAVENOUS at 15:05

## 2020-07-10 RX ADMIN — TRAMADOL HYDROCHLORIDE 50 MG: 50 TABLET, FILM COATED ORAL at 21:25

## 2020-07-10 RX ADMIN — Medication 3 MG: at 15:27

## 2020-07-10 RX ADMIN — HEPARIN SODIUM 3000 UNITS: 10000 INJECTION INTRAVENOUS; SUBCUTANEOUS at 13:52

## 2020-07-10 RX ADMIN — DEXAMETHASONE SODIUM PHOSPHATE 4 MG: 4 INJECTION, SOLUTION INTRAMUSCULAR; INTRAVENOUS at 13:27

## 2020-07-10 RX ADMIN — ACETAMINOPHEN 650 MG: 325 TABLET ORAL at 21:25

## 2020-07-10 RX ADMIN — GLIPIZIDE 5 MG: 5 TABLET ORAL at 17:56

## 2020-07-10 RX ADMIN — ROCURONIUM BROMIDE 50 MG: 10 INJECTION INTRAVENOUS at 13:15

## 2020-07-10 ASSESSMENT — PULMONARY FUNCTION TESTS
PIF_VALUE: 2
PIF_VALUE: 0
PIF_VALUE: 2
PIF_VALUE: 17
PIF_VALUE: 0
PIF_VALUE: 0
PIF_VALUE: 17
PIF_VALUE: 19
PIF_VALUE: 1
PIF_VALUE: 17
PIF_VALUE: 16
PIF_VALUE: 17
PIF_VALUE: 16
PIF_VALUE: 17
PIF_VALUE: 17
PIF_VALUE: 1
PIF_VALUE: 17
PIF_VALUE: 42
PIF_VALUE: 16
PIF_VALUE: 16
PIF_VALUE: 17
PIF_VALUE: 16
PIF_VALUE: 17
PIF_VALUE: 16
PIF_VALUE: 0
PIF_VALUE: 1
PIF_VALUE: 16
PIF_VALUE: 17
PIF_VALUE: 16
PIF_VALUE: 17
PIF_VALUE: 17
PIF_VALUE: 0
PIF_VALUE: 19
PIF_VALUE: 17
PIF_VALUE: 1
PIF_VALUE: 16
PIF_VALUE: 17
PIF_VALUE: 16
PIF_VALUE: 0
PIF_VALUE: 17
PIF_VALUE: 1
PIF_VALUE: 17
PIF_VALUE: 16
PIF_VALUE: 16
PIF_VALUE: 0
PIF_VALUE: 16
PIF_VALUE: 16
PIF_VALUE: 17
PIF_VALUE: 17
PIF_VALUE: 7
PIF_VALUE: 1
PIF_VALUE: 17
PIF_VALUE: 1
PIF_VALUE: 17
PIF_VALUE: 18
PIF_VALUE: 1
PIF_VALUE: 17
PIF_VALUE: 17
PIF_VALUE: 0
PIF_VALUE: 17
PIF_VALUE: 16
PIF_VALUE: 17
PIF_VALUE: 0
PIF_VALUE: 1
PIF_VALUE: 17
PIF_VALUE: 0
PIF_VALUE: 0
PIF_VALUE: 17
PIF_VALUE: 0
PIF_VALUE: 17
PIF_VALUE: 0
PIF_VALUE: 17
PIF_VALUE: 0
PIF_VALUE: 17
PIF_VALUE: 2
PIF_VALUE: 18
PIF_VALUE: 17
PIF_VALUE: 16
PIF_VALUE: 2
PIF_VALUE: 0
PIF_VALUE: 10
PIF_VALUE: 1
PIF_VALUE: 0
PIF_VALUE: 16
PIF_VALUE: 17
PIF_VALUE: 17
PIF_VALUE: 1
PIF_VALUE: 1
PIF_VALUE: 0
PIF_VALUE: 9
PIF_VALUE: 17
PIF_VALUE: 1
PIF_VALUE: 24
PIF_VALUE: 0
PIF_VALUE: 16
PIF_VALUE: 17
PIF_VALUE: 0
PIF_VALUE: 1
PIF_VALUE: 0
PIF_VALUE: 17
PIF_VALUE: 1
PIF_VALUE: 16
PIF_VALUE: 0
PIF_VALUE: 1
PIF_VALUE: 1
PIF_VALUE: 17
PIF_VALUE: 17
PIF_VALUE: 0
PIF_VALUE: 17
PIF_VALUE: 17
PIF_VALUE: 0
PIF_VALUE: 17
PIF_VALUE: 1
PIF_VALUE: 17
PIF_VALUE: 17
PIF_VALUE: 16
PIF_VALUE: 0
PIF_VALUE: 17
PIF_VALUE: 1
PIF_VALUE: 17
PIF_VALUE: 0
PIF_VALUE: 17
PIF_VALUE: 17
PIF_VALUE: 16
PIF_VALUE: 1
PIF_VALUE: 7
PIF_VALUE: 1
PIF_VALUE: 17
PIF_VALUE: 16
PIF_VALUE: 1
PIF_VALUE: 17
PIF_VALUE: 17
PIF_VALUE: 0
PIF_VALUE: 0
PIF_VALUE: 17
PIF_VALUE: 17
PIF_VALUE: 2
PIF_VALUE: 17
PIF_VALUE: 1
PIF_VALUE: 16
PIF_VALUE: 1
PIF_VALUE: 0
PIF_VALUE: 2
PIF_VALUE: 17
PIF_VALUE: 1
PIF_VALUE: 16
PIF_VALUE: 17
PIF_VALUE: 17
PIF_VALUE: 0
PIF_VALUE: 17
PIF_VALUE: 17
PIF_VALUE: 1
PIF_VALUE: 17
PIF_VALUE: 17
PIF_VALUE: 1
PIF_VALUE: 17
PIF_VALUE: 0
PIF_VALUE: 16
PIF_VALUE: 17
PIF_VALUE: 0
PIF_VALUE: 16
PIF_VALUE: 2
PIF_VALUE: 0
PIF_VALUE: 1
PIF_VALUE: 17
PIF_VALUE: 0
PIF_VALUE: 4
PIF_VALUE: 17
PIF_VALUE: 9
PIF_VALUE: 17
PIF_VALUE: 18
PIF_VALUE: 17
PIF_VALUE: 17
PIF_VALUE: 16
PIF_VALUE: 17
PIF_VALUE: 0
PIF_VALUE: 17
PIF_VALUE: 0
PIF_VALUE: 17
PIF_VALUE: 16
PIF_VALUE: 16

## 2020-07-10 ASSESSMENT — PAIN SCALES - GENERAL
PAINLEVEL_OUTOF10: 0
PAINLEVEL_OUTOF10: 10

## 2020-07-10 ASSESSMENT — PAIN - FUNCTIONAL ASSESSMENT: PAIN_FUNCTIONAL_ASSESSMENT: ACTIVITIES ARE NOT PREVENTED

## 2020-07-10 ASSESSMENT — PAIN DESCRIPTION - DESCRIPTORS: DESCRIPTORS: ACHING;DISCOMFORT

## 2020-07-10 ASSESSMENT — LIFESTYLE VARIABLES: SMOKING_STATUS: 1

## 2020-07-10 ASSESSMENT — PAIN DESCRIPTION - FREQUENCY: FREQUENCY: CONTINUOUS

## 2020-07-10 ASSESSMENT — PAIN DESCRIPTION - ONSET: ONSET: ON-GOING

## 2020-07-10 ASSESSMENT — PAIN DESCRIPTION - PROGRESSION: CLINICAL_PROGRESSION: GRADUALLY WORSENING

## 2020-07-10 ASSESSMENT — PAIN DESCRIPTION - PAIN TYPE: TYPE: SURGICAL PAIN

## 2020-07-10 ASSESSMENT — PAIN DESCRIPTION - LOCATION: LOCATION: GROIN

## 2020-07-10 ASSESSMENT — PAIN DESCRIPTION - ORIENTATION: ORIENTATION: RIGHT;LEFT

## 2020-07-10 NOTE — PROGRESS NOTES
Pt transported from PACU to 2126. Placed in room, connected to bedside monitor. Bilateral groins soft but right more swollen than left- has old drainage- no increase noted. DOPPLER pulses noted in both DP/PT's. Pt denies pain. VSS- NSR 60's.

## 2020-07-10 NOTE — PROGRESS NOTES
24-20-52-61 patient received from cath lab monitor placed and alarms on Report received from [address] [facility]. Cassandra MOTTA at bedside and is aware that right groin is soft but is slightly more swollen than the left groin pressure held on groin and drainage noted on dressing   1613 Dr Joan Gates at bedside and is aware of right groin and is noted to be soft no new drainage noted and drying per Dr Joan Gates valdez cath to be discontinued after 4 hours of bedrest is complete   1640 tolerating ice chips no changes noted to bilateral groin sites. 1705 transferred to 2126 via bed and monitor and with belongings.  Report given to [facility] RN informed of above note

## 2020-07-10 NOTE — ANESTHESIA PRE PROCEDURE
Department of Anesthesiology  Preprocedure Note       Name:  Leann Busby   Age:  69 y.o.  :  1945                                          MRN:  [id number]         Date:  7/10/2020      Surgeon: * No surgeons listed *    Procedure: ABDOMINAL AORTIC ANEURYSM REPAIR ENDOVASCULAR    Medications prior to admission:   Prior to Admission medications    Medication Sig Start Date End Date Taking? Authorizing Provider   diphenhydrAMINE-APAP, sleep, (TYLENOL PM EXTRA STRENGTH)  MG tablet Take 2 tablets by mouth nightly as needed for Sleep   Yes Historical Provider, MD   polyethylene glycol (GLYCOLAX) 17 GM/SCOOP powder Take 17 g by mouth daily   Yes Historical Provider, MD   docusate sodium (COLACE) 100 MG capsule Take 1 capsule by mouth daily as needed for Constipation 20  Yes Maldonado Greer MD   traMADol (ULTRAM) 50 MG tablet Take 1 tablet by mouth every 6 hours as needed for Pain for up to 21 days. Intended supply: 7 days. Take lowest dose possible to manage pain 20 Yes Maldonado Greer MD   amiodarone (CORDARONE) 200 MG tablet Take 1 tablet by mouth 2 times daily 20  Yes Maldonado Greer MD   carvedilol (COREG) 3.125 MG tablet Take 1 tablet by mouth 2 times daily 20  Yes Maldonado Greer MD   glipiZIDE (GLUCOTROL) 5 MG tablet Take 1 tablet by mouth 2 times daily (before meals) 20  Yes Maldonado Greer MD   atorvastatin (LIPITOR) 40 MG tablet Take 1 tablet by mouth daily 5/15/20  Yes Maldonado rGeer MD   furosemide (LASIX) 20 MG tablet Take 1 tablet by mouth 2 times daily 20  Yes Maldonado Greer MD   blood glucose monitor kit and supplies Test 3 times a day & as needed for symptoms of irregular blood glucose.  3/9/20  Yes Billy Boateng PA-C   aspirin 81 MG tablet Take by mouth   Yes Historical Provider, MD       Current medications:    Current Facility-Administered Medications   Medication Dose Route Frequency Provider Last Rate Last Dose    heparin (porcine) 30,000 Units in sodium chloride 0.9 % 1,000 mL (cell saver)  30,000 Units Intravenous Once Qasim Schultz MD        fentaNYL (SUBLIMAZE) injection 25 mcg  25 mcg Intravenous Q5 Min PRN Rahat Razo MD        HYDROmorphone (DILAUDID) injection 0.5 mg  0.5 mg Intravenous Q5 Min PRN Rahat Razo MD        HYDROmorphone (DILAUDID) injection 0.25 mg  0.25 mg Intravenous Q5 Min PRN Rahat Razo MD        morphine (PF) injection 2 mg  2 mg Intravenous Q5 Min PRN Rahat Razo MD        labetalol (NORMODYNE;TRANDATE) injection 5 mg  5 mg Intravenous Q10 Min PRN Rahat Razo MD        hydrALAZINE (APRESOLINE) injection 5 mg  5 mg Intravenous Q10 Min PRN Rahat Razo MD        ondansetron [facility]) injection 4 mg  4 mg Intravenous Once PRN Rahat Razo MD           Allergies:     Allergies   Allergen Reactions    Metformin And Related Diarrhea     Severe diarrhea, abd pain, and nausea    Amoxicillin Diarrhea    Other Nausea And Vomiting     ANTI-INFLAMMATORIES  Severe stomach pain--diarrhea  hypertension       Problem List:    Patient Active Problem List   Diagnosis Code    Abdominal aortic aneurysm (AAA) without rupture (HCC) I71.4    Tobacco abuse Z72.0    Essential hypertension I10    Smoker M52.323    Non-alcoholic fatty liver disease K76.0    Basal cell carcinoma of nose C44.311    Type 2 diabetes mellitus without complication, without long-term current use of insulin (HCC) E11.9    Other hyperlipidemia E78.49    Nonrheumatic aortic valve stenosis I35.0    CAD in native artery I25.10    Iliac artery aneurysm, right (HCC) I72.3    Other constipation K59.09    Spondylosis of lumbosacral region M47.817    Status post AAA (abdominal aortic aneurysm) repair U38.782, Z86.79       Past Medical History:        Diagnosis Date    AAA (abdominal aortic aneurysm) ([facility] [address] 2018    Infrarenal    Basal cell carcinoma of nose 2016    Dr Pippa Montelongo CAD (coronary artery disease)     Dr. Leni Angulo - severe aortic stenosis with a bicuspid aortic valve    Constipation     Essential hypertension     History of alcohol abuse     Hyperlipidemia     Hypertension     Follows with PCP    Missing teeth, acquired     Non-alcoholic fatty liver disease     Osteoarthritis     Knees, lower back, shoulders,    Spinal stenosis     had epidural steroid injection 1/8/2020 - lumbar    Tremor     Left arm only    Type 2 diabetes mellitus without complication ([address]     Controlling with diet    Wears glasses        Past Surgical History:        Procedure Laterality Date    CABG WITH AORTIC VALVE REPLACEMENT N/A 3/4/2020    CABG CORONARY ARTERY BYPASS x1 SALEEM TO LAD, AORTIC VALVE REPACEMENT performed by Isa Reyna MD at [address]  02/26/2020    critical LAD lesion and moderate to severe right coronary artery lesion    CARPAL TUNNEL RELEASE Right 1970's    KNEE SURGERY  2015    quad injury from a fall    OTHER SURGICAL HISTORY  12/09/2016    excision of basal cell carcinoma of nose with complex repair    OTHER SURGICAL HISTORY  01/2020    epidual steroid injections L3-L4    SINUS SURGERY  1970's    x 2    WISDOM TOOTH EXTRACTION         Social History:    Social History     Tobacco Use    Smoking status: Current Every Day Smoker     Packs/day: 1.00     Years: 58.00     Pack years: 58.00     Types: Cigarettes     Start date: 1962    Smokeless tobacco: Never Used   Substance Use Topics    Alcohol use: Yes     Comment: 1 glass wine a month                                Ready to quit: Not Answered  Counseling given: Not Answered      Vital Signs (Current):   Vitals:    07/10/20 1025 07/10/20 1057 07/10/20 1105   BP:   (!) 186/89   Pulse:  63    Temp:  37 °C (98.6 °F)    TempSrc:  Temporal    SpO2:   96%   Weight: 157 lb (71.2 kg)     Height: 5' 6\" (1.676 m)                                                BP Readings from Last 3 Encounters:   07/10/20 (!) 186/89   07/06/20 (!) 170/94   06/24/20 128/78       NPO Status:                                           1 sip  Coffee 0500                 Solids 12 hrs. BMI:   Wt Readings from Last 3 Encounters:   07/10/20 157 lb (71.2 kg)   07/06/20 158 lb (71.7 kg)   06/24/20 160 lb (72.6 kg)     Body mass index is 25.34 kg/m². CBC  Lab Results   Component Value Date/Time    WBC 8.4 07/06/2020 09:55 AM    HGB 14.7 07/06/2020 09:55 AM    HCT 46.2 07/06/2020 09:55 AM     07/06/2020 09:55 AM     RENAL  Lab Results   Component Value Date/Time     07/06/2020 09:55 AM    K 4.1 07/06/2020 09:55 AM     07/06/2020 09:55 AM    CO2 25 07/06/2020 09:55 AM    BUN 13 07/06/2020 09:55 AM    CREATININE 0.9 07/06/2020 09:55 AM    GLUCOSE 106 (H) 07/06/2020 09:55 AM     COAGS  Lab Results   Component Value Date/Time    PROTIME 11.5 (L) 07/06/2020 09:55 AM    INR 0.95 07/06/2020 09:55 AM    APTT 25.8 07/06/2020 09:55 AM       Anesthesia Evaluation  Patient summary reviewed and Nursing notes reviewed no history of anesthetic complications:   Airway: Mallampati: III  TM distance: >3 FB   Neck ROM: full  Mouth opening: > = 3 FB Dental:      Comment: Poor dentition, numerous missing teeth    Pulmonary:   (+) decreased breath sounds,  current smoker          Patient smoked on day of surgery. ROS comment: Smoker 1.5ppd x 58 years  Counseled on smoking cessation. PAT Airway Exam: no interval changes  Head/Neck movement: full  Thyromental Distance: >3 FB  History of difficult intubation:  None  Mallampati Classification:  Class II  Teeth:   Poor dentition. Missing and broken upper and lower teeth. Denies loose teeth   Able to lie flat     LUNGS:  No increased work of breathing, good air exchange, clear to auscultation bilaterally, no crackles or wheezing      COVID 19 screening  Denies recent fever or known COVID 19 exposure.   Instructed to quarantine until surgery and report any new respiratory or fever symptoms to surgeon. Aware COVID testing must be completed before DOS. COVID swab: 7/6/2020    CXR: 7/6/2020  No acute cardiopulmonary abnormality. No significant interval change. Cardiovascular:  Exercise tolerance: poor (<4 METS),   (+) hypertension (on beta blocker and ASA, to stop 6/6/2020):, valvular problems/murmurs (severe aortic stenosis with a bicuspid aortic valve s/p AV replacement 3/2020): AS, CAD:, CABG/stent (CABG x 1, AVR 3/2020):, dysrhythmias (post CABG Afib 3/2020): atrial fibrillation, hyperlipidemia      ECG reviewed      Echocardiogram reviewed  Stress test reviewed             ROS comment: Cardiac cath 2/26/2020   SEVERE LAD STENOSIS   MILD CX AND RCA STENOSIS   MODERATE TO SEVERE AS      Echo 2/2020  EF 50-55%  Left ventricular function is normal   Grade II diastolic dysfunction. Moderate left ventricular hypertrophy. Right ventricular systolic pressure of 35 mm Hg. Moderate to severe aortic stenosis with mean gradient of 35 mmHg. Moderate mitral regurgitation is present. Mild tricuspid regurgitation is present. No evidence of pericardial effusion. EKG 7/6/2020  Sinus rhythm with 1st degree AV block   Otherwise normal ECG   When compared with ECG of 29-MAY-2020 14:50,   No significant change was found            Neuro/Psych:   (+) neuromuscular disease (spinal stenosis, s/p epidural injection . Hx left arm tremors . hx fall, FALL RISK. fall with stiches on right eyebrow, 5/2020):,              ROS comment: Mini-cog evaluation performed per anesthesia protocol,    > age 74. Scored: 3/5   Copy on chart for review. 5/29/2020 GI/Hepatic/Renal:   (+) liver disease (ALDRICH, LFT's WNL, 2/2020):, renal disease (painful slow urine, onset 5/27/2020. Denies hx UTI or hematuria.   instructed to f/u  with PCP):,           Endo/Other:    (+) Diabetes (HbA1c    8.6,  2/2020 started on glipizide)Type II DM, poorly controlled, , : arthritis: OA., malignancy/cancer (BCC  on nose). Pt had PAT visit. ROS comment: Counseled on tight glycemic control with medication compliance to promote healing and prevent diabetic complications. Informed if BS elevated DOS, surgery could be delayed or cancelled. Abdominal:           Vascular:   + PVD, aortic or cerebral (AAA  and bailey common iliac arteries aneurysms), .        ROS comment: CT abdm 4/2020  1. 4.4 cm infrarenal abdominal aortic aneurysm which is slightly larger  compared with the recent evaluation from 03/03/2020 and larger by about 0.5  cm compared with 2018.  No evidence of rupture.  Atherosclerotic disease in  the renal arteries worse on the left redemonstrated. 2. Bilateral common iliac artery aneurysms worse on the right which is mildly  increased compared with previous evaluations. 3. Redemonstration of diverticulosis. 4. Redemonstration of right adrenal adenoma. 5. Nonspecific arterial enhancement in the liver also previously noted and  remains stable.       Carotid US 3/3/2020  The right internal carotid artery demonstrates 0-50% stenosis .     The left internal carotid artery demonstrates borderline velocities within  the proximal and distal segments of the left internal carotid artery,  measuring just over 125 centimeters/second, though with a normal ratio and no  definite significant stenosis by grayscale imaging.  Findings are felt likely  to represent 0-50% stenosis.     Bilateral vertebral arteries are patent with flow in the normal direction. .                               Anesthesia Plan      general     ASA 4       Induction: intravenous. MIPS: Postoperative opioids intended and Prophylactic antiemetics administered. Anesthetic plan and risks discussed with patient. Miryam Boggs, APRN - CRNA Chart reviewed, pt. Interviewed and examined.   Anesthesia Evaluation will follow by a certified practitioner prior to surgery.   7/10/2020

## 2020-07-10 NOTE — PROGRESS NOTES
Stent Card placed with  pt's belongings.  Explained importance of leaving the card in his wallet and carrying it with him at all times

## 2020-07-11 VITALS
OXYGEN SATURATION: 97 % | HEART RATE: 62 BPM | SYSTOLIC BLOOD PRESSURE: 155 MMHG | RESPIRATION RATE: 20 BRPM | HEIGHT: 66 IN | DIASTOLIC BLOOD PRESSURE: 60 MMHG | WEIGHT: 163.14 LBS | TEMPERATURE: 98.2 F | BODY MASS INDEX: 26.22 KG/M2

## 2020-07-11 PROBLEM — I71.40 AAA (ABDOMINAL AORTIC ANEURYSM): Status: ACTIVE | Noted: 2020-07-11

## 2020-07-11 LAB
ANION GAP SERPL CALCULATED.3IONS-SCNC: 6 MMOL/L (ref 4–16)
BUN BLDV-MCNC: 11 MG/DL (ref 6–23)
CALCIUM SERPL-MCNC: 8.7 MG/DL (ref 8.3–10.6)
CHLORIDE BLD-SCNC: 109 MMOL/L (ref 99–110)
CO2: 25 MMOL/L (ref 21–32)
CREAT SERPL-MCNC: 0.7 MG/DL (ref 0.9–1.3)
GFR AFRICAN AMERICAN: >60 ML/MIN/1.73M2
GFR NON-AFRICAN AMERICAN: >60 ML/MIN/1.73M2
GLUCOSE BLD-MCNC: 104 MG/DL (ref 70–99)
GLUCOSE BLD-MCNC: 120 MG/DL (ref 70–99)
POTASSIUM SERPL-SCNC: 4 MMOL/L (ref 3.5–5.1)
SODIUM BLD-SCNC: 140 MMOL/L (ref 135–145)

## 2020-07-11 PROCEDURE — 82962 GLUCOSE BLOOD TEST: CPT

## 2020-07-11 PROCEDURE — 6370000000 HC RX 637 (ALT 250 FOR IP): Performed by: SURGERY

## 2020-07-11 PROCEDURE — 6360000002 HC RX W HCPCS: Performed by: SURGERY

## 2020-07-11 PROCEDURE — 80048 BASIC METABOLIC PNL TOTAL CA: CPT

## 2020-07-11 PROCEDURE — 2580000003 HC RX 258: Performed by: SURGERY

## 2020-07-11 RX ORDER — FUROSEMIDE 20 MG/1
20 TABLET ORAL 2 TIMES DAILY
Status: DISCONTINUED | OUTPATIENT
Start: 2020-07-11 | End: 2020-07-11 | Stop reason: HOSPADM

## 2020-07-11 RX ADMIN — FUROSEMIDE 20 MG: 20 TABLET ORAL at 09:20

## 2020-07-11 RX ADMIN — CEFAZOLIN SODIUM 2 G: 2 INJECTION, SOLUTION INTRAVENOUS at 05:19

## 2020-07-11 RX ADMIN — CARVEDILOL 3.12 MG: 3.12 TABLET, FILM COATED ORAL at 09:10

## 2020-07-11 RX ADMIN — TRAMADOL HYDROCHLORIDE 50 MG: 50 TABLET, FILM COATED ORAL at 03:00

## 2020-07-11 RX ADMIN — TRAMADOL HYDROCHLORIDE 50 MG: 50 TABLET, FILM COATED ORAL at 09:11

## 2020-07-11 RX ADMIN — ACETAMINOPHEN 650 MG: 325 TABLET ORAL at 09:11

## 2020-07-11 RX ADMIN — SODIUM CHLORIDE: 9 INJECTION, SOLUTION INTRAVENOUS at 02:58

## 2020-07-11 RX ADMIN — GLIPIZIDE 5 MG: 5 TABLET ORAL at 09:10

## 2020-07-11 RX ADMIN — ACETAMINOPHEN 650 MG: 325 TABLET ORAL at 03:00

## 2020-07-11 RX ADMIN — AMIODARONE HYDROCHLORIDE 200 MG: 200 TABLET ORAL at 09:11

## 2020-07-11 RX ADMIN — SODIUM CHLORIDE, PRESERVATIVE FREE 10 ML: 5 INJECTION INTRAVENOUS at 09:20

## 2020-07-11 ASSESSMENT — PAIN SCALES - GENERAL
PAINLEVEL_OUTOF10: 3
PAINLEVEL_OUTOF10: 10
PAINLEVEL_OUTOF10: 10
PAINLEVEL_OUTOF10: 8
PAINLEVEL_OUTOF10: 4

## 2020-07-11 ASSESSMENT — PAIN DESCRIPTION - PROGRESSION
CLINICAL_PROGRESSION: GRADUALLY WORSENING
CLINICAL_PROGRESSION: NOT CHANGED

## 2020-07-11 ASSESSMENT — PAIN DESCRIPTION - LOCATION
LOCATION: GENERALIZED
LOCATION: GROIN
LOCATION: GROIN

## 2020-07-11 ASSESSMENT — PAIN DESCRIPTION - DESCRIPTORS
DESCRIPTORS: ACHING
DESCRIPTORS: ACHING;DISCOMFORT
DESCRIPTORS: ACHING
DESCRIPTORS: ACHING

## 2020-07-11 ASSESSMENT — PAIN DESCRIPTION - ONSET
ONSET: ON-GOING

## 2020-07-11 ASSESSMENT — PAIN DESCRIPTION - ORIENTATION
ORIENTATION: RIGHT;LEFT

## 2020-07-11 ASSESSMENT — PAIN DESCRIPTION - FREQUENCY
FREQUENCY: CONTINUOUS

## 2020-07-11 ASSESSMENT — PAIN - FUNCTIONAL ASSESSMENT
PAIN_FUNCTIONAL_ASSESSMENT: ACTIVITIES ARE NOT PREVENTED
PAIN_FUNCTIONAL_ASSESSMENT: PREVENTS OR INTERFERES SOME ACTIVE ACTIVITIES AND ADLS
PAIN_FUNCTIONAL_ASSESSMENT: ACTIVITIES ARE NOT PREVENTED
PAIN_FUNCTIONAL_ASSESSMENT: PREVENTS OR INTERFERES SOME ACTIVE ACTIVITIES AND ADLS

## 2020-07-11 ASSESSMENT — PAIN DESCRIPTION - PAIN TYPE
TYPE: ACUTE PAIN;SURGICAL PAIN
TYPE: CHRONIC PAIN
TYPE: SURGICAL PAIN
TYPE: ACUTE PAIN;CHRONIC PAIN

## 2020-07-11 NOTE — PLAN OF CARE
Problem: Falls - Risk of:  Goal: Will remain free from falls  Description: Will remain free from falls  7/11/2020 0942 by Kapil Kelsey RN  Outcome: Completed  7/11/2020 0941 by Kapil Kelsey RN  Outcome: Ongoing  7/11/2020 0328 by Abhijeet Loja RN  Outcome: Ongoing  Goal: Absence of physical injury  Description: Absence of physical injury  7/11/2020 0942 by Kapil Kelsey RN  Outcome: Completed  7/11/2020 0941 by Kapil Kelsey RN  Outcome: Ongoing  7/11/2020 0328 by Abhijeet Loja RN  Outcome: Ongoing     Problem: Discharge Planning:  Goal: Participates in care planning  Description: Participates in care planning  7/11/2020 0942 by Kapil Kelsey RN  Outcome: Completed  7/11/2020 0941 by Kapil Kelsey RN  Outcome: Ongoing  7/11/2020 0328 by Abhijeet Loja RN  Outcome: Ongoing  Goal: Discharged to appropriate level of care  Description: Discharged to appropriate level of care  7/11/2020 0942 by Kapil Kelsey RN  Outcome: Completed  7/11/2020 0941 by Kapil Kelsey RN  Outcome: Ongoing  7/11/2020 0328 by Abhijeet Loja RN  Outcome: Ongoing     Problem: Airway Clearance - Ineffective:  Goal: Ability to maintain a clear airway will improve  Description: Ability to maintain a clear airway will improve  7/11/2020 0942 by Kapil Kelsey RN  Outcome: Completed  7/11/2020 0941 by Kapil Kelsey RN  Outcome: Ongoing  7/11/2020 0328 by Abhijeet Loja RN  Outcome: Ongoing     Problem: Anxiety/Stress:  Goal: Level of anxiety will decrease  Description: Level of anxiety will decrease  7/11/2020 0942 by Kapil Kelsey RN  Outcome: Completed  7/11/2020 0941 by Kapil Kelsey RN  Outcome: Ongoing  7/11/2020 0328 by Abhijeet Loja RN  Outcome: Ongoing     Problem: Aspiration:  Goal: Absence of aspiration  Description: Absence of aspiration  7/11/2020 0942 by Kapil Kelsey RN  Outcome: Completed  7/11/2020 0941 by Kapil Kelsey RN  Outcome: Ongoing  7/11/2020 0328 by Abhijeet Loja RN  Outcome: Ongoing Problem:  Bowel Function - Altered:  Goal: Bowel elimination is within specified parameters  Description: Bowel elimination is within specified parameters  7/11/2020 0942 by Jovanni Davidson RN  Outcome: Completed  7/11/2020 0941 by Jovanni Davidson RN  Outcome: Ongoing  7/11/2020 0328 by Prosper Bates RN  Outcome: Ongoing     Problem: Cardiac Output - Decreased:  Goal: Hemodynamic stability will improve  Description: Hemodynamic stability will improve  7/11/2020 0942 by Jovanni Davidson RN  Outcome: Completed  7/11/2020 0941 by Jovanni Davidson RN  Outcome: Ongoing  7/11/2020 0328 by Prosper Bates RN  Outcome: Ongoing     Problem: Fluid Volume - Imbalance:  Goal: Absence of imbalanced fluid volume signs and symptoms  Description: Absence of imbalanced fluid volume signs and symptoms  7/11/2020 0942 by Jovanni Davidson RN  Outcome: Completed  7/11/2020 0941 by Jovanni Davidson RN  Outcome: Ongoing  7/11/2020 0328 by Prosper Bates RN  Outcome: Ongoing     Problem: Gas Exchange - Impaired:  Goal: Levels of oxygenation will improve  Description: Levels of oxygenation will improve  7/11/2020 0942 by Jovanni Davidson RN  Outcome: Completed  7/11/2020 0941 by Jovanni Davidson RN  Outcome: Ongoing  7/11/2020 0328 by Prosper Bates RN  Outcome: Ongoing     Problem: Mental Status - Impaired:  Goal: Mental status will be restored to baseline  Description: Mental status will be restored to baseline  7/11/2020 0942 by Jovanni Davidson RN  Outcome: Completed  7/11/2020 0941 by Jovanni Davidson RN  Outcome: Ongoing  7/11/2020 0328 by Prosper Bates RN  Outcome: Ongoing     Problem: Nutrition Deficit:  Goal: Ability to achieve adequate nutritional intake will improve  Description: Ability to achieve adequate nutritional intake will improve  7/11/2020 0942 by Jovanni Davidson RN  Outcome: Completed  7/11/2020 0941 by Jovanni Davidson RN  Outcome: Ongoing  7/11/2020 0328 by Prosper Bates RN  Outcome: Ongoing     Problem: Pain:  Description: Pain management should include both nonpharmacologic and pharmacologic interventions.   Goal: Pain level will decrease  Description: Pain level will decrease  7/11/2020 0942 by Peña Gibson RN  Outcome: Completed  7/11/2020 0941 by Peña Gibson RN  Outcome: Ongoing  7/11/2020 0328 by Mynor Pérez RN  Outcome: Ongoing  Goal: Recognizes and communicates pain  Description: Recognizes and communicates pain  7/11/2020 0942 by Peña Gibson RN  Outcome: Completed  7/11/2020 0941 by Peña Gibson RN  Outcome: Ongoing  7/11/2020 0328 by Mynor Pérez RN  Outcome: Ongoing  Goal: Control of acute pain  Description: Control of acute pain  7/11/2020 0942 by Peña Gibson RN  Outcome: Completed  7/11/2020 0941 by Peña Gibson RN  Outcome: Ongoing  7/11/2020 0328 by Mynor Pérez RN  Outcome: Ongoing  Goal: Control of chronic pain  Description: Control of chronic pain  7/11/2020 0942 by Peña Gibson RN  Outcome: Completed  7/11/2020 0941 by Peña Gibson RN  Outcome: Ongoing  7/11/2020 0328 by Mynor Pérez RN  Outcome: Ongoing     Problem: Serum Glucose Level - Abnormal:  Goal: Ability to maintain appropriate glucose levels will improve to within specified parameters  Description: Ability to maintain appropriate glucose levels will improve to within specified parameters  7/11/2020 0942 by Peña Gibson RN  Outcome: Completed  7/11/2020 0941 by Peña Gibson RN  Outcome: Ongoing  7/11/2020 0328 by Mynor Pérez RN  Outcome: Ongoing     Problem: Skin Integrity - Impaired:  Goal: Will show no infection signs and symptoms  Description: Will show no infection signs and symptoms  7/11/2020 0942 by Peña Gibson RN  Outcome: Completed  7/11/2020 0941 by Peña Gibson RN  Outcome: Ongoing  7/11/2020 0328 by Mynor Pérez RN  Outcome: Ongoing  Goal: Absence of new skin breakdown  Description: Absence of new skin breakdown  7/11/2020 0942 by Peña Gibson RN  Outcome: Completed  7/11/2020 0941 by Kaycee Carvajal RN  Outcome: Ongoing  7/11/2020 0328 by Melody Simmons RN  Outcome: Ongoing     Problem: Sleep Pattern Disturbance:  Goal: Appears well-rested  Description: Appears well-rested  7/11/2020 0942 by Kaycee Carvajal RN  Outcome: Completed  7/11/2020 0941 by Kaycee Carvajal RN  Outcome: Ongoing  7/11/2020 0328 by Melody Simmons RN  Outcome: Ongoing     Problem: Tissue Perfusion, Altered:  Goal: Circulatory function within specified parameters  Description: Circulatory function within specified parameters  7/11/2020 0942 by Kaycee Carvajal RN  Outcome: Completed  7/11/2020 0941 by Kaycee Carvajal RN  Outcome: Ongoing  7/11/2020 0328 by Melody Simmons RN  Outcome: Ongoing     Problem: Tissue Perfusion - Cardiopulmonary, Altered:  Goal: Absence of angina  Description: Absence of angina  7/11/2020 0942 by Kaycee Carvajal RN  Outcome: Completed  7/11/2020 0941 by Kaycee Carvajal RN  Outcome: Ongoing  7/11/2020 0328 by Melody Simmons RN  Outcome: Ongoing  Goal: Hemodynamic stability will improve  Description: Hemodynamic stability will improve  7/11/2020 0942 by Kaycee Carvajal RN  Outcome: Completed  7/11/2020 0941 by Kaycee Carvajal RN  Outcome: Ongoing  7/11/2020 0328 by Melody Simmons RN  Outcome: Ongoing     Problem: Pain:  Description: Pain management should include both nonpharmacologic and pharmacologic interventions.   Goal: Pain level will decrease  Description: Pain level will decrease  7/11/2020 0942 by Kaycee Carvajal RN  Outcome: Completed  7/11/2020 0941 by Kaycee Carvajal RN  Outcome: Ongoing  7/11/2020 0328 by Melody Simmons RN  Outcome: Ongoing  Goal: Control of acute pain  Description: Control of acute pain  7/11/2020 0942 by Kaycee Carvajal RN  Outcome: Completed  7/11/2020 0941 by Kaycee Carvajal RN  Outcome: Ongoing  7/11/2020 0328 by Melody Simmons RN  Outcome: Ongoing  Goal: Control of chronic pain  Description: Control of chronic pain  7/11/2020 0942 by Peña Gibson RN  Outcome: Completed  7/11/2020 0941 by Peña Gibosn RN  Outcome: Ongoing  7/11/2020 0328 by Mynor Pérez RN  Outcome: Ongoing

## 2020-07-11 NOTE — DISCHARGE SUMMARY
Discharge Summary     Patient ID  Anam Han   1945  [id number]      Primary Care Doctor:  Alina Santiago MD    Admit date: 7/10/2020   Discharge date: 7/11/2020      Admitting Physician: Qasim Schultz MD   Discharge Physician: Sara Ruiz MD    Discharge Diagnoses: Active Hospital Problems    Diagnosis Date Noted    AAA (abdominal aortic aneurysm) ([address] [I71.4] 07/11/2020    Status post AAA (abdominal aortic aneurysm) repair [P91.402, Z86.79] 07/10/2020     Discharged Condition: stable. Hospital Course:  Upon admission underwent surgery of EVAR after discussion and preparation. Tolerated surgery well   Post op ; monitored rhythm, O2 sat, I/O, Labs, CXRs serially  Neuro status , BP and vital signs monitored  Started on diet and activity. Uneventful recovery  At discharge, pt ambulating  Tolerating diet  Wounds clean  Had Bm  Lungs clear   NSR  VS at discharge   Vitals:    07/11/20 0910   BP: (!) 174/83   Pulse:    Resp:    Temp:    SpO2:        Consults: none      Disposition: home    Patient Instructions:      Medication List      CONTINUE taking these medications    amiodarone 200 MG tablet  Commonly known as:  CORDARONE  Take 1 tablet by mouth 2 times daily     aspirin 81 MG tablet     atorvastatin 40 MG tablet  Commonly known as:  LIPITOR  Take 1 tablet by mouth daily     blood glucose monitor kit and supplies  Test 3 times a day & as needed for symptoms of irregular blood glucose.      carvedilol 3.125 MG tablet  Commonly known as:  COREG  Take 1 tablet by mouth 2 times daily     docusate sodium 100 MG capsule  Commonly known as:  COLACE  Take 1 capsule by mouth daily as needed for Constipation     furosemide 20 MG tablet  Commonly known as:  Lasix  Take 1 tablet by mouth 2 times daily     glipiZIDE 5 MG tablet  Commonly known as:  GLUCOTROL  Take 1 tablet by mouth 2 times daily (before meals)     polyethylene glycol 17 GM/SCOOP powder  Commonly known as:  GLYCOLAX traMADol 50 MG tablet  Commonly known as:  Ultram  Take 1 tablet by mouth every 6 hours as needed for Pain for up to 21 days. Intended supply: 7 days.  Take lowest dose possible to manage pain     Tylenol PM Extra Strength  MG tablet  Generic drug:  diphenhydrAMINE-APAP (sleep)          Activity: activity as tolerated and no lifting, Driving, or Strenuous exercise until seen by physician in office  Diet: cardiac diet  Wound Care: as directed    Follow-up as directed at discharge    Signed: Merritt Mireles    Time spent on discharge 35 minutes

## 2020-07-11 NOTE — FLOWSHEET NOTE
Assessments per flowsheets. No neuro deficits. Left groin dressing with small amount of drainage noted. Pulses now palpable. Will continue to monitor closely.     Panchito Talbot RN

## 2020-07-11 NOTE — PLAN OF CARE
Problem: Falls - Risk of:  Goal: Will remain free from falls  Description: Will remain free from falls  7/11/2020 0941 by Trice Chase RN  Outcome: Ongoing  7/11/2020 0328 by Andrés Recinos RN  Outcome: Ongoing  Goal: Absence of physical injury  Description: Absence of physical injury  7/11/2020 0941 by Trice Chase RN  Outcome: Ongoing  7/11/2020 0328 by Andrés Recinos RN  Outcome: Ongoing     Problem: Discharge Planning:  Goal: Participates in care planning  Description: Participates in care planning  7/11/2020 0941 by Trice Chase RN  Outcome: Ongoing  7/11/2020 0328 by Andrés Recinos RN  Outcome: Ongoing  Goal: Discharged to appropriate level of care  Description: Discharged to appropriate level of care  7/11/2020 0941 by Trice Chase RN  Outcome: Ongoing  7/11/2020 0328 by Andrés Recinos RN  Outcome: Ongoing     Problem: Airway Clearance - Ineffective:  Goal: Ability to maintain a clear airway will improve  Description: Ability to maintain a clear airway will improve  7/11/2020 0941 by Trice Chase RN  Outcome: Ongoing  7/11/2020 0328 by Andrés Recinos RN  Outcome: Ongoing     Problem: Anxiety/Stress:  Goal: Level of anxiety will decrease  Description: Level of anxiety will decrease  7/11/2020 0941 by Trice Chase RN  Outcome: Ongoing  7/11/2020 0328 by Andrés Recinos RN  Outcome: Ongoing     Problem: Aspiration:  Goal: Absence of aspiration  Description: Absence of aspiration  7/11/2020 0941 by Trice Chase RN  Outcome: Ongoing  7/11/2020 0328 by Andrés Recinos RN  Outcome: Ongoing     Problem:  Bowel Function - Altered:  Goal: Bowel elimination is within specified parameters  Description: Bowel elimination is within specified parameters  7/11/2020 0941 by Trice Chase RN  Outcome: Ongoing  7/11/2020 0328 by Andrés Recinos RN  Outcome: Ongoing     Problem: Cardiac Output - Decreased:  Goal: Hemodynamic stability will improve  Description: Hemodynamic Control of chronic pain  7/11/2020 0941 by Willeen Schilder, RN  Outcome: Ongoing  7/11/2020 0328 by Tyshawn Rice RN  Outcome: Ongoing     Problem: Serum Glucose Level - Abnormal:  Goal: Ability to maintain appropriate glucose levels will improve to within specified parameters  Description: Ability to maintain appropriate glucose levels will improve to within specified parameters  7/11/2020 0941 by Willeen Schilder, RN  Outcome: Ongoing  7/11/2020 0328 by Tyshawn Rice RN  Outcome: Ongoing     Problem: Skin Integrity - Impaired:  Goal: Will show no infection signs and symptoms  Description: Will show no infection signs and symptoms  7/11/2020 0941 by Willeen Schilder, RN  Outcome: Ongoing  7/11/2020 0328 by Tyshawn Rice RN  Outcome: Ongoing  Goal: Absence of new skin breakdown  Description: Absence of new skin breakdown  7/11/2020 0941 by Willeen Schilder, RN  Outcome: Ongoing  7/11/2020 0328 by Tyshawn Rice RN  Outcome: Ongoing     Problem: Sleep Pattern Disturbance:  Goal: Appears well-rested  Description: Appears well-rested  7/11/2020 0941 by Willeen Schilder, RN  Outcome: Ongoing  7/11/2020 0328 by Tyshawn Rice RN  Outcome: Ongoing     Problem: Tissue Perfusion, Altered:  Goal: Circulatory function within specified parameters  Description: Circulatory function within specified parameters  7/11/2020 0941 by Willeen Schilder, RN  Outcome: Ongoing  7/11/2020 0328 by Tyshawn Rice RN  Outcome: Ongoing     Problem: Tissue Perfusion - Cardiopulmonary, Altered:  Goal: Absence of angina  Description: Absence of angina  7/11/2020 0941 by Willeen Schilder, RN  Outcome: Ongoing  7/11/2020 0328 by Tyshawn Rice RN  Outcome: Ongoing  Goal: Hemodynamic stability will improve  Description: Hemodynamic stability will improve  7/11/2020 0941 by Willeen Schilder, RN  Outcome: Ongoing  7/11/2020 0328 by Tyshawn Rice RN  Outcome: Ongoing     Problem: Pain:  Description: Pain management should include both nonpharmacologic and pharmacologic interventions.   Goal: Pain level will decrease  Description: Pain level will decrease  7/11/2020 0941 by Zohra Davila RN  Outcome: Ongoing  7/11/2020 0328 by Rudolph Richards RN  Outcome: Ongoing  Goal: Control of acute pain  Description: Control of acute pain  7/11/2020 0941 by Zohra Davila RN  Outcome: Ongoing  7/11/2020 0328 by Rudolph Richards RN  Outcome: Ongoing  Goal: Control of chronic pain  Description: Control of chronic pain  7/11/2020 0941 by Zohra Davila RN  Outcome: Ongoing  7/11/2020 0328 by Rudolph Richards RN  Outcome: Ongoing

## 2020-07-11 NOTE — PLAN OF CARE
Problem: Falls - Risk of:  Goal: Will remain free from falls  Description: Will remain free from falls  7/11/2020 0328 by Sheng Roth RN  Outcome: Ongoing  7/10/2020 1748 by Anisha Eckert RN  Outcome: Ongoing  Goal: Absence of physical injury  Description: Absence of physical injury  7/11/2020 0328 by Sheng Roth RN  Outcome: Ongoing  7/10/2020 1748 by Anisha Eckert RN  Outcome: Ongoing     Problem: Discharge Planning:  Goal: Participates in care planning  Description: Participates in care planning  7/11/2020 0328 by Sheng Roth RN  Outcome: Ongoing  7/10/2020 1748 by Anisha Eckert RN  Outcome: Ongoing  Goal: Discharged to appropriate level of care  Description: Discharged to appropriate level of care  7/11/2020 0328 by Sheng Roth RN  Outcome: Ongoing  7/10/2020 1748 by Anisha Eckert RN  Outcome: Ongoing     Problem: Airway Clearance - Ineffective:  Goal: Ability to maintain a clear airway will improve  Description: Ability to maintain a clear airway will improve  7/11/2020 0328 by Sheng Roth RN  Outcome: Ongoing  7/10/2020 1748 by Anisha Eckert RN  Outcome: Ongoing     Problem: Anxiety/Stress:  Goal: Level of anxiety will decrease  Description: Level of anxiety will decrease  7/11/2020 0328 by Sheng Roth RN  Outcome: Ongoing  7/10/2020 1748 by Anisha Eckert RN  Outcome: Ongoing     Problem: Aspiration:  Goal: Absence of aspiration  Description: Absence of aspiration  7/11/2020 0328 by Sheng Roth RN  Outcome: Ongoing  7/10/2020 1748 by Anisha Eckert RN  Outcome: Ongoing     Problem:  Bowel Function - Altered:  Goal: Bowel elimination is within specified parameters  Description: Bowel elimination is within specified parameters  7/11/2020 0328 by Sheng Roth RN  Outcome: Ongoing  7/10/2020 1748 by Anisha Eckert RN  Outcome: Ongoing     Problem: Cardiac Output - Decreased:  Goal: Hemodynamic stability will improve  Description: Hemodynamic stability will improve  7/11/2020 0328 by Timi Norwood RN  Outcome: Ongoing  7/10/2020 1748 by Scott Munson RN  Outcome: Ongoing     Problem: Fluid Volume - Imbalance:  Goal: Absence of imbalanced fluid volume signs and symptoms  Description: Absence of imbalanced fluid volume signs and symptoms  7/11/2020 0328 by Timi Norwood RN  Outcome: Ongoing  7/10/2020 1748 by Scott Munson RN  Outcome: Ongoing     Problem: Gas Exchange - Impaired:  Goal: Levels of oxygenation will improve  Description: Levels of oxygenation will improve  7/11/2020 0328 by Timi Norwood RN  Outcome: Ongoing  7/10/2020 1748 by Scott Munson RN  Outcome: Ongoing     Problem: Mental Status - Impaired:  Goal: Mental status will be restored to baseline  Description: Mental status will be restored to baseline  7/11/2020 0328 by Timi Norwood RN  Outcome: Ongoing  7/10/2020 1748 by Scott Munson RN  Outcome: Ongoing     Problem: Nutrition Deficit:  Goal: Ability to achieve adequate nutritional intake will improve  Description: Ability to achieve adequate nutritional intake will improve  7/11/2020 0328 by Timi Norwood RN  Outcome: Ongoing  7/10/2020 1748 by Scott Munson RN  Outcome: Ongoing     Problem: Pain:  Description: Pain management should include both nonpharmacologic and pharmacologic interventions.   Goal: Pain level will decrease  Description: Pain level will decrease  7/11/2020 0328 by Timi Norwood RN  Outcome: Ongoing  7/10/2020 1748 by Scott Munson RN  Outcome: Ongoing  Goal: Recognizes and communicates pain  Description: Recognizes and communicates pain  7/11/2020 0328 by Timi Norwood RN  Outcome: Ongoing  7/10/2020 1748 by Scott Munson RN  Outcome: Ongoing  Goal: Control of acute pain  Description: Control of acute pain  7/11/2020 0328 by Timi Norwood RN  Outcome: Ongoing  7/10/2020 1748 by Scott Munson RN  Outcome: Ongoing  Goal: Control of chronic pain  Description: Control of chronic pain  7/11/2020 0328 by Radha Garcia RN  Outcome: Ongoing  7/10/2020 1748 by David Diaz RN  Outcome: Ongoing     Problem: Serum Glucose Level - Abnormal:  Goal: Ability to maintain appropriate glucose levels will improve to within specified parameters  Description: Ability to maintain appropriate glucose levels will improve to within specified parameters  7/11/2020 0328 by Radha Garcia RN  Outcome: Ongoing  7/10/2020 1748 by David Diaz RN  Outcome: Ongoing     Problem: Skin Integrity - Impaired:  Goal: Will show no infection signs and symptoms  Description: Will show no infection signs and symptoms  7/11/2020 0328 by Radha Garcia RN  Outcome: Ongoing  7/10/2020 1748 by David Diaz RN  Outcome: Ongoing  Goal: Absence of new skin breakdown  Description: Absence of new skin breakdown  7/11/2020 0328 by Radha Garcia RN  Outcome: Ongoing  7/10/2020 1748 by David Diaz RN  Outcome: Ongoing     Problem: Sleep Pattern Disturbance:  Goal: Appears well-rested  Description: Appears well-rested  7/11/2020 0328 by Radha Garcia RN  Outcome: Ongoing  7/10/2020 1748 by David Diaz RN  Outcome: Ongoing     Problem: Tissue Perfusion, Altered:  Goal: Circulatory function within specified parameters  Description: Circulatory function within specified parameters  7/11/2020 0328 by Radha Garcia RN  Outcome: Ongoing  7/10/2020 1748 by David Diaz RN  Outcome: Ongoing     Problem: Tissue Perfusion - Cardiopulmonary, Altered:  Goal: Absence of angina  Description: Absence of angina  7/11/2020 0328 by Radha Garcia RN  Outcome: Ongoing  7/10/2020 1748 by David Diaz RN  Outcome: Ongoing  Goal: Hemodynamic stability will improve  Description: Hemodynamic stability will improve  7/11/2020 0328 by Radha Garcia RN  Outcome: Ongoing  7/10/2020 1748 by Maris Santiago RN  Outcome: Ongoing     Problem: Pain:  Description: Pain management should include both nonpharmacologic and pharmacologic interventions.   Goal: Pain level will decrease  Description: Pain level will decrease  7/11/2020 0328 by Zeeshan Robertson RN  Outcome: Ongoing  7/10/2020 1748 by Maris Santiago RN  Outcome: Ongoing  Goal: Control of acute pain  Description: Control of acute pain  Outcome: Ongoing  Goal: Control of chronic pain  Description: Control of chronic pain  Outcome: Ongoing

## 2020-07-11 NOTE — FLOWSHEET NOTE
Discharge instructions reviewed. AVS signed per patient and RN, discharge teaching done, verbalized understanding. Questions answered. Belongings gathered and checked with admission list. Bilateral peripheral saline locks removed. Site WNL. Patient discharged to home. Taken to lobby via wheelchair as daughter in law driving pt home, he states his truck is in the parking lot, but he is advised he is not to drive for one week, and he verbalized understanding.      Giovanna Vargas RN 12:07 PM

## 2020-07-12 LAB
ABO/RH: NORMAL
ANTIBODY SCREEN: NEGATIVE
COMMENT: NORMAL
COMPONENT: NORMAL
COMPONENT: NORMAL
CROSSMATCH RESULT: NORMAL
CROSSMATCH RESULT: NORMAL
STATUS: NORMAL
STATUS: NORMAL
TRANSFUSION STATUS: NORMAL
TRANSFUSION STATUS: NORMAL
UNIT DIVISION: 0
UNIT DIVISION: 0
UNIT NUMBER: NORMAL
UNIT NUMBER: NORMAL

## 2020-07-13 ENCOUNTER — TELEPHONE (OUTPATIENT)
Dept: FAMILY MEDICINE CLINIC | Age: 75
End: 2020-07-13

## 2020-07-13 NOTE — TELEPHONE ENCOUNTER
[facility] Initial Follow Up Call    Call within 2 business days of discharge: Yes     Patient: Mayelin Clancy Patient : 1945 MRN: [id number]    [unfilled]    RARS: Readmission Risk Score: 11       Spoke with: pt    Discharge department/facility: [facility]    Non-face-to-face services provided:  Scheduled appointment with PCP-romeo romano    Reviewed med list    Follow Up  Future Appointments   Date Time Provider Cristian Oakes   2020  1:15 PM Claudene Simmonds [facility]   2020  1:30 PM Andreia Arcos MD [facility]  [facility]   2020  1:15 PM Paris Quintanilla MD [facility]       Carolynn Irby LPN

## 2020-07-14 ENCOUNTER — VIRTUAL VISIT (OUTPATIENT)
Dept: FAMILY MEDICINE CLINIC | Age: 75
End: 2020-07-14
Payer: MEDICARE

## 2020-07-14 PROCEDURE — 1111F DSCHRG MED/CURRENT MED MERGE: CPT | Performed by: PHYSICIAN ASSISTANT

## 2020-07-14 PROCEDURE — 99442 PR PHYS/QHP TELEPHONE EVALUATION 11-20 MIN: CPT | Performed by: PHYSICIAN ASSISTANT

## 2020-07-14 NOTE — PROGRESS NOTES
Post-Discharge Transitional Care Management Services or Hospital Follow Up    Patient is a 69 y.o. male evaluated via telephone on 7/14/2020. Consent:  He and/or health care decision maker is aware that that he may receive a bill for this telephone service, depending on his insurance coverage, and has provided verbal consent to proceed: Yes    Mari Hale   YOB: 1945    Date of Office Visit:  7/14/2020  Date of Hospital Admission: 7/10/20  Date of Hospital Discharge: 7/11/20  Risk of hospital readmission (high >=14%.  Medium >=10%) :Readmission Risk Score: 11      Care management risk score Rising risk (score 2-5) and Complex Care (Scores >=6): 1     Non face to face  following discharge, date last encounter closed (first attempt may have been earlier): 7/13/2020  1:50 PM    Call initiated 2 business days of discharge: Yes    Patient Active Problem List   Diagnosis    Abdominal aortic aneurysm (AAA) without rupture ([address]    Tobacco abuse    Essential hypertension    Smoker    Non-alcoholic fatty liver disease    Basal cell carcinoma of nose    Type 2 diabetes mellitus without complication, without long-term current use of insulin ([address]    Other hyperlipidemia    Nonrheumatic aortic valve stenosis    CAD in native artery    Iliac artery aneurysm, right ([address]    Other constipation    Spondylosis of lumbosacral region    Status post AAA (abdominal aortic aneurysm) repair    AAA (abdominal aortic aneurysm) (HCC)       Allergies   Allergen Reactions    Metformin And Related Diarrhea     Severe diarrhea, abd pain, and nausea    Amoxicillin Diarrhea    Other Nausea And Vomiting     ANTI-INFLAMMATORIES  Severe stomach pain--diarrhea  hypertension       Medications listed as ordered at the time of discharge from hospital   Isra Mejia Medication Instructions MISHA:    Printed on:07/14/20 1800   Medication Information                      amiodarone (CORDARONE) 200 MG tablet  Take 1 tablet by mouth 2 times daily             aspirin 81 MG tablet  Take by mouth             atorvastatin (LIPITOR) 40 MG tablet  Take 1 tablet by mouth daily             blood glucose monitor kit and supplies  Test 3 times a day & as needed for symptoms of irregular blood glucose. carvedilol (COREG) 3.125 MG tablet  Take 1 tablet by mouth 2 times daily             diphenhydrAMINE-APAP, sleep, (TYLENOL PM EXTRA STRENGTH)  MG tablet  Take 2 tablets by mouth nightly as needed for Sleep             docusate sodium (COLACE) 100 MG capsule  Take 1 capsule by mouth daily as needed for Constipation             furosemide (LASIX) 20 MG tablet  Take 1 tablet by mouth 2 times daily             glipiZIDE (GLUCOTROL) 5 MG tablet  Take 1 tablet by mouth 2 times daily (before meals)             polyethylene glycol (GLYCOLAX) 17 GM/SCOOP powder  Take 17 g by mouth daily                   Medications marked \"taking\" at this time  No outpatient medications have been marked as taking for the 7/14/20 encounter (Virtual Visit) with Octavio Lagos PA-C. Medications patient taking as of now reconciled against medications ordered at time of hospital discharge: Yes    Chief Complaint   Patient presents with   [address] from Hospital       History of Present illness - Follow up of Hospital diagnosis(es):   Patient was admitted for AAA repair. Surgery went fairly well, it sounds like they had complication with access on 1 of the sides of his femoral artery because patient has a bunch of ecchymosis around that area. He has seems to be recovering well, denies any chest pain, shortness of breath, peripheral edema, lightheadedness, dizziness, headaches. He is compliant with his medications but is complaining that the Lasix is making him bathroom a lot more than he was before. He is not having any dysuria or difficulty urinating. He is mostly bothered by getting up at night to urinate.     Inpatient course: Discharge summary reviewed- see chart. Patient was held for observation after surgery and then discharged in stable condition. He has follow-up with cardiovascular surgery on 7/22/2020. Interval history/Current status: stable    A comprehensive review of systems was negative except for what was noted in the HPI. There were no vitals filed for this visit. There is no height or weight on file to calculate BMI. Wt Readings from Last 3 Encounters:   07/11/20 163 lb 2.3 oz (74 kg)   07/06/20 158 lb (71.7 kg)   06/24/20 160 lb (72.6 kg)     BP Readings from Last 3 Encounters:   07/11/20 (!) 155/60   07/10/20 111/60   07/06/20 (!) 170/94        Physical Exam:  None performed due to visit being virtual telephone conference. Assessment/Plan:  1. Abdominal aortic aneurysm (AAA) without rupture ([address]  2. S/P AAA (abdominal aortic aneurysm) repair  Overall recovering well. Will be following up with Dr. Theresa Horne, his cardiovascular surgeon, on 7/22/2020. He is to make sure they look at the bruised area around his femoral artery from 1 of their access points. He will need his blood pressure rechecked at that visit as well. Otherwise he is recovering well and does not have any acute or concerning symptoms. 3. Urinary frequency  Discussed with patient that the Lasix will cause more frequent urination. I encouraged him to take his second dose earlier in the day to help avoid nocturia. If symptoms persist patient to follow-up for UA. Medical Decision Making: low complexity      I affirm this is a Patient Initiated Episode with an Established Patient who has not had a related appointment within my department in the past 7 days or scheduled within the next 24 hours.     Total Time: minutes: 11-20 minutes    Note: not billable if this call serves to triage the patient into an appointment for the relevant concern

## 2020-07-16 RX ORDER — ATORVASTATIN CALCIUM 40 MG/1
40 TABLET, FILM COATED ORAL DAILY
Qty: 90 TABLET | Refills: 0 | Status: SHIPPED | OUTPATIENT
Start: 2020-07-16 | End: 2020-10-14 | Stop reason: SDUPTHER

## 2020-07-17 ENCOUNTER — TELEPHONE (OUTPATIENT)
Dept: FAMILY MEDICINE CLINIC | Age: 75
End: 2020-07-17
Payer: MEDICARE

## 2020-07-17 PROCEDURE — 81002 URINALYSIS NONAUTO W/O SCOPE: CPT | Performed by: PHYSICIAN ASSISTANT

## 2020-07-17 NOTE — TELEPHONE ENCOUNTER
To Kelsey--    Wants to discuss an issue with the water pill he is on----he needs some advice. He cannot take the Furosemide because he said he can't even walk out to the mailbox without having to use the bathroom-----said if he wants to go anywhere at all, he cannot take the Furosemide for that day. Please advise him.

## 2020-07-22 LAB
BILIRUBIN, POC: NORMAL
BLOOD URINE, POC: NORMAL
CLARITY, POC: NORMAL
COLOR, POC: YELLOW
GLUCOSE URINE, POC: NORMAL
KETONES, POC: NORMAL
LEUKOCYTE EST, POC: NORMAL
NITRITE, POC: NORMAL
PH, POC: 5.5
PROTEIN, POC: NORMAL
SPECIFIC GRAVITY, POC: 1.01
UROBILINOGEN, POC: 0.2

## 2020-07-25 LAB — URINE CULTURE, ROUTINE: NORMAL

## 2020-07-28 ENCOUNTER — TELEPHONE (OUTPATIENT)
Dept: FAMILY MEDICINE CLINIC | Age: 75
End: 2020-07-28

## 2020-07-28 NOTE — TELEPHONE ENCOUNTER
Called pt back to let him know to continue to stay off the lasix and to observe his symptoms. Patient verbalized understanding.

## 2020-07-31 ENCOUNTER — OFFICE VISIT (OUTPATIENT)
Dept: CARDIOLOGY CLINIC | Age: 75
End: 2020-07-31
Payer: MEDICARE

## 2020-07-31 VITALS
DIASTOLIC BLOOD PRESSURE: 60 MMHG | SYSTOLIC BLOOD PRESSURE: 110 MMHG | HEIGHT: 66 IN | HEART RATE: 60 BPM | BODY MASS INDEX: 25.71 KG/M2 | WEIGHT: 160 LBS

## 2020-07-31 PROCEDURE — 4040F PNEUMOC VAC/ADMIN/RCVD: CPT | Performed by: NURSE PRACTITIONER

## 2020-07-31 PROCEDURE — 1111F DSCHRG MED/CURRENT MED MERGE: CPT | Performed by: NURSE PRACTITIONER

## 2020-07-31 PROCEDURE — 93000 ELECTROCARDIOGRAM COMPLETE: CPT | Performed by: NURSE PRACTITIONER

## 2020-07-31 PROCEDURE — 4004F PT TOBACCO SCREEN RCVD TLK: CPT | Performed by: NURSE PRACTITIONER

## 2020-07-31 PROCEDURE — 99212 OFFICE O/P EST SF 10 MIN: CPT | Performed by: NURSE PRACTITIONER

## 2020-07-31 PROCEDURE — 1123F ACP DISCUSS/DSCN MKR DOCD: CPT | Performed by: NURSE PRACTITIONER

## 2020-07-31 PROCEDURE — G8427 DOCREV CUR MEDS BY ELIG CLIN: HCPCS | Performed by: NURSE PRACTITIONER

## 2020-07-31 PROCEDURE — G8417 CALC BMI ABV UP PARAM F/U: HCPCS | Performed by: NURSE PRACTITIONER

## 2020-07-31 PROCEDURE — 3017F COLORECTAL CA SCREEN DOC REV: CPT | Performed by: NURSE PRACTITIONER

## 2020-07-31 ASSESSMENT — ENCOUNTER SYMPTOMS
NAUSEA: 0
SORE THROAT: 0
ABDOMINAL PAIN: 0
BACK PAIN: 0
SINUS PRESSURE: 0
SHORTNESS OF BREATH: 0
CHEST TIGHTNESS: 0
ABDOMINAL DISTENTION: 0
COLOR CHANGE: 0
EYE REDNESS: 0
BLOOD IN STOOL: 0
DIARRHEA: 0
SINUS PAIN: 0
EYE ITCHING: 0
VOMITING: 0

## 2020-07-31 NOTE — PROGRESS NOTES
Constipation 30 capsule 0    amiodarone (CORDARONE) 200 MG tablet Take 1 tablet by mouth 2 times daily 60 tablet 1    carvedilol (COREG) 3.125 MG tablet Take 1 tablet by mouth 2 times daily 180 tablet 1    glipiZIDE (GLUCOTROL) 5 MG tablet Take 1 tablet by mouth 2 times daily (before meals) 180 tablet 1    furosemide (LASIX) 20 MG tablet Take 1 tablet by mouth 2 times daily 60 tablet 3    blood glucose monitor kit and supplies Test 3 times a day & as needed for symptoms of irregular blood glucose. 1 kit 0    aspirin 81 MG tablet Take by mouth       No current facility-administered medications for this visit. Allergies: Metformin and related;  Amoxicillin; and Other  Past Medical History:   Diagnosis Date    AAA (abdominal aortic aneurysm) ([facility] 2018    Infrarenal    Basal cell carcinoma of nose 2016    Dr Monica Macedo CAD (coronary artery disease)     Dr. Luoie Holloway - severe aortic stenosis with a bicuspid aortic valve    Constipation     Essential hypertension     History of alcohol abuse     Hyperlipidemia     Hypertension     Follows with PCP    Missing teeth, acquired     Non-alcoholic fatty liver disease     Osteoarthritis     Knees, lower back, shoulders,    Spinal stenosis     had epidural steroid injection 1/8/2020 - lumbar    Tremor     Left arm only    Type 2 diabetes mellitus without complication ([facility]     Controlling with diet    Wears glasses      Past Surgical History:   Procedure Laterality Date    CABG WITH AORTIC VALVE REPLACEMENT N/A 3/4/2020    CABG CORONARY ARTERY BYPASS x1 SALEEM TO LAD, AORTIC VALVE REPACEMENT performed by Kim Duvall MD at [address]  02/26/2020    critical LAD lesion and moderate to severe right coronary artery lesion    CARPAL TUNNEL RELEASE Right 1970's    KNEE SURGERY  2015    quad injury from a fall    OTHER SURGICAL HISTORY  12/09/2016    excision of basal cell carcinoma of nose with complex repair    OTHER SURGICAL HISTORY  01/2020    epidual steroid injections L3-L4    SINUS SURGERY  1970's    x 2    WISDOM TOOTH EXTRACTION       Family History   Problem Relation Age of Onset    Lung Cancer Mother     Heart Disease Father     High Blood Pressure Father     High Cholesterol Father     Lung Cancer Father      Social History     Tobacco Use    Smoking status: Current Every Day Smoker     Packs/day: 1.00     Years: 58.00     Pack years: 58.00     Types: Cigarettes     Start date: 1962    Smokeless tobacco: Never Used   Substance Use Topics    Alcohol use: Yes     Comment: 1 glass wine a month        Review of Systems   Constitutional: Negative for activity change, appetite change and fatigue. HENT: Negative for congestion, sinus pressure, sinus pain and sore throat. Eyes: Negative for redness and itching. Respiratory: Negative for chest tightness and shortness of breath. Cardiovascular: Negative for chest pain, palpitations and leg swelling. Gastrointestinal: Negative for abdominal distention, abdominal pain, blood in stool, diarrhea, nausea and vomiting. Genitourinary: Positive for frequency. Negative for difficulty urinating and dysuria. Musculoskeletal: Positive for arthralgias. Negative for back pain and gait problem. Skin: Negative for color change, pallor, rash and wound. Neurological: Negative for dizziness, syncope and light-headedness. Psychiatric/Behavioral: Negative for confusion. The patient is not nervous/anxious. Objective:      Physical Exam:  /60   Pulse 60   Ht 5' 6\" (1.676 m)   Wt 160 lb (72.6 kg)   BMI 25.82 kg/m²   Wt Readings from Last 3 Encounters:   07/31/20 160 lb (72.6 kg)   07/22/20 162 lb (73.5 kg)   07/11/20 163 lb 2.3 oz (74 kg)     Body mass index is 25.82 kg/m². Physical Exam  Constitutional:       Appearance: Normal appearance. He is not ill-appearing or diaphoretic. HENT:      Head: Normocephalic and atraumatic.       Right Ear: External ear normal.      Left Ear: External ear normal.      Nose: No congestion or rhinorrhea. Mouth/Throat:      Mouth: Mucous membranes are moist.      Pharynx: Oropharynx is clear. No oropharyngeal exudate or posterior oropharyngeal erythema. Eyes:      General:         Right eye: No discharge. Left eye: No discharge. Conjunctiva/sclera: Conjunctivae normal.      Pupils: Pupils are equal, round, and reactive to light. Neck:      Musculoskeletal: Neck supple. No muscular tenderness. Vascular: No carotid bruit. Cardiovascular:      Rate and Rhythm: Normal rate and regular rhythm. Pulses: Normal pulses. Heart sounds: Normal heart sounds. No murmur. No friction rub. No gallop. Pulmonary:      Effort: Pulmonary effort is normal.      Breath sounds: Normal breath sounds. No stridor. No wheezing, rhonchi or rales. Abdominal:      General: Abdomen is flat. Bowel sounds are normal.      Palpations: There is no mass. Tenderness: There is no abdominal tenderness. Musculoskeletal:      Right lower leg: No edema. Left lower leg: No edema. Skin:     General: Skin is warm and dry. Capillary Refill: Capillary refill takes less than 2 seconds. Neurological:      Mental Status: He is alert and oriented to person, place, and time. Psychiatric:         Mood and Affect: Mood normal.         Behavior: Behavior normal.         Thought Content: Thought content normal.         Judgment: Judgment normal.         DATA:    Lab Results   Component Value Date    LABA1C 6.1 06/22/2020    LABA1C 8.6 02/11/2020     Lab Results   Component Value Date    CHOL 146 03/17/2020    TRIG 198 (H) 03/17/2020    HDL 30 (L) 03/17/2020    LDLCALC 76 03/17/2020     Lab Results   Component Value Date    ALT 24 03/17/2020    AST 18 03/17/2020     TSH:    Lab Results   Component Value Date    TSH 6.02 03/17/2020         Assessment/ Plan:    Patient seen, interviewed and examined.  Testing was reviewed. Fluid volume overload  Congestive heart failure with preserved EF    Patient is here today to discuss frequent urination on Lasix  He states that he is constantly going to the bathroom  He is not sleeping at night because he is up urinating  Patient states he does not want to take Lasix anymore  I explained to him that he needed the Lasix to keep the fluid off of him as he admitted that he stopped taking Lasix for several days and noticed an increase in weight. I gave patient 2 options as he was stated adamantly  that he did not want to take lasix anymore    He could take 40 mg of Lasix in the morning or he could take Lasix 20 mg daily and then Lasix 20 mg as needed when noted increased weight gain  Patient reports that he will take Lasix 20 mg daily and then take it as needed when noted weight gain greater than 2 to 3 pounds in 1 to 2 days  Also discussed with patient importance of fluid restriction of less than 64 ounces a day    Patient needs office visit with Dr. Sierra Betts in 1 month      Patient is encouraged to exercise even a brisk walk for 30 minutes at least 3 to 4 times a week. Lifestyle and risk factor modificatons discussed. Various goals are discussed and questions answered. Continue current medications. Appropriate prescriptions are addressed. Questions answered and patient verbalizes understanding. Call for any problems, questions, or concerns.

## 2020-07-31 NOTE — LETTER
Yusra Clarke  1945  [id number]    Have you had any Chest Pain - No      Have you had any Shortness of Breath - No      Have you had any dizziness - No      Have you had any palpitations - No      Is the patient on any of the following medications -   If Yes DO EKG    Do you have any edema - no    Do you have a surgery or procedure scheduled in the near future - No      Ask patient if they want to [facility] if they are not already signed up    Check to see if we have an E-MAIL on file for the patient    Check medication list thoroughly!!!  BE SURE TO ASK PATIENT IF THEY NEED MEDICATION REFILLS

## 2020-08-07 RX ORDER — CALCIUM CITRATE/VITAMIN D3 200MG-6.25
1 TABLET ORAL DAILY
Qty: 100 EACH | Refills: 5 | Status: SHIPPED | OUTPATIENT
Start: 2020-08-07 | End: 2021-05-05 | Stop reason: SDUPTHER

## 2020-08-10 ENCOUNTER — HOSPITAL ENCOUNTER (OUTPATIENT)
Dept: CT IMAGING | Age: 75
Discharge: HOME | End: 2020-08-10
Payer: MEDICARE

## 2020-08-10 PROCEDURE — 74174 CTA ABD&PLVS W/CONTRAST: CPT

## 2020-08-10 PROCEDURE — 6360000004 HC RX CONTRAST MEDICATION: Performed by: SURGERY

## 2020-08-10 RX ORDER — AMIODARONE HYDROCHLORIDE 200 MG/1
200 TABLET ORAL 2 TIMES DAILY
Qty: 60 TABLET | Refills: 1 | Status: SHIPPED | OUTPATIENT
Start: 2020-08-10 | End: 2020-10-09 | Stop reason: SDUPTHER

## 2020-08-10 RX ADMIN — IOPAMIDOL 100 ML: 755 INJECTION, SOLUTION INTRAVENOUS at 09:56

## 2020-08-12 PROBLEM — K76.89 LIVER NODULE: Status: ACTIVE | Noted: 2020-08-12

## 2020-08-14 ENCOUNTER — OFFICE VISIT (OUTPATIENT)
Dept: FAMILY MEDICINE CLINIC | Age: 75
End: 2020-08-14
Payer: MEDICARE

## 2020-08-14 VITALS
SYSTOLIC BLOOD PRESSURE: 144 MMHG | DIASTOLIC BLOOD PRESSURE: 70 MMHG | HEART RATE: 60 BPM | HEIGHT: 66 IN | OXYGEN SATURATION: 98 % | WEIGHT: 168.2 LBS | TEMPERATURE: 98.1 F | BODY MASS INDEX: 27.03 KG/M2

## 2020-08-14 DIAGNOSIS — E11.9 TYPE 2 DIABETES MELLITUS WITHOUT COMPLICATION, WITHOUT LONG-TERM CURRENT USE OF INSULIN (HCC): ICD-10-CM

## 2020-08-14 DIAGNOSIS — I10 ESSENTIAL HYPERTENSION: ICD-10-CM

## 2020-08-14 PROBLEM — D69.6 THROMBOCYTOPENIA (HCC): Status: ACTIVE | Noted: 2020-08-14

## 2020-08-14 LAB
A/G RATIO: 2 (ref 1.1–2.2)
ALBUMIN SERPL-MCNC: 3.9 G/DL (ref 3.4–5)
ALP BLD-CCNC: 106 U/L (ref 40–129)
ALT SERPL-CCNC: 35 U/L (ref 10–40)
ANION GAP SERPL CALCULATED.3IONS-SCNC: 12 MMOL/L (ref 3–16)
AST SERPL-CCNC: 20 U/L (ref 15–37)
BILIRUB SERPL-MCNC: 0.4 MG/DL (ref 0–1)
BUN BLDV-MCNC: 22 MG/DL (ref 7–20)
CALCIUM SERPL-MCNC: 9.2 MG/DL (ref 8.3–10.6)
CHLORIDE BLD-SCNC: 102 MMOL/L (ref 99–110)
CO2: 23 MMOL/L (ref 21–32)
CREAT SERPL-MCNC: 0.8 MG/DL (ref 0.8–1.3)
GFR AFRICAN AMERICAN: >60
GFR NON-AFRICAN AMERICAN: >60
GLOBULIN: 2 G/DL
GLUCOSE BLD-MCNC: 175 MG/DL (ref 70–99)
HCT VFR BLD CALC: 38.5 % (ref 40.5–52.5)
HEMOGLOBIN: 12.9 G/DL (ref 13.5–17.5)
MCH RBC QN AUTO: 30 PG (ref 26–34)
MCHC RBC AUTO-ENTMCNC: 33.4 G/DL (ref 31–36)
MCV RBC AUTO: 89.8 FL (ref 80–100)
PDW BLD-RTO: 18.7 % (ref 12.4–15.4)
PLATELET # BLD: 137 K/UL (ref 135–450)
PMV BLD AUTO: 8.9 FL (ref 5–10.5)
POTASSIUM SERPL-SCNC: 4.1 MMOL/L (ref 3.5–5.1)
RBC # BLD: 4.28 M/UL (ref 4.2–5.9)
SODIUM BLD-SCNC: 137 MMOL/L (ref 136–145)
TOTAL PROTEIN: 5.9 G/DL (ref 6.4–8.2)
WBC # BLD: 11 K/UL (ref 4–11)

## 2020-08-14 PROCEDURE — 4040F PNEUMOC VAC/ADMIN/RCVD: CPT | Performed by: FAMILY MEDICINE

## 2020-08-14 PROCEDURE — 3044F HG A1C LEVEL LT 7.0%: CPT | Performed by: FAMILY MEDICINE

## 2020-08-14 PROCEDURE — 2022F DILAT RTA XM EVC RTNOPTHY: CPT | Performed by: FAMILY MEDICINE

## 2020-08-14 PROCEDURE — 4004F PT TOBACCO SCREEN RCVD TLK: CPT | Performed by: FAMILY MEDICINE

## 2020-08-14 PROCEDURE — 3017F COLORECTAL CA SCREEN DOC REV: CPT | Performed by: FAMILY MEDICINE

## 2020-08-14 PROCEDURE — 1123F ACP DISCUSS/DSCN MKR DOCD: CPT | Performed by: FAMILY MEDICINE

## 2020-08-14 PROCEDURE — 99214 OFFICE O/P EST MOD 30 MIN: CPT | Performed by: FAMILY MEDICINE

## 2020-08-14 PROCEDURE — G8417 CALC BMI ABV UP PARAM F/U: HCPCS | Performed by: FAMILY MEDICINE

## 2020-08-14 PROCEDURE — G8427 DOCREV CUR MEDS BY ELIG CLIN: HCPCS | Performed by: FAMILY MEDICINE

## 2020-08-14 RX ORDER — GABAPENTIN 300 MG/1
300 CAPSULE ORAL 3 TIMES DAILY
COMMUNITY
End: 2020-10-09 | Stop reason: SDUPTHER

## 2020-08-14 ASSESSMENT — ENCOUNTER SYMPTOMS
ABDOMINAL PAIN: 0
VOMITING: 0
NAUSEA: 0
TROUBLE SWALLOWING: 0
BACK PAIN: 1
CHEST TIGHTNESS: 0
BLOOD IN STOOL: 0
EYE PAIN: 0
SHORTNESS OF BREATH: 0
DIARRHEA: 0
WHEEZING: 0

## 2020-08-14 NOTE — PROGRESS NOTES
Visual inspection: Feet are clean and nails are normal.  Deformity/amputation: absent  Skin lesions/pre-ulcerative calluses:absent absent  Edema: right- negative, left- negative    Sensory exam:  Monofilament sensation: normal  (minimum of 5 random plantar locations tested, avoiding callused areas - > 1 area with absence of sensation is + for neuropathy)    Plus at least one of the following:  Pulses: normal    Sheffield Zane

## 2020-08-14 NOTE — PROGRESS NOTES
Negative for environmental allergies. Neurological: Negative for dizziness, seizures, speech difficulty and weakness. Hematological: Does not bruise/bleed easily. Psychiatric/Behavioral: Negative for agitation, confusion, hallucinations and sleep disturbance. The patient is not nervous/anxious.         PAST MEDICAL HISTORY  Past Medical History:   Diagnosis Date    AAA [facility]abdominal [facility]) [facility] 2018    Infrarenal    Basal cell carcinoma of nose 2016    Dr Sam Rodriguez CAD (coronary artery disease)     Dr. Ivone Elise - severe aortic stenosis with a bicuspid aortic valve    Constipation     Essential hypertension     History of alcohol abuse     Hyperlipidemia     Hypertension     Follows with PCP    Missing teeth, acquired     Non-alcoholic fatty liver disease     Osteoarthritis     Knees, lower back, shoulders,    Spinal stenosis     had epidural steroid injection 1/8/2020 - lumbar    Tremor     Left arm only    Type 2 diabetes mellitus without complication [facility]     Controlling with diet    Wears glasses        FAMILY HISTORY  Family History   Problem Relation Age of Onset    Lung Cancer Mother     Heart Disease Father     High Blood Pressure Father     High Cholesterol Father     Lung Cancer Father        SOCIAL HISTORY  Social History     Socioeconomic History    Marital status:      Spouse name: None    Number of children: None    Years of education: None    Highest education level: None   Occupational History    None   Social Needs    Financial resource strain: None    Food insecurity     Worry: None     Inability: None    Transportation needs     Medical: None     Non-medical: None   Tobacco Use    Smoking status: Current Every Day Smoker     Packs/day: 1.00     Years: 58.00     Pack years: 58.00     Types: Cigarettes     Start date: 1962    Smokeless tobacco: Never Used   Substance and Sexual Activity    Alcohol use: Yes     Comment: 1 glass wine a month    Drug use: No    Sexual activity: None   Lifestyle    Physical activity     Days per week: None     Minutes per session: None    Stress: None   Relationships    Social connections     Talks on phone: None     Gets together: None     Attends [employer] service: None     Active member of club or organization: None     Attends meetings of clubs or organizations: None     Relationship status: None    Intimate partner violence     Fear of current or ex partner: None     Emotionally abused: None     Physically abused: None     Forced sexual activity: None   Other Topics Concern    None   Social History Narrative    None        SURGICAL HISTORY  Past Surgical History:   Procedure Laterality Date    CABG WITH AORTIC VALVE REPLACEMENT N/A 3/4/2020    CABG CORONARY ARTERY BYPASS x1 SALEEM TO LAD, AORTIC VALVE REPACEMENT performed by Irina Thomas MD at [address] [facility]  02/26/2020    critical LAD lesion and moderate to severe right coronary artery lesion    CARPAL TUNNEL RELEASE Right 1970's    KNEE SURGERY  2015    quad injury from a fall    OTHER SURGICAL HISTORY  12/09/2016    excision of basal cell carcinoma of nose with complex repair    OTHER SURGICAL HISTORY  01/2020    epidual steroid injections L3-L4    SINUS SURGERY  1970's    x 2    WISDOM TOOTH EXTRACTION                   CURRENT MEDICATIONS  Current Outpatient Medications   Medication Sig Dispense Refill    gabapentin (NEURONTIN) 300 MG capsule Take 300 mg by mouth 3 times daily.  Blood Pressure Monitoring (BLOOD PRESSURE KIT) NASH Use as directed 1 Device 0    amiodarone (CORDARONE) 200 MG tablet Take 1 tablet by mouth 2 times daily 60 tablet 1    blood glucose test strips (TRUE METRIX BLOOD GLUCOSE TEST) strip 1 each by In Vitro route daily As needed.  100 each 5    atorvastatin (LIPITOR) 40 MG tablet Take 1 tablet by mouth daily 90 tablet 0    polyethylene glycol (GLYCOLAX) 17 GM/SCOOP powder Take 17 g by mouth daily      docusate sodium (COLACE) 100 MG capsule Take 1 capsule by mouth daily as needed for Constipation 30 capsule 0    carvedilol (COREG) 3.125 MG tablet Take 1 tablet by mouth 2 times daily 180 tablet 1    glipiZIDE (GLUCOTROL) 5 MG tablet Take 1 tablet by mouth 2 times daily (before meals) 180 tablet 1    furosemide (LASIX) 20 MG tablet Take 1 tablet by mouth 2 times daily 60 tablet 3    blood glucose monitor kit and supplies Test 3 times a day & as needed for symptoms of irregular blood glucose. 1 kit 0    aspirin 81 MG tablet Take by mouth       No current facility-administered medications for this visit. ALLERGIES  Allergies   Allergen Reactions    Metformin And Related Diarrhea     Severe diarrhea, abd pain, and nausea    Amoxicillin Diarrhea    Other Nausea And Vomiting     ANTI-INFLAMMATORIES  Severe stomach pain--diarrhea  hypertension       PHYSICAL EXAM    BP (!) 144/70 (Site: Right Upper Arm, Position: Sitting, Cuff Size: Medium Adult)   Pulse 60   Temp 98.1 °F (36.7 °C) (Temporal)   Ht 5' 6\" (1.676 m)   Wt 168 lb 3.2 oz (76.3 kg)   SpO2 98%   BMI 27.15 kg/m²     Physical Exam  Vitals signs and nursing note reviewed. Constitutional:       Appearance: Normal appearance. He is well-developed. He is not ill-appearing. HENT:      Head: Normocephalic and atraumatic. Nose: Nose normal.      Mouth/Throat:      Mouth: Mucous membranes are moist.      Pharynx: Oropharynx is clear. No posterior oropharyngeal erythema. Eyes:      Pupils: Pupils are equal, round, and reactive to light. Neck:      Musculoskeletal: Normal range of motion and neck supple. Cardiovascular:      Rate and Rhythm: Normal rate and regular rhythm. Heart sounds: Normal heart sounds. Pulmonary:      Effort: Pulmonary effort is normal.      Breath sounds: Normal breath sounds. Abdominal:      Palpations: Abdomen is soft. Musculoskeletal: Normal range of motion.       Comments: Patient does have low back pain but is able to get in out of a chair and ambulate fairly normally. Skin:     General: Skin is warm and dry. Neurological:      General: No focal deficit present. Mental Status: He is alert and oriented to person, place, and time. Mental status is at baseline. Cranial Nerves: No cranial nerve deficit. Motor: No weakness. Psychiatric:         Mood and Affect: Mood normal.         Behavior: Behavior normal.         Thought Content: Thought content normal.         ASSESSMENT & PLAN     Diagnosis Orders   1. Type 2 diabetes mellitus without complication, without long-term current use of insulin ([facility])   DIABETES FOOT EXAM    HEMOGLOBIN A1C   2. Essential hypertension  COMPREHENSIVE METABOLIC PANEL    CBC   3. CAD in native artery     4. Osteoarthritis of spine with radiculopathy, lumbosacral region     5. Thrombocytopenia ([address]     Patient will have an A1c, CMP, and a CBC drawn today. He is to continue on same regimen and encouraged to be compliant with diet and exercise-specially watching his carbohydrates. Diabetic foot exam on chart, and we will need to keep an eye on his blood pressures. He is to continue with social distancing and healthy lifestyle. Please note review of PDMP has been appropriate. Return in about 3 months (around 11/14/2020).          Electronically signed by David Lott MD on 8/14/2020

## 2020-08-15 LAB
ESTIMATED AVERAGE GLUCOSE: 119.8 MG/DL
HBA1C MFR BLD: 5.8 %

## 2020-08-18 RX ORDER — FUROSEMIDE 20 MG/1
20 TABLET ORAL 2 TIMES DAILY
Qty: 60 TABLET | Refills: 2 | Status: SHIPPED | OUTPATIENT
Start: 2020-08-18 | End: 2021-04-05

## 2020-08-20 ENCOUNTER — APPOINTMENT (OUTPATIENT)
Dept: GENERAL RADIOLOGY | Age: 75
End: 2020-08-20
Payer: MEDICARE

## 2020-08-20 ENCOUNTER — HOSPITAL ENCOUNTER (EMERGENCY)
Age: 75
Discharge: LEFT BEFORE DISCHARGE | End: 2020-08-20
Attending: EMERGENCY MEDICINE
Payer: MEDICARE

## 2020-08-20 ENCOUNTER — APPOINTMENT (OUTPATIENT)
Dept: CT IMAGING | Age: 75
End: 2020-08-20
Payer: MEDICARE

## 2020-08-20 VITALS
HEIGHT: 66 IN | HEART RATE: 53 BPM | DIASTOLIC BLOOD PRESSURE: 80 MMHG | WEIGHT: 160 LBS | BODY MASS INDEX: 25.71 KG/M2 | OXYGEN SATURATION: 94 % | TEMPERATURE: 99 F | SYSTOLIC BLOOD PRESSURE: 184 MMHG | RESPIRATION RATE: 22 BRPM

## 2020-08-20 LAB
ALBUMIN SERPL-MCNC: 4.1 GM/DL (ref 3.4–5)
ALP BLD-CCNC: 99 IU/L (ref 40–129)
ALT SERPL-CCNC: 29 U/L (ref 10–40)
ANION GAP SERPL CALCULATED.3IONS-SCNC: 9 MMOL/L (ref 4–16)
AST SERPL-CCNC: 19 IU/L (ref 15–37)
BASOPHILS ABSOLUTE: 0 K/CU MM
BASOPHILS RELATIVE PERCENT: 0.1 % (ref 0–1)
BILIRUB SERPL-MCNC: 0.5 MG/DL (ref 0–1)
BUN BLDV-MCNC: 23 MG/DL (ref 6–23)
CALCIUM SERPL-MCNC: 9.8 MG/DL (ref 8.3–10.6)
CHLORIDE BLD-SCNC: 100 MMOL/L (ref 99–110)
CO2: 28 MMOL/L (ref 21–32)
CREAT SERPL-MCNC: 0.8 MG/DL (ref 0.9–1.3)
DIFFERENTIAL TYPE: ABNORMAL
EOSINOPHILS ABSOLUTE: 0 K/CU MM
EOSINOPHILS RELATIVE PERCENT: 0 % (ref 0–3)
GFR AFRICAN AMERICAN: >60 ML/MIN/1.73M2
GFR NON-AFRICAN AMERICAN: >60 ML/MIN/1.73M2
GLUCOSE BLD-MCNC: 128 MG/DL (ref 70–99)
HCT VFR BLD CALC: 44.3 % (ref 42–52)
HEMOGLOBIN: 14 GM/DL (ref 13.5–18)
IMMATURE NEUTROPHIL %: 2 % (ref 0–0.43)
LIPASE: 33 IU/L (ref 13–60)
LYMPHOCYTES ABSOLUTE: 0.9 K/CU MM
LYMPHOCYTES RELATIVE PERCENT: 7.4 % (ref 24–44)
MCH RBC QN AUTO: 29 PG (ref 27–31)
MCHC RBC AUTO-ENTMCNC: 31.6 % (ref 32–36)
MCV RBC AUTO: 91.7 FL (ref 78–100)
MONOCYTES ABSOLUTE: 1.3 K/CU MM
MONOCYTES RELATIVE PERCENT: 11.4 % (ref 0–4)
PDW BLD-RTO: 18 % (ref 11.7–14.9)
PLATELET # BLD: 156 K/CU MM (ref 140–440)
PMV BLD AUTO: 9.6 FL (ref 7.5–11.1)
POTASSIUM SERPL-SCNC: 4.2 MMOL/L (ref 3.5–5.1)
RBC # BLD: 4.83 M/CU MM (ref 4.6–6.2)
SEGMENTED NEUTROPHILS ABSOLUTE COUNT: 9.3 K/CU MM
SEGMENTED NEUTROPHILS RELATIVE PERCENT: 79.1 % (ref 36–66)
SODIUM BLD-SCNC: 137 MMOL/L (ref 135–145)
TOTAL IMMATURE NEUTOROPHIL: 0.23 K/CU MM
TOTAL PROTEIN: 6.7 GM/DL (ref 6.4–8.2)
TROPONIN T: <0.01 NG/ML
WBC # BLD: 11.7 K/CU MM (ref 4–10.5)

## 2020-08-20 PROCEDURE — 71045 X-RAY EXAM CHEST 1 VIEW: CPT

## 2020-08-20 PROCEDURE — 80053 COMPREHEN METABOLIC PANEL: CPT

## 2020-08-20 PROCEDURE — 93005 ELECTROCARDIOGRAM TRACING: CPT | Performed by: EMERGENCY MEDICINE

## 2020-08-20 PROCEDURE — 85025 COMPLETE CBC W/AUTO DIFF WBC: CPT

## 2020-08-20 PROCEDURE — 6370000000 HC RX 637 (ALT 250 FOR IP): Performed by: EMERGENCY MEDICINE

## 2020-08-20 PROCEDURE — 71275 CT ANGIOGRAPHY CHEST: CPT

## 2020-08-20 PROCEDURE — 83690 ASSAY OF LIPASE: CPT

## 2020-08-20 PROCEDURE — 6360000004 HC RX CONTRAST MEDICATION: Performed by: EMERGENCY MEDICINE

## 2020-08-20 PROCEDURE — 99285 EMERGENCY DEPT VISIT HI MDM: CPT

## 2020-08-20 PROCEDURE — 84484 ASSAY OF TROPONIN QUANT: CPT

## 2020-08-20 RX ORDER — TRAMADOL HYDROCHLORIDE 50 MG/1
50 TABLET ORAL ONCE
Status: COMPLETED | OUTPATIENT
Start: 2020-08-20 | End: 2020-08-20

## 2020-08-20 RX ORDER — LIDOCAINE 4 G/G
1 PATCH TOPICAL ONCE
Status: DISCONTINUED | OUTPATIENT
Start: 2020-08-20 | End: 2020-08-20 | Stop reason: HOSPADM

## 2020-08-20 RX ORDER — ASPIRIN 325 MG
325 TABLET ORAL ONCE
Status: COMPLETED | OUTPATIENT
Start: 2020-08-20 | End: 2020-08-20

## 2020-08-20 RX ORDER — NITROGLYCERIN 0.4 MG/1
0.4 TABLET SUBLINGUAL EVERY 5 MIN PRN
Status: DISCONTINUED | OUTPATIENT
Start: 2020-08-20 | End: 2020-08-20 | Stop reason: HOSPADM

## 2020-08-20 RX ADMIN — NITROGLYCERIN 0.4 MG: 0.4 TABLET SUBLINGUAL at 17:34

## 2020-08-20 RX ADMIN — ASPIRIN 325 MG: 325 TABLET ORAL at 16:02

## 2020-08-20 RX ADMIN — IOPAMIDOL 75 ML: 755 INJECTION, SOLUTION INTRAVENOUS at 19:33

## 2020-08-20 RX ADMIN — TRAMADOL HYDROCHLORIDE 50 MG: 50 TABLET, FILM COATED ORAL at 18:23

## 2020-08-20 ASSESSMENT — ENCOUNTER SYMPTOMS
NAUSEA: 0
SHORTNESS OF BREATH: 0
BACK PAIN: 0
EYE REDNESS: 0
SORE THROAT: 0
COUGH: 0
RHINORRHEA: 0
VOMITING: 0
ABDOMINAL PAIN: 0

## 2020-08-20 ASSESSMENT — PAIN DESCRIPTION - LOCATION: LOCATION: CHEST

## 2020-08-20 ASSESSMENT — PAIN DESCRIPTION - DESCRIPTORS: DESCRIPTORS: DULL;CONSTANT

## 2020-08-20 ASSESSMENT — PAIN DESCRIPTION - FREQUENCY: FREQUENCY: CONTINUOUS

## 2020-08-20 ASSESSMENT — PAIN SCALES - GENERAL: PAINLEVEL_OUTOF10: 10

## 2020-08-20 ASSESSMENT — PAIN DESCRIPTION - PAIN TYPE: TYPE: ACUTE PAIN

## 2020-08-20 ASSESSMENT — PAIN DESCRIPTION - ORIENTATION: ORIENTATION: LEFT

## 2020-08-20 NOTE — ED NOTES
Pt sts \"you might as well have given me a carmen mouse sticker\" referencing the lidocaine patch given; Dr Dawood Ortiz aware     Randi Weston RN  08/20/20 1921

## 2020-08-20 NOTE — ED PROVIDER NOTES
Triage Chief Complaint:   Chest Pain (States began after moving a 20# pound bag of cat litter. States fell twice the day prior. C/O pain in the worst in left upper chest. States has taken nothing for pain. States began having a cough after his pain began. Denies feeling SOB. Denies nausea or vomiting. Denies fever or chills. Denies unusual sweating. No further complaint voiced. )    [place]:  Lesly Pendleton is a 69 y.o. male that presents with left-sided chest pain that radiates into his back. The pain started yesterday evening but worsened today. He explains that he was picking up with heavy bag of cat litter and putting it down multiple times before the pain started. He had open heart surgery this spring. He denies any shortness of breath. No lightheadedness or dizziness. No nausea or vomiting. No diaphoresis. No lower extremity swelling. No history of blood clots. ROS:   Review of Systems   Constitutional: Negative for chills and fever. HENT: Negative for congestion, rhinorrhea and sore throat. Eyes: Negative for redness and visual disturbance. Respiratory: Negative for cough and shortness of breath. Cardiovascular: Positive for chest pain. Negative for leg swelling. Gastrointestinal: Negative for abdominal pain, nausea and vomiting. Genitourinary: Negative for dysuria and frequency. Musculoskeletal: Negative for arthralgias and back pain. Skin: Negative for rash and wound. Neurological: Negative for syncope and headaches. Psychiatric/Behavioral: Negative for hallucinations and suicidal ideas.        Past Medical History:   Diagnosis Date    AAA (abdominal aortic aneurysm) ([facility] 2018    Infrarenal    Basal cell carcinoma of nose 2016    Dr Laura Loja CAD (coronary artery disease)     Dr. Shelley Quigley - severe aortic stenosis with a bicuspid aortic valve    Constipation     Essential hypertension     History of alcohol abuse     Hyperlipidemia     Hypertension     Follows with PCP   Sandra Guardado Missing teeth, acquired     Non-alcoholic fatty liver disease     Osteoarthritis     Knees, lower back, shoulders,    Spinal stenosis     had epidural steroid injection 1/8/2020 - lumbar    Tremor     Left arm only    Type 2 diabetes mellitus without complication ([facility] [address]     Controlling with diet    Wears glasses      Past Surgical History:   Procedure Laterality Date    CABG WITH AORTIC VALVE REPLACEMENT N/A 3/4/2020    CABG CORONARY ARTERY BYPASS x1 SALEEM TO LAD, AORTIC VALVE REPACEMENT performed by Seymour Malloy MD at [address]  02/26/2020    critical LAD lesion and moderate to severe right coronary artery lesion    CARPAL TUNNEL RELEASE Right 1970's    KNEE SURGERY  2015    quad injury from a fall    OTHER SURGICAL HISTORY  12/09/2016    excision of basal cell carcinoma of nose with complex repair    OTHER SURGICAL HISTORY  01/2020    epidual steroid injections L3-L4    SINUS SURGERY  1970's    x 2    WISDOM TOOTH EXTRACTION       Family History   Problem Relation Age of Onset    Lung Cancer Mother     Heart Disease Father     High Blood Pressure Father     High Cholesterol Father     Lung Cancer Father      Social History     Socioeconomic History    Marital status:      Spouse name: Not on file    Number of children: Not on file    Years of education: Not on file    Highest education level: Not on file   Occupational History    Not on file   Social Needs    Financial resource strain: Not on file    Food insecurity     Worry: Not on file     Inability: Not on file    Transportation needs     Medical: Not on file     Non-medical: Not on file   Tobacco Use    Smoking status: Current Every Day Smoker     Packs/day: 1.50     Years: 58.00     Pack years: 87.00     Types: Cigarettes     Start date: 1962    Smokeless tobacco: Never Used   Substance and Sexual Activity    Alcohol use: Yes     Comment: 1 glass wine a month    Drug use: No    Sexual activity: Not on file   Lifestyle    Physical activity     Days per week: Not on file     Minutes per session: Not on file    Stress: Not on file   Relationships    Social connections     Talks on phone: Not on file     Gets together: Not on file     Attends Mu-ism service: Not on file     Active member of club or organization: Not on file     Attends meetings of clubs or organizations: Not on file     Relationship status: Not on file    Intimate partner violence     Fear of current or ex partner: Not on file     Emotionally abused: Not on file     Physically abused: Not on file     Forced sexual activity: Not on file   Other Topics Concern    Not on file   Social History Narrative    Not on file     No current facility-administered medications for this encounter. Current Outpatient Medications   Medication Sig Dispense Refill    azithromycin (ZITHROMAX) 500 MG tablet Take 1 tablet by mouth daily for 7 days 7 tablet 0    furosemide (LASIX) 20 MG tablet Take 1 tablet by mouth 2 times daily 60 tablet 2    gabapentin (NEURONTIN) 300 MG capsule Take 300 mg by mouth 3 times daily.  Blood Pressure Monitoring (BLOOD PRESSURE KIT) NASH Use as directed 1 Device 0    amiodarone (CORDARONE) 200 MG tablet Take 1 tablet by mouth 2 times daily 60 tablet 1    blood glucose test strips (TRUE METRIX BLOOD GLUCOSE TEST) strip 1 each by In Vitro route daily As needed.  100 each 5    atorvastatin (LIPITOR) 40 MG tablet Take 1 tablet by mouth daily 90 tablet 0    polyethylene glycol (GLYCOLAX) 17 GM/SCOOP powder Take 17 g by mouth daily      docusate sodium (COLACE) 100 MG capsule Take 1 capsule by mouth daily as needed for Constipation 30 capsule 0    carvedilol (COREG) 3.125 MG tablet Take 1 tablet by mouth 2 times daily 180 tablet 1    glipiZIDE (GLUCOTROL) 5 MG tablet Take 1 tablet by mouth 2 times daily (before meals) 180 tablet 1    blood glucose monitor kit and supplies Test 3 times a day & as needed for symptoms of irregular blood glucose. 1 kit 0    aspirin 81 MG tablet Take by mouth       Allergies   Allergen Reactions    Metformin And Related Diarrhea     Severe diarrhea, abd pain, and nausea    Amoxicillin Diarrhea    Other Nausea And Vomiting     ANTI-INFLAMMATORIES  Severe stomach pain--diarrhea  hypertension       Nursing Notes Reviewed     Physical Exam:   ED Triage Vitals   Enc Vitals Group      BP       Pulse       Resp       Temp       Temp src       SpO2       Weight       Height       Head Circumference       Peak Flow       Pain Score       Pain Loc       Pain Edu? Excl. in [address]? BP (!) 184/80   Pulse 53   Temp 99 °F (37.2 °C) (Oral)   Resp 22   Ht 5' 6\" (1.676 m)   Wt 160 lb (72.6 kg)   SpO2 94%   BMI 25.82 kg/m²   My pulse ox interpretation is - normal  Physical Exam  Constitutional:       General: He is not in acute distress. Appearance: Normal appearance. He is not diaphoretic. HENT:      Head: Normocephalic and atraumatic. Eyes:      General:         Right eye: No discharge. Left eye: No discharge. Conjunctiva/sclera: Conjunctivae normal.   Cardiovascular:      Rate and Rhythm: Normal rate and regular rhythm. Pulses: Normal pulses. Radial pulses are 2+ on the right side and 2+ on the left side. Pulmonary:      Effort: Pulmonary effort is normal. No respiratory distress. Breath sounds: Normal breath sounds. No wheezing or rales. Chest:      Chest wall: Tenderness (to the left of his sternum) present. Abdominal:      General: There is no distension. Tenderness: There is no abdominal tenderness. Musculoskeletal: Normal range of motion. General: No swelling or tenderness. Skin:     General: Skin is warm and dry. Neurological:      General: No focal deficit present. Mental Status: He is alert. Cranial Nerves: No cranial nerve deficit.    Psychiatric:         Mood and Affect: Mood normal. I gave him multiple different medications and patient states his pain is actually getting worse and not better. I then offered him a CT scan of his chest to rule out a dissection or pulmonary embolism. Patient is not sure he wants to stay for CT but is still working on finding a ride home. Patient does have a slightly elevated heart score, but he does not want further evaluation of his heart. Heart Score = 4   0-3 Delta troponin and discharge  4-6 Observation chest pain protocol  7+ Admission to cardiology  History   Highly suspicious -- +2   Moderately suspicious -- +1   Slightly suspicious -- 0   EKG   Significant ST depression -- +2   Nonspecific repolarization disturbance -- +1   Normal -- 0   Age   ? 65 -- +2   45-65 -- +1   ? 45 -- 0  Risk Factors   Risk factors include:   Hypercholesterolemia   Hypertension   Diabetes Mellitus   Cigarette smoking   Positive family history   Obesity (BMI > 30)  ? 3 risk factors or history of atherosclerotic disease -- +2   1-2 risk factors -- +1   No risk factors known -- 0  Troponin   ? 3× normal limit -- +2   1-3× normal limit -- +1   ? normal limit -- 0  *The HEART Score is a prospectively studied scoring system to help emergency physicians risk-stratify chest pain patients who are at risk for all-cause mortality, myocardial infarction, or coronary revascularization in the next 6 weeks. Low risk patients have a score 0-3 and have a less than 2% risk of major event at 6 weeks. *    7PM  I have signed out [facility] Parkinson Emergency Department care to Dr. Rafael Clay. We discussed the pertinent history, physical exam, completed/pending test results (if applicable) and current treatment plan. Please refer to his/her chart for the patients remaining Emergency Department course and final disposition.     I did don appropriate PPE (including N95 face mask, protective eye ware/safety glasses, gloves, hair covering, and no isolation gown), as recommended by the health facility/national standard best practice, during my bedside interactions with the patient. The likelihood of other entities in the differential is insufficient to justify any further testing for them. This was explained to the patient. The patient was advised that persistent or worsening symptoms would requirefurther evaluation. Clinical Impression:  1. Chest pain, unspecified type          Ellie Ragsdale MD       Please note that portions of this note may have been complete with a voice recognition program.  Effortswere made to edit the dictations, but occasional words are mis-transcribed.           Ellie Ragsdale MD  08/21/20 2513

## 2020-08-20 NOTE — ED NOTES
Pt c/o 10/10 pain; sts \"you might as well have put it in my ear\" pt them tells this nurse that her will just go home and drink a big bottle of vodka to self medicate; Dr Willoughby Primer updated     Marciana Collet, KALIA  08/20/20 1202

## 2020-08-21 ENCOUNTER — TELEPHONE (OUTPATIENT)
Dept: FAMILY MEDICINE CLINIC | Age: 75
End: 2020-08-21

## 2020-08-21 LAB
EKG ATRIAL RATE: 62 BPM
EKG DIAGNOSIS: NORMAL
EKG P AXIS: 38 DEGREES
EKG P-R INTERVAL: 212 MS
EKG Q-T INTERVAL: 452 MS
EKG QRS DURATION: 96 MS
EKG QTC CALCULATION (BAZETT): 458 MS
EKG R AXIS: 31 DEGREES
EKG T AXIS: 59 DEGREES
EKG VENTRICULAR RATE: 62 BPM

## 2020-08-21 PROCEDURE — 93010 ELECTROCARDIOGRAM REPORT: CPT | Performed by: INTERNAL MEDICINE

## 2020-08-21 RX ORDER — AZITHROMYCIN 500 MG/1
500 TABLET, FILM COATED ORAL DAILY
Qty: 7 TABLET | Refills: 0 | Status: SHIPPED | OUTPATIENT
Start: 2020-08-21 | End: 2020-08-28

## 2020-08-21 NOTE — ED NOTES
Pt insistent that he leave, stating \"you guys have not given me anything for pain\". Pt reminded that he has had nitroglycerin, ASA, lidocaine patch, and tramadol. Then patient stated \"well you haven't given me a pain pill\". Pt advised that ASA and tramadol pills which are both for pain, ASA also is protective of the heart since he was having CP. Pt insistent that he was going home and \"treating his own pain with vodka and orange juice\". Dr Rigoberto Ramos notified and asked that pt sign AMA form.      Mazin Jama RN  08/20/20 2007

## 2020-08-21 NOTE — ED PROVIDER NOTES
ADDENDUM:    Care of the patient was assumed  from Dr. Amy Childs. I have reviewed the notes, assessments, and/or procedures performed, I concur with her/his documentation on Mayelin Clancy. I reviewed the medical record and evaluated the patient. ED COURSE/MDM:  Laboratory and imaging data were reviewed and care plan was arranged with the patient(see separate lab/imaging reports). RADIOLOGY:  Already resulted studies have been reviewed. CTA PULMONARY W CONTRAST   Final Result   No evidence of an acute pulmonary embolus. Patchy right upper lobe ground-glass opacities. Similar ground-glass   opacities at the left greater than right lung bases. Multifocal pneumonia,   particularly in the right upper lobe suspected. Imaging features can be seen with COVID-19 pneumonia, though are nonspecific   and can occur with a variety of infectious and noninfectious processes   including edema and atelectasis. PneInd         XR CHEST PORTABLE   Final Result   No evidence for acute cardiopulmonary process.              Labs Reviewed   CBC WITH AUTO DIFFERENTIAL - Abnormal; Notable for the following components:       Result Value    WBC 11.7 (*)     MCHC 31.6 (*)     RDW 18.0 (*)     Segs Relative 79.1 (*)     Lymphocytes % 7.4 (*)     Monocytes % 11.4 (*)     Immature Neutrophil % 2.0 (*)     All other components within normal limits   COMPREHENSIVE METABOLIC PANEL W/ REFLEX TO MG FOR LOW K - Abnormal; Notable for the following components:    CREATININE 0.8 (*)     Glucose 128 (*)     All other components within normal limits   TROPONIN   LIPASE       Medications   aspirin tablet 325 mg (325 mg Oral Given 8/20/20 1602)   traMADol (ULTRAM) tablet 50 mg (50 mg Oral Given 8/20/20 1823)   iopamidol (ISOVUE-370) 76 % injection 75 mL (75 mLs Intravenous Given 8/20/20 1933)       Vitals:    08/20/20 1735 08/20/20 1748 08/20/20 1800 08/20/20 1818   BP: (!) 191/75 (!) 175/73 (!) 188/73 (!) 184/80   Pulse: 58 55 54 53 Resp: 21 19 14 22   Temp:       TempSrc:       SpO2: 98% 92% 95% 94%   Weight:       Height:             I discussed the nature and purpose, risks and benefits, as well as, the alternatives with Ivana Higgins. Ivana Higgins was given the time and opportunity to ask questions and consider their options, and after the discussion, Ivana Higgins decided to refuse my recommended course of medical treatment and he refused to wait for CT scan and he left,   I informed Ivana Higgins that refusal could lead to, but was not limited to, death, permanent disability, or severe pain. My typical dicussion, presentation, and considerations for this patients' chief complaint, diagnosis, differential diagnosis, medications, medication use,  medication safety and medication interactions have been explained and outlined to this patient for this patient encounter. I have stressed need for follow up and reexamination for this encounter and or return to the emergency department if any changes or any concern and the need for this follow up. If present, I asked the relatives or significant others of Ivana Higgins to also participate with the discussions ( This was made with the patients prior approval and right to privacy). Prior to refusing, their nurse and I determined and agreed that Ivana Higgins had the capacity to make this decision and understood the consequences of that decision. After refusal, I made every reasonable opportunity to treat Ivana Higgins to the best of my ability and applying best medical choices where required for this patient. Overall this patient exhibits ability to communicate their choice of refusal and understand the medical relevance. The patient appreciates the medical consequences of the situation as well as ability to reason about the treatment options and choices and consequences I have outlined for this condition.   They agree and understand that their refusal to treat does not reflect poorly and they may return at any time. . In all this patient has the capacity to make this choice for their care    FINAL IMPRESSION:  1.  Chest pain, unspecified type        Discharge Medication List as of 8/20/2020  8:18 PM                    287 Hira Hollins DO  08/21/20 0000

## 2020-08-24 ENCOUNTER — OFFICE VISIT (OUTPATIENT)
Dept: FAMILY MEDICINE CLINIC | Age: 75
End: 2020-08-24
Payer: MEDICARE

## 2020-08-24 VITALS
TEMPERATURE: 99.2 F | HEART RATE: 70 BPM | SYSTOLIC BLOOD PRESSURE: 160 MMHG | HEIGHT: 66 IN | WEIGHT: 157.6 LBS | DIASTOLIC BLOOD PRESSURE: 80 MMHG | BODY MASS INDEX: 25.33 KG/M2 | OXYGEN SATURATION: 95 %

## 2020-08-24 PROCEDURE — 99214 OFFICE O/P EST MOD 30 MIN: CPT | Performed by: PHYSICIAN ASSISTANT

## 2020-08-24 RX ORDER — PREDNISONE 50 MG/1
50 TABLET ORAL DAILY
Qty: 5 TABLET | Refills: 0 | Status: SHIPPED | OUTPATIENT
Start: 2020-08-24 | End: 2020-08-29

## 2020-08-24 ASSESSMENT — ENCOUNTER SYMPTOMS
BACK PAIN: 1
SHORTNESS OF BREATH: 0
WHEEZING: 0
CHEST TIGHTNESS: 0

## 2020-08-24 NOTE — PROGRESS NOTES
8/24/2020    Adelina Thomas    Chief Complaint   Patient presents with    Other     pt went to the hosp for chest pains and had a chest scan and was told he had pneumonia . He was put on antibiotic . Pt states he is about the same and no better. Pt also states that the left side of his chest feels like there is something moving around and he had pain clear across his chest last night when he was going to bed . he said the pain lasted for about 10 minutes and went away . pt almost called the squad . HPI  History was obtained from the patient. Rick Rankin is a 69 y.o. male who presents today for ER F/U. Patient complains of pain in upper back and chest that radiates through the chest.  Usually starts on the left side and radiates from there. Pain rated a 5/10. Pain is worse with deep inspiration. He went to the emergency department and work-up revealed pneumonia on CTA of the chest.  There were no other acute findings. Patient was started on Z-Dario and took his third dose today. He notes that he does not feel any better. He never really had any respiratory symptoms such as cough, shortness of breath and has not had fever or chills. He notes that he had a lot of pain last night when he was trying to go to sleep. He did fall a couple of times prior to the chest pain but does not necessarily recall suffering any injuries. REVIEW OF SYMPTOMS    Review of Systems   Constitutional: Negative for activity change, chills and fever. Respiratory: Negative for chest tightness, shortness of breath and wheezing. Cardiovascular: Positive for chest pain. Negative for palpitations and leg swelling. Musculoskeletal: Positive for back pain. Negative for myalgias. Neurological: Negative for headaches.        SOCIAL HISTORY  Social History     Socioeconomic History    Marital status:      Spouse name: None    Number of children: None    Years of education: None    Highest education level: None Occupational History    None   Social Needs    Financial resource strain: None    Food insecurity     Worry: None     Inability: None    Transportation needs     Medical: None     Non-medical: None   Tobacco Use    Smoking status: Current Every Day Smoker     Packs/day: 1.50     Years: 58.00     Pack years: 87.00     Types: Cigarettes     Start date: 1962    Smokeless tobacco: Never Used   Substance and Sexual Activity    Alcohol use: Yes     Comment: 1 glass wine a month    Drug use: No    Sexual activity: None   Lifestyle    Physical activity     Days per week: None     Minutes per session: None    Stress: None   Relationships    Social connections     Talks on phone: None     Gets together: None     Attends Rastafari service: None     Active member of club or organization: None     Attends meetings of clubs or organizations: None     Relationship status: None    Intimate partner violence     Fear of current or ex partner: None     Emotionally abused: None     Physically abused: None     Forced sexual activity: None   Other Topics Concern    None   Social History Narrative    None        CURRENT MEDICATIONS  Current Outpatient Medications   Medication Sig Dispense Refill    predniSONE (DELTASONE) 50 MG tablet Take 1 tablet by mouth daily for 5 days 5 tablet 0    azithromycin (ZITHROMAX) 500 MG tablet Take 1 tablet by mouth daily for 7 days 7 tablet 0    furosemide (LASIX) 20 MG tablet Take 1 tablet by mouth 2 times daily 60 tablet 2    gabapentin (NEURONTIN) 300 MG capsule Take 300 mg by mouth 3 times daily.  Blood Pressure Monitoring (BLOOD PRESSURE KIT) NASH Use as directed 1 Device 0    amiodarone (CORDARONE) 200 MG tablet Take 1 tablet by mouth 2 times daily 60 tablet 1    blood glucose test strips (TRUE METRIX BLOOD GLUCOSE TEST) strip 1 each by In Vitro route daily As needed.  100 each 5    atorvastatin (LIPITOR) 40 MG tablet Take 1 tablet by mouth daily 90 tablet 0    carvedilol (COREG) 3.125 MG tablet Take 1 tablet by mouth 2 times daily 180 tablet 1    glipiZIDE (GLUCOTROL) 5 MG tablet Take 1 tablet by mouth 2 times daily (before meals) 180 tablet 1    blood glucose monitor kit and supplies Test 3 times a day & as needed for symptoms of irregular blood glucose. 1 kit 0    aspirin 81 MG tablet Take by mouth      polyethylene glycol (GLYCOLAX) 17 GM/SCOOP powder Take 17 g by mouth daily      docusate sodium (COLACE) 100 MG capsule Take 1 capsule by mouth daily as needed for Constipation (Patient not taking: Reported on 8/24/2020) 30 capsule 0     No current facility-administered medications for this visit. PHYSICAL EXAM    BP (!) 160/80   Pulse 70   Temp 99.2 °F (37.3 °C)   Ht 5' 6\" (1.676 m)   Wt 157 lb 9.6 oz (71.5 kg)   SpO2 95%   BMI 25.44 kg/m²     Physical Exam  Vitals signs and nursing note reviewed. Constitutional:       Appearance: Normal appearance. HENT:      Head: Normocephalic and atraumatic. Eyes:      Extraocular Movements: Extraocular movements intact. Conjunctiva/sclera: Conjunctivae normal.   Cardiovascular:      Rate and Rhythm: Regular rhythm. Pulses: Normal pulses. Heart sounds: Normal heart sounds. Pulmonary:      Effort: Pulmonary effort is normal.      Breath sounds: Wheezing (left side) present. Chest:       Musculoskeletal:      Left shoulder: He exhibits no tenderness, no bony tenderness and no deformity. Cervical back: He exhibits normal range of motion, no tenderness, no bony tenderness, no deformity and no spasm. Thoracic back: He exhibits normal range of motion, no tenderness, no bony tenderness, no deformity and no spasm. Neurological:      General: No focal deficit present. Mental Status: He is alert and oriented to person, place, and time. Psychiatric:         Mood and Affect: Mood normal.     Reviewed ER reports including labs and imaging. ASSESSMENT & PLAN    1.  Pneumonia of both lungs due to infectious organism, unspecified part of lung  2. Pleurisy  3. Costochondritis  Pneumonia found on CT of the chest, chest x-ray did not show any so likely would be pointless to follow-up with chest x-ray. I think a lot of patient's current pain is related to pleurisy and costochondritis. He has pain with deep inspiration and does have some chest wall tenderness. No tenderness over the cervical or thoracic spine or over the bony structures of the shoulder/clavicles that make me suspect any injury from his falls causing his pain. We will treat with prednisone and if patient does not improve consider stronger antibiotic with follow-up CT of the chest if symptoms do not improve on second antibiotic. - predniSONE (DELTASONE) 50 MG tablet; Take 1 tablet by mouth daily for 5 days  Dispense: 5 tablet; Refill: 0    Blood pressure is elevated today, likely secondary to pain. Patient has blood pressure cuff at home and is to monitor. He is to notify if blood pressures remain elevated even with resolution of pain. Electronically signed by Harriet Rahman PA-C on 8/24/2020    Please note that this chart was generated using dragon dictation software. Although every effort was made to ensure the accuracy of this automated transcription, some errors in transcription may have occurred.

## 2020-08-28 RX ORDER — LEVOFLOXACIN 750 MG/1
750 TABLET ORAL DAILY
Qty: 7 TABLET | Refills: 0 | Status: SHIPPED | OUTPATIENT
Start: 2020-08-28 | End: 2020-09-04

## 2020-08-28 RX ORDER — AZITHROMYCIN 500 MG/1
500 TABLET, FILM COATED ORAL DAILY
Qty: 7 TABLET | Refills: 0 | Status: CANCELLED | OUTPATIENT
Start: 2020-08-28 | End: 2020-09-04

## 2020-08-28 RX ORDER — PREDNISONE 50 MG/1
50 TABLET ORAL DAILY
Qty: 5 TABLET | Refills: 0 | Status: CANCELLED | OUTPATIENT
Start: 2020-08-28 | End: 2020-09-02

## 2020-09-01 ENCOUNTER — HOSPITAL ENCOUNTER (OUTPATIENT)
Dept: GENERAL RADIOLOGY | Age: 75
Discharge: HOME | End: 2020-09-01
Payer: MEDICARE

## 2020-09-01 ENCOUNTER — TELEPHONE (OUTPATIENT)
Dept: FAMILY MEDICINE CLINIC | Age: 75
End: 2020-09-01

## 2020-09-01 ENCOUNTER — HOSPITAL ENCOUNTER (OUTPATIENT)
Age: 75
Discharge: HOME | End: 2020-09-01
Payer: MEDICARE

## 2020-09-01 PROCEDURE — 72110 X-RAY EXAM L-2 SPINE 4/>VWS: CPT

## 2020-09-01 PROCEDURE — 71046 X-RAY EXAM CHEST 2 VIEWS: CPT

## 2020-09-01 NOTE — TELEPHONE ENCOUNTER
Lower right side towards his back - sharp pain that takes his breath. Has trouble getting in and out of bed due to it.      Said he has pneumonia and would like an xray at [facility].
Okay to place order for chest x-ray PA and lateral for [facility].   Diagnosis: chest pain/ history pneumonia
Please advise
Xrays ordered, patient notified.
Clothing

## 2020-09-01 NOTE — TELEPHONE ENCOUNTER
Patient said he is suppose to have an order of his lower back and the only x-ray orders in [facility] are for chest x-ray. Please advise him.

## 2020-09-03 ENCOUNTER — TELEPHONE (OUTPATIENT)
Dept: FAMILY MEDICINE CLINIC | Age: 75
End: 2020-09-03

## 2020-09-03 NOTE — TELEPHONE ENCOUNTER
Stop ALL smoking, push fluids, finish atb rx, and would consider Anoro sample 1 puff/day.  He is s/p atb rx x2, and prednisone rx, CXR, etc.

## 2020-09-03 NOTE — TELEPHONE ENCOUNTER
PT HAS 1 PILL LEFT OF HIS 2ND ROUND OF ATB AND DOESN'T FEEL A WHOLE LOT BETTER. PLEASE ADVISE.     ALSO 9/1 XRAY RESULTS

## 2020-09-03 NOTE — TELEPHONE ENCOUNTER
Patient notifed. Told we would have an inhaler sample here for him. Didn't seem too interested but said he \"may\" come in and pick it up.   States \"I will never quit smoking; I'll die first\"

## 2020-09-04 ENCOUNTER — TELEPHONE (OUTPATIENT)
Dept: FAMILY MEDICINE CLINIC | Age: 75
End: 2020-09-04

## 2020-09-04 NOTE — TELEPHONE ENCOUNTER
Came in today to  the inhaler sample. States when he had his open heart they gave him breathing treatments that really helped. Sounded like jayesh. Was wondering if that would be an option for him.

## 2020-09-04 NOTE — TELEPHONE ENCOUNTER
Possibly-decrease smoking a bit and try inhaler and let us know how he is doing next 1-2 weeks he may be a candidate for aerosols on prn basis.

## 2020-09-10 RX ORDER — GLIPIZIDE 5 MG/1
5 TABLET ORAL
Qty: 180 TABLET | Refills: 1 | Status: SHIPPED | OUTPATIENT
Start: 2020-09-10 | End: 2020-10-14

## 2020-09-29 ENCOUNTER — TELEPHONE (OUTPATIENT)
Dept: FAMILY MEDICINE CLINIC | Age: 75
End: 2020-09-29

## 2020-09-29 NOTE — TELEPHONE ENCOUNTER
bs has been running high in the mornings but when he rechecks in the afternoon its a lot lower. 139 this morning   154 yesterday   151 Sunday -- afternoon 78     Wasn't sure if you wanted to see him or not. Thinks he is over his pneumonia.

## 2020-10-06 ENCOUNTER — OFFICE VISIT (OUTPATIENT)
Dept: CARDIOLOGY CLINIC | Age: 75
End: 2020-10-06
Payer: MEDICARE

## 2020-10-06 VITALS
DIASTOLIC BLOOD PRESSURE: 86 MMHG | HEIGHT: 66 IN | HEART RATE: 60 BPM | SYSTOLIC BLOOD PRESSURE: 138 MMHG | BODY MASS INDEX: 26.29 KG/M2 | WEIGHT: 163.6 LBS

## 2020-10-06 PROCEDURE — 3017F COLORECTAL CA SCREEN DOC REV: CPT | Performed by: INTERNAL MEDICINE

## 2020-10-06 PROCEDURE — G8417 CALC BMI ABV UP PARAM F/U: HCPCS | Performed by: INTERNAL MEDICINE

## 2020-10-06 PROCEDURE — G8484 FLU IMMUNIZE NO ADMIN: HCPCS | Performed by: INTERNAL MEDICINE

## 2020-10-06 PROCEDURE — 99214 OFFICE O/P EST MOD 30 MIN: CPT | Performed by: INTERNAL MEDICINE

## 2020-10-06 PROCEDURE — 1123F ACP DISCUSS/DSCN MKR DOCD: CPT | Performed by: INTERNAL MEDICINE

## 2020-10-06 PROCEDURE — 4040F PNEUMOC VAC/ADMIN/RCVD: CPT | Performed by: INTERNAL MEDICINE

## 2020-10-06 PROCEDURE — 4004F PT TOBACCO SCREEN RCVD TLK: CPT | Performed by: INTERNAL MEDICINE

## 2020-10-06 PROCEDURE — G8427 DOCREV CUR MEDS BY ELIG CLIN: HCPCS | Performed by: INTERNAL MEDICINE

## 2020-10-06 NOTE — LETTER
Sarah Vargas Pamela  1945  [id number]    Have you had any Chest Pain - No      Have you had any Shortness of Breath - No    Have you had any dizziness - No      Have you had any palpitations - No      Do you have any edema - No      Do you have a surgery or procedure scheduled in the near future - No

## 2020-10-06 NOTE — PROGRESS NOTES
CARDIOLOGY NOTE      10/6/2020    RE: Flor Ram  (1945)                               TO:  Dr. Melany Salomon MD            Estefany Abbott is a 69 y.o. male who was seen today for management of  CAD                                    HPI:                   The patient does not have cardiac complaints  Patient also seen  for    - Coronary artery disease, does not have chest pain. Patient is  compliant with prescribed medicines. - Hypertension,is  well controlled, pt is  compliant with medicines  - Hyperlipidimea, importance of hyperlipidimea discussed with pt.   - Diabetes mellitus, blood glucose level is  well controlled. Pt is compliant with meds and diet  - VHD  SP  AVR    Flor Ram has the following history recorded in care path:  Patient Active Problem List    Diagnosis Date Noted    Thrombocytopenia ([address] 08/14/2020    Liver nodule 08/12/2020    AAA (abdominal aortic aneurysm) ([address] 07/11/2020    Status post AAA (abdominal aortic aneurysm) repair 07/10/2020    Other constipation 06/22/2020    Spondylosis of lumbosacral region 06/22/2020    Iliac artery aneurysm, right ([address] 06/12/2020    CAD in native artery 03/04/2020    Nonrheumatic aortic valve stenosis 12/18/2019    Type 2 diabetes mellitus without complication, without long-term current use of insulin ([address] 07/24/2019    Other hyperlipidemia 07/24/2019    Essential hypertension     Smoker     Non-alcoholic fatty liver disease     Basal cell carcinoma of nose     Tobacco abuse 12/12/2018    Abdominal aortic aneurysm (AAA) without rupture (HCC) 11/20/2018     Current Outpatient Medications   Medication Sig Dispense Refill    glipiZIDE (GLUCOTROL) 5 MG tablet TAKE 1 TABLET BY MOUTH 2 TIMES DAILY (BEFORE MEALS) 180 tablet 1    furosemide (LASIX) 20 MG tablet Take 1 tablet by mouth 2 times daily 60 tablet 2    gabapentin (NEURONTIN) 300 MG capsule Take 300 mg by mouth 3 times daily.       Blood Pressure Monitoring (BLOOD PRESSURE KIT) NASH Use as directed 1 Device 0    amiodarone (CORDARONE) 200 MG tablet Take 1 tablet by mouth 2 times daily 60 tablet 1    blood glucose test strips (TRUE METRIX BLOOD GLUCOSE TEST) strip 1 each by In Vitro route daily As needed. 100 each 5    atorvastatin (LIPITOR) 40 MG tablet Take 1 tablet by mouth daily 90 tablet 0    polyethylene glycol (GLYCOLAX) 17 GM/SCOOP powder Take 17 g by mouth daily      docusate sodium (COLACE) 100 MG capsule Take 1 capsule by mouth daily as needed for Constipation 30 capsule 0    carvedilol (COREG) 3.125 MG tablet Take 1 tablet by mouth 2 times daily 180 tablet 1    blood glucose monitor kit and supplies Test 3 times a day & as needed for symptoms of irregular blood glucose. 1 kit 0    aspirin 81 MG tablet Take by mouth       No current facility-administered medications for this visit. Allergies: Metformin and related;  Amoxicillin; and Other  Past Medical History:   Diagnosis Date    AAA (abdominal aortic aneurysm) ([facility] 2018    Infrarenal    Basal cell carcinoma of nose 2016    Dr Courtney Castorena CAD (coronary artery disease)     Dr. Paty Bowie - severe aortic stenosis with a bicuspid aortic valve    Constipation     Essential hypertension     History of alcohol abuse     Hyperlipidemia     Hypertension     Follows with PCP    Missing teeth, acquired     Non-alcoholic fatty liver disease     Osteoarthritis     Knees, lower back, shoulders,    Spinal stenosis     had epidural steroid injection 1/8/2020 - lumbar    Tremor     Left arm only    Type 2 diabetes mellitus without complication ([facility]     Controlling with diet    Wears glasses      Past Surgical History:   Procedure Laterality Date    CABG WITH AORTIC VALVE REPLACEMENT N/A 3/4/2020    CABG CORONARY ARTERY BYPASS x1 SALEEM TO LAD, AORTIC VALVE REPACEMENT performed by Brett Retana MD at [facility]  02/26/2020    critical LAD lesion and moderate to severe right coronary artery lesion    CARPAL TUNNEL RELEASE Right 1970's    KNEE SURGERY  2015    quad injury from a fall    OTHER SURGICAL HISTORY  12/09/2016    excision of basal cell carcinoma of nose with complex repair    OTHER SURGICAL HISTORY  01/2020    epidual steroid injections L3-L4    SINUS SURGERY  1970's    x 2    WISDOM TOOTH EXTRACTION        As reviewed   Family History   Problem Relation Age of Onset    Lung Cancer Mother     Heart Disease Father     High Blood Pressure Father     High Cholesterol Father     Lung Cancer Father      Social History     Tobacco Use    Smoking status: Current Every Day Smoker     Packs/day: 1.50     Years: 58.00     Pack years: 87.00     Types: Cigarettes     Start date: 1962    Smokeless tobacco: Never Used   Substance Use Topics    Alcohol use: Yes     Comment: 1 glass wine a month      Review of Systems:    Constitutional: Negative for diaphoresis and fatigue  Psychological:Negative for anxiety or depression  HENT: Negative for headaches, nasal congestion, sinus pain or vertigo  Eyes: Negative for visual disturbance.    Endocrine: Negative for polydipsia/polyuria  Respiratory: Negative for shortness of breath  Cardiovascular: Negative for chest pain, dyspnea on exertion, claudication, edema, irregular heartbeat, murmur, palpitations or shortness of breath  Gastrointestinal: Negative for abdominal pain or heartburn  Genito-Urinary: Negative for urinary frequency/urgency  Musculoskeletal: Negative for muscle pain, muscular weakness, negative for pain in arm and leg or swelling in foot and leg  Neurological: Negative for dizziness, headaches, memory loss, numbness/tingling, visual changes, syncope  Dermatological: Negative for rash    Objective:    Vitals:    10/06/20 1447   BP: 138/86   Pulse: 60   Weight: 163 lb 9.6 oz (74.2 kg)   Height: 5' 6\" (1.676 m)     /86   Pulse 60   Ht 5' 6\" (1.676 m)   Wt 163 lb 9.6 oz (74.2 kg)   BMI 26.41 kg/m² No flowsheet data found. Wt Readings from Last 3 Encounters:   10/06/20 163 lb 9.6 oz (74.2 kg)   08/24/20 157 lb 9.6 oz (71.5 kg)   08/20/20 160 lb (72.6 kg)     Body mass index is 26.41 kg/m². GENERAL - Alert, oriented, pleasant, in no apparent distress. EYES: No jaundice, no conjunctival pallor. SKIN: It is warm & dry. No rashes. No Echhymosis    HEENT - No clinically significant abnormalities seen. Neck - Supple. No jugular venous distention noted. No carotid bruits. Cardiovascular - Normal S1 and S2 without obvious murmur or gallop. Extremities - No cyanosis, clubbing, or significant edema. Pulmonary - No respiratory distress. No wheezes or rales. Abdomen - No masses, tenderness, or organomegaly. Musculoskeletal - No significant edema. No joint deformities. No muscle wasting. Neurologic - Cranial nerves II through XII are grossly intact. There were no gross focal neurologic abnormalities.     Lab Review   Lab Results   Component Value Date    TROPONINT <0.010 08/20/2020     BNP:  No results found for: BNP  PT/INR:    Lab Results   Component Value Date    INR 0.95 07/06/2020     Lab Results   Component Value Date    LABA1C 5.8 08/14/2020    LABA1C 6.1 06/22/2020     Lab Results   Component Value Date    WBC 11.7 (H) 08/20/2020    HCT 44.3 08/20/2020    MCV 91.7 08/20/2020     08/20/2020     Lab Results   Component Value Date    CHOL 146 03/17/2020    TRIG 198 (H) 03/17/2020    HDL 30 (L) 03/17/2020    LDLCALC 76 03/17/2020     Lab Results   Component Value Date    ALT 29 08/20/2020    AST 19 08/20/2020     BMP:    Lab Results   Component Value Date     08/20/2020    K 4.2 08/20/2020     08/20/2020    CO2 28 08/20/2020    BUN 23 08/20/2020    CREATININE 0.8 08/20/2020     CMP:   Lab Results   Component Value Date     08/20/2020    K 4.2 08/20/2020     08/20/2020    CO2 28 08/20/2020    BUN 23 08/20/2020    PROT 6.7 08/20/2020     TSH:    Lab Results Component Value Date    TSH 6.02 03/17/2020       QUALITY MEASURES REVIEWED:      Impression:    No diagnosis found. Patient Active Problem List   Diagnosis Code    Abdominal aortic aneurysm (AAA) without rupture (HCC) I71.4    Tobacco abuse Z72.0    Essential hypertension I10    Smoker W64.811    Non-alcoholic fatty liver disease K76.0    Basal cell carcinoma of nose C44.311    Type 2 diabetes mellitus without complication, without long-term current use of insulin (HCC) E11.9    Other hyperlipidemia E78.49    Nonrheumatic aortic valve stenosis I35.0    CAD in native artery I25.10    Iliac artery aneurysm, right (HCC) I72.3    Other constipation K59.09    Spondylosis of lumbosacral region M47.817    Status post AAA (abdominal aortic aneurysm) repair O34.891, Z86.79    AAA (abdominal aortic aneurysm) (HCC) I71.4    Liver nodule K76.89    Thrombocytopenia (HCC) D69.6       Assessment & Plan:               -     CORONARY ARTERY DISEASE:  asymptomatic     All available  tests in chart reviewed. Management discussed . Testing ordered  no                                 -  Hypertension: Patients blood pressure is normal. Patient is advised about low sodium diet. Present medical regimen will not be changed. -  LIPID MANAGEMENT:  Importance of lipid levels discussed with patient   and patient was given dietary advice. NCEP- ATP III guidelines reviewed with patient. -   Changes  in medicines made: No                                  -   DIABETES MELLITUS: Available pertinent lab data reviewed   and  patient was given dietary advice . Advised to check blood glucose level on a regular basis. -   Changes  in medicines made:  No                - VHD  Had AVR Denver Crosser MD    [facility]

## 2020-10-06 NOTE — LETTER
[facility]  [address] [id number]  Dept: [phone]  Loc: [address], [place]    10/6/2020      Gerald Pham  [address] [place] [address] [address] [address]          Dear Dayo Horan,    [facility] records show that you use tobacco products. We strongly encourage you to quit using tobacco.       The [facility] for Disease Control and Prevention tells us:  (www.cdc.gov)    · Smoking harms nearly every organ of the body and reduces the health of smokers in general.   · Smoking causes many diseases including heart and lung disease and many types of cancer. We have many ways to help you quit using tobacco.  If you are interested in our help please call our office for an appointment time. Here are some programs you might also like to try:  · Website: www.smokefree. gov   · Cell phone text message program: To get started text the word QUIT to the number IQUIT  (Text message charges apply on your cell phone plan.)   · Free telephone counseling:  Dial 4-800-QUIT-NOW    If you have already stopped using tobacco products we praise this step toward good health! Please call our office so that we can update your records. We also welcome you to reach out to us online using [employer]. Ask us how!     Sincerely,    Cezar Newman MD

## 2020-10-09 RX ORDER — AMIODARONE HYDROCHLORIDE 200 MG/1
200 TABLET ORAL 2 TIMES DAILY
Qty: 60 TABLET | Refills: 3 | Status: SHIPPED | OUTPATIENT
Start: 2020-10-09 | End: 2021-01-11

## 2020-10-09 RX ORDER — GABAPENTIN 300 MG/1
300 CAPSULE ORAL 3 TIMES DAILY
Qty: 90 CAPSULE | Refills: 0 | Status: SHIPPED | OUTPATIENT
Start: 2020-10-09 | End: 2020-11-25 | Stop reason: SDUPTHER

## 2020-10-14 ENCOUNTER — OFFICE VISIT (OUTPATIENT)
Dept: FAMILY MEDICINE CLINIC | Age: 75
End: 2020-10-14
Payer: MEDICARE

## 2020-10-14 VITALS
HEART RATE: 67 BPM | BODY MASS INDEX: 26.84 KG/M2 | DIASTOLIC BLOOD PRESSURE: 84 MMHG | SYSTOLIC BLOOD PRESSURE: 150 MMHG | TEMPERATURE: 98.4 F | OXYGEN SATURATION: 96 % | HEIGHT: 66 IN | WEIGHT: 167 LBS

## 2020-10-14 PROCEDURE — 4004F PT TOBACCO SCREEN RCVD TLK: CPT | Performed by: FAMILY MEDICINE

## 2020-10-14 PROCEDURE — 4040F PNEUMOC VAC/ADMIN/RCVD: CPT | Performed by: FAMILY MEDICINE

## 2020-10-14 PROCEDURE — G8484 FLU IMMUNIZE NO ADMIN: HCPCS | Performed by: FAMILY MEDICINE

## 2020-10-14 PROCEDURE — 3044F HG A1C LEVEL LT 7.0%: CPT | Performed by: FAMILY MEDICINE

## 2020-10-14 PROCEDURE — 2022F DILAT RTA XM EVC RTNOPTHY: CPT | Performed by: FAMILY MEDICINE

## 2020-10-14 PROCEDURE — 1123F ACP DISCUSS/DSCN MKR DOCD: CPT | Performed by: FAMILY MEDICINE

## 2020-10-14 PROCEDURE — 99214 OFFICE O/P EST MOD 30 MIN: CPT | Performed by: FAMILY MEDICINE

## 2020-10-14 PROCEDURE — G8427 DOCREV CUR MEDS BY ELIG CLIN: HCPCS | Performed by: FAMILY MEDICINE

## 2020-10-14 PROCEDURE — 3017F COLORECTAL CA SCREEN DOC REV: CPT | Performed by: FAMILY MEDICINE

## 2020-10-14 PROCEDURE — G8417 CALC BMI ABV UP PARAM F/U: HCPCS | Performed by: FAMILY MEDICINE

## 2020-10-14 RX ORDER — GLIPIZIDE 2.5 MG/1
2.5 TABLET, EXTENDED RELEASE ORAL 2 TIMES DAILY
Qty: 60 TABLET | Refills: 3 | Status: SHIPPED | OUTPATIENT
Start: 2020-10-14 | End: 2020-11-11

## 2020-10-14 RX ORDER — ATORVASTATIN CALCIUM 40 MG/1
40 TABLET, FILM COATED ORAL DAILY
Qty: 90 TABLET | Refills: 1 | Status: SHIPPED | OUTPATIENT
Start: 2020-10-14 | End: 2021-05-04 | Stop reason: SDUPTHER

## 2020-10-14 RX ORDER — PRIMIDONE 50 MG/1
25 TABLET ORAL NIGHTLY
Qty: 30 TABLET | Refills: 3 | Status: SHIPPED | OUTPATIENT
Start: 2020-10-14 | End: 2020-11-25 | Stop reason: SDUPTHER

## 2020-10-14 ASSESSMENT — ENCOUNTER SYMPTOMS
EYE PAIN: 0
SHORTNESS OF BREATH: 0
DIARRHEA: 0
VOMITING: 0
ABDOMINAL PAIN: 0
NAUSEA: 0
WHEEZING: 0
TROUBLE SWALLOWING: 0
CHEST TIGHTNESS: 0
BLOOD IN STOOL: 0

## 2020-10-14 NOTE — PROGRESS NOTES
10/14/2020    [facility] [place]    Chief Complaint   Patient presents with    1 Month Follow-Up     2-3 week    Blood Sugar Problem     High in morning and bottoms out in afternoon. 140-216.  50-70's in afternoon    Tremors     in hands. HPI  History was obtained from the patient. Marisela Turner is a 69 y.o. male who presents today with follow-up on COPD, diabetes mellitus type 2, history of hypertension, hyperlipidemia, and mild to moderate tremor that is increasing. In addition he has known diabetes but is having some hypoglycemic symptoms he is not had loss of conscious but he has had jitteriness and weakness. Patient has not really fallen anymore. On discussion his breathing and chest pain have definitely been improved. His blood sugars show elevation the morning and then sugars drop later in the day despite eating. He is just on glipizide at this time as he could not tolerate metformin in the past because of severe diarrhea. Terri Butts REVIEW OF SYMPTOMS    Review of Systems   Constitutional: Negative for activity change and fatigue. HENT: Negative for congestion, hearing loss, mouth sores and trouble swallowing. Eyes: Negative for pain and visual disturbance. Respiratory: Negative for chest tightness, shortness of breath and wheezing. Cardiovascular: Negative for chest pain and palpitations. Gastrointestinal: Negative for abdominal pain, blood in stool, diarrhea, nausea and vomiting. Endocrine: Negative for cold intolerance, polydipsia and polyuria. Genitourinary: Negative for dysuria, frequency and urgency. Musculoskeletal: Negative for arthralgias, gait problem and neck stiffness. Skin: Negative for rash. Allergic/Immunologic: Negative for environmental allergies. Neurological: Positive for tremors. Negative for dizziness, seizures, speech difficulty and weakness. Hematological: Does not bruise/bleed easily.    Psychiatric/Behavioral: Negative for agitation, confusion, dysphoric mood and hallucinations. The patient is not nervous/anxious.         PAST MEDICAL HISTORY  Past Medical History:   Diagnosis Date    AAA (abdominal aortic aneurysm) ([facility] 2018    Infrarenal    Basal cell carcinoma of nose 2016    Dr Junior Kumar CAD (coronary artery disease)     Dr. Lennox Mars - severe aortic stenosis with a bicuspid aortic valve    Constipation     Essential hypertension     History of alcohol abuse     Hyperlipidemia     Hypertension     Follows with PCP    Missing teeth, acquired     Non-alcoholic fatty liver disease     Osteoarthritis     Knees, lower back, shoulders,    Spinal stenosis     had epidural steroid injection 1/8/2020 - lumbar    Tremor     Left arm only    Type 2 diabetes mellitus without complication ([facility]     Controlling with diet    Wears glasses        FAMILY HISTORY  Family History   Problem Relation Age of Onset    Lung Cancer Mother     Heart Disease Father     High Blood Pressure Father     High Cholesterol Father     Lung Cancer Father        SOCIAL HISTORY  Social History     Socioeconomic History    Marital status:      Spouse name: None    Number of children: None    Years of education: None    Highest education level: None   Occupational History    None   Social Needs    Financial resource strain: None    Food insecurity     Worry: None     Inability: None    Transportation needs     Medical: None     Non-medical: None   Tobacco Use    Smoking status: Current Every Day Smoker     Packs/day: 1.50     Years: 58.00     Pack years: 87.00     Types: Cigarettes     Start date: 1962    Smokeless tobacco: Never Used   Substance and Sexual Activity    Alcohol use: Yes     Comment: 1 glass wine a month    Drug use: No    Sexual activity: None   Lifestyle    Physical activity     Days per week: None     Minutes per session: None    Stress: None   Relationships    Social connections     Talks on phone: None     Gets together: None     Attends Worship service: None     Active member of club or organization: None     Attends meetings of clubs or organizations: None     Relationship status: None    Intimate partner violence     Fear of current or ex partner: None     Emotionally abused: None     Physically abused: None     Forced sexual activity: None   Other Topics Concern    None   Social History Narrative    None        SURGICAL HISTORY  Past Surgical History:   Procedure Laterality Date    CABG WITH AORTIC VALVE REPLACEMENT N/A 3/4/2020    CABG CORONARY ARTERY BYPASS x1 SALEEM TO LAD, AORTIC VALVE REPACEMENT performed by Solitario Henderson MD at [facility]  02/26/2020    critical LAD lesion and moderate to severe right coronary artery lesion    CARPAL TUNNEL RELEASE Right 1970's    KNEE SURGERY  2015    quad injury from a fall    OTHER SURGICAL HISTORY  12/09/2016    excision of basal cell carcinoma of nose with complex repair    OTHER SURGICAL HISTORY  01/2020    epidual steroid injections L3-L4    SINUS SURGERY  1970's    x 2    WISDOM TOOTH EXTRACTION                   CURRENT MEDICATIONS  Current Outpatient Medications   Medication Sig Dispense Refill    atorvastatin (LIPITOR) 40 MG tablet Take 1 tablet by mouth daily 90 tablet 1    glipiZIDE (GLUCOTROL XL) 2.5 MG extended release tablet Take 1 tablet by mouth 2 times daily 60 tablet 3    primidone (MYSOLINE) 50 MG tablet Take 0.5 tablets by mouth nightly 30 tablet 3    gabapentin (NEURONTIN) 300 MG capsule Take 1 capsule by mouth 3 times daily for 30 days.  90 capsule 0    amiodarone (CORDARONE) 200 MG tablet Take 1 tablet by mouth 2 times daily 60 tablet 3    furosemide (LASIX) 20 MG tablet Take 1 tablet by mouth 2 times daily 60 tablet 2    polyethylene glycol (GLYCOLAX) 17 GM/SCOOP powder Take 17 g by mouth daily      docusate sodium (COLACE) 100 MG capsule Take 1 capsule by mouth daily as needed for Constipation 30 capsule 0    carvedilol (COREG) 3.125 MG tablet Take 1 tablet by mouth 2 times daily 180 tablet 1    aspirin 81 MG tablet Take by mouth      Blood Pressure Monitoring (BLOOD PRESSURE KIT) NASH Use as directed 1 Device 0    blood glucose test strips (TRUE METRIX BLOOD GLUCOSE TEST) strip 1 each by In Vitro route daily As needed. 100 each 5    blood glucose monitor kit and supplies Test 3 times a day & as needed for symptoms of irregular blood glucose. 1 kit 0     No current facility-administered medications for this visit. ALLERGIES  Allergies   Allergen Reactions    Metformin And Related Diarrhea     Severe diarrhea, abd pain, and nausea    Amoxicillin Diarrhea    Other Nausea And Vomiting     ANTI-INFLAMMATORIES  Severe stomach pain--diarrhea  hypertension       PHYSICAL EXAM    BP (!) 150/84 (Site: Right Upper Arm, Position: Sitting, Cuff Size: Large Adult)   Pulse 67   Temp 98.4 °F (36.9 °C)   Ht 5' 6\" (1.676 m)   Wt 167 lb (75.8 kg)   SpO2 96%   BMI 26.95 kg/m²     Physical Exam  Vitals signs and nursing note reviewed. Constitutional:       Appearance: Normal appearance. He is well-developed. He is not ill-appearing. HENT:      Head: Normocephalic and atraumatic. Nose: Nose normal. No congestion. Mouth/Throat:      Mouth: Mucous membranes are moist.      Pharynx: Oropharynx is clear. Eyes:      Pupils: Pupils are equal, round, and reactive to light. Neck:      Musculoskeletal: Normal range of motion and neck supple. Cardiovascular:      Rate and Rhythm: Normal rate and regular rhythm. Heart sounds: Normal heart sounds. Pulmonary:      Effort: Pulmonary effort is normal.      Breath sounds: Normal breath sounds. Abdominal:      Palpations: Abdomen is soft. Musculoskeletal: Normal range of motion. Skin:     General: Skin is warm and dry. Neurological:      General: No focal deficit present. Mental Status: He is alert and oriented to person, place, and time. Mental status is at baseline.       Cranial Nerves: No cranial nerve deficit. Motor: No weakness. Comments: He is alert and oriented x3 no focalizing deficits seen speech is good but he does have a little bit of tremor at rest.  No definite bradykinesia or rigidity at this point   Psychiatric:         Mood and Affect: Mood normal.         Behavior: Behavior normal.         Thought Content: Thought content normal.         ASSESSMENT & PLAN     Diagnosis Orders   1. Hypoglycemia     2. Type 2 diabetes mellitus without complication, without long-term current use of insulin ([facility]     3. Tremor, essential     4. Other hyperlipidemia     5. Essential hypertension     At this point he was advised to decrease glipizide to 2.5 mg twice a day with absolutely no skipping meals,  He is to keep Insta-Glucose available. Monitor blood sugars closely - for his sugar and hyperglycemic control. He does have a resting tremor and for this we will try him on Mysoline 50 mg started a half a pill at bedtime and see how he does. The rest the regimen is remain the same- refills to be provided as needed. He is to call with questions or problems. Return in about 4 weeks (around 11/11/2020).          Electronically signed by Lesly Sharif MD on 10/14/2020

## 2020-10-29 ENCOUNTER — PROCEDURE VISIT (OUTPATIENT)
Dept: CARDIOLOGY CLINIC | Age: 75
End: 2020-10-29
Payer: MEDICARE

## 2020-10-29 LAB
LV EF: 58 %
LVEF MODALITY: NORMAL

## 2020-10-29 PROCEDURE — 93306 TTE W/DOPPLER COMPLETE: CPT | Performed by: INTERNAL MEDICINE

## 2020-11-04 ENCOUNTER — TELEPHONE (OUTPATIENT)
Dept: CARDIOLOGY CLINIC | Age: 75
End: 2020-11-04

## 2020-11-11 ENCOUNTER — TELEPHONE (OUTPATIENT)
Dept: FAMILY MEDICINE CLINIC | Age: 75
End: 2020-11-11

## 2020-11-11 RX ORDER — GLIPIZIDE 2.5 MG/1
2.5 TABLET, EXTENDED RELEASE ORAL 3 TIMES DAILY
Qty: 90 TABLET | Refills: 2 | Status: SHIPPED | OUTPATIENT
Start: 2020-11-11 | End: 2021-01-15 | Stop reason: DRUGHIGH

## 2020-11-11 NOTE — TELEPHONE ENCOUNTER
SPOKE WITH PATIENT REGARDING CHANGING TO A VV. STATED HIS BS HAS BEEN RUNNING HIGH. STATED IT IS AVERAGING 150. IS THERE ANYTHING HE SHOULD DO BEFORE HIS APPT Monday?  Roseanne Clay [phone]

## 2020-11-16 ENCOUNTER — OFFICE VISIT (OUTPATIENT)
Dept: FAMILY MEDICINE CLINIC | Age: 75
End: 2020-11-16
Payer: MEDICARE

## 2020-11-16 VITALS
DIASTOLIC BLOOD PRESSURE: 80 MMHG | WEIGHT: 170 LBS | TEMPERATURE: 98.5 F | HEIGHT: 66 IN | BODY MASS INDEX: 27.32 KG/M2 | OXYGEN SATURATION: 94 % | SYSTOLIC BLOOD PRESSURE: 176 MMHG | HEART RATE: 64 BPM

## 2020-11-16 DIAGNOSIS — E11.9 TYPE 2 DIABETES MELLITUS WITHOUT COMPLICATION, WITHOUT LONG-TERM CURRENT USE OF INSULIN (HCC): ICD-10-CM

## 2020-11-16 PROBLEM — R25.1 TREMOR, PHYSIOLOGICAL: Status: ACTIVE | Noted: 2020-11-16

## 2020-11-16 PROCEDURE — 4004F PT TOBACCO SCREEN RCVD TLK: CPT | Performed by: FAMILY MEDICINE

## 2020-11-16 PROCEDURE — G8427 DOCREV CUR MEDS BY ELIG CLIN: HCPCS | Performed by: FAMILY MEDICINE

## 2020-11-16 PROCEDURE — 90732 PPSV23 VACC 2 YRS+ SUBQ/IM: CPT | Performed by: FAMILY MEDICINE

## 2020-11-16 PROCEDURE — 99214 OFFICE O/P EST MOD 30 MIN: CPT | Performed by: FAMILY MEDICINE

## 2020-11-16 PROCEDURE — G8482 FLU IMMUNIZE ORDER/ADMIN: HCPCS | Performed by: FAMILY MEDICINE

## 2020-11-16 PROCEDURE — G8417 CALC BMI ABV UP PARAM F/U: HCPCS | Performed by: FAMILY MEDICINE

## 2020-11-16 PROCEDURE — 1123F ACP DISCUSS/DSCN MKR DOCD: CPT | Performed by: FAMILY MEDICINE

## 2020-11-16 PROCEDURE — 3044F HG A1C LEVEL LT 7.0%: CPT | Performed by: FAMILY MEDICINE

## 2020-11-16 PROCEDURE — G0009 ADMIN PNEUMOCOCCAL VACCINE: HCPCS | Performed by: FAMILY MEDICINE

## 2020-11-16 PROCEDURE — 3017F COLORECTAL CA SCREEN DOC REV: CPT | Performed by: FAMILY MEDICINE

## 2020-11-16 PROCEDURE — 2022F DILAT RTA XM EVC RTNOPTHY: CPT | Performed by: FAMILY MEDICINE

## 2020-11-16 PROCEDURE — 4040F PNEUMOC VAC/ADMIN/RCVD: CPT | Performed by: FAMILY MEDICINE

## 2020-11-16 ASSESSMENT — ENCOUNTER SYMPTOMS
ABDOMINAL PAIN: 0
TROUBLE SWALLOWING: 0
BLOOD IN STOOL: 0
SHORTNESS OF BREATH: 0
CHEST TIGHTNESS: 0
WHEEZING: 0
EYE PAIN: 0
NAUSEA: 0
DIARRHEA: 0
VOMITING: 0

## 2020-11-16 NOTE — PROGRESS NOTES
11/16/2020    [facility]    Chief Complaint   Patient presents with    3 Month Follow-Up     130-140 in mornings. drops low in evening 61    Shaking     hands still shaking. HPI  History was obtained from the patient. Susi Denise is a 70 y.o. male who presents today with follow-up on physiologic tremor and blood sugar readings. Patient was having some low blood sugar symptoms and cut back on his glipizide dose. He still occasionally skips meals and has mild hypoglycemic symptoms. Does have Insta-Glucose available he has had his high-dose flu shot. Has not noticed a whole lot of difference in his physiologic tremor symptoms since initiating initiating low-dose primidone. Denies chest pain or shortness of breath meds are otherwise well-tolerated. REVIEW OF SYMPTOMS    Review of Systems   Constitutional: Negative for activity change and fatigue. HENT: Negative for congestion, hearing loss, mouth sores and trouble swallowing. Eyes: Negative for pain and visual disturbance. Respiratory: Negative for chest tightness, shortness of breath and wheezing. Cardiovascular: Negative for chest pain and palpitations. Gastrointestinal: Negative for abdominal pain, blood in stool, diarrhea, nausea and vomiting. Endocrine: Negative for cold intolerance, polydipsia and polyuria. Genitourinary: Negative for dysuria, frequency and urgency. Musculoskeletal: Negative for arthralgias, gait problem and neck stiffness. Skin: Negative for rash. Allergic/Immunologic: Negative for environmental allergies. Neurological: Positive for tremors. Negative for dizziness, seizures, speech difficulty and weakness. Hematological: Does not bruise/bleed easily. Psychiatric/Behavioral: Negative for agitation, confusion, dysphoric mood, hallucinations, self-injury and sleep disturbance. The patient is not nervous/anxious.         PAST MEDICAL HISTORY  Past Medical History:   Diagnosis Date    AAA (abdominal aortic aneurysm) ([facility] 2018    Infrarenal    Basal cell carcinoma of nose 2016    Dr Karla Bender CAD (coronary artery disease)     Dr. Maia Dee - severe aortic stenosis with a bicuspid aortic valve    Constipation     echocardiogram 10/29/2020    EF 55-60% normally functioning valve in aortic position, mild mitral regurg with two jets mild pulm htn.      Essential hypertension     History of alcohol abuse     Hyperlipidemia     Hypertension     Follows with PCP    Missing teeth, acquired     Non-alcoholic fatty liver disease     Osteoarthritis     Knees, lower back, shoulders,    Spinal stenosis     had epidural steroid injection 1/8/2020 - lumbar    Tremor     Left arm only    Type 2 diabetes mellitus without complication ([facility]     Controlling with diet    Wears glasses        FAMILY HISTORY  Family History   Problem Relation Age of Onset    Lung Cancer Mother     Heart Disease Father     High Blood Pressure Father     High Cholesterol Father     Lung Cancer Father        SOCIAL HISTORY  Social History     Socioeconomic History    Marital status:      Spouse name: None    Number of children: None    Years of education: None    Highest education level: None   Occupational History    None   Social Needs    Financial resource strain: None    Food insecurity     Worry: None     Inability: None    Transportation needs     Medical: None     Non-medical: None   Tobacco Use    Smoking status: Current Every Day Smoker     Packs/day: 1.50     Years: 58.00     Pack years: 87.00     Types: Cigarettes     Start date: 1962    Smokeless tobacco: Never Used   Substance and Sexual Activity    Alcohol use: Yes     Comment: 1 glass wine a month    Drug use: No    Sexual activity: None   Lifestyle    Physical activity     Days per week: None     Minutes per session: None    Stress: None   Relationships    Social connections     Talks on phone: None     Gets together: None     Attends Congregational service: None Active member of club or organization: None     Attends meetings of clubs or organizations: None     Relationship status: None    Intimate partner violence     Fear of current or ex partner: None     Emotionally abused: None     Physically abused: None     Forced sexual activity: None   Other Topics Concern    None   Social History Narrative    None        SURGICAL HISTORY  Past Surgical History:   Procedure Laterality Date    CABG WITH AORTIC VALVE REPLACEMENT N/A 3/4/2020    CABG CORONARY ARTERY BYPASS x1 SALEEM TO LAD, AORTIC VALVE REPACEMENT performed by Joesph Sheehan MD at [address] [place] [address]  02/26/2020    critical LAD lesion and moderate to severe right coronary artery lesion    CARPAL TUNNEL RELEASE Right 1970's    KNEE SURGERY  2015    quad injury from a fall    OTHER SURGICAL HISTORY  12/09/2016    excision of basal cell carcinoma of nose with complex repair    OTHER SURGICAL HISTORY  01/2020    epidual steroid injections L3-L4    SINUS SURGERY  1970's    x 2    WISDOM TOOTH EXTRACTION                   CURRENT MEDICATIONS  Current Outpatient Medications   Medication Sig Dispense Refill    glipiZIDE (GLUCOTROL XL) 2.5 MG extended release tablet Take 1 tablet by mouth 3 times daily 90 tablet 2    atorvastatin (LIPITOR) 40 MG tablet Take 1 tablet by mouth daily 90 tablet 1    primidone (MYSOLINE) 50 MG tablet Take 0.5 tablets by mouth nightly 30 tablet 3    gabapentin (NEURONTIN) 300 MG capsule Take 1 capsule by mouth 3 times daily for 30 days.  90 capsule 0    amiodarone (CORDARONE) 200 MG tablet Take 1 tablet by mouth 2 times daily 60 tablet 3    polyethylene glycol (GLYCOLAX) 17 GM/SCOOP powder Take 17 g by mouth daily      docusate sodium (COLACE) 100 MG capsule Take 1 capsule by mouth daily as needed for Constipation 30 capsule 0    carvedilol (COREG) 3.125 MG tablet Take 1 tablet by mouth 2 times daily 180 tablet 1    aspirin 81 MG tablet Take by mouth      furosemide (LASIX) 20 MG tablet Take 1 tablet by mouth 2 times daily (Patient not taking: Reported on 11/16/2020) 60 tablet 2    Blood Pressure Monitoring (BLOOD PRESSURE KIT) NASH Use as directed 1 Device 0    blood glucose test strips (TRUE METRIX BLOOD GLUCOSE TEST) strip 1 each by In Vitro route daily As needed. 100 each 5    blood glucose monitor kit and supplies Test 3 times a day & as needed for symptoms of irregular blood glucose. 1 kit 0     No current facility-administered medications for this visit. ALLERGIES  Allergies   Allergen Reactions    Metformin And Related Diarrhea     Severe diarrhea, abd pain, and nausea    Amoxicillin Diarrhea    Other Nausea And Vomiting     ANTI-INFLAMMATORIES  Severe stomach pain--diarrhea  hypertension       PHYSICAL EXAM    BP (!) 176/80 (Site: Right Upper Arm, Position: Sitting, Cuff Size: Medium Adult)   Pulse 64   Temp 98.5 °F (36.9 °C)   Ht 5' 6\" (1.676 m)   Wt 170 lb (77.1 kg)   SpO2 94%   BMI 27.44 kg/m²     Physical Exam  Vitals signs and nursing note reviewed. Constitutional:       Appearance: Normal appearance. He is well-developed. He is not ill-appearing. HENT:      Head: Normocephalic and atraumatic. Nose: Nose normal.      Mouth/Throat:      Mouth: Mucous membranes are moist.   Eyes:      Pupils: Pupils are equal, round, and reactive to light. Neck:      Musculoskeletal: Normal range of motion and neck supple. Cardiovascular:      Rate and Rhythm: Normal rate and regular rhythm. Heart sounds: Normal heart sounds. Pulmonary:      Effort: Pulmonary effort is normal.      Breath sounds: Normal breath sounds. Abdominal:      Palpations: Abdomen is soft. Musculoskeletal: Normal range of motion. Skin:     General: Skin is warm and dry. Neurological:      General: No focal deficit present. Mental Status: He is alert and oriented to person, place, and time. Cranial Nerves: No cranial nerve deficit.       Gait: Gait normal.      Comments: Patient's left upper extremity tremor is about the same. Psychiatric:         Mood and Affect: Mood normal.         Behavior: Behavior normal.         Thought Content: Thought content normal.         ASSESSMENT & PLAN     Diagnosis Orders   1. Essential hypertension     2. CAD in native artery     3. Type 2 diabetes mellitus without complication, without long-term current use of insulin ([facility])-history of mild hypoglycemia. HEMOGLOBIN A1C   4. Tremor, physiological     After discussion we will increase primidone to 50 mg 1 whole tablet at bedtime -let me know how he is doing in terms of tremor tremoring and 10 to 14 days. We will check an A1c and decrease glipizide to 2.5 mg just twice a day he is to not skip meals. And be sure to not take the glipizide in the middle of the day. He will get a Pneumovax 23 today also. Sure to have Insta-Glucose available. Return in about 3 months (around 2/16/2021).          Electronically signed by Uri Garcia MD on 11/16/2020

## 2020-11-17 LAB
ESTIMATED AVERAGE GLUCOSE: 139.9 MG/DL
HBA1C MFR BLD: 6.5 %

## 2020-11-25 RX ORDER — PRIMIDONE 50 MG/1
50 TABLET ORAL NIGHTLY
Qty: 30 TABLET | Refills: 3 | Status: SHIPPED | OUTPATIENT
Start: 2020-11-25 | End: 2020-12-08 | Stop reason: SDUPTHER

## 2020-11-25 RX ORDER — GABAPENTIN 300 MG/1
300 CAPSULE ORAL 3 TIMES DAILY
Qty: 90 CAPSULE | Refills: 0 | Status: SHIPPED | OUTPATIENT
Start: 2020-11-25 | End: 2021-01-26 | Stop reason: SDUPTHER

## 2020-11-25 NOTE — TELEPHONE ENCOUNTER
Last appt 11/16/20 and next appt 2/15/21  Needs as soon as possible  Pt also needs a medicine to help his tremors.  He could not think of the name of this med is almost out of this as [facility]

## 2020-11-30 ENCOUNTER — TELEPHONE (OUTPATIENT)
Dept: FAMILY MEDICINE CLINIC | Age: 75
End: 2020-11-30

## 2020-12-08 ENCOUNTER — OFFICE VISIT (OUTPATIENT)
Dept: FAMILY MEDICINE CLINIC | Age: 75
End: 2020-12-08
Payer: MEDICARE

## 2020-12-08 VITALS
BODY MASS INDEX: 27.16 KG/M2 | SYSTOLIC BLOOD PRESSURE: 150 MMHG | HEIGHT: 66 IN | HEART RATE: 67 BPM | OXYGEN SATURATION: 95 % | WEIGHT: 169 LBS | TEMPERATURE: 97.9 F | DIASTOLIC BLOOD PRESSURE: 80 MMHG

## 2020-12-08 PROCEDURE — G8417 CALC BMI ABV UP PARAM F/U: HCPCS | Performed by: PHYSICIAN ASSISTANT

## 2020-12-08 PROCEDURE — 3017F COLORECTAL CA SCREEN DOC REV: CPT | Performed by: PHYSICIAN ASSISTANT

## 2020-12-08 PROCEDURE — 3044F HG A1C LEVEL LT 7.0%: CPT | Performed by: PHYSICIAN ASSISTANT

## 2020-12-08 PROCEDURE — 4040F PNEUMOC VAC/ADMIN/RCVD: CPT | Performed by: PHYSICIAN ASSISTANT

## 2020-12-08 PROCEDURE — 1123F ACP DISCUSS/DSCN MKR DOCD: CPT | Performed by: PHYSICIAN ASSISTANT

## 2020-12-08 PROCEDURE — 2022F DILAT RTA XM EVC RTNOPTHY: CPT | Performed by: PHYSICIAN ASSISTANT

## 2020-12-08 PROCEDURE — G8482 FLU IMMUNIZE ORDER/ADMIN: HCPCS | Performed by: PHYSICIAN ASSISTANT

## 2020-12-08 PROCEDURE — 4004F PT TOBACCO SCREEN RCVD TLK: CPT | Performed by: PHYSICIAN ASSISTANT

## 2020-12-08 PROCEDURE — G8427 DOCREV CUR MEDS BY ELIG CLIN: HCPCS | Performed by: PHYSICIAN ASSISTANT

## 2020-12-08 PROCEDURE — 99214 OFFICE O/P EST MOD 30 MIN: CPT | Performed by: PHYSICIAN ASSISTANT

## 2020-12-08 RX ORDER — LISINOPRIL 10 MG/1
10 TABLET ORAL DAILY
Qty: 90 TABLET | Refills: 1 | Status: SHIPPED | OUTPATIENT
Start: 2020-12-08

## 2020-12-08 RX ORDER — PRIMIDONE 50 MG/1
50 TABLET ORAL 2 TIMES DAILY
Qty: 60 TABLET | Refills: 2 | Status: SHIPPED | OUTPATIENT
Start: 2020-12-08 | End: 2021-03-08

## 2020-12-08 ASSESSMENT — ENCOUNTER SYMPTOMS: SHORTNESS OF BREATH: 0

## 2020-12-08 NOTE — PROGRESS NOTES
Pack years: 87.00     Types: Cigarettes     Start date: 1962    Smokeless tobacco: Never Used   Substance and Sexual Activity    Alcohol use: Yes     Comment: 1 glass wine a month    Drug use: No    Sexual activity: None   Lifestyle    Physical activity     Days per week: None     Minutes per session: None    Stress: None   Relationships    Social connections     Talks on phone: None     Gets together: None     Attends [employer] service: None     Active member of club or organization: None     Attends meetings of clubs or organizations: None     Relationship status: None    Intimate partner violence     Fear of current or ex partner: None     Emotionally abused: None     Physically abused: None     Forced sexual activity: None   Other Topics Concern    None   Social History Narrative    None        CURRENT MEDICATIONS  Current Outpatient Medications   Medication Sig Dispense Refill    lisinopril (PRINIVIL;ZESTRIL) 10 MG tablet Take 1 tablet by mouth daily 90 tablet 1    primidone (MYSOLINE) 50 MG tablet Take 1 tablet by mouth 2 times daily 60 tablet 2    gabapentin (NEURONTIN) 300 MG capsule Take 1 capsule by mouth 3 times daily for 30 days. 90 capsule 0    glipiZIDE (GLUCOTROL XL) 2.5 MG extended release tablet Take 1 tablet by mouth 3 times daily 90 tablet 2    atorvastatin (LIPITOR) 40 MG tablet Take 1 tablet by mouth daily 90 tablet 1    amiodarone (CORDARONE) 200 MG tablet Take 1 tablet by mouth 2 times daily 60 tablet 3    Blood Pressure Monitoring (BLOOD PRESSURE KIT) NASH Use as directed 1 Device 0    blood glucose test strips (TRUE METRIX BLOOD GLUCOSE TEST) strip 1 each by In Vitro route daily As needed.  100 each 5    polyethylene glycol (GLYCOLAX) 17 GM/SCOOP powder Take 17 g by mouth daily      docusate sodium (COLACE) 100 MG capsule Take 1 capsule by mouth daily as needed for Constipation 30 capsule 0    carvedilol (COREG) 3.125 MG tablet Take 1 tablet by mouth 2 times daily 180 tablet 1    blood glucose monitor kit and supplies Test 3 times a day & as needed for symptoms of irregular blood glucose. 1 kit 0    aspirin 81 MG tablet Take by mouth      furosemide (LASIX) 20 MG tablet Take 1 tablet by mouth 2 times daily (Patient not taking: Reported on 11/16/2020) 60 tablet 2     No current facility-administered medications for this visit. PHYSICAL EXAM    BP (!) 150/80   Pulse 67   Temp 97.9 °F (36.6 °C)   Ht 5' 6\" (1.676 m)   Wt 169 lb (76.7 kg)   SpO2 95%   BMI 27.28 kg/m²     Physical Exam  Vitals signs and nursing note reviewed. Constitutional:       Appearance: Normal appearance. HENT:      Head: Normocephalic and atraumatic. Right Ear: External ear normal.      Left Ear: External ear normal.   Cardiovascular:      Rate and Rhythm: Normal rate and regular rhythm. Pulses: Normal pulses. Heart sounds: Normal heart sounds. Pulmonary:      Effort: Pulmonary effort is normal.      Breath sounds: Normal breath sounds. Neurological:      Mental Status: He is alert. Psychiatric:         Mood and Affect: Mood normal.         Behavior: Behavior normal.         ASSESSMENT & PLAN    1. Type 2 diabetes mellitus without complication, without long-term current use of insulin ([address]  Patient has poor dietary habits, most of his meals are mostly carbohydrates. Patient is to work on adding some diversity to his meals, is to balance out his breakfast with more proteins and good fats and recommended him eat a snack in between breakfast and his late lunch to help keep his blood sugars more stable. His A1c is right in a perfect range so we will will see if we can get his blood sugars more stable with fixing his eating habits before taking away the glipizide as I do think it is helping his overall blood sugar control.   I did ask him to take the glipizide with lunch and dinner instead of with breakfast since that seems to be the big issue is between breakfast and lunch. Patient is to report back in a couple of weeks on what his blood sugars are doing    2. Tremor, physiological  Increase primidone to 50 mg twice daily  - primidone (MYSOLINE) 50 MG tablet; Take 1 tablet by mouth 2 times daily  Dispense: 60 tablet; Refill: 2    3. Essential hypertension  Add on lisinopril due to uncontrolled hypertension. Lisinopril chosen as it will provide protection to the cardiac and renal systems  - lisinopril (PRINIVIL;ZESTRIL) 10 MG tablet; Take 1 tablet by mouth daily  Dispense: 90 tablet; Refill: 1           Electronically signed by Constantino Moncada PA-C on 12/8/2020    Please note that this chart was generated using dragon dictation software. Although every effort was made to ensure the accuracy of this automated transcription, some errors in transcription may have occurred.

## 2020-12-26 ENCOUNTER — HOSPITAL ENCOUNTER (OUTPATIENT)
Dept: CT IMAGING | Age: 75
Discharge: HOME | End: 2020-12-26
Payer: MEDICARE

## 2020-12-26 PROCEDURE — G0297 LDCT FOR LUNG CA SCREEN: HCPCS

## 2021-01-11 RX ORDER — AMIODARONE HYDROCHLORIDE 200 MG/1
200 TABLET ORAL 2 TIMES DAILY
Qty: 60 TABLET | Refills: 3 | Status: SHIPPED | OUTPATIENT
Start: 2021-01-11

## 2021-01-11 RX ORDER — CARVEDILOL 3.12 MG/1
3.12 TABLET ORAL 2 TIMES DAILY
Qty: 60 TABLET | Refills: 3 | Status: SHIPPED | OUTPATIENT
Start: 2021-01-11

## 2021-01-13 PROBLEM — R91.1 PULMONARY NODULE: Status: ACTIVE | Noted: 2021-01-13

## 2021-01-15 ENCOUNTER — OFFICE VISIT (OUTPATIENT)
Dept: FAMILY MEDICINE CLINIC | Age: 76
End: 2021-01-15
Payer: MEDICARE

## 2021-01-15 VITALS
DIASTOLIC BLOOD PRESSURE: 72 MMHG | WEIGHT: 164 LBS | HEIGHT: 66 IN | HEART RATE: 65 BPM | OXYGEN SATURATION: 97 % | BODY MASS INDEX: 26.36 KG/M2 | SYSTOLIC BLOOD PRESSURE: 148 MMHG | TEMPERATURE: 97.9 F

## 2021-01-15 DIAGNOSIS — E16.2 HYPOGLYCEMIA: Primary | ICD-10-CM

## 2021-01-15 DIAGNOSIS — G25.0 ESSENTIAL TREMOR: ICD-10-CM

## 2021-01-15 DIAGNOSIS — I10 ESSENTIAL HYPERTENSION: ICD-10-CM

## 2021-01-15 PROCEDURE — G8427 DOCREV CUR MEDS BY ELIG CLIN: HCPCS | Performed by: PHYSICIAN ASSISTANT

## 2021-01-15 PROCEDURE — G8510 SCR DEP NEG, NO PLAN REQD: HCPCS | Performed by: PHYSICIAN ASSISTANT

## 2021-01-15 PROCEDURE — 99214 OFFICE O/P EST MOD 30 MIN: CPT | Performed by: PHYSICIAN ASSISTANT

## 2021-01-15 PROCEDURE — G8482 FLU IMMUNIZE ORDER/ADMIN: HCPCS | Performed by: PHYSICIAN ASSISTANT

## 2021-01-15 PROCEDURE — 3017F COLORECTAL CA SCREEN DOC REV: CPT | Performed by: PHYSICIAN ASSISTANT

## 2021-01-15 PROCEDURE — G8417 CALC BMI ABV UP PARAM F/U: HCPCS | Performed by: PHYSICIAN ASSISTANT

## 2021-01-15 PROCEDURE — 1123F ACP DISCUSS/DSCN MKR DOCD: CPT | Performed by: PHYSICIAN ASSISTANT

## 2021-01-15 PROCEDURE — 4004F PT TOBACCO SCREEN RCVD TLK: CPT | Performed by: PHYSICIAN ASSISTANT

## 2021-01-15 PROCEDURE — 4040F PNEUMOC VAC/ADMIN/RCVD: CPT | Performed by: PHYSICIAN ASSISTANT

## 2021-01-15 RX ORDER — GLIPIZIDE 2.5 MG/1
2.5 TABLET, EXTENDED RELEASE ORAL DAILY
Qty: 90 TABLET | Refills: 2 | Status: SHIPPED
Start: 2021-01-15

## 2021-01-15 ASSESSMENT — PATIENT HEALTH QUESTIONNAIRE - PHQ9
2. FEELING DOWN, DEPRESSED OR HOPELESS: 1
1. LITTLE INTEREST OR PLEASURE IN DOING THINGS: 1

## 2021-01-15 NOTE — PROGRESS NOTES
1/15/2021    Madelin Schmidt    Chief Complaint   Patient presents with   Mari Dill     has fallen a couple times in past 2 months    Diabetes     pt states BS flucuates alot, states he thinks his bs goes too low, gets dizzy and falls    Tremors     pt states meds have been increased but they are not helping       HPI  History obtained from the patient. Bhupendra aguero is a 70 y.o. male who presents today for frequent falls. The patient states that he has been feeling lightheaded and dizzy more often, making him prone to fall. He notes that a month ago, he fell backwards onto his tailbone. He has a cane but often forgets it. The patient states that he believes this issue is caused by low blood sugars. He notes that in the afternoons, his blood sugar often gets really low, for example, yesterday afternoon it was 65. He has been taking glipizide 2.5 mg 4 times daily. His last A1c was in November, and this was 6.5%. The patient also notes that he has not seen any improvement whatsoever in his tremor. His dose of primidone was increased to 50 mg twice daily, but he has not seen any improvement in his tremor. The tremor is worse in his left hand compared to the right. He often drops things, and has found this tremor extremely bothersome. REVIEW OF SYMPTOMS  Review of Systems   Constitutional: Negative for chills and fever. Respiratory: Negative for cough and shortness of breath. Gastrointestinal: Negative for diarrhea and vomiting. PAST MEDICAL HISTORY  Past Medical History:   Diagnosis Date    AAA (abdominal aortic aneurysm) ([facility] 2018    Infrarenal    Basal cell carcinoma of nose 2016    Dr Noemi King CAD (coronary artery disease)     Dr. Fan Ser - severe aortic stenosis with a bicuspid aortic valve    Constipation     echocardiogram 10/29/2020    EF 55-60% normally functioning valve in aortic position, mild mitral regurg with two jets mild pulm htn.      Essential hypertension Emotionally abused: Not on file     Physically abused: Not on file     Forced sexual activity: Not on file   Other Topics Concern    Not on file   Social History Narrative    Not on file        SURGICAL HISTORY  Past Surgical History:   Procedure Laterality Date    CABG WITH AORTIC VALVE REPLACEMENT N/A 3/4/2020    CABG CORONARY ARTERY BYPASS x1 SALEEM TO LAD, AORTIC VALVE REPACEMENT performed by Carlene Ahuja MD at [facility]  02/26/2020    critical LAD lesion and moderate to severe right coronary artery lesion    CARPAL TUNNEL RELEASE Right 1970's    KNEE SURGERY  2015    quad injury from a fall    OTHER SURGICAL HISTORY  12/09/2016    excision of basal cell carcinoma of nose with complex repair    OTHER SURGICAL HISTORY  01/2020    epidual steroid injections L3-L4    SINUS SURGERY  1970's    x 2    WISDOM TOOTH EXTRACTION         CURRENT MEDICATIONS  Current Outpatient Medications   Medication Sig Dispense Refill    glipiZIDE (GLUCOTROL XL) 2.5 MG extended release tablet Take 1 tablet by mouth daily 90 tablet 2    carvedilol (COREG) 3.125 MG tablet TAKE 1 TABLET BY MOUTH 2 TIMES DAILY 60 tablet 3    amiodarone (CORDARONE) 200 MG tablet TAKE 1 TABLET BY MOUTH 2 TIMES DAILY 60 tablet 3    lisinopril (PRINIVIL;ZESTRIL) 10 MG tablet Take 1 tablet by mouth daily 90 tablet 1    primidone (MYSOLINE) 50 MG tablet Take 1 tablet by mouth 2 times daily 60 tablet 2    gabapentin (NEURONTIN) 300 MG capsule Take 1 capsule by mouth 3 times daily for 30 days. 90 capsule 0    atorvastatin (LIPITOR) 40 MG tablet Take 1 tablet by mouth daily 90 tablet 1    Blood Pressure Monitoring (BLOOD PRESSURE KIT) NASH Use as directed 1 Device 0    blood glucose test strips (TRUE METRIX BLOOD GLUCOSE TEST) strip 1 each by In Vitro route daily As needed. 100 each 5    blood glucose monitor kit and supplies Test 3 times a day & as needed for symptoms of irregular blood glucose.  1 kit 0  aspirin 81 MG tablet Take by mouth      furosemide (LASIX) 20 MG tablet Take 1 tablet by mouth 2 times daily (Patient not taking: Reported on 11/16/2020) 60 tablet 2    polyethylene glycol (GLYCOLAX) 17 GM/SCOOP powder Take 17 g by mouth daily      docusate sodium (COLACE) 100 MG capsule Take 1 capsule by mouth daily as needed for Constipation (Patient not taking: Reported on 1/15/2021) 30 capsule 0     No current facility-administered medications for this visit. ALLERGIES  Allergies   Allergen Reactions    Metformin And Related Diarrhea     Severe diarrhea, abd pain, and nausea    Amoxicillin Diarrhea    Other Nausea And Vomiting     ANTI-INFLAMMATORIES  Severe stomach pain--diarrhea  hypertension       RECENT LABS    Lab Results   Component Value Date    LABA1C 6.5 11/16/2020     Lab Results   Component Value Date    .9 11/16/2020       Lab Results   Component Value Date    CHOL 146 03/17/2020    CHOL 223 (H) 02/11/2020    CHOL 179 02/27/2019     Lab Results   Component Value Date    LDLCALC 76 03/17/2020    LDLCALC 115 (H) 02/11/2020    LDLCALC 87 07/22/2019       Lab Results   Component Value Date    WBC 11.7 (H) 08/20/2020    HGB 14.0 08/20/2020    HCT 44.3 08/20/2020    MCV 91.7 08/20/2020     08/20/2020       PHYSICAL EXAM  BP (!) 148/72   Pulse 65   Temp 97.9 °F (36.6 °C)   Ht 5' 6\" (1.676 m)   Wt 164 lb (74.4 kg)   SpO2 97%   BMI 26.47 kg/m²     Physical Exam  Constitutional:       Appearance: Normal appearance. HENT:      Head: Normocephalic and atraumatic. Eyes:      Comments: EOM grossly intact. Cardiovascular:      Rate and Rhythm: Normal rate and regular rhythm. Heart sounds: No murmur. No friction rub. No gallop. Pulmonary:      Effort: Pulmonary effort is normal.      Breath sounds: Normal breath sounds. No wheezing, rhonchi or rales. Skin:     General: Skin is warm and dry.    Neurological: Mental Status: He is alert and oriented to person, place, and time. Comments: Cranial nerves II-XII grossly intact. Action tremor present bilaterally, more noticeable on the left. Psychiatric:         Mood and Affect: Mood normal.         Behavior: Behavior normal.         ASSESSMENT & PLAN  1. Hypoglycemia  Instructed the patient to reduce his glipizide 2.5 mg from 4 times daily down to 2 times daily. Continue checking blood glucose at home. I will follow-up with the patient in 2 weeks. 2. Essential tremor  Instructed the patient to increase his primidone by 50 mg daily until he is up to a total of 300 mg/day (150 mg in the morning and 150 mg at night). 3.  Essential hypertension  Patient's blood pressure was elevated today in the office. Instructed the patient to take his blood pressure at home once daily and record the values. We will follow-up with him in 2 weeks to review his log. Return in about 2 weeks (around 1/29/2021).             Electronically signed by Binh Albright PA-C on 1/15/2021

## 2021-01-26 RX ORDER — GABAPENTIN 300 MG/1
300 CAPSULE ORAL 3 TIMES DAILY
Qty: 90 CAPSULE | Refills: 0 | Status: SHIPPED | OUTPATIENT
Start: 2021-01-26 | End: 2021-04-05 | Stop reason: SDUPTHER

## 2021-01-29 ENCOUNTER — OFFICE VISIT (OUTPATIENT)
Dept: FAMILY MEDICINE CLINIC | Age: 76
End: 2021-01-29

## 2021-01-29 VITALS
DIASTOLIC BLOOD PRESSURE: 76 MMHG | OXYGEN SATURATION: 95 % | WEIGHT: 162.6 LBS | HEART RATE: 51 BPM | HEIGHT: 66 IN | BODY MASS INDEX: 26.13 KG/M2 | SYSTOLIC BLOOD PRESSURE: 130 MMHG | TEMPERATURE: 97.1 F

## 2021-01-29 DIAGNOSIS — I10 ESSENTIAL HYPERTENSION: Primary | ICD-10-CM

## 2021-01-29 DIAGNOSIS — G25.0 ESSENTIAL TREMOR: ICD-10-CM

## 2021-01-29 DIAGNOSIS — E11.9 TYPE 2 DIABETES MELLITUS WITHOUT COMPLICATION, WITHOUT LONG-TERM CURRENT USE OF INSULIN (HCC): ICD-10-CM

## 2021-01-29 DIAGNOSIS — R29.6 FREQUENT FALLS: ICD-10-CM

## 2021-01-29 DIAGNOSIS — M54.50 ACUTE LOW BACK PAIN WITHOUT SCIATICA, UNSPECIFIED BACK PAIN LATERALITY: ICD-10-CM

## 2021-01-29 PROCEDURE — 4040F PNEUMOC VAC/ADMIN/RCVD: CPT | Performed by: PHYSICIAN ASSISTANT

## 2021-01-29 PROCEDURE — G8482 FLU IMMUNIZE ORDER/ADMIN: HCPCS | Performed by: PHYSICIAN ASSISTANT

## 2021-01-29 PROCEDURE — 4004F PT TOBACCO SCREEN RCVD TLK: CPT | Performed by: PHYSICIAN ASSISTANT

## 2021-01-29 PROCEDURE — 3046F HEMOGLOBIN A1C LEVEL >9.0%: CPT | Performed by: PHYSICIAN ASSISTANT

## 2021-01-29 PROCEDURE — 1123F ACP DISCUSS/DSCN MKR DOCD: CPT | Performed by: PHYSICIAN ASSISTANT

## 2021-01-29 PROCEDURE — 3017F COLORECTAL CA SCREEN DOC REV: CPT | Performed by: PHYSICIAN ASSISTANT

## 2021-01-29 PROCEDURE — G8427 DOCREV CUR MEDS BY ELIG CLIN: HCPCS | Performed by: PHYSICIAN ASSISTANT

## 2021-01-29 PROCEDURE — 99214 OFFICE O/P EST MOD 30 MIN: CPT | Performed by: PHYSICIAN ASSISTANT

## 2021-01-29 PROCEDURE — G8417 CALC BMI ABV UP PARAM F/U: HCPCS | Performed by: PHYSICIAN ASSISTANT

## 2021-01-29 PROCEDURE — 2022F DILAT RTA XM EVC RTNOPTHY: CPT | Performed by: PHYSICIAN ASSISTANT

## 2021-01-29 RX ORDER — TRAMADOL HYDROCHLORIDE 50 MG/1
50 TABLET ORAL EVERY 6 HOURS PRN
Qty: 28 TABLET | Refills: 0 | Status: SHIPPED | OUTPATIENT
Start: 2021-01-29 | End: 2021-02-05

## 2021-01-29 NOTE — PROGRESS NOTES
1/29/2021    Ana Hinkle    Chief Complaint   Patient presents with    2 Week Follow-Up    Fall     pt reports slipping on ice 1 wk ago    Shoulder Pain     pt states pain in right shoulder and back from fall       HPI  History obtained from the patient . Christopher Ragsdale is a 70 y.o. male who presents today for 2 week follow up on blood pressure. HTN - Patient brought his blood pressure log (pictured below), but he states that he doubts the accuracy of his home blood pressure monitor. Frequent falls - Patient states that he was walking up a hill on wet snow and ice 1 week ago, and he fell. He notes that he has had pain in his right shoulder and back since this fall, not all the time but occasionally. Patient has been using a cane more often. Hypoglycemia - Patient reports that his fasting blood glucose has been good. He brought his glucose log in today (pictured below). The patient has been taking his glipizide 2.5 mg twice daily. Patient states that he still frequently has episodes of lightheadedness and dizziness associated with low blood sugar. Tremor - Patient complains of worsening left-handed tremor. He tried increasing his dose of primidone but could not tolerate this, so he stopped taking it. REVIEW OF SYMPTOMS  Review of Systems   Constitutional: Negative for chills and fever. Respiratory: Negative for cough and shortness of breath. Gastrointestinal: Negative for diarrhea and vomiting. PAST MEDICAL HISTORY  Past Medical History:   Diagnosis Date    AAA (abdominal aortic aneurysm) ([facility] [address] 2018    Infrarenal    Basal cell carcinoma of nose 2016    Dr Marcelo Haq CAD (coronary artery disease)     Dr. Arizmendi [place] - severe aortic stenosis with a bicuspid aortic valve    Constipation     echocardiogram 10/29/2020    EF 55-60% normally functioning valve in aortic position, mild mitral regurg with two jets mild pulm htn.      Essential hypertension     History of alcohol abuse  Hyperlipidemia     Hypertension     Follows with PCP    Missing teeth, acquired     Non-alcoholic fatty liver disease     Osteoarthritis     Knees, lower back, shoulders,    Spinal stenosis     had epidural steroid injection 1/8/2020 - lumbar    Tremor     Left arm only    Type 2 diabetes mellitus without complication ([facility] [address]     Controlling with diet    Wears glasses        FAMILY HISTORY  Family History   Problem Relation Age of Onset    Lung Cancer Mother     Heart Disease Father     High Blood Pressure Father     High Cholesterol Father     Lung Cancer Father        SOCIAL HISTORY  Social History     Socioeconomic History    Marital status:      Spouse name: Not on file    Number of children: Not on file    Years of education: Not on file    Highest education level: Not on file   Occupational History    Not on file   Social Needs    Financial resource strain: Not on file    Food insecurity     Worry: Not on file     Inability: Not on file    Transportation needs     Medical: Not on file     Non-medical: Not on file   Tobacco Use    Smoking status: Current Every Day Smoker     Packs/day: 1.50     Years: 58.00     Pack years: 87.00     Types: Cigarettes     Start date: 1962    Smokeless tobacco: Never Used   Substance and Sexual Activity    Alcohol use: Yes     Comment: 1 glass wine a month    Drug use: No    Sexual activity: Not on file   Lifestyle    Physical activity     Days per week: Not on file     Minutes per session: Not on file    Stress: Not on file   Relationships    Social connections     Talks on phone: Not on file     Gets together: Not on file     Attends Faith service: Not on file     Active member of club or organization: Not on file     Attends meetings of clubs or organizations: Not on file     Relationship status: Not on file    Intimate partner violence     Fear of current or ex partner: Not on file     Emotionally abused: Not on file Physically abused: Not on file     Forced sexual activity: Not on file   Other Topics Concern    Not on file   Social History Narrative    Not on file        SURGICAL HISTORY  Past Surgical History:   Procedure Laterality Date    CABG WITH AORTIC VALVE REPLACEMENT N/A 3/4/2020    CABG CORONARY ARTERY BYPASS x1 SALEEM TO LAD, AORTIC VALVE REPACEMENT performed by Richard Orta MD at [address]  02/26/2020    critical LAD lesion and moderate to severe right coronary artery lesion    CARPAL TUNNEL RELEASE Right 1970's    KNEE SURGERY  2015    quad injury from a fall    OTHER SURGICAL HISTORY  12/09/2016    excision of basal cell carcinoma of nose with complex repair    OTHER SURGICAL HISTORY  01/2020    epidual steroid injections L3-L4    SINUS SURGERY  1970's    x 2    WISDOM TOOTH EXTRACTION         CURRENT MEDICATIONS  Current Outpatient Medications   Medication Sig Dispense Refill    traMADol (ULTRAM) 50 MG tablet Take 1 tablet by mouth every 6 hours as needed for Pain for up to 7 days. Intended supply: 7 days. Take lowest dose possible to manage pain 28 tablet 0    gabapentin (NEURONTIN) 300 MG capsule Take 1 capsule by mouth 3 times daily for 30 days.  90 capsule 0    glipiZIDE (GLUCOTROL XL) 2.5 MG extended release tablet Take 1 tablet by mouth daily 90 tablet 2    carvedilol (COREG) 3.125 MG tablet TAKE 1 TABLET BY MOUTH 2 TIMES DAILY 60 tablet 3    amiodarone (CORDARONE) 200 MG tablet TAKE 1 TABLET BY MOUTH 2 TIMES DAILY 60 tablet 3    lisinopril (PRINIVIL;ZESTRIL) 10 MG tablet Take 1 tablet by mouth daily 90 tablet 1    primidone (MYSOLINE) 50 MG tablet Take 1 tablet by mouth 2 times daily 60 tablet 2    atorvastatin (LIPITOR) 40 MG tablet Take 1 tablet by mouth daily 90 tablet 1    Blood Pressure Monitoring (BLOOD PRESSURE KIT) NASH Use as directed 1 Device 0  blood glucose test strips (TRUE METRIX BLOOD GLUCOSE TEST) strip 1 each by In Vitro route daily As needed. 100 each 5    polyethylene glycol (GLYCOLAX) 17 GM/SCOOP powder Take 17 g by mouth daily      blood glucose monitor kit and supplies Test 3 times a day & as needed for symptoms of irregular blood glucose. 1 kit 0    aspirin 81 MG tablet Take by mouth      furosemide (LASIX) 20 MG tablet Take 1 tablet by mouth 2 times daily (Patient not taking: Reported on 11/16/2020) 60 tablet 2    docusate sodium (COLACE) 100 MG capsule Take 1 capsule by mouth daily as needed for Constipation (Patient not taking: Reported on 1/15/2021) 30 capsule 0     No current facility-administered medications for this visit. ALLERGIES  Allergies   Allergen Reactions    Metformin And Related Diarrhea     Severe diarrhea, abd pain, and nausea    Amoxicillin Diarrhea    Other Nausea And Vomiting     ANTI-INFLAMMATORIES  Severe stomach pain--diarrhea  hypertension       RECENT LABS    Lab Results   Component Value Date    LABA1C 6.5 11/16/2020     Lab Results   Component Value Date    .9 11/16/2020       Lab Results   Component Value Date    CHOL 146 03/17/2020    CHOL 223 (H) 02/11/2020    CHOL 179 02/27/2019     Lab Results   Component Value Date    LDLCALC 76 03/17/2020    LDLCALC 115 (H) 02/11/2020    LDLCALC 87 07/22/2019       Lab Results   Component Value Date    WBC 11.7 (H) 08/20/2020    HGB 14.0 08/20/2020    HCT 44.3 08/20/2020    MCV 91.7 08/20/2020     08/20/2020       PHYSICAL EXAM  /76   Pulse 51   Temp 97.1 °F (36.2 °C)   Ht 5' 6\" (1.676 m)   Wt 162 lb 9.6 oz (73.8 kg)   SpO2 95%   BMI 26.24 kg/m²     Physical Exam  Constitutional:       Appearance: Normal appearance. HENT:      Head: Normocephalic and atraumatic. Eyes:      Comments: EOM grossly intact. Cardiovascular:      Rate and Rhythm: Normal rate and regular rhythm. Heart sounds: No murmur. No friction rub. No gallop. Pulmonary:      Effort: Pulmonary effort is normal.      Breath sounds: Normal breath sounds. No wheezing, rhonchi or rales. Skin:     General: Skin is warm and dry. Neurological:      Mental Status: He is alert and oriented to person, place, and time. Comments: Cranial nerves II-XII grossly intact   Psychiatric:         Mood and Affect: Mood normal.         Behavior: Behavior normal.         ASSESSMENT & PLAN  1. Essential hypertension  Patient's blood pressure was 130/76 when taken by my [place]. I doubt the accuracy of his home monitor. We will continue to keep an eye on his blood pressure but will not treat at this time. 2. Type 2 diabetes mellitus without complication, without long-term current use of insulin ([address]  Patient still having episodes of hypoglycemia. Instructed the patient to take his glipizide 2.5 mg only once daily. He has an appointment with Dr. Dax Clancy in a few weeks and can touch base with him on how this is going at that point. 3. Essential tremor  Patient cannot tolerate primidone. Referred the patient to neurology for evaluation.  - Amb External Referral To Neurology    4. Acute low back pain without sciatica, unspecified back pain laterality  Patient recently fell on the ice and injured his shoulder and back. Recommended x-rays but the patient politely declined these for now. Sent short-term prescription for tramadol for back pain. - traMADol (ULTRAM) 50 MG tablet; Take 1 tablet by mouth every 6 hours as needed for Pain for up to 7 days. Intended supply: 7 days. Take lowest dose possible to manage pain  Dispense: 28 tablet; Refill: 0    5. Frequent falls  Recommended trying physical therapy to improve strength and balance, but the patient politely declined this for now. No follow-ups on file.             Electronically signed by Naomie Kenny PA-C on 1/29/2021

## 2021-02-06 ENCOUNTER — APPOINTMENT (OUTPATIENT)
Dept: GENERAL RADIOLOGY | Age: 76
End: 2021-02-06
Payer: MEDICARE

## 2021-02-06 ENCOUNTER — APPOINTMENT (OUTPATIENT)
Dept: CT IMAGING | Age: 76
End: 2021-02-06
Payer: MEDICARE

## 2021-02-06 ENCOUNTER — HOSPITAL ENCOUNTER (EMERGENCY)
Age: 76
Discharge: HOME | End: 2021-02-06
Attending: EMERGENCY MEDICINE
Payer: MEDICARE

## 2021-02-06 VITALS
RESPIRATION RATE: 13 BRPM | DIASTOLIC BLOOD PRESSURE: 83 MMHG | OXYGEN SATURATION: 95 % | WEIGHT: 162 LBS | HEIGHT: 66 IN | BODY MASS INDEX: 26.03 KG/M2 | TEMPERATURE: 99.1 F | SYSTOLIC BLOOD PRESSURE: 194 MMHG | HEART RATE: 72 BPM

## 2021-02-06 DIAGNOSIS — R29.898 WEAKNESS OF BOTH LOWER EXTREMITIES: Primary | ICD-10-CM

## 2021-02-06 DIAGNOSIS — S50.02XA CONTUSION OF LEFT ELBOW, INITIAL ENCOUNTER: ICD-10-CM
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
## 2021-02-06 DIAGNOSIS — S50.01XA CONTUSION OF RIGHT ELBOW, INITIAL ENCOUNTER: ICD-10-CM

## 2021-02-06 DIAGNOSIS — S80.01XA CONTUSION OF RIGHT KNEE, INITIAL ENCOUNTER: ICD-10-CM

## 2021-02-06 LAB
ALBUMIN SERPL-MCNC: 3.5 GM/DL (ref 3.4–5)
ALP BLD-CCNC: 142 IU/L (ref 40–129)
ALT SERPL-CCNC: 54 U/L (ref 10–40)
ANION GAP SERPL CALCULATED.3IONS-SCNC: 7 MMOL/L (ref 4–16)
AST SERPL-CCNC: 29 IU/L (ref 15–37)
BACTERIA: ABNORMAL /HPF
BASOPHILS ABSOLUTE: 0.1 K/CU MM
BASOPHILS RELATIVE PERCENT: 0.4 % (ref 0–1)
BILIRUB SERPL-MCNC: 0.5 MG/DL (ref 0–1)
BILIRUBIN URINE: NEGATIVE MG/DL
BLOOD, URINE: NEGATIVE
BUN BLDV-MCNC: 17 MG/DL (ref 6–23)
CALCIUM SERPL-MCNC: 9.6 MG/DL (ref 8.3–10.6)
CAST TYPE: NEGATIVE /HPF
CHLORIDE BLD-SCNC: 99 MMOL/L (ref 99–110)
CLARITY: ABNORMAL
CO2: 31 MMOL/L (ref 21–32)
COLOR: YELLOW
CREAT SERPL-MCNC: 0.8 MG/DL (ref 0.9–1.3)
CRYSTAL TYPE: NEGATIVE /HPF
DIFFERENTIAL TYPE: ABNORMAL
EOSINOPHILS ABSOLUTE: 0.1 K/CU MM
EOSINOPHILS RELATIVE PERCENT: 0.5 % (ref 0–3)
EPITHELIAL CELLS, UA: ABNORMAL /HPF
GFR AFRICAN AMERICAN: >60 ML/MIN/1.73M2
GFR NON-AFRICAN AMERICAN: >60 ML/MIN/1.73M2
GLUCOSE BLD-MCNC: 108 MG/DL (ref 70–99)
GLUCOSE, URINE: NEGATIVE MG/DL
HCT VFR BLD CALC: 42.4 % (ref 42–52)
HEMOGLOBIN: 13.9 GM/DL (ref 13.5–18)
IMMATURE NEUTROPHIL %: 0.9 % (ref 0–0.43)
KETONES, URINE: NEGATIVE MG/DL
LEUKOCYTE ESTERASE, URINE: NEGATIVE
LYMPHOCYTES ABSOLUTE: 1.4 K/CU MM
LYMPHOCYTES RELATIVE PERCENT: 12.2 % (ref 24–44)
MCH RBC QN AUTO: 30.5 PG (ref 27–31)
MCHC RBC AUTO-ENTMCNC: 32.8 % (ref 32–36)
MCV RBC AUTO: 93.2 FL (ref 78–100)
MONOCYTES ABSOLUTE: 0.7 K/CU MM
MONOCYTES RELATIVE PERCENT: 6.1 % (ref 0–4)
NITRITE URINE, QUANTITATIVE: NEGATIVE
PDW BLD-RTO: 14.7 % (ref 11.7–14.9)
PH, URINE: 7.5 (ref 5–8)
PLATELET # BLD: 107 K/CU MM (ref 140–440)
PMV BLD AUTO: 10.5 FL (ref 7.5–11.1)
POTASSIUM SERPL-SCNC: 4.1 MMOL/L (ref 3.5–5.1)
PROTEIN UA: ABNORMAL MG/DL
RBC # BLD: 4.55 M/CU MM (ref 4.6–6.2)
RBC URINE: NEGATIVE /HPF (ref 0–3)
SEGMENTED NEUTROPHILS ABSOLUTE COUNT: 9 K/CU MM
SEGMENTED NEUTROPHILS RELATIVE PERCENT: 79.9 % (ref 36–66)
SODIUM BLD-SCNC: 137 MMOL/L (ref 135–145)
SPECIFIC GRAVITY UA: 1.02 (ref 1–1.03)
TOTAL IMMATURE NEUTOROPHIL: 0.1 K/CU MM
TOTAL PROTEIN: 6.3 GM/DL (ref 6.4–8.2)
UROBILINOGEN, URINE: 0.2 MG/DL (ref 0.2–1)
WBC # BLD: 11.2 K/CU MM (ref 4–10.5)
WBC UA: NEGATIVE /HPF (ref 0–2)

## 2021-02-06 PROCEDURE — 73080 X-RAY EXAM OF ELBOW: CPT

## 2021-02-06 PROCEDURE — 73562 X-RAY EXAM OF KNEE 3: CPT

## 2021-02-06 PROCEDURE — 85025 COMPLETE CBC W/AUTO DIFF WBC: CPT

## 2021-02-06 PROCEDURE — 96375 TX/PRO/DX INJ NEW DRUG ADDON: CPT

## 2021-02-06 PROCEDURE — 6360000002 HC RX W HCPCS: Performed by: EMERGENCY MEDICINE

## 2021-02-06 PROCEDURE — 99283 EMERGENCY DEPT VISIT LOW MDM: CPT

## 2021-02-06 PROCEDURE — 72131 CT LUMBAR SPINE W/O DYE: CPT

## 2021-02-06 PROCEDURE — 81001 URINALYSIS AUTO W/SCOPE: CPT

## 2021-02-06 PROCEDURE — 96374 THER/PROPH/DIAG INJ IV PUSH: CPT

## 2021-02-06 PROCEDURE — 80053 COMPREHEN METABOLIC PANEL: CPT

## 2021-02-06 RX ORDER — ONDANSETRON 2 MG/ML
4 INJECTION INTRAMUSCULAR; INTRAVENOUS EVERY 30 MIN PRN
Status: DISCONTINUED | OUTPATIENT
Start: 2021-02-06 | End: 2021-02-06 | Stop reason: HOSPADM

## 2021-02-06 RX ORDER — METHYLPREDNISOLONE SODIUM SUCCINATE 125 MG/2ML
125 INJECTION, POWDER, LYOPHILIZED, FOR SOLUTION INTRAMUSCULAR; INTRAVENOUS ONCE
Status: COMPLETED | OUTPATIENT
Start: 2021-02-06 | End: 2021-02-06

## 2021-02-06 RX ORDER — MORPHINE SULFATE 4 MG/ML
4 INJECTION, SOLUTION INTRAMUSCULAR; INTRAVENOUS EVERY 30 MIN PRN
Status: DISCONTINUED | OUTPATIENT
Start: 2021-02-06 | End: 2021-02-06 | Stop reason: HOSPADM

## 2021-02-06 RX ORDER — KETOROLAC TROMETHAMINE 15 MG/ML
15 INJECTION, SOLUTION INTRAMUSCULAR; INTRAVENOUS ONCE
Status: COMPLETED | OUTPATIENT
Start: 2021-02-06 | End: 2021-02-06

## 2021-02-06 RX ADMIN — METHYLPREDNISOLONE SODIUM SUCCINATE 125 MG: 125 INJECTION, POWDER, FOR SOLUTION INTRAMUSCULAR; INTRAVENOUS at 18:00

## 2021-02-06 RX ADMIN — KETOROLAC TROMETHAMINE 15 MG: 15 INJECTION, SOLUTION INTRAMUSCULAR; INTRAVENOUS at 18:00

## 2021-02-06 ASSESSMENT — PAIN DESCRIPTION - ORIENTATION: ORIENTATION: RIGHT;LEFT

## 2021-02-06 NOTE — ED NOTES
Pt placed in hospital gown. Pt used urinal in bed.  Urine collected and sent to lab for UA      Sarah England RN  02/06/21 [id number]

## 2021-02-06 NOTE — ED NOTES
Phlebotomy done and saline lock initiated. Samples to lab. 18g RFA x 1 attempt pt tolerated well.         Alejandro Aranda RN  02/06/21 [id number]

## 2021-02-06 NOTE — ED NOTES
Bed: 01  Expected date: 2/6/21  Expected time:   Means of arrival:   Comments:  Luis Bowles RN  02/06/21 [id number]

## 2021-02-06 NOTE — ED PROVIDER NOTES
7 4 - 16   Urinalysis with Microscopic   Result Value Ref Range    Color, UA YELLOW YELLOW    Clarity, UA SL HAZY CLEAR    Glucose, Urine NEGATIVE NEGATIVE MG/DL    Bilirubin Urine NEGATIVE NEGATIVE MG/DL    Ketones, Urine NEGATIVE NEGATIVE MG/DL    Specific Gravity, UA 1.025 1.001 - 1.035    Blood, Urine NEGATIVE NEGATIVE    pH, Urine 7.5 5.0 - 8.0    Protein, UA TRACE NEGATIVE MG/DL    Urobilinogen, Urine 0.2 0.2 - 1.0 MG/DL    Nitrite Urine, Quantitative NEGATIVE NEGATIVE    Leukocyte Esterase, Urine NEGATIVE NEGATIVE    RBC, UA NEGATIVE 0 - 3 /HPF    WBC, UA NEGATIVE 0 - 2 /HPF    Epithelial Cells, UA 0 TO 2 /HPF    Cast Type NEGATIVE (A) NO CAST FORMS SEEN /HPF    Bacteria, UA FEW NEGATIVE /HPF    Crystal Type NEGATIVE NEGATIVE /HPF      Radiographs (if obtained):    [] Radiologist's Report Reviewed:  XR KNEE RIGHT (3 VIEWS)   Final Result   No acute osseous abnormality. XR ELBOW RIGHT (MIN 3 VIEWS)   Final Result   No acute osseous abnormality. XR ELBOW LEFT (MIN 3 VIEWS)   Final Result   No acute osseous abnormality. CT Lumbar Spine WO Contrast   Final Result   No acute fracture no change in alignment compared to prior study. .             MDM:  72-year-old male status post nonsyncopal fall. Imaging showed no signs of acute abnormality. Patient was able to ambulate and emergency department well. No saddle anesthesia. No lower extremity numbness or tingling. No lower extremity weakness. Patient reported overall that he has had some increasing weakness over the last several months. Laboratory analysis was nonconcerning. Urinalysis showed no signs of infection. Discussed admission for further work-up and possible rehab and PT OT. Patient preferred to be discharged to home. Discussed elevated blood pressure and importance of controlling blood pressure.   Discussed importance of following up with his primary care physician for close follow-up on Monday and possible PT/OT referral and coordination for possible in-home PT OT. Patient acknowledged understanding and agreement with plan of care. He was discharged in good condition with stable vitals. Clinical Impression:  1. Weakness of both lower extremities    2. Fall, initial encounter    3. Contusion of left elbow, initial encounter    4. Contusion of right elbow, initial encounter    5. Contusion of right knee, initial encounter      Disposition referral (if applicable):  Joshua Velasco MD  [address] [facility]  [phone]    Call   for close follow up    Disposition medications (if applicable):  Discharge Medication List as of 2/6/2021  9:05 PM          Comment: Please note this report has been produced using speech recognition software and may contain errors related to that system including errors in grammar, punctuation, and spelling, as well as words and phrases that may be inappropriate. Efforts were made to edit the dictations.         German Velasquez MD  02/07/21 [id number]       German Velasquez MD  02/07/21 [id number]

## 2021-02-06 NOTE — ED PROVIDER NOTES
Emergency Department Encounter  Location: [facility] [address] [place] [address]    Patient: Emili Olmedo  MRN: [id number]  : 1945  Date of evaluation: 2021  ED Provider: Daphne Umana DO, FACEP    Chief Complaint:    Fatigue (just feel tired and weak) and Fall (pt states he falls alot)    [place]:  Emili Olmedo is a 70 y.o. male that presents to the emergency department with complaints of falling today. The patient states he was outside trying to get well water for his wife and his left knee gave out on him. He states he was unable to get himself up. He managed to crawl back to the house and up to steps. He was unable to stand and so squad was called and brought him to the emergency department. Patient is denying any pain in his back but does have abrasions and contusions to his knees bilaterally. He states his legs are too weak to hold him up. He has a history of spinal stenosis and states he has been getting steroid injections in his low back. His last injection was 3 months ago. He states he has had no films performed of his back and he thinks his spinal stenosis may be worsening. He denies any incontinence or saddle anesthesia. He states he falls frequently. He states his pain is 2 out of 10. ROS - see HPI, below listed is current ROS at time of my eval:  At least 10 systems reviewed and otherwise acutely negative except as in the [address]   General:  No fevers, no chills, no weakness  Eyes:  No recent vison changes, no discharge  ENT:  No sore throat, no nasal congestion, no hearing changes  Cardiovascular:  No chest pain, no palpitations  Respiratory:  No shortness of breath, no cough, no wheezing  Gastrointestinal:  No pain, no nausea, no vomiting, no diarrhea  Musculoskeletal:  No muscle pain, no joint pain  Skin:  No rash, no pruritis, no easy bruising  Neurologic:  No speech problems, no headache, no extremity numbness, no extremity tingling, positive for extremity weakness Spouse name: Not on file    Number of children: Not on file    Years of education: Not on file    Highest education level: Not on file   Occupational History    Not on file   Social Needs    Financial resource strain: Not on file    Food insecurity     Worry: Not on file     Inability: Not on file    Transportation needs     Medical: Not on file     Non-medical: Not on file   Tobacco Use    Smoking status: Current Every Day Smoker     Packs/day: 1.50     Years: 58.00     Pack years: 87.00     Types: Cigarettes     Start date: 1962    Smokeless tobacco: Never Used   Substance and Sexual Activity    Alcohol use: Yes     Comment: 1 glass wine a month    Drug use: No    Sexual activity: Not on file   Lifestyle    Physical activity     Days per week: Not on file     Minutes per session: Not on file    Stress: Not on file   Relationships    Social connections     Talks on phone: Not on file     Gets together: Not on file     Attends Druze service: Not on file     Active member of club or organization: Not on file     Attends meetings of clubs or organizations: Not on file     Relationship status: Not on file    Intimate partner violence     Fear of current or ex partner: Not on file     Emotionally abused: Not on file     Physically abused: Not on file     Forced sexual activity: Not on file   Other Topics Concern    Not on file   Social History Narrative    Not on file     Current Facility-Administered Medications   Medication Dose Route Frequency Provider Last Rate Last Admin    morphine sulfate (PF) injection 4 mg  4 mg Intravenous Q30 Min PRN Luther Triston, DO        [facility] injection 4 mg  4 mg Intravenous Q30 Min PRN Luther Triston, DO         Current Outpatient Medications   Medication Sig Dispense Refill    gabapentin (NEURONTIN) 300 MG capsule Take 1 capsule by mouth 3 times daily for 30 days.  90 capsule 0    glipiZIDE (GLUCOTROL XL) 2.5 MG extended release tablet Take 1 tablet by mouth daily 90 tablet 2    carvedilol (COREG) 3.125 MG tablet TAKE 1 TABLET BY MOUTH 2 TIMES DAILY 60 tablet 3    amiodarone (CORDARONE) 200 MG tablet TAKE 1 TABLET BY MOUTH 2 TIMES DAILY 60 tablet 3    lisinopril (PRINIVIL;ZESTRIL) 10 MG tablet Take 1 tablet by mouth daily 90 tablet 1    primidone (MYSOLINE) 50 MG tablet Take 1 tablet by mouth 2 times daily 60 tablet 2    atorvastatin (LIPITOR) 40 MG tablet Take 1 tablet by mouth daily 90 tablet 1    furosemide (LASIX) 20 MG tablet Take 1 tablet by mouth 2 times daily (Patient not taking: Reported on 11/16/2020) 60 tablet 2    Blood Pressure Monitoring (BLOOD PRESSURE KIT) NASH Use as directed 1 Device 0    blood glucose test strips (TRUE METRIX BLOOD GLUCOSE TEST) strip 1 each by In Vitro route daily As needed. 100 each 5    polyethylene glycol (GLYCOLAX) 17 GM/SCOOP powder Take 17 g by mouth daily      docusate sodium (COLACE) 100 MG capsule Take 1 capsule by mouth daily as needed for Constipation (Patient not taking: Reported on 1/15/2021) 30 capsule 0    blood glucose monitor kit and supplies Test 3 times a day & as needed for symptoms of irregular blood glucose. 1 kit 0    aspirin 81 MG tablet Take by mouth       Allergies   Allergen Reactions    Metformin And Related Diarrhea     Severe diarrhea, abd pain, and nausea    Amoxicillin Diarrhea    Other Nausea And Vomiting     ANTI-INFLAMMATORIES  Severe stomach pain--diarrhea  hypertension       Nursing Notes Reviewed    Physical Exam:  ED Triage Vitals [02/06/21 1650]   Enc Vitals Group      BP (!) 180/99      Pulse 77      Resp 18      Temp 99.1 °F (37.3 °C)      Temp Source Oral      SpO2 93 %      Weight 162 lb (73.5 kg)      Height 5' 6\" (1.676 m)      Head Circumference       Peak Flow       Pain Score       Pain Loc       Pain Edu? Excl. in [address]? GENERAL APPEARANCE: Awake and alert. Cooperative. No acute distress. Nontoxic in appearance  HEAD: Normocephalic. Atraumatic.    EYES: EOM's grossly intact. Sclera anicteric. ENT: Tolerates saliva. No trismus. NECK: Supple. Trachea midline. CARDIO: RRR. Radial pulse 2+. LUNGS: Respirations unlabored. CTAB. ABDOMEN: Soft. Non-distended. Non-tender. EXTREMITIES: No acute deformities. Patient has abrasions or contusion to his bilateral elbows. He has contusion and abrasion to his right knee. Straight leg raising of his legs bilaterally reveals negative testing bilaterally. Sensation is intact in his legs bilaterally. SKIN: Warm and dry. NEUROLOGICAL: No gross facial drooping. Moves all 4 extremities spontaneously. PSYCHIATRIC: Normal mood.      Labs:  Results for orders placed or performed during the hospital encounter of 02/06/21   CBC Auto Differential   Result Value Ref Range    WBC 11.2 (H) 4.0 - 10.5 K/CU MM    RBC 4.55 (L) 4.6 - 6.2 M/CU MM    Hemoglobin 13.9 13.5 - 18.0 GM/DL    Hematocrit 42.4 42 - 52 %    MCV 93.2 78 - 100 FL    MCH 30.5 27 - 31 PG    MCHC 32.8 32.0 - 36.0 %    RDW 14.7 11.7 - 14.9 %    Platelets 030 (L) 495 - 440 K/CU MM    MPV 10.5 7.5 - 11.1 FL    Differential Type AUTOMATED DIFFERENTIAL     Segs Relative 79.9 (H) 36 - 66 %    Lymphocytes % 12.2 (L) 24 - 44 %    Monocytes % 6.1 (H) 0 - 4 %    Eosinophils % 0.5 0 - 3 %    Basophils % 0.4 0 - 1 %    Segs Absolute 9.0 K/CU MM    Lymphocytes Absolute 1.4 K/CU MM    Monocytes Absolute 0.7 K/CU MM    Eosinophils Absolute 0.1 K/CU MM    Basophils Absolute 0.1 K/CU MM    Immature Neutrophil % 0.9 (H) 0 - 0.43 %    Total Immature Neutrophil 0.10 K/CU MM   Comprehensive Metabolic Panel   Result Value Ref Range    Sodium 137 135 - 145 MMOL/L    Potassium 4.1 3.5 - 5.1 MMOL/L    Chloride 99 99 - 110 mMol/L    CO2 31 21 - 32 MMOL/L    BUN 17 6 - 23 MG/DL    CREATININE 0.8 (L) 0.9 - 1.3 MG/DL    Glucose 108 (H) 70 - 99 MG/DL    Calcium 9.6 8.3 - 10.6 MG/DL    Albumin 3.5 3.4 - 5.0 GM/DL    Total Protein 6.3 (L) 6.4 - 8.2 GM/DL    Total Bilirubin 0.5 0.0 - 1.0 MG/DL

## 2021-02-07 NOTE — ED NOTES
Patient given meal tray and phone to call family member at this time     Mana Delgado, RN  02/06/21 [id number]

## 2021-02-08 ENCOUNTER — CARE COORDINATION (OUTPATIENT)
Dept: CARE COORDINATION | Age: 76
End: 2021-02-08

## 2021-02-09 ENCOUNTER — CARE COORDINATION (OUTPATIENT)
Dept: CARE COORDINATION | Age: 76
End: 2021-02-09

## 2021-02-09 ENCOUNTER — HOSPITAL ENCOUNTER (INPATIENT)
Age: 76
LOS: 2 days | Discharge: NURSING, REHAB OR LONG-TERM CARE | DRG: 194 | End: 2021-02-11
Attending: EMERGENCY MEDICINE | Admitting: INTERNAL MEDICINE
Payer: MEDICARE

## 2021-02-09 ENCOUNTER — APPOINTMENT (OUTPATIENT)
Dept: CT IMAGING | Age: 76
DRG: 194 | End: 2021-02-09
Attending: INTERNAL MEDICINE
Payer: MEDICARE

## 2021-02-09 ENCOUNTER — APPOINTMENT (OUTPATIENT)
Dept: GENERAL RADIOLOGY | Age: 76
DRG: 194 | End: 2021-02-09
Attending: INTERNAL MEDICINE
Payer: MEDICARE

## 2021-02-09 DIAGNOSIS — R26.81 GAIT INSTABILITY: ICD-10-CM
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
## 2021-02-09 DIAGNOSIS — R53.1 GENERAL WEAKNESS: ICD-10-CM

## 2021-02-09 DIAGNOSIS — R53.1 WEAKNESS: ICD-10-CM
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
## 2021-02-09 DIAGNOSIS — T07.XXXA MULTIPLE CONTUSIONS: ICD-10-CM

## 2021-02-09 DIAGNOSIS — J18.9 PNEUMONIA OF BOTH LOWER LOBES DUE TO INFECTIOUS ORGANISM: ICD-10-CM
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
## 2021-02-09 PROBLEM — E11.9 TYPE 2 DIABETES MELLITUS (HCC): Status: ACTIVE | Noted: 2021-02-09

## 2021-02-09 LAB
ALBUMIN SERPL-MCNC: 3.3 GM/DL (ref 3.4–5)
ALP BLD-CCNC: 122 IU/L (ref 40–129)
ALT SERPL-CCNC: 51 U/L (ref 10–40)
ANION GAP SERPL CALCULATED.3IONS-SCNC: 11 MMOL/L (ref 4–16)
AST SERPL-CCNC: 41 IU/L (ref 15–37)
BACTERIA: ABNORMAL /HPF
BASOPHILS ABSOLUTE: 0 K/CU MM
BASOPHILS RELATIVE PERCENT: 0.2 % (ref 0–1)
BILIRUB SERPL-MCNC: 0.6 MG/DL (ref 0–1)
BILIRUBIN URINE: NEGATIVE MG/DL
BLOOD, URINE: NEGATIVE
BUN BLDV-MCNC: 20 MG/DL (ref 6–23)
CALCIUM SERPL-MCNC: 9.4 MG/DL (ref 8.3–10.6)
CAST TYPE: ABNORMAL /HPF
CHLORIDE BLD-SCNC: 97 MMOL/L (ref 99–110)
CLARITY: CLEAR
CO2: 26 MMOL/L (ref 21–32)
COLOR: YELLOW
CREAT SERPL-MCNC: 0.8 MG/DL (ref 0.9–1.3)
CRYSTAL TYPE: ABNORMAL /HPF
DIFFERENTIAL TYPE: ABNORMAL
EOSINOPHILS ABSOLUTE: 0 K/CU MM
EOSINOPHILS RELATIVE PERCENT: 0.1 % (ref 0–3)
EPITHELIAL CELLS, UA: ABNORMAL /HPF
GFR AFRICAN AMERICAN: >60 ML/MIN/1.73M2
GFR NON-AFRICAN AMERICAN: >60 ML/MIN/1.73M2
GLUCOSE BLD-MCNC: 120 MG/DL (ref 70–99)
GLUCOSE BLD-MCNC: 175 MG/DL (ref 70–99)
GLUCOSE, URINE: 100 MG/DL
HCT VFR BLD CALC: 41 % (ref 42–52)
HEMOGLOBIN: 13.8 GM/DL (ref 13.5–18)
IMMATURE NEUTROPHIL %: 1 % (ref 0–0.43)
KETONES, URINE: NEGATIVE MG/DL
LACTATE: 2.7 MMOL/L (ref 0.4–2)
LEUKOCYTE ESTERASE, URINE: NEGATIVE
LYMPHOCYTES ABSOLUTE: 1.5 K/CU MM
LYMPHOCYTES RELATIVE PERCENT: 9.5 % (ref 24–44)
MCH RBC QN AUTO: 31.2 PG (ref 27–31)
MCHC RBC AUTO-ENTMCNC: 33.7 % (ref 32–36)
MCV RBC AUTO: 92.8 FL (ref 78–100)
MONOCYTES ABSOLUTE: 0.4 K/CU MM
MONOCYTES RELATIVE PERCENT: 2.7 % (ref 0–4)
MUCUS: ABNORMAL HPF
NITRITE URINE, QUANTITATIVE: NEGATIVE
PDW BLD-RTO: 15.3 % (ref 11.7–14.9)
PH, URINE: 7 (ref 5–8)
PLATELET # BLD: 128 K/CU MM (ref 140–440)
PMV BLD AUTO: 11.6 FL (ref 7.5–11.1)
POTASSIUM SERPL-SCNC: 4.6 MMOL/L (ref 3.5–5.1)
PROTEIN UA: 30 MG/DL
RBC # BLD: 4.42 M/CU MM (ref 4.6–6.2)
RBC URINE: ABNORMAL /HPF (ref 0–3)
SEGMENTED NEUTROPHILS ABSOLUTE COUNT: 13.3 K/CU MM
SEGMENTED NEUTROPHILS RELATIVE PERCENT: 86.5 % (ref 36–66)
SODIUM BLD-SCNC: 134 MMOL/L (ref 135–145)
SPECIFIC GRAVITY UA: 1.02 (ref 1–1.03)
TOTAL IMMATURE NEUTOROPHIL: 0.16 K/CU MM
TOTAL PROTEIN: 6.2 GM/DL (ref 6.4–8.2)
TROPONIN T: 0.04 NG/ML
UROBILINOGEN, URINE: 2 MG/DL (ref 0.2–1)
VOLUME, (UVOL): 12 ML (ref 10–12)
WBC # BLD: 15.4 K/CU MM (ref 4–10.5)
WBC UA: ABNORMAL /HPF (ref 0–2)

## 2021-02-09 PROCEDURE — 84484 ASSAY OF TROPONIN QUANT: CPT

## 2021-02-09 PROCEDURE — 85025 COMPLETE CBC W/AUTO DIFF WBC: CPT

## 2021-02-09 PROCEDURE — 6360000002 HC RX W HCPCS: Performed by: EMERGENCY MEDICINE

## 2021-02-09 PROCEDURE — 80053 COMPREHEN METABOLIC PANEL: CPT

## 2021-02-09 PROCEDURE — 87040 BLOOD CULTURE FOR BACTERIA: CPT

## 2021-02-09 PROCEDURE — G0378 HOSPITAL OBSERVATION PER HR: HCPCS

## 2021-02-09 PROCEDURE — C9803 HOPD COVID-19 SPEC COLLECT: HCPCS

## 2021-02-09 PROCEDURE — 81001 URINALYSIS AUTO W/SCOPE: CPT

## 2021-02-09 PROCEDURE — 93005 ELECTROCARDIOGRAM TRACING: CPT | Performed by: EMERGENCY MEDICINE

## 2021-02-09 PROCEDURE — 6370000000 HC RX 637 (ALT 250 FOR IP): Performed by: NURSE PRACTITIONER

## 2021-02-09 PROCEDURE — U0002 COVID-19 LAB TEST NON-CDC: HCPCS

## 2021-02-09 PROCEDURE — 83605 ASSAY OF LACTIC ACID: CPT

## 2021-02-09 PROCEDURE — 2580000003 HC RX 258: Performed by: NURSE PRACTITIONER

## 2021-02-09 PROCEDURE — 99285 EMERGENCY DEPT VISIT HI MDM: CPT

## 2021-02-09 PROCEDURE — 70450 CT HEAD/BRAIN W/O DYE: CPT

## 2021-02-09 PROCEDURE — 82962 GLUCOSE BLOOD TEST: CPT

## 2021-02-09 PROCEDURE — 71045 X-RAY EXAM CHEST 1 VIEW: CPT

## 2021-02-09 PROCEDURE — 1200000000 HC SEMI PRIVATE

## 2021-02-09 PROCEDURE — 72125 CT NECK SPINE W/O DYE: CPT

## 2021-02-09 RX ORDER — LEVOFLOXACIN 5 MG/ML
750 INJECTION, SOLUTION INTRAVENOUS EVERY 24 HOURS
Status: DISCONTINUED | OUTPATIENT
Start: 2021-02-09 | End: 2021-02-11 | Stop reason: HOSPADM

## 2021-02-09 RX ORDER — DEXTROSE MONOHYDRATE 50 MG/ML
100 INJECTION, SOLUTION INTRAVENOUS PRN
Status: DISCONTINUED | OUTPATIENT
Start: 2021-02-09 | End: 2021-02-11 | Stop reason: HOSPADM

## 2021-02-09 RX ORDER — PROMETHAZINE HYDROCHLORIDE 12.5 MG/1
12.5 TABLET ORAL EVERY 6 HOURS PRN
Status: DISCONTINUED | OUTPATIENT
Start: 2021-02-09 | End: 2021-02-11 | Stop reason: HOSPADM

## 2021-02-09 RX ORDER — GABAPENTIN 300 MG/1
300 CAPSULE ORAL 3 TIMES DAILY
Status: DISCONTINUED | OUTPATIENT
Start: 2021-02-09 | End: 2021-02-11 | Stop reason: HOSPADM

## 2021-02-09 RX ORDER — DEXTROSE MONOHYDRATE 25 G/50ML
12.5 INJECTION, SOLUTION INTRAVENOUS PRN
Status: DISCONTINUED | OUTPATIENT
Start: 2021-02-09 | End: 2021-02-11 | Stop reason: HOSPADM

## 2021-02-09 RX ORDER — POLYETHYLENE GLYCOL 3350 17 G/17G
17 POWDER, FOR SOLUTION ORAL DAILY PRN
Status: DISCONTINUED | OUTPATIENT
Start: 2021-02-09 | End: 2021-02-11 | Stop reason: HOSPADM

## 2021-02-09 RX ORDER — ATORVASTATIN CALCIUM 40 MG/1
40 TABLET, FILM COATED ORAL DAILY
Status: DISCONTINUED | OUTPATIENT
Start: 2021-02-10 | End: 2021-02-11 | Stop reason: HOSPADM

## 2021-02-09 RX ORDER — DOCUSATE SODIUM 100 MG/1
100 CAPSULE, LIQUID FILLED ORAL DAILY PRN
Status: DISCONTINUED | OUTPATIENT
Start: 2021-02-09 | End: 2021-02-11 | Stop reason: HOSPADM

## 2021-02-09 RX ORDER — ONDANSETRON 2 MG/ML
4 INJECTION INTRAMUSCULAR; INTRAVENOUS EVERY 6 HOURS PRN
Status: DISCONTINUED | OUTPATIENT
Start: 2021-02-09 | End: 2021-02-11 | Stop reason: HOSPADM

## 2021-02-09 RX ORDER — SODIUM CHLORIDE 0.9 % (FLUSH) 0.9 %
10 SYRINGE (ML) INJECTION PRN
Status: DISCONTINUED | OUTPATIENT
Start: 2021-02-09 | End: 2021-02-11 | Stop reason: HOSPADM

## 2021-02-09 RX ORDER — SODIUM CHLORIDE 0.9 % (FLUSH) 0.9 %
10 SYRINGE (ML) INJECTION EVERY 12 HOURS SCHEDULED
Status: DISCONTINUED | OUTPATIENT
Start: 2021-02-09 | End: 2021-02-11 | Stop reason: HOSPADM

## 2021-02-09 RX ORDER — AMIODARONE HYDROCHLORIDE 200 MG/1
200 TABLET ORAL 2 TIMES DAILY
Status: DISCONTINUED | OUTPATIENT
Start: 2021-02-09 | End: 2021-02-11 | Stop reason: HOSPADM

## 2021-02-09 RX ORDER — LISINOPRIL 10 MG/1
10 TABLET ORAL DAILY
Status: DISCONTINUED | OUTPATIENT
Start: 2021-02-10 | End: 2021-02-11 | Stop reason: HOSPADM

## 2021-02-09 RX ORDER — GLIPIZIDE 5 MG/1
2.5 TABLET ORAL
Status: DISCONTINUED | OUTPATIENT
Start: 2021-02-10 | End: 2021-02-11 | Stop reason: HOSPADM

## 2021-02-09 RX ORDER — ASPIRIN 81 MG/1
81 TABLET, CHEWABLE ORAL DAILY
Status: DISCONTINUED | OUTPATIENT
Start: 2021-02-10 | End: 2021-02-11 | Stop reason: HOSPADM

## 2021-02-09 RX ORDER — ACETAMINOPHEN 325 MG/1
650 TABLET ORAL EVERY 6 HOURS PRN
Status: DISCONTINUED | OUTPATIENT
Start: 2021-02-09 | End: 2021-02-11 | Stop reason: HOSPADM

## 2021-02-09 RX ORDER — CARVEDILOL 3.12 MG/1
3.12 TABLET ORAL 2 TIMES DAILY WITH MEALS
Status: DISCONTINUED | OUTPATIENT
Start: 2021-02-09 | End: 2021-02-11 | Stop reason: HOSPADM

## 2021-02-09 RX ORDER — ACETAMINOPHEN 650 MG/1
650 SUPPOSITORY RECTAL EVERY 6 HOURS PRN
Status: DISCONTINUED | OUTPATIENT
Start: 2021-02-09 | End: 2021-02-11 | Stop reason: HOSPADM

## 2021-02-09 RX ORDER — PRIMIDONE 50 MG/1
50 TABLET ORAL 2 TIMES DAILY
Status: DISCONTINUED | OUTPATIENT
Start: 2021-02-09 | End: 2021-02-11 | Stop reason: HOSPADM

## 2021-02-09 RX ORDER — NICOTINE POLACRILEX 4 MG
15 LOZENGE BUCCAL PRN
Status: DISCONTINUED | OUTPATIENT
Start: 2021-02-09 | End: 2021-02-11 | Stop reason: HOSPADM

## 2021-02-09 RX ORDER — NICOTINE 21 MG/24HR
1 PATCH, TRANSDERMAL 24 HOURS TRANSDERMAL DAILY
Status: DISCONTINUED | OUTPATIENT
Start: 2021-02-10 | End: 2021-02-11 | Stop reason: HOSPADM

## 2021-02-09 RX ADMIN — SODIUM CHLORIDE, PRESERVATIVE FREE 10 ML: 5 INJECTION INTRAVENOUS at 23:30

## 2021-02-09 RX ADMIN — LEVOFLOXACIN 750 MG: 5 INJECTION, SOLUTION INTRAVENOUS at 22:17

## 2021-02-09 RX ADMIN — CARVEDILOL 3.12 MG: 3.12 TABLET, FILM COATED ORAL at 23:29

## 2021-02-09 RX ADMIN — PRIMIDONE 50 MG: 50 TABLET ORAL at 23:29

## 2021-02-09 RX ADMIN — GABAPENTIN 300 MG: 300 CAPSULE ORAL at 23:29

## 2021-02-09 RX ADMIN — AMIODARONE HYDROCHLORIDE 200 MG: 200 TABLET ORAL at 23:29

## 2021-02-09 ASSESSMENT — PAIN SCALES - GENERAL
PAINLEVEL_OUTOF10: 0
PAINLEVEL_OUTOF10: 0

## 2021-02-09 ASSESSMENT — ENCOUNTER SYMPTOMS
VISUAL CHANGE: 0
BOWEL INCONTINENCE: 0
EYES NEGATIVE: 1
NAUSEA: 0
GASTROINTESTINAL NEGATIVE: 1
VOMITING: 0
ABDOMINAL PAIN: 0
RESPIRATORY NEGATIVE: 1

## 2021-02-09 NOTE — CARE COORDINATION
Ambulatory Care Coordination Note  CM Risk Score: 9  Charlson 10 Year Mortality Risk Score: 98%     ACC: Anival Mao RN    Summary Note: Call to pt for ed f/u and possible acm enrollment. Initial outreach. Pt was in ED on 2/7 for fall and weakness of extremities. Reports he has had a Hard time getting up and down. Has to use cane to get up. Left leg is real weak. Denies any other symptoms/concerns. Denies any s/s r/t covid. Wife doesn't drive. Reports hx of bad fall and torn muscle in l leg approx 5 years ago. Wife helps at times. Reports he has [address] 3 times in last couple weeks. Is able to walk with cane. Had home care in past [facility]. Pt is Interested in home care/physical therapy. Pt declines moving apptmt with pcp to sooner [facility] at this time as concerns over transpo. Discussed option of [facility] if needs arise prior to ov. Pt uses cts for transpo and offered acm assistance with transpo set up if needed. Will continue to follow. Ambulatory Care Coordination Assessment    Care Coordination Protocol  Program Enrollment: Rising Risk  Referral from Primary Care Provider: No  Week 1 - Initial Assessment     Do you have all of your prescriptions and are they filled?: Yes  Barriers to medication adherence: None  Are you able to afford your medications?: Yes  How often do you have trouble taking your medications the way you have been told to take them?: I always take them as prescribed. Current Housing: Private Residence                 Suggested Interventions and Community Resources                  Prior to Admission medications    Medication Sig Start Date End Date Taking? Authorizing Provider   gabapentin (NEURONTIN) 300 MG capsule Take 1 capsule by mouth 3 times daily for 30 days.  1/26/21 2/25/21  Arcelia Cowart MD   glipiZIDE (GLUCOTROL XL) 2.5 MG extended release tablet Take 1 tablet by mouth daily 1/15/21   Betty Negrete PA-C   carvedilol (COREG) 3.125 MG tablet TAKE 1 TABLET BY MOUTH 2 TIMES DAILY 1/11/21   Heladio Beckett MD   amiodarone (CORDARONE) 200 MG tablet TAKE 1 TABLET BY MOUTH 2 TIMES DAILY 1/11/21   Heladio Beckett MD   lisinopril (PRINIVIL;ZESTRIL) 10 MG tablet Take 1 tablet by mouth daily 12/8/20   Valentina Bills PA-C   primidone (MYSOLINE) 50 MG tablet Take 1 tablet by mouth 2 times daily 12/8/20   Valentina Bills PA-C   atorvastatin (LIPITOR) 40 MG tablet Take 1 tablet by mouth daily 10/14/20   Heladio Beckett MD   furosemide (LASIX) 20 MG tablet Take 1 tablet by mouth 2 times daily 8/18/20   Heladio Beckett MD   Blood Pressure Monitoring (BLOOD PRESSURE KIT) NASH Use as directed 8/12/20   Heladio Beckett MD   blood glucose test strips (TRUE METRIX BLOOD GLUCOSE TEST) strip 1 each by In Vitro route daily As needed. 8/7/20   Heladio Beckett MD   polyethylene glycol [facility]) 17 GM/SCOOP powder Take 17 g by mouth daily    Historical Provider, MD   blood glucose monitor kit and supplies Test 3 times a day & as needed for symptoms of irregular blood glucose.  3/9/20   Amandeep Martinez PA-C   aspirin 81 MG tablet Take 81 mg by mouth daily     Historical Provider, MD       Future Appointments   Date Time Provider Cristian Oakes   4/26/2021  1:00 PM Margaret Meckel, MD [facility]   ,   Diabetes Assessment        No patient-reported symptoms       and   General Assessment    Do you have any symptoms that are causing concern?: Yes  Progression since Onset: Unchanged  Reported Symptoms: Weakness

## 2021-02-09 NOTE — ED TRIAGE NOTES
Patient states was at home on the couch got up and fell landed on his left side and hit side of right neck small laceration present.  Denies loosing conscousness

## 2021-02-10 ENCOUNTER — APPOINTMENT (OUTPATIENT)
Dept: MRI IMAGING | Age: 76
DRG: 194 | End: 2021-02-10
Attending: INTERNAL MEDICINE
Payer: MEDICARE

## 2021-02-10 PROBLEM — J18.9 PNEUMONIA: Status: ACTIVE | Noted: 2021-02-10

## 2021-02-10 LAB
ANION GAP SERPL CALCULATED.3IONS-SCNC: 6 MMOL/L (ref 4–16)
BASOPHILS ABSOLUTE: 0 K/CU MM
BASOPHILS RELATIVE PERCENT: 0.2 % (ref 0–1)
BUN BLDV-MCNC: 18 MG/DL (ref 6–23)
CALCIUM SERPL-MCNC: 8.7 MG/DL (ref 8.3–10.6)
CHLORIDE BLD-SCNC: 100 MMOL/L (ref 99–110)
CO2: 29 MMOL/L (ref 21–32)
CREAT SERPL-MCNC: 0.8 MG/DL (ref 0.9–1.3)
DIFFERENTIAL TYPE: ABNORMAL
EOSINOPHILS ABSOLUTE: 0.1 K/CU MM
EOSINOPHILS RELATIVE PERCENT: 0.4 % (ref 0–3)
GFR AFRICAN AMERICAN: >60 ML/MIN/1.73M2
GFR NON-AFRICAN AMERICAN: >60 ML/MIN/1.73M2
GLUCOSE BLD-MCNC: 103 MG/DL (ref 70–99)
GLUCOSE BLD-MCNC: 114 MG/DL (ref 70–99)
GLUCOSE BLD-MCNC: 115 MG/DL (ref 70–99)
GLUCOSE BLD-MCNC: 126 MG/DL (ref 70–99)
GLUCOSE BLD-MCNC: 99 MG/DL (ref 70–99)
HCT VFR BLD CALC: 35.1 % (ref 42–52)
HEMOGLOBIN: 11.7 GM/DL (ref 13.5–18)
IMMATURE NEUTROPHIL %: 0.7 % (ref 0–0.43)
LACTATE: 0.8 MMOL/L (ref 0.4–2)
LYMPHOCYTES ABSOLUTE: 1.3 K/CU MM
LYMPHOCYTES RELATIVE PERCENT: 9.8 % (ref 24–44)
MCH RBC QN AUTO: 31.1 PG (ref 27–31)
MCHC RBC AUTO-ENTMCNC: 33.3 % (ref 32–36)
MCV RBC AUTO: 93.4 FL (ref 78–100)
MONOCYTES ABSOLUTE: 0.3 K/CU MM
MONOCYTES RELATIVE PERCENT: 2.3 % (ref 0–4)
PDW BLD-RTO: 15.4 % (ref 11.7–14.9)
PLATELET # BLD: 109 K/CU MM (ref 140–440)
PMV BLD AUTO: 10.4 FL (ref 7.5–11.1)
POTASSIUM SERPL-SCNC: 3.8 MMOL/L (ref 3.5–5.1)
PRO-BNP: 609.6 PG/ML
RBC # BLD: 3.76 M/CU MM (ref 4.6–6.2)
SARS-COV-2, NAAT: NOT DETECTED
SARS-COV-2: NORMAL
SEGMENTED NEUTROPHILS ABSOLUTE COUNT: 11.7 K/CU MM
SEGMENTED NEUTROPHILS RELATIVE PERCENT: 86.6 % (ref 36–66)
SODIUM BLD-SCNC: 135 MMOL/L (ref 135–145)
SOURCE: NORMAL
TOTAL IMMATURE NEUTOROPHIL: 0.09 K/CU MM
WBC # BLD: 13.5 K/CU MM (ref 4–10.5)

## 2021-02-10 PROCEDURE — 1200000000 HC SEMI PRIVATE

## 2021-02-10 PROCEDURE — 82962 GLUCOSE BLOOD TEST: CPT

## 2021-02-10 PROCEDURE — 6370000000 HC RX 637 (ALT 250 FOR IP): Performed by: NURSE PRACTITIONER

## 2021-02-10 PROCEDURE — 97162 PT EVAL MOD COMPLEX 30 MIN: CPT

## 2021-02-10 PROCEDURE — 6360000002 HC RX W HCPCS: Performed by: NURSE PRACTITIONER

## 2021-02-10 PROCEDURE — 97166 OT EVAL MOD COMPLEX 45 MIN: CPT

## 2021-02-10 PROCEDURE — 70551 MRI BRAIN STEM W/O DYE: CPT

## 2021-02-10 PROCEDURE — 97116 GAIT TRAINING THERAPY: CPT

## 2021-02-10 PROCEDURE — 94761 N-INVAS EAR/PLS OXIMETRY MLT: CPT

## 2021-02-10 PROCEDURE — 83880 ASSAY OF NATRIURETIC PEPTIDE: CPT

## 2021-02-10 PROCEDURE — 85025 COMPLETE CBC W/AUTO DIFF WBC: CPT

## 2021-02-10 PROCEDURE — 2580000003 HC RX 258: Performed by: NURSE PRACTITIONER

## 2021-02-10 PROCEDURE — 94640 AIRWAY INHALATION TREATMENT: CPT

## 2021-02-10 PROCEDURE — 36415 COLL VENOUS BLD VENIPUNCTURE: CPT

## 2021-02-10 PROCEDURE — 80048 BASIC METABOLIC PNL TOTAL CA: CPT

## 2021-02-10 PROCEDURE — 2700000000 HC OXYGEN THERAPY PER DAY

## 2021-02-10 PROCEDURE — 83605 ASSAY OF LACTIC ACID: CPT

## 2021-02-10 PROCEDURE — 93010 ELECTROCARDIOGRAM REPORT: CPT | Performed by: INTERNAL MEDICINE

## 2021-02-10 PROCEDURE — 6360000002 HC RX W HCPCS: Performed by: EMERGENCY MEDICINE

## 2021-02-10 RX ORDER — IPRATROPIUM BROMIDE AND ALBUTEROL SULFATE 2.5; .5 MG/3ML; MG/3ML
1 SOLUTION RESPIRATORY (INHALATION) EVERY 4 HOURS PRN
Status: DISCONTINUED | OUTPATIENT
Start: 2021-02-10 | End: 2021-02-11 | Stop reason: HOSPADM

## 2021-02-10 RX ORDER — SODIUM CHLORIDE 9 MG/ML
INJECTION, SOLUTION INTRAVENOUS CONTINUOUS
Status: DISCONTINUED | OUTPATIENT
Start: 2021-02-10 | End: 2021-02-11 | Stop reason: HOSPADM

## 2021-02-10 RX ADMIN — PRIMIDONE 50 MG: 50 TABLET ORAL at 20:23

## 2021-02-10 RX ADMIN — GABAPENTIN 300 MG: 300 CAPSULE ORAL at 08:21

## 2021-02-10 RX ADMIN — ACETAMINOPHEN 650 MG: 325 TABLET ORAL at 20:23

## 2021-02-10 RX ADMIN — GABAPENTIN 300 MG: 300 CAPSULE ORAL at 13:36

## 2021-02-10 RX ADMIN — GLIPIZIDE 2.5 MG: 5 TABLET ORAL at 08:29

## 2021-02-10 RX ADMIN — PRIMIDONE 50 MG: 50 TABLET ORAL at 08:19

## 2021-02-10 RX ADMIN — ASPIRIN 81 MG: 81 TABLET, CHEWABLE ORAL at 08:19

## 2021-02-10 RX ADMIN — ATORVASTATIN CALCIUM 40 MG: 40 TABLET, FILM COATED ORAL at 08:21

## 2021-02-10 RX ADMIN — AMIODARONE HYDROCHLORIDE 200 MG: 200 TABLET ORAL at 20:24

## 2021-02-10 RX ADMIN — SODIUM CHLORIDE: 9 INJECTION, SOLUTION INTRAVENOUS at 02:52

## 2021-02-10 RX ADMIN — AMIODARONE HYDROCHLORIDE 200 MG: 200 TABLET ORAL at 08:20

## 2021-02-10 RX ADMIN — LISINOPRIL 10 MG: 10 TABLET ORAL at 08:20

## 2021-02-10 RX ADMIN — CARVEDILOL 3.12 MG: 3.12 TABLET, FILM COATED ORAL at 08:20

## 2021-02-10 RX ADMIN — GABAPENTIN 300 MG: 300 CAPSULE ORAL at 20:24

## 2021-02-10 RX ADMIN — LEVOFLOXACIN 750 MG: 5 INJECTION, SOLUTION INTRAVENOUS at 18:30

## 2021-02-10 RX ADMIN — ENOXAPARIN SODIUM 30 MG: 30 INJECTION SUBCUTANEOUS at 08:22

## 2021-02-10 RX ADMIN — CEFTRIAXONE SODIUM 1000 MG: 1 INJECTION, POWDER, FOR SOLUTION INTRAMUSCULAR; INTRAVENOUS at 06:36

## 2021-02-10 RX ADMIN — IPRATROPIUM BROMIDE AND ALBUTEROL SULFATE 1 AMPULE: .5; 3 SOLUTION RESPIRATORY (INHALATION) at 19:19

## 2021-02-10 RX ADMIN — SODIUM CHLORIDE: 9 INJECTION, SOLUTION INTRAVENOUS at 18:22

## 2021-02-10 ASSESSMENT — PAIN DESCRIPTION - PAIN TYPE
TYPE: ACUTE PAIN
TYPE: ACUTE PAIN

## 2021-02-10 ASSESSMENT — PAIN DESCRIPTION - LOCATION
LOCATION: BACK

## 2021-02-10 ASSESSMENT — PAIN SCALES - GENERAL
PAINLEVEL_OUTOF10: 5
PAINLEVEL_OUTOF10: 0

## 2021-02-10 ASSESSMENT — PAIN DESCRIPTION - FREQUENCY: FREQUENCY: INTERMITTENT

## 2021-02-10 NOTE — PLAN OF CARE
Problem: Falls - Risk of:  Goal: Will remain free from falls  Description: Will remain free from falls  2/10/2021 1151 by Olinda Brooks  Outcome: Ongoing  2/10/2021 0046 by Minh Crowell RN  Outcome: Ongoing  Goal: Absence of physical injury  Description: Absence of physical injury  2/10/2021 1151 by Olinda Brooks  Outcome: Ongoing  2/10/2021 0046 by Minh Crowell RN  Outcome: Ongoing     Problem: Discharge Planning:  Goal: Discharged to appropriate level of care  Description: Discharged to appropriate level of care  2/10/2021 1151 by Olinda Brooks  Outcome: Ongoing  2/10/2021 0046 by Minh Crowell RN  Outcome: Ongoing  Goal: Participates in care planning  Description: Participates in care planning  2/10/2021 1151 by Olinda Brooks  Outcome: Ongoing  2/10/2021 0046 by Minh Crowell RN  Outcome: Ongoing     Problem: Serum Glucose Level - Abnormal:  Goal: Ability to maintain appropriate glucose levels will improve  Description: Ability to maintain appropriate glucose levels will improve  2/10/2021 1151 by Olinda Brooks  Outcome: Ongoing  2/10/2021 0046 by Minh Crowell RN  Outcome: Ongoing     Problem: Sensory Perception - Impaired:  Goal: Ability to maintain a stable neurologic state will improve  Description: Ability to maintain a stable neurologic state will improve  2/10/2021 1151 by Olinda Brooks  Outcome: Ongoing  2/10/2021 0046 by Minh Crowell RN  Outcome: Ongoing     Problem: Airway Clearance - Ineffective:  Goal: Clear lung sounds  Description: Clear lung sounds  2/10/2021 1151 by Olinda Brooks  Outcome: Ongoing  2/10/2021 0046 by Minh Crowell RN  Outcome: Ongoing  Goal: Ability to maintain a clear airway will improve  Description: Ability to maintain a clear airway will improve  2/10/2021 1151 by Olinda Brooks  Outcome: Ongoing  2/10/2021 0046 by Minh Crowell RN  Outcome: Ongoing     Problem: Fluid Volume - Deficit: Goal: Achieves intake and output within specified parameters  Description: Achieves intake and output within specified parameters  2/10/2021 1151 by Dedrick Lion  Outcome: Ongoing  2/10/2021 0046 by Violetta Traylor RN  Outcome: Ongoing     Problem: Tobacco Use:  Goal: Will participate in inpatient tobacco-use cessation counseling  Description: Will participate in inpatient tobacco-use cessation counseling  2/10/2021 1151 by Dedrick Lion  Outcome: Ongoing  2/10/2021 0046 by Violetta Traylor RN  Outcome: Ongoing     Problem: Coping:  Goal: Participation in decision-making will improve  Description: Demonstration of participation in decision-making regarding own care will improve  2/10/2021 1151 by Dedrick Lion  Outcome: Ongoing  2/10/2021 0046 by Violetta Traylor RN  Outcome: Ongoing  Goal: Verbalizations of comfort with decisions will increase  Description: Verbalizations of comfort with decisions will increase  2/10/2021 1151 by Dedrick Lion  Outcome: Ongoing  2/10/2021 0046 by Violetta Traylor RN  Outcome: Ongoing     Problem: Health Behavior:  Goal: Ability to identify and utilize available resources and services will improve  Description: Ability to identify and utilize available resources and services will improve  2/10/2021 1151 by Dedrick Lion  Outcome: Ongoing  2/10/2021 0046 by Violetta Traylor RN  Outcome: Ongoing     Problem: Skin Integrity:  Goal: Will show no infection signs and symptoms  Description: Will show no infection signs and symptoms  Outcome: Ongoing  Goal: Absence of new skin breakdown  Description: Absence of new skin breakdown  Outcome: Ongoing

## 2021-02-10 NOTE — PROGRESS NOTES
Physical Therapy    Facility/Department: [facility] UNIT  Initial Assessment    NAME: Rosendo Prado  : 1945  MRN: [id number]    Date of Service: 2/10/2021    Discharge Recommendations:  Subacute/Skilled Nursing Facility(recommend swing bed)        Assessment   Body structures, Functions, Activity limitations: Decreased high-level IADLs;Decreased endurance;Decreased strength  Assessment: patient is a 71 yo male admitted to [facility] due to falling @ home; patient would benefit from skilled PT inteventions include admission to swing bed  in order to appropriately address deficits with balance, strength, functional mobility and endurance; once the deficits are adddress, patient has the potential to return to his home and his PLOF  Treatment Diagnosis: impaired mobility, Hx of falls  Prognosis: Good  Decision Making: Medium Complexity  History: Pf- Hx of falls  Exam: gait/ balance  Clinical Presentation: changing characteristics of function due to weakness/unsteadiness on feet  Barriers to Learning: none  REQUIRES PT FOLLOW UP: Yes  Activity Tolerance  Activity Tolerance: Patient limited by fatigue;Patient limited by endurance       Patient Diagnosis(es): The primary encounter diagnosis was Fall, initial encounter. Diagnoses of Multiple contusions, Pneumonia of both lower lobes due to infectious organism, Gait instability, and General weakness were also pertinent to this visit. has a past medical history of AAA (abdominal aortic aneurysm) (HCC), Basal cell carcinoma of nose, CAD (coronary artery disease), Constipation, echocardiogram, Essential hypertension, History of alcohol abuse, Hyperlipidemia, Hypertension, Missing teeth, acquired, Non-alcoholic fatty liver disease, Osteoarthritis, Spinal stenosis, Tremor, Type 2 diabetes mellitus without complication ([facility] [address] and Wears glasses. has a past surgical history that includes sinus surgery (2529'H); other surgical history (12/09/2016); other surgical history (01/2020); Coronary artery bypass graft (N/A, 3/4/2020); knee surgery (2015); Carpal tunnel release (Right, 1970's); [place] tooth extraction; and Cardiac catheterization (02/26/2020).     Restrictions  Restrictions/Precautions  Restrictions/Precautions: Fall Risk, General Precautions  Vision/Hearing  Vision: Impaired  Vision Exceptions: Wears glasses for reading  Hearing: Within functional limits     Subjective  General  Chart Reviewed: Yes  Patient assessed for rehabilitation services?: Yes  Family / Caregiver Present: No  Follows Commands: Within Functional Limits     Pre Treatment Pain Screening  Pain at present: 5  Scale Used: Numeric Score  Intervention List: Patient able to continue with treatment    Orientation  Orientation  Overall Orientation Status: Within Normal Limits  Social/Functional History  Social/Functional History  Lives With: Spouse  Type of Home: House  Home Layout: One level  Home Access: Stairs to enter with rails  Entrance Stairs - Number of Steps: 2  Bathroom Shower/Tub: Walk-in shower, Shower chair without back  Bathroom Toilet: Standard  Bathroom Equipment: Grab bars in shower, Hand-held shower  Home Equipment: 4 wheeled walker, [place] [employer] Help From: Family(wife)  ADL Assistance: Independent  Homemaking Assistance: Independent  Homemaking Responsibilities: Yes  Ambulation Assistance: Needs assistance(4WW)  Transfer Assistance: Independent  Active : Yes  Mode of Transportation: Car  Occupation: Retired  Cognition        Objective     Observation/Palpation  Posture: Fair  Observation: in bed - tele, O2 per nc    AROM RLE (degrees)  RLE AROM: WFL  AROM LLE (degrees)  LLE AROM : WFL  Strength RLE  Comment: grossly 4/5  Strength LLE  Comment: grossly 4/5        Bed mobility  Rolling to Left: Minimal assistance  Rolling to Right: Minimal assistance Supine to Sit: Moderate assistance  Transfers  Sit to Stand: Minimal Assistance  Stand to sit: Moderate Assistance  Ambulation  Ambulation?: Yes  Ambulation 1  Device: Rolling Walker  Other Apparatus: O2  Assistance: Minimal assistance  Gait Deviations: Slow Shakila  Distance: 5 ft     Balance  Sitting - Static: Fair;+  Sitting - Dynamic: Fair  Standing - Static: Poor;+  Standing - Dynamic: Poor        Plan   Plan  Times per week: 4+  Current Treatment Recommendations: Strengthening, Gait Training, ADL/Self-care Training, IADL Training, Balance Training, Functional Mobility Training, Transfer Training, Endurance Training  Safety Devices  Type of devices: Gait belt, Left in chair, Call light within reach, Chair alarm in place    G-Code       OutComes Score                                                  AM-PAC Score             Goals  Short term goals  Time Frame for Short term goals: 1 week  Short term goal 1: patient will safely  perform bed mobility activities with no greater CGA with HOB flat and no HR  Short term goal 2: patient will demonstrate the ability to safely transfer sit to stands and stand to sit from 2 different height surfaces with no greater than SBA  Short term goal 3: patient will demonstrate the ability to safely ambulate 150 ft with the least restrictive AD and no greater than SBA while wearing O2 per nc if necessary  Short term goal 4: patient will demonstrate the ability to safely stand for 5 consecutive minutes  while performing dynamic  balance activities with CGA and more than 1 hand for ssupport       Therapy Time   Individual Concurrent Group Co-treatment   Time In 0915         Time Out 0940         Minutes 25         Timed Code Treatment Minutes: 10 Minutes       GREER Chin, PT

## 2021-02-10 NOTE — H&P
History and Physical      Name:  Rosalie Mckenna /Age/Sex: 1945  (16 y.o. male)   MRN & CSN:  [id number] & [id number] Admission Date/Time: 2021  6:18 PM   Location:   PCP: Bhakti Hawley MD       Rosalie Mckenna is a 70 y.o.  male  who presents with Fall      Assessment and Plan:     1. Fall at home      Did not loose consciousness    Some abrasions on both knees    Head and neck CT W/O contrast    Impression:  No acute intracranial abnormality. Impression:  No acute abnormality of the cervical spine. Fall precaution, PT/OT consult  GI and DVT prophylaxis    2. Pneumonia due to Unknown organism      Weakness,WBC15.4High K/CU MM hypoxia  Lactate 2.7High Panic mMOL/L    SARS-CoV-2, NAATNOT DETECTED    Chest X-ray   Bilateral pulmonary opacities in the mid lung fields and lower lung fields   which may represent infiltrates. Pulmonary nodules cannot be excluded     Culture, fluid, antibiotics (Rocephin)      3. Diabetes mellitus Type II       POC [id number] MG/DL        POCT glucose, hypoglycemic protocol       Home  Medications, sliding scale insulin with meal coverage    4. Hyponatremia        [id number] MMOL/L       Replete sodium    5. Other health Concerns     Tremor, Osteoarthritis, Hypertension,spinal stenosis, and CAD      Medications:   Medications:    levofloxacin  750 mg Intravenous Q24H      Infusions:   PRN Meds:      Current Facility-Administered Medications:     levoFLOXacin (LEVAQUIN) 750 MG/150ML infusion 750 mg, 750 mg, Intravenous, Q24H, Sidney Henry DO    Current Outpatient Medications:     gabapentin (NEURONTIN) 300 MG capsule, Take 1 capsule by mouth 3 times daily for 30 days. , Disp: 90 capsule, Rfl: 0    carvedilol (COREG) 3.125 MG tablet, TAKE 1 TABLET BY MOUTH 2 TIMES DAILY, Disp: 60 tablet, Rfl: 3    amiodarone (CORDARONE) 200 MG tablet, TAKE 1 TABLET BY MOUTH 2 TIMES DAILY, Disp: 60 tablet, Rfl: 3   primidone (MYSOLINE) 50 MG tablet, Take 1 tablet by mouth 2 times daily, Disp: 60 tablet, Rfl: 2    furosemide (LASIX) 20 MG tablet, Take 1 tablet by mouth 2 times daily, Disp: 60 tablet, Rfl: 2    Blood Pressure Monitoring (BLOOD PRESSURE KIT) NASH, Use as directed, Disp: 1 Device, Rfl: 0    polyethylene glycol (GLYCOLAX) 17 GM/SCOOP powder, Take 17 g by mouth daily, Disp: , Rfl:     blood glucose monitor kit and supplies, Test 3 times a day & as needed for symptoms of irregular blood glucose., Disp: 1 kit, Rfl: 0    aspirin 81 MG tablet, Take by mouth, Disp: , Rfl:     glipiZIDE (GLUCOTROL XL) 2.5 MG extended release tablet, Take 1 tablet by mouth daily, Disp: 90 tablet, Rfl: 2    lisinopril (PRINIVIL;ZESTRIL) 10 MG tablet, Take 1 tablet by mouth daily, Disp: 90 tablet, Rfl: 1    atorvastatin (LIPITOR) 40 MG tablet, Take 1 tablet by mouth daily, Disp: 90 tablet, Rfl: 1    blood glucose test strips (TRUE METRIX BLOOD GLUCOSE TEST) strip, 1 each by In Vitro route daily As needed. , Disp: 100 each, Rfl: 5    History of present illness     Chief Complaint: Arcenio Jara is a 70 y.o.  male  With PMH that include CAD, AAA,Hypertension, nicotine abuse and osteoarthritis. He  presents  Following a fall at home. according to patient, he has had two falls today from standing positing. He denied loosing consciousness. He denies headache, chest and abdominal pain, endorsed generalized body weakness and reduced oral intake. Patient lives with his wife who is not able to take care about him. , He will like to regain some strength so he will not be falling at home. Review of Systems     GENERAL: Fatigue, chills, night sweats, or changes in weight. EYES:  Denies recent visual changes. ENT:  Denies ear pain, hearing loss or tinnitus  RESP:  Denies any cough, dyspnea, or wheezing. CV:  Denies any chest pain with exertion or at rest, palpitations, syncope, or edema. GI:  Denies any dysphagia, nausea, vomiting, abdominal pain, heartburn, changes in bowel habit, melena or rectal bleeding  Endorsed knee joint swelling, joint pain, denies  loss of range of motion. NEURO:  Denies any headaches, tremors, dizziness, vertigo, memory loss, confusion, weakness, numbness or tingling. PSYCH:  Denies any sleeping problems, history of abuse,   HEME/LYMPHATIC/IMMUNO:  Denies , bruising, bleeding abnormalities   ENDO:  Denies any heat or cold intolerance, panemiaolyuria or polydipsia. Objective:   No intake or output data in the 24 hours ending 02/09/21 2145   Vitals:   Vitals:    02/09/21 1842   BP:    Pulse: 78   Resp:    Temp:    SpO2:      Physical Exam:     General :  awake, alert, cooperative, no acute  distress  EYES:Lids and lashes normal, pupils equal, round ,extra ocular muscles intact, sclera clear, conjunctiva normal  ENT:  Normocephalic, oral pharynx with moist mucus membranes  NECK:  Supple, symmetrical, trachea midline, no wheezing noted. CARDIOVASCULAR:  regular rate and rhythm, normal S1 and S2,no murmur noted, peripheral pulses 2+, no pitting edema  ABDOMEN: Normal BS, Non tender, non distended, no HSM noted. MUSCULOSKELETAL:  ROM of all extremities grossly wnl  NEUROLOGIC: AOx 3,  Cranial nerves II-XII are grossly intact. Motor is 5 out of 5 bilaterally. Sensory is intact, no lateralizing findings. SKIN, normal skin color, turgor, no redness, warmth,      Past Medical History:      Past Medical History:   Diagnosis Date    AAA (abdominal aortic aneurysm) ([facility] [address] 2018    Infrarenal    Basal cell carcinoma of nose 2016    Dr Armando Bach CAD (coronary artery disease)     Dr. Escobar Sin - severe aortic stenosis with a bicuspid aortic valve    Constipation     echocardiogram 10/29/2020    EF 55-60% normally functioning valve in aortic position, mild mitral regurg with two jets mild pulm htn.      Essential hypertension     History of alcohol abuse     Hyperlipidemia  Hypertension     Follows with PCP    Missing teeth, acquired     Non-alcoholic fatty liver disease     Osteoarthritis     Knees, lower back, shoulders,    Spinal stenosis     had epidural steroid injection 1/8/2020 - lumbar    Tremor     Left arm only    Type 2 diabetes mellitus without complication ([facility] [address]     Controlling with diet    Wears glasses      PSHX:  has a past surgical history that includes sinus surgery (3060'G); other surgical history (12/09/2016); other surgical history (01/2020); Coronary artery bypass graft (N/A, 3/4/2020); knee surgery (2015); Carpal tunnel release (Right, 1970's); [place] tooth extraction; and Cardiac catheterization (02/26/2020). Allergies: Allergies   Allergen Reactions    Metformin And Related Diarrhea     Severe diarrhea, abd pain, and nausea    Amoxicillin Diarrhea    Other Nausea And Vomiting     ANTI-INFLAMMATORIES  Severe stomach pain--diarrhea  hypertension       FAM HX: family history includes Heart Disease in his father; High Blood Pressure in his father; High Cholesterol in his father; Reese Fudge in his father and mother.       Soc HX:   Social History     Socioeconomic History    Marital status:      Spouse name: None    Number of children: None    Years of education: None    Highest education level: None   Occupational History    None   Social Needs    Financial resource strain: None    Food insecurity     Worry: None     Inability: None    Transportation needs     Medical: None     Non-medical: None   Tobacco Use    Smoking status: Current Every Day Smoker     Packs/day: 1.50     Years: 58.00     Pack years: 87.00     Types: Cigarettes     Start date: 1962    Smokeless tobacco: Never Used   Substance and Sexual Activity    Alcohol use: Not Currently     Comment: 1 glass wine a month    Drug use: No    Sexual activity: Not Currently   Lifestyle    Physical activity     Days per week: None     Minutes per session: None  Stress: None   Relationships    Social connections     Talks on phone: None     Gets together: None     Attends Buddhism service: None     Active member of club or organization: None     Attends meetings of clubs or organizations: None     Relationship status: None    Intimate partner violence     Fear of current or ex partner: None     Emotionally abused: None     Physically abused: None     Forced sexual activity: None   Other Topics Concern    None   Social History Narrative    None       Electronically signed by MADDY Connelly CNP on 2/9/2021 at 9:45 PM

## 2021-02-10 NOTE — PLAN OF CARE
Problem: Falls - Risk of:  Goal: Will remain free from falls  Description: Will remain free from falls  Outcome: Ongoing  Goal: Absence of physical injury  Description: Absence of physical injury  Outcome: Ongoing     Problem: Discharge Planning:  Goal: Discharged to appropriate level of care  Description: Discharged to appropriate level of care  Outcome: Ongoing  Goal: Participates in care planning  Description: Participates in care planning  Outcome: Ongoing     Problem: Serum Glucose Level - Abnormal:  Goal: Ability to maintain appropriate glucose levels will improve  Description: Ability to maintain appropriate glucose levels will improve  Outcome: Ongoing     Problem: Sensory Perception - Impaired:  Goal: Ability to maintain a stable neurologic state will improve  Description: Ability to maintain a stable neurologic state will improve  Outcome: Ongoing     Problem: Airway Clearance - Ineffective:  Goal: Clear lung sounds  Description: Clear lung sounds  Outcome: Ongoing  Goal: Ability to maintain a clear airway will improve  Description: Ability to maintain a clear airway will improve  Outcome: Ongoing     Problem: Fluid Volume - Deficit:  Goal: Achieves intake and output within specified parameters  Description: Achieves intake and output within specified parameters  Outcome: Ongoing     Problem: Tobacco Use:  Goal: Will participate in inpatient tobacco-use cessation counseling  Description: Will participate in inpatient tobacco-use cessation counseling  Outcome: Ongoing     Problem: Coping:  Goal: Participation in decision-making will improve  Description: Demonstration of participation in decision-making regarding own care will improve  Outcome: Ongoing  Goal: Verbalizations of comfort with decisions will increase  Description: Verbalizations of comfort with decisions will increase  Outcome: Ongoing     Problem: Health Behavior: Goal: Ability to identify and utilize available resources and services will improve  Description: Ability to identify and utilize available resources and services will improve  Outcome: Ongoing

## 2021-02-10 NOTE — PROGRESS NOTES
Hospitalist Progress Note       Vipin Mccarthy M.D.  2/10/2021 7:27 AM  Admit Date: 2/9/2021    PCP: Manuel Garcia MD     Assessment and Plan:   1. Fall at home      Did not loose consciousness    Some abrasions on both knees    Head and neck CT W/O contrast    Impression:  No acute intracranial abnormality. Impression:  No acute abnormality of the cervical spine. Fall precaution, PT/OT consult  GI and DVT prophylaxis  MRI brain WO contrast     2. Pneumonia due to Unknown organism      Weakness,WBC15.4High K/CU MM hypoxia  Lactate 2.7High Panic mMOL/L    SARS-CoV-2, NAATNOT DETECTED    Chest X-ray   Bilateral pulmonary opacities in the mid lung fields and lower lung fields   which may represent infiltrates.  Pulmonary nodules cannot be excluded     Culture, fluid, antibiotics (Rocephin)        3. Diabetes mellitus Type II       POC [id number] MG/DL        POCT glucose, hypoglycemic protocol       Home  Medications, sliding scale insulin with meal coverage     4.   Hyponatremia        [id number] MMOL/L       Replete sodium     5. Other health Concerns     Tremor, Osteoarthritis, Hypertension,spinal stenosis, and CAD         Patient Active Problem List:     Abdominal aortic aneurysm (AAA) without rupture (HCC)     Tobacco abuse     Essential hypertension     Smoker     Non-alcoholic fatty liver disease     Basal cell carcinoma of nose     Type 2 diabetes mellitus without complication, without long-term current use of insulin (HCC)     Other hyperlipidemia     Nonrheumatic aortic valve stenosis     CAD in native artery     Iliac artery aneurysm, right (HCC)     Other constipation     Spondylosis of lumbosacral region     Status post AAA (abdominal aortic aneurysm) repair     AAA (abdominal aortic aneurysm) (HCC)     Liver nodule     Thrombocytopenia (HCC)     Tremor, physiological     Pulmonary nodule     Fall at home, initial encounter     Type 2 diabetes mellitus ([facility] [address]      Subjective: Chief Complaint   Patient presents with   [facility] Fall       F/U:  Interval History: Patient states that after he had his bypass he felt he was doing really well with PT at home, then after that he began to become weaker. He denied any major event associated with cause of weakness he states he just cannot moves his arms. Objective: Intake/Output Summary (Last 24 hours) at 2/10/2021 0727  Last data filed at 2/10/2021 9487  Gross per 24 hour   Intake    Output 300 ml   Net -300 ml      Vitals:   Vitals:    02/10/21 0717   BP: 132/63   Pulse: 75   Resp: 16   Temp: 99.1 °F (37.3 °C)   SpO2: 92%     Physical Exam:  Gen:  awake, alert, cooperative, no apparent distress, EYES:  Lids and lashes normal, pupils equal, round and reactive to light, extra ocular muscles intact, sclera clear, conjunctiva normal  ENT:  Normocephalic, oral pharynx with moist mucus membranes, tonsils without erythema or exudates,  NECK:  Supple, symmetrical, trachea midline, no adenopathy,  LUNGS:  Clear to auscultate bilaterally, no rales ronchi or wheezing noted. CARDIOVASCULAR:  regular rate and rhythm, normal S1 and S2, no S3 or S4, and no murmur noted  ABDOMEN: Normal BS, Non tender, non distended, no HSM noted. MUSCULOSKELETAL:  ROM of all extremities grossly wnl  NEUROLOGIC: AOx 3,  Cranial nerves II-XII are grossly intact.   decrased strength against resistance  SKIN:  no bruising or bleeding, normal skin color, texture, turgor and no redness, warmth, or swelling    Xr Elbow Left (min 3 Views)    Result Date: 2/6/2021 EXAMINATION: THREE XRAY VIEWS OF THE LEFT ELBOW 2/6/2021 5:38 pm COMPARISON: None. HISTORY: ORDERING SYSTEM PROVIDED HISTORY: fall TECHNOLOGIST PROVIDED HISTORY: Reason for exam:->fall Reason for Exam: left elbow pain Acuity: Acute Type of Exam: Initial Mechanism of Injury: fall FINDINGS: No fracture, dislocation, or joint effusion. Moderate enthesophyte at the triceps insertion. The joint spaces are preserved. The soft tissues are unremarkable. No acute osseous abnormality. Xr Elbow Right (min 3 Views)    Result Date: 2/6/2021  EXAMINATION: THREE XRAY VIEWS OF THE RIGHT ELBOW 2/6/2021 5:38 pm COMPARISON: None. HISTORY: ORDERING SYSTEM PROVIDED HISTORY: fall TECHNOLOGIST PROVIDED HISTORY: Reason for exam:->fall Reason for Exam: right elbow pain Acuity: Acute Type of Exam: Initial Mechanism of Injury: fall FINDINGS: A peripheral venous catheter is present in the antecubital fossa. No joint effusion. No fracture or dislocation identified. The soft tissues are unremarkable. No acute osseous abnormality. Xr Knee Right (3 Views)    Result Date: 2/6/2021  EXAMINATION: THREE XRAY VIEWS OF THE RIGHT KNEE 2/6/2021 5:39 pm COMPARISON: None. HISTORY: ORDERING SYSTEM PROVIDED HISTORY: fall TECHNOLOGIST PROVIDED HISTORY: Reason for exam:->fall Reason for Exam: right knee pain Acuity: Acute Type of Exam: Initial Mechanism of Injury: fall FINDINGS: No fracture, dislocation, or joint effusion. Mild medial compartment degenerative changes. Atherosclerotic vascular calcifications. No acute osseous abnormality.      Ct Head Wo Contrast    Result Date: 2/9/2021 EXAMINATION: CT OF THE HEAD WITHOUT CONTRAST  2/9/2021 7:26 pm TECHNIQUE: CT of the head was performed without the administration of intravenous contrast. Dose modulation, iterative reconstruction, and/or weight based adjustment of the mA/kV was utilized to reduce the radiation dose to as low as reasonably achievable. COMPARISON: None. HISTORY: ORDERING SYSTEM PROVIDED HISTORY: head pain TECHNOLOGIST PROVIDED HISTORY: Has a \"code stroke\" or \"stroke alert\" been called? ->No Reason for exam:->head pain Reason for Exam: Trauma, fall Acuity: Acute Type of Exam: Initial Mechanism of Injury: Trauma, fall FINDINGS: BRAIN/VENTRICLES: There is no acute intracranial hemorrhage, mass effect or midline shift. No abnormal extra-axial fluid collection. The gray-white differentiation is maintained without evidence of an acute infarct. Hypoattenuation of the periventricular and subcortical white matter is suggestive of chronic small vessel ischemic disease. Mild diffuse parenchymal volume loss is noted. There is no evidence of hydrocephalus. ORBITS: The visualized portion of the orbits demonstrate no acute abnormality. SINUSES: Is a small polyp versus retention cyst in the right sphenoid sinus. Other visualized paranasal sinuses and mastoid air cells are clear. SOFT TISSUES/SKULL:  No acute abnormality of the visualized skull or soft tissues. No acute intracranial abnormality.      Ct Cervical Spine Wo Contrast    Result Date: 2/9/2021 EXAMINATION: CT OF THE CERVICAL SPINE WITHOUT CONTRAST 2/9/2021 7:27 pm TECHNIQUE: CT of the cervical spine was performed without the administration of intravenous contrast. Multiplanar reformatted images are provided for review. Dose modulation, iterative reconstruction, and/or weight based adjustment of the mA/kV was utilized to reduce the radiation dose to as low as reasonably achievable. COMPARISON: None. HISTORY: ORDERING SYSTEM PROVIDED HISTORY: pain TECHNOLOGIST PROVIDED HISTORY: Reason for exam:->pain Reason for Exam: Trauma, fall Acuity: Acute Type of Exam: Initial Mechanism of Injury: Trauma, fall FINDINGS: BONES/ALIGNMENT: There is no acute fracture or traumatic malalignment. DEGENERATIVE CHANGES: Multilevel cervical spondylosis noted, most prominent at C5-C6 SOFT TISSUES: There is no prevertebral soft tissue swelling. No apical pneumothorax. Ground-glass densities in bilateral upper lobes, due to multifocal pneumonia (such as COVID-19), contusion, or aspiration. No acute abnormality of the cervical spine. RECOMMENDATIONS: Ground-glass densities in the bilateral upper lobes, due to multifocal pneumonia (such as COVID-19), contusion, or aspiration.      Ct Lumbar Spine Wo Contrast    Result Date: 2/6/2021 EXAMINATION: CT OF THE LUMBAR SPINE WITHOUT CONTRAST  2/6/2021 TECHNIQUE: CT of the lumbar spine was performed without the administration of intravenous contrast. Multiplanar reformatted images are provided for review. Dose modulation, iterative reconstruction, and/or weight based adjustment of the mA/kV was utilized to reduce the radiation dose to as low as reasonably achievable. COMPARISON: August 10, 2020. HISTORY: ORDERING SYSTEM PROVIDED HISTORY: fall TECHNOLOGIST PROVIDED HISTORY: Reason for exam:->fall Decision Support Exception->Emergency Medical Condition (MA) Reason for Exam: low back pain Acuity: Acute Type of Exam: Initial Mechanism of Injury: fall Relevant Medical/Surgical History: hx of falls FINDINGS: BONES/ALIGNMENT: There is normal alignment of the spine. The vertebral body heights are maintained. No osseous destructive lesion is seen. DEGENERATIVE CHANGES: No significant degenerative changes of the lumbar spine. SOFT TISSUES/RETROPERITONEUM: Stable appearance to aortic stent graft. No acute fracture no change in alignment compared to prior study. Conformity Chest Portable    Result Date: 2/9/2021  EXAMINATION: ONE XRAY VIEW OF THE CHEST 2/9/2021 7:39 pm COMPARISON: 09/01/2020 HISTORY: ORDERING SYSTEM PROVIDED HISTORY: chest pain TECHNOLOGIST PROVIDED HISTORY: Reason for exam:->chest pain Reason for Exam: Chest pain Acuity: Acute Type of Exam: Initial Additional signs and symptoms: Chest pain Initial evaluation FINDINGS: The patient has had a median sternotomy. Monitor wires overlie the chest. The trachea is midline. The cardiac silhouette is unremarkable. There are bilateral pulmonary airspace changes that are nonspecific and could represent infiltrates. Pulmonary nodules cannot be entirely excluded. There is no pleural fluid. Follow up to resolution is suggested. CT could better evaluate lung parenchyma if indicated. Bilateral pulmonary opacities in the mid lung fields and lower lung fields which may represent infiltrates. Pulmonary nodules cannot be excluded. Follow up to resolution is suggested. CT could better evaluate lung parenchyma if indicated. -    DATA:    CBC   Recent Labs     02/09/21  1948 02/10/21  0200   WBC 15.4* 13.5*   HGB 13.8 11.7*   HCT 41.0* 35.1*   * 109*      BMP   Recent Labs     02/09/21  1948 02/10/21  0200   * 135   K 4.6 3.8   CL 97* 100   CO2 26 29   BUN 20 18   CREATININE 0.8* 0.8*     LFT'S   Recent Labs     02/09/21 1948   AST 41*   ALT 51*   BILITOT 0.6   ALKPHOS 122     COAG No results for input(s): INR in the last 72 hours. CARDIAC ENZYMES  No results for input(s): CKTOTAL, CKMB, CKMBINDEX, TROPONINI in the last 72 hours.   U/A:    Lab Results   Component Value Date    NITRITE NEG 07/22/2020    COLORU YELLOW 02/09/2021    WBCUA 1 TO 2 02/09/2021    RBCUA NO CELLS SEEN 02/09/2021    MUCUS 1+ 02/09/2021    BACTERIA MODERATE 02/09/2021    CLARITYU CLEAR 02/09/2021    SPECGRAV 1.020 02/09/2021    LEUKOCYTESUR NEGATIVE 02/09/2021    BLOODU NEGATIVE 02/09/2021    GLUCOSEU NEG 07/22/2020         Jaylene Teague MD  Rounding Hospitalist

## 2021-02-10 NOTE — ED NOTES
Pt states he has been falling lately. He states he has fall a few times in the past week and 2 times today. Thinks he hit the coffee table but is not sure.  + older abrasions to bilateral elbows and fresh abrasions to bilateral knees. + Rt shoulder soreness. States he has had phlegm built up in his throat that is sometimes hard to clear. He is not sure if he has had syncopal episodes.        Whit Richard RN  02/09/21 1972

## 2021-02-10 NOTE — PROGRESS NOTES
Occupational Therapy   Occupational Therapy Initial Assessment  Date: 2/10/2021   Patient Name: Elvis Meneses  MRN: [id number]     : 1945    Date of Service: 2/10/2021    Discharge Recommendations:  Subacute/Skilled Nursing Facility(Swingbed)       Assessment   Performance deficits / Impairments: Decreased functional mobility ; Decreased safe awareness;Decreased coordination;Decreased balance;Decreased ADL status; Decreased endurance;Decreased high-level IADLs;Decreased strength  Assessment: Pt is a 70year old male with hx of falls. Pt presents with the following imparments limiting ADL/IADL independence. pt would benefit from continued OT to address deficits and increase functional I  Prognosis: Good  Decision Making: Medium Complexity  OT Education: OT Role;Plan of Care;Energy Conservation  REQUIRES OT FOLLOW UP: Yes  Activity Tolerance  Activity Tolerance: Patient limited by fatigue;Patient limited by pain  Safety Devices  Safety Devices in place: Yes  Type of devices: All fall risk precautions in place;Gait belt;Patient at risk for falls; Left in bed;Call light within reach;Nurse notified; Bed alarm in place(Pt left with nursing students)           Patient Diagnosis(es): The primary encounter diagnosis was Fall, initial encounter. Diagnoses of Multiple contusions, Pneumonia of both lower lobes due to infectious organism, Gait instability, and General weakness were also pertinent to this visit. has a past medical history of AAA (abdominal aortic aneurysm) (HCC), Basal cell carcinoma of nose, CAD (coronary artery disease), Constipation, echocardiogram, Essential hypertension, History of alcohol abuse, Hyperlipidemia, Hypertension, Missing teeth, acquired, Non-alcoholic fatty liver disease, Osteoarthritis, Spinal stenosis, Tremor, Type 2 diabetes mellitus without complication ([facility] [address] and Wears glasses. has a past surgical history that includes sinus surgery (2867'A); other surgical history (12/09/2016); other surgical history (01/2020); Coronary artery bypass graft (N/A, 3/4/2020); knee surgery (2015); Carpal tunnel release (Right, 1970's); Marcellus tooth extraction; and Cardiac catheterization (02/26/2020). Restrictions  Restrictions/Precautions  Restrictions/Precautions: Fall Risk, General Precautions  Required Braces or Orthoses?: No    Subjective   General  Chart Reviewed: Yes  Patient assessed for rehabilitation services?: Yes  Subjective  Subjective: Pt reports \"I just got back to bed. \"  Patient Currently in Pain: Denies  Pain Assessment  Pain Assessment: 0-10  Pain Level: 0  Vital Signs  Patient Currently in Pain: Denies  Social/Functional History  Social/Functional History  Lives With: Spouse  Type of Home: House  Home Layout: One level  Home Access: Stairs to enter with rails  Entrance Stairs - Number of Steps: 2  Bathroom Shower/Tub: Walk-in shower, Shower chair without back  Bathroom Toilet: Standard  Bathroom Equipment: Grab bars in shower, Hand-held shower  Home Equipment: 4 wheeled walker, [employer] Help From: Family(wife)  ADL Assistance: Independent  Homemaking Assistance: Independent  Homemaking Responsibilities: Yes  Ambulation Assistance: Needs assistance(4WW)  Transfer Assistance: Independent  Active : Yes  Mode of Transportation: Car  Occupation: Retired       Objective   Vision: Impaired  Vision Exceptions: Wears glasses for reading  Hearing: Within functional limits    Orientation  Overall Orientation Status: Within Functional Limits  Observation/Palpation  Posture: Fair  Observation: in bed - tele, O2 per nc  Balance  Sitting Balance: Contact guard assistance(CGA for balance sitting EOB)  Standing Balance: Contact guard assistance  Functional Mobility  Functional - Mobility Device: Rolling Walker  Assist Level: Contact guard assistance  ADL Feeding: Modified independent   Grooming: Minimal assistance  UE Bathing: Moderate assistance  LE Bathing: Dependent/Total  UE Dressing: Moderate assistance  LE Dressing: Dependent/Total  Toileting: Moderate assistance  Additional Comments: based on pt performance, simulation, and evaluation of strenght and functionl mobility status   AM-PAC 6 click short form for inpatient daily activity:   How much help from another person does the patient currently need. .. Unable  Dep A Lot  Max A A Lot   Mod A A Little  Min A A Little   CGA  SBA None   Mod I  Indep  Sup   1. Putting on and taking off regular lower body clothing? [x] 1    [] 2   [] 2   [] 3   [] 3   [] 4      2. Bathing (including washing, rinsing, drying)? [] 1   [x] 2   [] 2 [] 3 [] 3 [] 4   3. Toileting, which includes using toilet, bedpan, or urinal? [] 1    [] 2   [x] 2   [] 3   [] 3   [] 4     4. Putting on and taking off regular upper body clothing? [] 1   [] 2   [x] 2   [] 3   [] 3    [] 4      5. Taking care of personal grooming such as brushing teeth? [] 1   [] 2    [] 2 [x] 3    [] 3   [] 4      6. Eating meals?    [] 1   [] 2   [] 2   [] 3   [] 3   [x] 4      Raw Score:  14/24   [24=0% impaired(CH), 23=1-19%(CI), 20-22=20-39%(CJ), 15-19=40-59%(CK), 10-14=60-79%(CL), 7-9=80-99%(CM), 6=100%(CN)]      Tone RUE  RUE Tone: Normotonic  Tone LUE  LUE Tone: Normotonic  Coordination  Coordination and Movement description: Resting tremors     Bed mobility  Rolling to Right: Minimal assistance  Supine to Sit: Moderate assistance  Scooting: Dependent/Total(dependent to scoot up in bed)  Transfers  Sit to stand: Contact guard assistance  Stand to sit: Contact guard assistance              Sensation  Overall Sensation Status: WFL(per pt report)        LUE AROM (degrees)  LUE AROM : WFL  RUE PROM (degrees)  RUE General PROM: Pt with slightly decreased ROM at R shoulder  LUE Strength  L Hand General: 4-/5  RUE Strength  R Hand General: 4-/5                   Plan Plan  Times per week: 4x  Times per day: Daily  Current Treatment Recommendations: Strengthening, Safety Education & Training, Balance Training, Patient/Caregiver Education & Training, Self-Care / ADL, Home Management Training, Equipment Evaluation, Education, & procurement, Functional Mobility Training, Endurance Training      Goals  Short term goals  Time Frame for Short term goals: until discharge  Short term goal 1: Pt will complete all UB ADLs mod I for increased functional I  Short term goal 2: Pt will complete all LB ADLs min A for increased functional I  Short term goal 3: Pt will complete all aspects of toileting SBA for increased functional I  Short term goal 4: Pt will complete all functional transfers and bed mobility SBA in prep for EOB/OOB ADL tasks.   Short term goal 5: PT will participate in therex/theract for 5+ minutes with emphasis on UE strengthning and dynamic balance for increased I with IADL/home management tasks  Patient Goals   Patient goals : to return home       Therapy Time   Individual Concurrent Group Co-treatment   Time In 1154         Time Out 1209         Minutes 15         Timed Code Treatment Minutes: 0 Minutes       Ankur Matos OT

## 2021-02-10 NOTE — ED PROVIDER NOTES
The history is provided by the patient. Fall  The fall occurred while walking. Distance fallen: standing height. There was no blood loss. Point of impact: unsure. Pain location: no pain  The patient is experiencing no pain. There was no entrapment after the fall. There was no drug use involved in the accident. There was no alcohol use involved in the accident. Pertinent negatives include no visual change, no fever, no numbness, no abdominal pain, no bowel incontinence, no nausea, no vomiting, no hematuria, no headaches, no hearing loss, no loss of consciousness and no tingling. Associated symptoms comments: The patient describes a generalized weakness and fatigue. \" I feel run down and I need to know why I feel so weak and tired and why am I falling all the time recently. Denies SOB, chest pain, [place], orthopnea or fevers. Although he states he has felt \"chilled\" . Review of Systems   Constitutional: Positive for fatigue. Negative for fever. HENT: Negative. Eyes: Negative. Respiratory: Negative. Cardiovascular: Negative. Gastrointestinal: Negative. Negative for abdominal pain, bowel incontinence, nausea and vomiting. Genitourinary: Negative. Negative for hematuria. Musculoskeletal: Negative. Skin: Negative. Neurological: Positive for weakness. Negative for dizziness, tingling, tremors, seizures, loss of consciousness, syncope, facial asymmetry, speech difficulty, numbness and headaches. Generalized no focal weakness   All other systems reviewed and are negative.       Family History   Problem Relation Age of Onset    Lung Cancer Mother     Heart Disease Father     High Blood Pressure Father     High Cholesterol Father     Lung Cancer Father      Social History     Socioeconomic History    Marital status:      Spouse name: Not on file    Number of children: Not on file    Years of education: Not on file    Highest education level: Not on file Occupational History    Not on file   Social Needs    Financial resource strain: Not on file    Food insecurity     Worry: Not on file     Inability: Not on file    Transportation needs     Medical: Not on file     Non-medical: Not on file   Tobacco Use    Smoking status: Current Every Day Smoker     Packs/day: 1.50     Years: 58.00     Pack years: 87.00     Types: Cigarettes     Start date: 1962    Smokeless tobacco: Never Used   Substance and Sexual Activity    Alcohol use: Not Currently     Comment: 1 glass wine a month    Drug use: No    Sexual activity: Not Currently   Lifestyle    Physical activity     Days per week: Not on file     Minutes per session: Not on file    Stress: Not on file   Relationships    Social connections     Talks on phone: Not on file     Gets together: Not on file     Attends Baptism service: Not on file     Active member of club or organization: Not on file     Attends meetings of clubs or organizations: Not on file     Relationship status: Not on file    Intimate partner violence     Fear of current or ex partner: Not on file     Emotionally abused: Not on file     Physically abused: Not on file     Forced sexual activity: Not on file   Other Topics Concern    Not on file   Social History Narrative    Not on file     Past Surgical History:   Procedure Laterality Date    CABG WITH AORTIC VALVE REPLACEMENT N/A 3/4/2020    CABG CORONARY ARTERY BYPASS x1 SALEEM TO LAD, AORTIC VALVE REPACEMENT performed by James Fletcher MD at [address] [place] [address]  02/26/2020    critical LAD lesion and moderate to severe right coronary artery lesion    CARPAL TUNNEL RELEASE Right 1970's    KNEE SURGERY  2015    quad injury from a fall    OTHER SURGICAL HISTORY  12/09/2016    excision of basal cell carcinoma of nose with complex repair    OTHER SURGICAL HISTORY  01/2020    epidual steroid injections L3-L4    SINUS SURGERY  1970's    x 2    WISDOM TOOTH EXTRACTION Past Medical History:   Diagnosis Date    AAA (abdominal aortic aneurysm) ([facility] [address] 2018    Infrarenal    Basal cell carcinoma of nose 2016    Dr Sincere Flood CAD (coronary artery disease)     Dr. Lisa Aden - severe aortic stenosis with a bicuspid aortic valve    Constipation     echocardiogram 10/29/2020    EF 55-60% normally functioning valve in aortic position, mild mitral regurg with two jets mild pulm htn.  Essential hypertension     History of alcohol abuse     Hyperlipidemia     Hypertension     Follows with PCP    Missing teeth, acquired     Non-alcoholic fatty liver disease     Osteoarthritis     Knees, lower back, shoulders,    Spinal stenosis     had epidural steroid injection 1/8/2020 - lumbar    Tremor     Left arm only    Type 2 diabetes mellitus without complication (HCC)     Controlling with diet    Wears glasses      Allergies   Allergen Reactions    Metformin And Related Diarrhea     Severe diarrhea, abd pain, and nausea    Amoxicillin Diarrhea    Other Nausea And Vomiting     ANTI-INFLAMMATORIES  Severe stomach pain--diarrhea  hypertension     Prior to Admission medications    Medication Sig Start Date End Date Taking? Authorizing Provider   gabapentin (NEURONTIN) 300 MG capsule Take 1 capsule by mouth 3 times daily for 30 days.  1/26/21 2/25/21 Yes Ayad Pruett MD   carvedilol (COREG) 3.125 MG tablet TAKE 1 TABLET BY MOUTH 2 TIMES DAILY 1/11/21  Yes Ayad Pruett MD   amiodarone (CORDARONE) 200 MG tablet TAKE 1 TABLET BY MOUTH 2 TIMES DAILY 1/11/21  Yes Ayad Pruett MD   primidone (MYSOLINE) 50 MG tablet Take 1 tablet by mouth 2 times daily 12/8/20  Yes Pascale Chin PA-C   furosemide (LASIX) 20 MG tablet Take 1 tablet by mouth 2 times daily 8/18/20  Yes Ayad Pruett MD   Blood Pressure Monitoring (BLOOD PRESSURE KIT) NASH Use as directed 8/12/20  Yes Ayad Pruett MD polyethylene glycol (GLYCOLAX) 17 GM/SCOOP powder Take 17 g by mouth daily   Yes Historical Provider, MD   blood glucose monitor kit and supplies Test 3 times a day & as needed for symptoms of irregular blood glucose. 3/9/20  Yes Gris Hennessy PA-C   aspirin 81 MG tablet Take by mouth   Yes Historical Provider, MD   glipiZIDE (GLUCOTROL XL) 2.5 MG extended release tablet Take 1 tablet by mouth daily 1/15/21   Luisa Landin PA-C   lisinopril (PRINIVIL;ZESTRIL) 10 MG tablet Take 1 tablet by mouth daily 12/8/20   Charito Birmingham PA-C   atorvastatin (LIPITOR) 40 MG tablet Take 1 tablet by mouth daily 10/14/20   Vishnu Mills MD   blood glucose test strips (TRUE METRIX BLOOD GLUCOSE TEST) strip 1 each by In Vitro route daily As needed. 8/7/20   Vishnu Mills MD       BP (!) 150/76   Pulse 78   Temp 99.6 °F (37.6 °C) (Oral)   Resp 19   Ht 5' 6\" (1.676 m)   Wt 162 lb (73.5 kg)   SpO2 (!) 88% Comment: oxygen applied per nasal cannula at 2l  BMI 26.15 kg/m²     Physical Exam  Vitals signs and nursing note reviewed. Constitutional:       General: He is not in acute distress. Appearance: He is ill-appearing. He is not diaphoretic. HENT:      Head: Normocephalic. Comments: Abrasion from his fall     Nose: Rhinorrhea present. Mouth/Throat:      Mouth: Mucous membranes are moist.   Eyes:      Pupils: Pupils are equal, round, and reactive to light. Neck:      Musculoskeletal: No neck rigidity. Cardiovascular:      Rate and Rhythm: Regular rhythm. Pulses: Normal pulses. Pulmonary:      Effort: No respiratory distress. Breath sounds: No decreased air movement. Decreased breath sounds present. No wheezing. Abdominal:      General: Abdomen is flat. There is no distension. Palpations: Abdomen is soft. Tenderness: There is no abdominal tenderness. Musculoskeletal:         General: No deformity. Lymphadenopathy:      Cervical: No cervical adenopathy.    Skin: Coloration: Skin is pale. Neurological:      General: No focal deficit present. Mental Status: He is alert and oriented to person, place, and time. Mental status is at baseline. MDM:    Labs Reviewed   CBC WITH AUTO DIFFERENTIAL - Abnormal; Notable for the following components:       Result Value    WBC 15.4 (*)     RBC 4.42 (*)     Hematocrit 41.0 (*)     MCH 31.2 (*)     RDW 15.3 (*)     Platelets 462 (*)     MPV 11.6 (*)     Segs Relative 86.5 (*)     Lymphocytes % 9.5 (*)     Immature Neutrophil % 1.0 (*)     All other components within normal limits   COMPREHENSIVE METABOLIC PANEL - Abnormal; Notable for the following components:    Sodium 134 (*)     Chloride 97 (*)     CREATININE 0.8 (*)     Glucose 120 (*)     Albumin 3.3 (*)     Total Protein 6.2 (*)     ALT 51 (*)     AST 41 (*)     All other components within normal limits   TROPONIN - Abnormal; Notable for the following components:    Troponin T 0.040 (*)     All other components within normal limits   URINALYSIS - Abnormal; Notable for the following components:    Glucose, Urine 100 (*)     Protein, UA 30 (*)     Urobilinogen, Urine 2.0 (*)     Bacteria, UA MODERATE (*)     Mucus, UA 1+ (*)     All other components within normal limits   CULTURE, BLOOD 1   CULTURE, BLOOD 2   COVID-19   LACTIC ACID, PLASMA       XR CHEST PORTABLE   Preliminary Result   Bilateral pulmonary opacities in the mid lung fields and lower lung fields   which may represent infiltrates. Pulmonary nodules cannot be excluded. Follow up to resolution is suggested. CT could better evaluate lung   parenchyma if indicated. CT CERVICAL SPINE WO CONTRAST   Final Result   No acute abnormality of the cervical spine. RECOMMENDATIONS:   Ground-glass densities in the bilateral upper lobes, due to multifocal   pneumonia (such as COVID-19), contusion, or aspiration. CT HEAD WO CONTRAST   Final Result   No acute intracranial abnormality. My typical dicussion, presentation,and considerations for this patients' chief complaint, diagnosis, and differential diagnosis have been considered. I have contacted hospitalist for admission. The patient had diminished lung sounds bilaterally but he did not have any wheezing or adventitious sounds. Patient's chest x-ray shows possibility of a bilateral pneumonia or a possible viral pneumonia. Patient's Covid test is negative. I have drawn blood cultures and lactic acid on the patient and he has been given Levaquin. The patient will need to stay in the hospital and I believe he would benefit from PT OT seen the patient as well as even the possibility of short-term stay. The patient has not been very steady on his feet and has had multiple falls. The patient has been seen in the emergency department for these multiple falls. The patient was placed on 2 L of oxygen and his oxygen saturations immediately increased to 93%. The patient states that he does not have any shortness of breath he just feels weak and tired. I believe that the patient's chest x-ray and his either bilateral pneumonia due to bacteria or viral etiologies are causing his generalized hypoxia and his overall weakness. Patient overall is hemodynamically stable and is currently resting comfortably. Critical care time of 30 minutes was given to this patient which included time at the bedside, discussions with consultants, review of the chart, and lab studies. This critical care time does not include any billable procedures. Final Impression    1. Fall, initial encounter    2. Multiple contusions    3. Pneumonia of both lower lobes due to infectious organism    4. Gait instability    5.  General weakness              [address] Hollins DO  02/09/21 6032

## 2021-02-10 NOTE — ED NOTES
IV attempted but unsuccessful of the LAC. Was able to get blood from that attempt but not the IV.       John Kumar RN  02/09/21 2015

## 2021-02-11 ENCOUNTER — HOSPITAL ENCOUNTER (INPATIENT)
Age: 76
LOS: 1 days | Discharge: STILL ADMITTED OR ADMINISTRATIVE | DRG: 194 | End: 2021-02-12
Attending: INTERNAL MEDICINE | Admitting: INTERNAL MEDICINE
Payer: MEDICARE

## 2021-02-11 VITALS
TEMPERATURE: 99.8 F | HEART RATE: 87 BPM | BODY MASS INDEX: 26.34 KG/M2 | RESPIRATION RATE: 16 BRPM | WEIGHT: 163.9 LBS | DIASTOLIC BLOOD PRESSURE: 60 MMHG | OXYGEN SATURATION: 91 % | SYSTOLIC BLOOD PRESSURE: 126 MMHG | HEIGHT: 66 IN

## 2021-02-11 VITALS
OXYGEN SATURATION: 88 % | SYSTOLIC BLOOD PRESSURE: 109 MMHG | TEMPERATURE: 98.4 F | DIASTOLIC BLOOD PRESSURE: 48 MMHG | BODY MASS INDEX: 26.34 KG/M2 | HEIGHT: 66 IN | WEIGHT: 163.9 LBS | RESPIRATION RATE: 16 BRPM | HEART RATE: 82 BPM

## 2021-02-11 PROBLEM — R53.1 WEAKNESS: Status: ACTIVE | Noted: 2021-02-11

## 2021-02-11 LAB
ANION GAP SERPL CALCULATED.3IONS-SCNC: 13 MMOL/L (ref 4–16)
BASOPHILS ABSOLUTE: 0 K/CU MM
BASOPHILS RELATIVE PERCENT: 0.1 % (ref 0–1)
BUN BLDV-MCNC: 21 MG/DL (ref 6–23)
CALCIUM SERPL-MCNC: 8.8 MG/DL (ref 8.3–10.6)
CHLORIDE BLD-SCNC: 102 MMOL/L (ref 99–110)
CO2: 19 MMOL/L (ref 21–32)
CREAT SERPL-MCNC: 0.9 MG/DL (ref 0.9–1.3)
DIFFERENTIAL TYPE: ABNORMAL
EOSINOPHILS ABSOLUTE: 0 K/CU MM
EOSINOPHILS RELATIVE PERCENT: 0 % (ref 0–3)
GFR AFRICAN AMERICAN: >60 ML/MIN/1.73M2
GFR NON-AFRICAN AMERICAN: >60 ML/MIN/1.73M2
GLUCOSE BLD-MCNC: 108 MG/DL (ref 70–99)
GLUCOSE BLD-MCNC: 117 MG/DL (ref 70–99)
GLUCOSE BLD-MCNC: 144 MG/DL (ref 70–99)
GLUCOSE BLD-MCNC: 156 MG/DL (ref 70–99)
GLUCOSE BLD-MCNC: 88 MG/DL (ref 70–99)
HCT VFR BLD CALC: 35.6 % (ref 42–52)
HEMOGLOBIN: 11.3 GM/DL (ref 13.5–18)
IMMATURE NEUTROPHIL %: 0.6 % (ref 0–0.43)
LYMPHOCYTES ABSOLUTE: 0.6 K/CU MM
LYMPHOCYTES RELATIVE PERCENT: 4 % (ref 24–44)
MCH RBC QN AUTO: 30.8 PG (ref 27–31)
MCHC RBC AUTO-ENTMCNC: 31.7 % (ref 32–36)
MCV RBC AUTO: 97 FL (ref 78–100)
MONOCYTES ABSOLUTE: 0.2 K/CU MM
MONOCYTES RELATIVE PERCENT: 1.7 % (ref 0–4)
PDW BLD-RTO: 15.7 % (ref 11.7–14.9)
PLATELET # BLD: 100 K/CU MM (ref 140–440)
PMV BLD AUTO: 10.9 FL (ref 7.5–11.1)
POTASSIUM SERPL-SCNC: 3.6 MMOL/L (ref 3.5–5.1)
RBC # BLD: 3.67 M/CU MM (ref 4.6–6.2)
SEGMENTED NEUTROPHILS ABSOLUTE COUNT: 13.2 K/CU MM
SEGMENTED NEUTROPHILS RELATIVE PERCENT: 93.6 % (ref 36–66)
SODIUM BLD-SCNC: 134 MMOL/L (ref 135–145)
TOTAL IMMATURE NEUTOROPHIL: 0.08 K/CU MM
WBC # BLD: 14.1 K/CU MM (ref 4–10.5)

## 2021-02-11 PROCEDURE — 97530 THERAPEUTIC ACTIVITIES: CPT

## 2021-02-11 PROCEDURE — 2700000000 HC OXYGEN THERAPY PER DAY

## 2021-02-11 PROCEDURE — 6370000000 HC RX 637 (ALT 250 FOR IP): Performed by: INTERNAL MEDICINE

## 2021-02-11 PROCEDURE — 97166 OT EVAL MOD COMPLEX 45 MIN: CPT

## 2021-02-11 PROCEDURE — 2580000003 HC RX 258: Performed by: INTERNAL MEDICINE

## 2021-02-11 PROCEDURE — 97116 GAIT TRAINING THERAPY: CPT

## 2021-02-11 PROCEDURE — 2580000003 HC RX 258: Performed by: NURSE PRACTITIONER

## 2021-02-11 PROCEDURE — 85025 COMPLETE CBC W/AUTO DIFF WBC: CPT

## 2021-02-11 PROCEDURE — 80048 BASIC METABOLIC PNL TOTAL CA: CPT

## 2021-02-11 PROCEDURE — 97162 PT EVAL MOD COMPLEX 30 MIN: CPT

## 2021-02-11 PROCEDURE — 6360000002 HC RX W HCPCS: Performed by: NURSE PRACTITIONER

## 2021-02-11 PROCEDURE — 36415 COLL VENOUS BLD VENIPUNCTURE: CPT

## 2021-02-11 PROCEDURE — 82962 GLUCOSE BLOOD TEST: CPT

## 2021-02-11 PROCEDURE — 94761 N-INVAS EAR/PLS OXIMETRY MLT: CPT

## 2021-02-11 PROCEDURE — 6370000000 HC RX 637 (ALT 250 FOR IP): Performed by: NURSE PRACTITIONER

## 2021-02-11 PROCEDURE — 87324 CLOSTRIDIUM AG IA: CPT

## 2021-02-11 PROCEDURE — 1200000002 HC SEMI PRIVATE SWING BED

## 2021-02-11 RX ORDER — CHOLESTYRAMINE LIGHT 4 G/5.7G
4 POWDER, FOR SUSPENSION ORAL 2 TIMES DAILY
Status: DISCONTINUED | OUTPATIENT
Start: 2021-02-11 | End: 2021-02-11 | Stop reason: HOSPADM

## 2021-02-11 RX ORDER — GABAPENTIN 300 MG/1
300 CAPSULE ORAL 3 TIMES DAILY
Status: DISCONTINUED | OUTPATIENT
Start: 2021-02-11 | End: 2021-02-12 | Stop reason: HOSPADM

## 2021-02-11 RX ORDER — ACETAMINOPHEN 325 MG/1
650 TABLET ORAL EVERY 6 HOURS PRN
Status: DISCONTINUED | OUTPATIENT
Start: 2021-02-11 | End: 2021-02-12 | Stop reason: HOSPADM

## 2021-02-11 RX ORDER — SODIUM CHLORIDE 0.9 % (FLUSH) 0.9 %
10 SYRINGE (ML) INJECTION PRN
Status: CANCELLED | OUTPATIENT
Start: 2021-02-11

## 2021-02-11 RX ORDER — DOCUSATE SODIUM 100 MG/1
100 CAPSULE, LIQUID FILLED ORAL DAILY PRN
Status: DISCONTINUED | OUTPATIENT
Start: 2021-02-11 | End: 2021-02-12 | Stop reason: HOSPADM

## 2021-02-11 RX ORDER — PROMETHAZINE HYDROCHLORIDE 12.5 MG/1
12.5 TABLET ORAL EVERY 6 HOURS PRN
Status: CANCELLED | OUTPATIENT
Start: 2021-02-11

## 2021-02-11 RX ORDER — SODIUM CHLORIDE 0.9 % (FLUSH) 0.9 %
10 SYRINGE (ML) INJECTION PRN
Status: DISCONTINUED | OUTPATIENT
Start: 2021-02-11 | End: 2021-02-12 | Stop reason: HOSPADM

## 2021-02-11 RX ORDER — DEXTROSE MONOHYDRATE 25 G/50ML
12.5 INJECTION, SOLUTION INTRAVENOUS PRN
Status: DISCONTINUED | OUTPATIENT
Start: 2021-02-11 | End: 2021-02-12 | Stop reason: HOSPADM

## 2021-02-11 RX ORDER — SODIUM CHLORIDE 9 MG/ML
1000 INJECTION, SOLUTION INTRAVENOUS CONTINUOUS
Status: DISCONTINUED | OUTPATIENT
Start: 2021-02-11 | End: 2021-02-11

## 2021-02-11 RX ORDER — LOPERAMIDE HYDROCHLORIDE 2 MG/1
2 CAPSULE ORAL 4 TIMES DAILY PRN
Status: DISCONTINUED | OUTPATIENT
Start: 2021-02-11 | End: 2021-02-12 | Stop reason: HOSPADM

## 2021-02-11 RX ORDER — CARVEDILOL 3.12 MG/1
3.12 TABLET ORAL 2 TIMES DAILY WITH MEALS
Status: DISCONTINUED | OUTPATIENT
Start: 2021-02-11 | End: 2021-02-12 | Stop reason: HOSPADM

## 2021-02-11 RX ORDER — NICOTINE POLACRILEX 4 MG
15 LOZENGE BUCCAL PRN
Status: CANCELLED | OUTPATIENT
Start: 2021-02-11

## 2021-02-11 RX ORDER — DEXTROSE MONOHYDRATE 50 MG/ML
100 INJECTION, SOLUTION INTRAVENOUS PRN
Status: CANCELLED | OUTPATIENT
Start: 2021-02-11

## 2021-02-11 RX ORDER — POLYETHYLENE GLYCOL 3350 17 G/17G
17 POWDER, FOR SOLUTION ORAL DAILY PRN
Status: DISCONTINUED | OUTPATIENT
Start: 2021-02-11 | End: 2021-02-12 | Stop reason: HOSPADM

## 2021-02-11 RX ORDER — DEXTROSE MONOHYDRATE 50 MG/ML
1000 INJECTION, SOLUTION INTRAVENOUS PRN
Status: DISCONTINUED | OUTPATIENT
Start: 2021-02-11 | End: 2021-02-12 | Stop reason: HOSPADM

## 2021-02-11 RX ORDER — GUAIFENESIN 600 MG/1
600 TABLET, EXTENDED RELEASE ORAL 2 TIMES DAILY
Status: DISCONTINUED | OUTPATIENT
Start: 2021-02-11 | End: 2021-02-11 | Stop reason: HOSPADM

## 2021-02-11 RX ORDER — ASPIRIN 81 MG/1
81 TABLET, CHEWABLE ORAL DAILY
Status: CANCELLED | OUTPATIENT
Start: 2021-02-12

## 2021-02-11 RX ORDER — PROMETHAZINE HYDROCHLORIDE 12.5 MG/1
12.5 TABLET ORAL EVERY 6 HOURS PRN
Status: DISCONTINUED | OUTPATIENT
Start: 2021-02-11 | End: 2021-02-12 | Stop reason: HOSPADM

## 2021-02-11 RX ORDER — ACETAMINOPHEN 650 MG/1
650 SUPPOSITORY RECTAL EVERY 6 HOURS PRN
Status: CANCELLED | OUTPATIENT
Start: 2021-02-11

## 2021-02-11 RX ORDER — NICOTINE POLACRILEX 4 MG
15 LOZENGE BUCCAL PRN
Status: DISCONTINUED | OUTPATIENT
Start: 2021-02-11 | End: 2021-02-12 | Stop reason: HOSPADM

## 2021-02-11 RX ORDER — SODIUM CHLORIDE 0.9 % (FLUSH) 0.9 %
10 SYRINGE (ML) INJECTION EVERY 12 HOURS SCHEDULED
Status: CANCELLED | OUTPATIENT
Start: 2021-02-11

## 2021-02-11 RX ORDER — GUAIFENESIN 600 MG/1
600 TABLET, EXTENDED RELEASE ORAL 2 TIMES DAILY
Status: CANCELLED | OUTPATIENT
Start: 2021-02-11

## 2021-02-11 RX ORDER — LISINOPRIL 10 MG/1
10 TABLET ORAL DAILY
Status: DISCONTINUED | OUTPATIENT
Start: 2021-02-12 | End: 2021-02-12 | Stop reason: HOSPADM

## 2021-02-11 RX ORDER — PRIMIDONE 50 MG/1
50 TABLET ORAL 2 TIMES DAILY
Status: CANCELLED | OUTPATIENT
Start: 2021-02-11

## 2021-02-11 RX ORDER — IPRATROPIUM BROMIDE AND ALBUTEROL SULFATE 2.5; .5 MG/3ML; MG/3ML
1 SOLUTION RESPIRATORY (INHALATION) EVERY 4 HOURS PRN
Status: CANCELLED | OUTPATIENT
Start: 2021-02-11

## 2021-02-11 RX ORDER — LEVOFLOXACIN 5 MG/ML
750 INJECTION, SOLUTION INTRAVENOUS EVERY 24 HOURS
Status: DISCONTINUED | OUTPATIENT
Start: 2021-02-11 | End: 2021-02-11

## 2021-02-11 RX ORDER — NICOTINE 21 MG/24HR
1 PATCH, TRANSDERMAL 24 HOURS TRANSDERMAL DAILY
Status: DISCONTINUED | OUTPATIENT
Start: 2021-02-12 | End: 2021-02-12 | Stop reason: HOSPADM

## 2021-02-11 RX ORDER — GUAIFENESIN 600 MG/1
600 TABLET, EXTENDED RELEASE ORAL 2 TIMES DAILY
Status: DISCONTINUED | OUTPATIENT
Start: 2021-02-11 | End: 2021-02-12 | Stop reason: HOSPADM

## 2021-02-11 RX ORDER — ACETAMINOPHEN 325 MG/1
650 TABLET ORAL EVERY 6 HOURS PRN
Status: CANCELLED | OUTPATIENT
Start: 2021-02-11

## 2021-02-11 RX ORDER — CHOLESTYRAMINE LIGHT 4 G/5.7G
4 POWDER, FOR SUSPENSION ORAL 2 TIMES DAILY
Status: DISCONTINUED | OUTPATIENT
Start: 2021-02-11 | End: 2021-02-12 | Stop reason: HOSPADM

## 2021-02-11 RX ORDER — POLYETHYLENE GLYCOL 3350 17 G/17G
17 POWDER, FOR SOLUTION ORAL DAILY PRN
Status: CANCELLED | OUTPATIENT
Start: 2021-02-11

## 2021-02-11 RX ORDER — GABAPENTIN 300 MG/1
300 CAPSULE ORAL 3 TIMES DAILY
Status: CANCELLED | OUTPATIENT
Start: 2021-02-11

## 2021-02-11 RX ORDER — NICOTINE 21 MG/24HR
1 PATCH, TRANSDERMAL 24 HOURS TRANSDERMAL DAILY
Status: CANCELLED | OUTPATIENT
Start: 2021-02-12

## 2021-02-11 RX ORDER — LEVOFLOXACIN 500 MG/1
500 TABLET, FILM COATED ORAL
Status: DISCONTINUED | OUTPATIENT
Start: 2021-02-11 | End: 2021-02-12

## 2021-02-11 RX ORDER — MIDODRINE HYDROCHLORIDE 2.5 MG/1
2.5 TABLET ORAL
Status: DISCONTINUED | OUTPATIENT
Start: 2021-02-11 | End: 2021-02-12 | Stop reason: HOSPADM

## 2021-02-11 RX ORDER — ACETAMINOPHEN 650 MG/1
650 SUPPOSITORY RECTAL EVERY 6 HOURS PRN
Status: DISCONTINUED | OUTPATIENT
Start: 2021-02-11 | End: 2021-02-12 | Stop reason: HOSPADM

## 2021-02-11 RX ORDER — AMIODARONE HYDROCHLORIDE 200 MG/1
200 TABLET ORAL 2 TIMES DAILY
Status: CANCELLED | OUTPATIENT
Start: 2021-02-11

## 2021-02-11 RX ORDER — SODIUM CHLORIDE 0.9 % (FLUSH) 0.9 %
10 SYRINGE (ML) INJECTION EVERY 12 HOURS SCHEDULED
Status: DISCONTINUED | OUTPATIENT
Start: 2021-02-11 | End: 2021-02-12 | Stop reason: HOSPADM

## 2021-02-11 RX ORDER — IPRATROPIUM BROMIDE AND ALBUTEROL SULFATE 2.5; .5 MG/3ML; MG/3ML
1 SOLUTION RESPIRATORY (INHALATION) EVERY 4 HOURS PRN
Status: DISCONTINUED | OUTPATIENT
Start: 2021-02-11 | End: 2021-02-12 | Stop reason: HOSPADM

## 2021-02-11 RX ORDER — CHOLESTYRAMINE LIGHT 4 G/5.7G
4 POWDER, FOR SUSPENSION ORAL 2 TIMES DAILY
Status: CANCELLED | OUTPATIENT
Start: 2021-02-11

## 2021-02-11 RX ORDER — LEVOFLOXACIN 500 MG/1
500 TABLET, FILM COATED ORAL
Status: DISCONTINUED | OUTPATIENT
Start: 2021-02-11 | End: 2021-02-11

## 2021-02-11 RX ORDER — ATORVASTATIN CALCIUM 40 MG/1
40 TABLET, FILM COATED ORAL DAILY
Status: DISCONTINUED | OUTPATIENT
Start: 2021-02-12 | End: 2021-02-12 | Stop reason: HOSPADM

## 2021-02-11 RX ORDER — LEVOFLOXACIN 5 MG/ML
750 INJECTION, SOLUTION INTRAVENOUS EVERY 24 HOURS
Status: CANCELLED | OUTPATIENT
Start: 2021-02-11

## 2021-02-11 RX ORDER — ATORVASTATIN CALCIUM 40 MG/1
40 TABLET, FILM COATED ORAL DAILY
Status: CANCELLED | OUTPATIENT
Start: 2021-02-12

## 2021-02-11 RX ORDER — LISINOPRIL 10 MG/1
10 TABLET ORAL DAILY
Status: CANCELLED | OUTPATIENT
Start: 2021-02-12

## 2021-02-11 RX ORDER — ONDANSETRON 2 MG/ML
4 INJECTION INTRAMUSCULAR; INTRAVENOUS EVERY 6 HOURS PRN
Status: DISCONTINUED | OUTPATIENT
Start: 2021-02-11 | End: 2021-02-12 | Stop reason: HOSPADM

## 2021-02-11 RX ORDER — ASPIRIN 81 MG/1
81 TABLET ORAL DAILY
Status: DISCONTINUED | OUTPATIENT
Start: 2021-02-12 | End: 2021-02-12 | Stop reason: HOSPADM

## 2021-02-11 RX ORDER — DOCUSATE SODIUM 100 MG/1
100 CAPSULE, LIQUID FILLED ORAL DAILY PRN
Status: CANCELLED | OUTPATIENT
Start: 2021-02-11

## 2021-02-11 RX ORDER — GLIPIZIDE 5 MG/1
2.5 TABLET ORAL
Status: CANCELLED | OUTPATIENT
Start: 2021-02-12

## 2021-02-11 RX ORDER — PRIMIDONE 50 MG/1
50 TABLET ORAL 2 TIMES DAILY
Status: DISCONTINUED | OUTPATIENT
Start: 2021-02-11 | End: 2021-02-12 | Stop reason: HOSPADM

## 2021-02-11 RX ORDER — AMIODARONE HYDROCHLORIDE 200 MG/1
200 TABLET ORAL 2 TIMES DAILY
Status: DISCONTINUED | OUTPATIENT
Start: 2021-02-11 | End: 2021-02-12 | Stop reason: HOSPADM

## 2021-02-11 RX ORDER — DEXTROSE MONOHYDRATE 25 G/50ML
12.5 INJECTION, SOLUTION INTRAVENOUS PRN
Status: CANCELLED | OUTPATIENT
Start: 2021-02-11

## 2021-02-11 RX ORDER — LOPERAMIDE HYDROCHLORIDE 2 MG/1
2 CAPSULE ORAL 4 TIMES DAILY PRN
Status: DISCONTINUED | OUTPATIENT
Start: 2021-02-11 | End: 2021-02-11 | Stop reason: HOSPADM

## 2021-02-11 RX ORDER — CARVEDILOL 3.12 MG/1
3.12 TABLET ORAL 2 TIMES DAILY WITH MEALS
Status: CANCELLED | OUTPATIENT
Start: 2021-02-11

## 2021-02-11 RX ORDER — ONDANSETRON 2 MG/ML
4 INJECTION INTRAMUSCULAR; INTRAVENOUS EVERY 6 HOURS PRN
Status: CANCELLED | OUTPATIENT
Start: 2021-02-11

## 2021-02-11 RX ORDER — GLIPIZIDE 5 MG/1
2.5 TABLET ORAL
Status: DISCONTINUED | OUTPATIENT
Start: 2021-02-12 | End: 2021-02-12 | Stop reason: HOSPADM

## 2021-02-11 RX ORDER — LOPERAMIDE HYDROCHLORIDE 2 MG/1
2 CAPSULE ORAL 4 TIMES DAILY PRN
Status: CANCELLED | OUTPATIENT
Start: 2021-02-11

## 2021-02-11 RX ORDER — SODIUM CHLORIDE 9 MG/ML
INJECTION, SOLUTION INTRAVENOUS CONTINUOUS
Status: CANCELLED | OUTPATIENT
Start: 2021-02-11

## 2021-02-11 RX ADMIN — LEVOFLOXACIN 500 MG: 500 TABLET, FILM COATED ORAL at 17:11

## 2021-02-11 RX ADMIN — GLIPIZIDE 2.5 MG: 5 TABLET ORAL at 09:09

## 2021-02-11 RX ADMIN — MIDODRINE HYDROCHLORIDE 2.5 MG: 2.5 TABLET ORAL at 17:11

## 2021-02-11 RX ADMIN — ASPIRIN 81 MG: 81 TABLET, CHEWABLE ORAL at 08:49

## 2021-02-11 RX ADMIN — GUAIFENESIN 600 MG: 600 TABLET, EXTENDED RELEASE ORAL at 20:46

## 2021-02-11 RX ADMIN — ATORVASTATIN CALCIUM 40 MG: 40 TABLET, FILM COATED ORAL at 08:49

## 2021-02-11 RX ADMIN — GABAPENTIN 300 MG: 300 CAPSULE ORAL at 20:46

## 2021-02-11 RX ADMIN — PRIMIDONE 50 MG: 50 TABLET ORAL at 20:46

## 2021-02-11 RX ADMIN — LISINOPRIL 10 MG: 10 TABLET ORAL at 09:08

## 2021-02-11 RX ADMIN — SODIUM CHLORIDE: 9 INJECTION, SOLUTION INTRAVENOUS at 09:11

## 2021-02-11 RX ADMIN — GABAPENTIN 300 MG: 300 CAPSULE ORAL at 08:49

## 2021-02-11 RX ADMIN — ENOXAPARIN SODIUM 30 MG: 30 INJECTION SUBCUTANEOUS at 08:49

## 2021-02-11 RX ADMIN — CARVEDILOL 3.12 MG: 3.12 TABLET, FILM COATED ORAL at 17:11

## 2021-02-11 RX ADMIN — AMIODARONE HYDROCHLORIDE 200 MG: 200 TABLET ORAL at 20:46

## 2021-02-11 RX ADMIN — PRIMIDONE 50 MG: 50 TABLET ORAL at 08:49

## 2021-02-11 RX ADMIN — GUAIFENESIN 600 MG: 600 TABLET, EXTENDED RELEASE ORAL at 08:49

## 2021-02-11 RX ADMIN — AMIODARONE HYDROCHLORIDE 200 MG: 200 TABLET ORAL at 08:49

## 2021-02-11 RX ADMIN — CARVEDILOL 3.12 MG: 3.12 TABLET, FILM COATED ORAL at 08:49

## 2021-02-11 RX ADMIN — ACETAMINOPHEN 650 MG: 325 TABLET ORAL at 20:46

## 2021-02-11 RX ADMIN — SODIUM CHLORIDE, PRESERVATIVE FREE 10 ML: 5 INJECTION INTRAVENOUS at 20:48

## 2021-02-11 RX ADMIN — CHOLESTYRAMINE 4 G: 4 POWDER, FOR SUSPENSION ORAL at 12:16

## 2021-02-11 RX ADMIN — CEFTRIAXONE SODIUM 1000 MG: 1 INJECTION, POWDER, FOR SOLUTION INTRAMUSCULAR; INTRAVENOUS at 06:07

## 2021-02-11 RX ADMIN — CHOLESTYRAMINE 4 G: 4 POWDER, FOR SUSPENSION ORAL at 17:11

## 2021-02-11 RX ADMIN — GABAPENTIN 300 MG: 300 CAPSULE ORAL at 13:30

## 2021-02-11 ASSESSMENT — PAIN SCALES - GENERAL
PAINLEVEL_OUTOF10: 0

## 2021-02-11 NOTE — CARE COORDINATION
[facility]  Swing Bed Evaluation for Certification for [address] meets skilled criteria due to the need for   [ x] Therapy; physical and occupational; for decreased strength, balance and self     care activities. [ ] Tpn   [ ] Grecia Crutch care  [ ] IV therapy  [ ] Wound care   Tracy Cr lives [ ] Alone   [x ] With Spouse  [ ] Other:   and plans on returning there at discharge. Tracy Cr prefers this facially for skilled care. Tracy Cr will require skilled care on a daily basis beginning 2/11/2021, if medically stable.         Estimated length of stay [ x] 1-2 weeks   [ ] 2-3 weeks  [ ] 3-4 weeks       Anastacio Spine LSW  Date: 02/11/21             Cosigned by:    Revision History

## 2021-02-11 NOTE — PLAN OF CARE
Problem: Falls - Risk of:  Goal: Will remain free from falls  Description: Will remain free from falls  2/11/2021 0001 by Claudean Single, RN  Outcome: Ongoing  2/10/2021 1151 by Kelsey Antonio  Outcome: Ongoing  Goal: Absence of physical injury  Description: Absence of physical injury  2/11/2021 0001 by Claudean Single, RN  Outcome: Ongoing  2/10/2021 1151 by Kelsey Antonio  Outcome: Ongoing     Problem: Discharge Planning:  Goal: Discharged to appropriate level of care  Description: Discharged to appropriate level of care  2/11/2021 0001 by Claudean Single, RN  Outcome: Ongoing  2/10/2021 1151 by Kelsey Antonio  Outcome: Ongoing  Goal: Participates in care planning  Description: Participates in care planning  2/11/2021 0001 by Claudean Single, RN  Outcome: Ongoing  2/10/2021 1151 by Kelsey Antonio  Outcome: Ongoing     Problem: Serum Glucose Level - Abnormal:  Goal: Ability to maintain appropriate glucose levels will improve  Description: Ability to maintain appropriate glucose levels will improve  2/11/2021 0001 by Claudean Single, RN  Outcome: Ongoing  2/10/2021 1151 by Kelsey Antonio  Outcome: Ongoing     Problem: Sensory Perception - Impaired:  Goal: Ability to maintain a stable neurologic state will improve  Description: Ability to maintain a stable neurologic state will improve  2/11/2021 0001 by Claudean Single, RN  Outcome: Ongoing  2/10/2021 1151 by Kelsey Antonio  Outcome: Ongoing     Problem: Airway Clearance - Ineffective:  Goal: Clear lung sounds  Description: Clear lung sounds  2/11/2021 0001 by Claudean Single, RN  Outcome: Ongoing  2/10/2021 1151 by Kelsey Antonio  Outcome: Ongoing  Goal: Ability to maintain a clear airway will improve  Description: Ability to maintain a clear airway will improve  2/11/2021 0001 by Claudean Single, RN  Outcome: Ongoing  2/10/2021 1151 by Kelsey Antonio  Outcome: Ongoing     Problem: Fluid Volume - Deficit: Goal: Achieves intake and output within specified parameters  Description: Achieves intake and output within specified parameters  2/11/2021 0001 by Karen Lujan RN  Outcome: Ongoing  2/10/2021 1151 by Can Campo  Outcome: Ongoing     Problem: Tobacco Use:  Goal: Will participate in inpatient tobacco-use cessation counseling  Description: Will participate in inpatient tobacco-use cessation counseling  2/11/2021 0001 by Karen Lujan RN  Outcome: Ongoing  2/10/2021 1151 by Can Campo  Outcome: Ongoing     Problem: Coping:  Goal: Participation in decision-making will improve  Description: Demonstration of participation in decision-making regarding own care will improve  2/11/2021 0001 by Karen Lujan RN  Outcome: Ongoing  2/10/2021 1151 by Can Campo  Outcome: Ongoing  Goal: Verbalizations of comfort with decisions will increase  Description: Verbalizations of comfort with decisions will increase  2/11/2021 0001 by Karen Lujan RN  Outcome: Ongoing  2/10/2021 1151 by aCn Campo  Outcome: Ongoing     Problem: Health Behavior:  Goal: Ability to identify and utilize available resources and services will improve  Description: Ability to identify and utilize available resources and services will improve  2/11/2021 0001 by Karen Lujan RN  Outcome: Ongoing  2/10/2021 1151 by Can Campo  Outcome: Ongoing     Problem: Skin Integrity:  Goal: Will show no infection signs and symptoms  Description: Will show no infection signs and symptoms  2/11/2021 0001 by Karen Lujan RN  Outcome: Ongoing  2/10/2021 1151 by Can Campo  Outcome: Ongoing  Goal: Absence of new skin breakdown  Description: Absence of new skin breakdown  2/11/2021 0001 by Karen Lujan RN  Outcome: Ongoing  2/10/2021 1151 by Can Campo  Outcome: Ongoing

## 2021-02-11 NOTE — PROGRESS NOTES
Physical Therapy    Physical Therapy Treatment Note  Name: Moreno Mosqueda MRN: [id number] :   1945   Date:  2021   Admission Date: 2021 Room:  [address] [place] [address]     Restrictions/Precautions:  Restrictions/Precautions  Restrictions/Precautions: Fall Risk, General Precautions  Required Braces or Orthoses?: No         Communication with other providers:  RN approves tx session. Aid Pamela present for pt safety. Subjective:  Patient states:  Agreeable to tx session;     Pain:   Location, Type, Intensity (0/10 to 10/10):  8/10 buttocks area    Objective:    Observation:  Pt in bed upon arrival.    Treatment, including education/measures:  Transfers  Supine to sit :modA  Scooting :modA  Rolling :CGA  Sit to stand :Sukumar  Stand to sit :modA for eccentric control    Gait:  Pt amb with RW for 10ft x 3ft with Sukumar assist  Pt needed VC's for walker safety, pathway. Pt able to amb ~10ft to chair, then requests to use toilet; able to amb another ~10ft to toilet. Pt unable to control bowels this date. Pt then amb ~10ft back to chair. Increased time and effort required this date due to pt bowel incontinence and cleanup afterwards. Safety  Patient left safely in the chair, with call light/phone in reach with alarm applied. Gait belt used for transfers and gait.     Assessment / Impression:       Patient's tolerance of treatment:  good   Adverse Reaction: none  Significant change in status and impact:  none  Barriers to improvement:  Safety     Plan for Next Session:    Will cont to work towards pt's goals per his tolerance    Time in:  [phone]  Time out:  1111  Timed treatment minutes:  30  Total treatment time:  30    Previously filed items:  Social/Functional History  Lives With: Spouse  Type of Home: House  Home Layout: One level  Home Access: Stairs to enter with rails  Entrance Stairs - Number of Steps: 2  Bathroom Shower/Tub: Walk-in shower, Shower chair without back  Bathroom Toilet: Standard Bathroom Equipment: Grab bars in shower, Hand-held shower  Home Equipment: 4 wheeled walker, [employer] Help From: [employer])  ADL Assistance: Independent  Homemaking Assistance: Independent  Homemaking Responsibilities: Yes  Ambulation Assistance: Needs assistance(4WW)  Transfer Assistance: Independent  Active : Yes  Mode of Transportation: Car  Occupation: Retired  Short term goals  Time Frame for [employer] term goals: 1 week  Short term goal 1: patient will safely  perform bed mobility activities with no greater CGA with HOB flat and no HR  Short term goal 2: patient will demonstrate the ability to safely transfer sit to stands and stand to sit from 2 different height surfaces with no greater than SBA  Short term goal 3: patient will demonstrate the ability to safely ambulate 150 ft with the least restrictive AD and no greater than SBA while wearing O2 per nc if necessary  Short term goal 4: patient will demonstrate the ability to safely stand for 5 consecutive minutes  while performing dynamic  balance activities with CGA and more than 1 hand for ssupport       Electronically signed by:    Fiona Landon PTA  2/11/2021, 12:37 PM

## 2021-02-11 NOTE — PROGRESS NOTES
Occupational Therapy   Occupational Therapy Initial Assessment  Date: 2021   Patient Name: Theresa Delong  MRN: [id number]     : 1945    Date of Service: 2021    Discharge Recommendations:  Continue to assess pending progress, Home with assist PRN  OT Equipment Recommendations  Equipment Needed: No    Assessment   Performance deficits / Impairments: Decreased functional mobility ; Decreased safe awareness;Decreased coordination;Decreased balance;Decreased ADL status; Decreased endurance;Decreased high-level IADLs;Decreased strength  Assessment: Pt is a 70year old male with hx of falls admitted to [facility] unit for therapy. Pt presents with weakness and decreased endurance and ability to impairements to perform functional mobiilty and ADLs indep. Pt would benefit from continued OT to address deficits and increase functional I  Treatment Diagnosis: Weakness  Prognosis: Good  Decision Making: Medium Complexity  OT Education: OT Role;Plan of Care;Energy Conservation  REQUIRES OT FOLLOW UP: Yes  Activity Tolerance  Activity Tolerance: Patient limited by fatigue  Safety Devices  Safety Devices in place: Yes  Type of devices: All fall risk precautions in place;Gait belt;Patient at risk for falls;Call light within reach; Left in chair;Chair alarm in place  Restraints  Initially in place: No           Patient Diagnosis(es): There were no encounter diagnoses. has a past medical history of AAA (abdominal aortic aneurysm) (HCC), Basal cell carcinoma of nose, CAD (coronary artery disease), Constipation, echocardiogram, Essential hypertension, History of alcohol abuse, Hyperlipidemia, Hypertension, Missing teeth, acquired, Non-alcoholic fatty liver disease, Osteoarthritis, Spinal stenosis, Tremor, Type 2 diabetes mellitus without complication ([facility] [address] and Wears glasses. has a past surgical history that includes sinus surgery (1264'B); other surgical history (12/09/2016); other surgical history (01/2020); Coronary artery bypass graft (N/A, 3/4/2020); knee surgery (2015); Carpal tunnel release (Right, 1970's); [place] tooth extraction; and Cardiac catheterization (02/26/2020).     Treatment Diagnosis: Weakness      Restrictions  Restrictions/Precautions  Restrictions/Precautions: Fall Risk, General Precautions  Required Braces or Orthoses?: No  Position Activity Restriction  Other position/activity restrictions: 2L of O2    Subjective   General  Chart Reviewed: Yes  Patient assessed for rehabilitation services?: Yes  Family / Caregiver Present: No  Subjective  Subjective: Pt reports \"I'm feeling ok\"  General Comment  Comments: Pt presents awake supine in bed watching TV  Patient Currently in Pain: Denies  Pain Assessment  Pain Assessment: 0-10  Pain Level: 0  Vital Signs  Temp: 98 °F (36.7 °C)  Temp Source: Oral  Pulse: 77  Heart Rate Source: Monitor  Resp: 16  BP: (!) 123/57  BP Location: Right upper arm  Patient Position: Semi fowlers  Level of Consciousness: Alert (0)  MEWS Score: 1  Patient Currently in Pain: Denies  Height and Weight  Height: 5' 6\" (167.6 cm)  Weight: 163 lb 14.4 oz (74.3 kg)  Weight Method: Bed scale  BSA (Calculated - sq m): 1.86 sq meters  BMI (Calculated): 26.5  Oxygen Therapy  SpO2: 93 %  O2 Flow Rate (L/min): 3 L/min  Social/Functional History  Social/Functional History  Lives With: Spouse  Type of Home: House  Home Layout: One level  Home Access: Stairs to enter with rails  Entrance Stairs - Number of Steps: 2  Bathroom Shower/Tub: Walk-in shower, Shower chair without back  Bathroom Toilet: Standard  Bathroom Equipment: Grab bars in shower, Hand-held shower  Home Equipment: 4 wheeled walker, [employer] Help From: Family  ADL Assistance: Independent  Homemaking Assistance: Independent  Homemaking Responsibilities: Yes Meal Prep Responsibility: Secondary  Laundry Responsibility: Secondary  Cleaning Responsibility: Secondary  Shopping Responsibility: Primary  Ambulation Assistance: Independent  Transfer Assistance: Independent  Active : Yes  Mode of Transportation: Car  Occupation: Retired  Leisure & Hobbies: Pt reports he doesn't do much in the winter but likes to be outside in the summer       Objective   Vision: Impaired  Vision Exceptions: Wears glasses for reading  Hearing: Within functional limits    Orientation  Overall Orientation Status: Within Functional Limits  Observation/Palpation  Posture: Fair  Observation: Awake supine in bed with 2 L of O2  Balance  Sitting Balance: Stand by assistance  Standing Balance: Minimal assistance  Standing Balance  Activity: Pt amb in room ~15ft using RW with min A for balance and mod VC for walker/balance safety  ADL  Feeding: Modified independent   Grooming: Minimal assistance  UE Bathing: Moderate assistance  LE Bathing: Maximum assistance  UE Dressing: Moderate assistance  LE Dressing: Maximum assistance  Toileting: Moderate assistance  Additional Comments: Based on Pt performance, simulation and evaluation of strength, endurance and functional mobility status.   Pt demo reaching down to foot but was unable to lenora/doff his socks  Tone RUE  RUE Tone: Normotonic  Tone LUE  LUE Tone: Normotonic  Coordination  Movements Are Fluid And Coordinated: No  Coordination and Movement description: Resting tremors     Bed mobility  Rolling to Left: Minimal assistance  Supine to Sit: Minimal assistance  Scooting: Minimal assistance  Transfers  Stand Step Transfers: Minimal assistance  Sit to stand: Minimal assistance  Stand to sit: Contact guard assistance  Vision - Basic Assessment  Prior Vision: Wears glasses all the time  Cognition  Overall Cognitive Status: WFL        Sensation  Overall Sensation Status: WFL        LUE AROM (degrees)  LUE AROM : WFL  RUE PROM (degrees)  RUE PROM: [facility] RUE General PROM: Pt with slightly decreased ROM at R shoulder  LUE Strength  Gross LUE Strength: Exceptions to WFL(4-/5)  RUE Strength  Gross RUE Strength: Exceptions to WFL(4-/5)                   Plan   Plan  Times per week: 4+  Times per day: Daily  Current Treatment Recommendations: Strengthening, Safety Education & Training, Balance Training, Patient/Caregiver Education & Training, Self-Care / ADL, Home Management Training, Equipment Evaluation, Education, & procurement, Functional Mobility Training, Endurance Training    G-Code     OutComes Score                                                  AM-PAC Score             Goals  Short term goals  Time Frame for Short term goals: Until goals met or discharge  Short term goal 1: Pt will complete all UB ADLs mod I for increased functional I  Short term goal 2: Pt will complete all LB ADLs min A for increased functional I  Short term goal 3: Pt will complete all aspects of toileting SBA for increased functional I  Short term goal 4: Pt will complete all functional transfers and bed mobility SBA in prep for EOB/OOB ADL tasks. Short term goal 5: PT will participate in therex/theract for 5+ minutes with emphasis on UE strengthning and dynamic balance for increased I with IADL/home management tasks  Patient Goals   Patient goals : to return home       Therapy Time   Individual Concurrent Group Co-treatment   Time In       1445   Time Out       1511   Minutes       26   Timed Code Treatment Minutes: [address] [facility] MARKY Hector OTR/L [id number]

## 2021-02-11 NOTE — PROGRESS NOTES
Patient being discharged to swing bed. Bilateral upper extremity bruising, with abrasions on bilateral elbows and knees from falling and small skin tear behind right ear. No other skin issues noted.

## 2021-02-11 NOTE — PROGRESS NOTES
Physical Therapy  Initial Assessment  Date: 2021  Patient Name: Robyn Lainez  MRN: [id number]  : 1945     Treatment Diagnosis: impaired mobility, Hx of falls    Restrictions  Restrictions/Precautions  Restrictions/Precautions: Fall Risk, General Precautions  Required Braces or Orthoses?: No  Position Activity Restriction  Other position/activity restrictions: 2L of O2    Subjective   General  Chart Reviewed: Yes  Patient assessed for rehabilitation services?: Yes  Follows Commands: Within Functional Limits  Pain Screening  Patient Currently in Pain: Denies       Vision/Hearing       Orientation       Social/Functional History  Social/Functional History  Lives With: Spouse  Type of Home: House  Home Layout: One level  Home Access: Stairs to enter with rails  Entrance Stairs - Number of Steps: 2  Bathroom Shower/Tub: Walk-in shower; Shower chair without back  Bathroom Toilet: Standard  Bathroom Equipment: Grab bars in shower;Hand-held shower  Home Equipment: 4 wheeled walker;Cane  Receives Help From: Family  ADL Assistance: Independent  Homemaking Assistance: Independent  Meal Prep Responsibility: Secondary  Laundry Responsibility: Secondary  Cleaning Responsibility: Secondary  Shopping Responsibility: Primary  Ambulation Assistance: Independent  Transfer Assistance: Independent  Active : Yes  Mode of Transportation: Car  Occupation: Retired    Objective     Observation/Palpation  Posture: Fair  Observation: Awake supine in bed with 2 L of O2    AROM RLE (degrees)  RLE AROM: WFL  AROM LLE (degrees)  LLE AROM : WFL    Strength RLE  Comment: grossly 4/5  Strength LLE  Comment: grossly 4/5           Bed mobility  Rolling to Left: Minimal assistance  Rolling to Right: Minimal assistance  Supine to Sit: Minimal assistance  Sit to Supine:  Moderate assistance  Scooting: Minimal assistance  Transfers  Sit to Stand: Minimal Assistance  Stand to sit: Modified independent       Ambulation 1 Device: Rolling Walker  Other Apparatus: O2  Assistance: Minimal assistance  Gait Deviations: Slow Shakila  Distance: 10 ft  Balance  Standing - Static: Poor;+  Standing - Dynamic: Poor                         Assessment   Conditions Requiring Skilled Therapeutic Intervention  Assessment: patient is a 71 yo male admitted to [facility] due to falling @ home; patient would benefit from skilled PT inteventions include admission to swing bed  in order to appropriately address deficits with balance, strength, functional mobility and endurance; once the deficits are adddress, patient has the potential to return to his home and his PLOF  Treatment Diagnosis: impaired mobility, Hx of falls  Prognosis: Good  Decision Making: Medium Complexity  History: Pf- Hx of falls  Exam: gait/ balance  Clinical Presentation: changing characteristics of function due to weakness/unsteadiness on feet  Barriers to Learning: none  REQUIRES PT FOLLOW UP: Yes         Plan   Plan  Times per week: 5+  Current Treatment Recommendations: Strengthening, Gait Training, ADL/Self-care Training, IADL Training, Balance Training, Functional Mobility Training, Transfer Training, Endurance Training  Safety Devices  Type of devices: Gait belt, Left in chair, Call light within reach, Chair alarm in place    G-Code       OutComes Score                                                  AM-PAC Score             Goals  Short term goals  Time Frame for Short term goals: 1 week  Short term goal 1: patient will safely  perform bed mobility activities with no greater CGA with HOB flat and no HR  Short term goal 2: patient will demonstrate the ability to safely transfer sit to stands and stand to sit from 2 different height surfaces with no greater than SBA  Short term goal 3: patient will demonstrate the ability to safely ambulate 150 ft with the least restrictive AD and no greater than SBA while wearing O2 per nc if necessary Short term goal 4: patient will demonstrate the ability to safely stand for 5 consecutive minutes  while performing dynamic  balance activities with CGA and more than 1 hand for ssupport       Therapy Time   Individual Concurrent Group Co-treatment   Time In 1445         Time Out 1505         Minutes 20         Timed Code Treatment Minutes: [address], PT

## 2021-02-11 NOTE — H&P
History and Physical  Jackie Tinajero MD    2/11/2021  Moreno Mosqueda  1945    PCP: Eber Barton MD    ASSESSMENT AND PLAN:      1. Fall at home related to weakness and debility      Did not loose consciousness    Some abrasions on both knees    Head and neck CT W/O contrast    Impression:  No acute intracranial abnormality. Impression:  No acute abnormality of the cervical spine.   Fall precaution, PT/OT consult: rec SNF  MRI brain WO contrast: negative  cdiff is pending but otherwise stable to go to swing     2.  Pneumonia due to Unknown organism      Weakness,WBC15.4High K/CU MM hypoxia  Lactate 2.7High Panic mMOL/L    SARS-CoV-2, NAATNOT DETECTED    Chest X-ray   Bilateral pulmonary opacities in the mid lung fields and lower lung fields   which may represent infiltrates.  Pulmonary nodules cannot be excluded     Culture pending  Dc ivf  Three doses of midodrine remain  Day 2 abx: levaquin for total of 4-5 more days; daily QTc monitoring with ekg while he is also on amiodarone   mucinex  Cbc monitor x 2 more days     3.  Diabetes mellitus Type II       POCT glucose, hypoglycemic protocol       Home  Medications, sliding scale insulin with meal coverage  bg 99 this am     4.  Hyponatremia        [id number] MMOL/L       Replete sodium  electroltyes monitor x 2 more days     5. Other health Concerns     Tremor, Osteoarthritis, Hypertension,spinal stenosis, and CAD     6. Diarrhea  Imodium cdif cholestyramine        Cc: weakness and debility  HPI: Moreno Mosqueda is a 70 y.o. male with significant weakness following previous hospitalization presented to [facility] after a fall. A workup ruled out stroke and he continues to be treated at this time for pneumonia. He did have increased weakness today and an explosive bout of diarrhea. Denied fever or chills, is not nauseated or had vomiting, denied rash. Denied palpitations or chest pain or feeling light headed. But weakness is profound.     PMHX: Past Medical History:   Diagnosis Date    AAA (abdominal aortic aneurysm) ([facility] [address] 2018    Infrarenal    Basal cell carcinoma of nose 2016    Dr Mai Mcburney CAD (coronary artery disease)     Dr. Vanessa Rivera - severe aortic stenosis with a bicuspid aortic valve    Constipation     echocardiogram 10/29/2020    EF 55-60% normally functioning valve in aortic position, mild mitral regurg with two jets mild pulm htn.  Essential hypertension     History of alcohol abuse     Hyperlipidemia     Hypertension     Follows with PCP    Missing teeth, acquired     Non-alcoholic fatty liver disease     Osteoarthritis     Knees, lower back, shoulders,    Spinal stenosis     had epidural steroid injection 1/8/2020 - lumbar    Tremor     Left arm only    Type 2 diabetes mellitus without complication ([facility] [address]     Controlling with diet    Wears glasses      PSHX:  has a past surgical history that includes sinus surgery (3116'S); other surgical history (12/09/2016); other surgical history (01/2020); Coronary artery bypass graft (N/A, 3/4/2020); knee surgery (2015); Carpal tunnel release (Right, 1970's); [place] tooth extraction; and Cardiac catheterization (02/26/2020). Meds - list of home medications reviewed in electronic chart and confirmed  Allergies: Allergies   Allergen Reactions    Metformin And Related Diarrhea     Severe diarrhea, abd pain, and nausea    Amoxicillin Diarrhea    Other Nausea And Vomiting     ANTI-INFLAMMATORIES  Severe stomach pain--diarrhea  hypertension       FAM HX: family history includes Heart Disease in his father; High Blood Pressure in his father; High Cholesterol in his father; Sarai Locus in his father and mother.   Soc HX:   Social History     Socioeconomic History    Marital status:      Spouse name: Not on file    Number of children: Not on file    Years of education: Not on file    Highest education level: Not on file   Occupational History    Not on file   Social Needs  Financial resource strain: Not on file    Food insecurity     Worry: Not on file     Inability: Not on file   Linchpin needs     Medical: Not on file     Non-medical: Not on file   Tobacco Use    Smoking status: Current Every Day Smoker     Packs/day: 1.50     Years: 58.00     Pack years: 87.00     Types: Cigarettes     Start date: 1962    Smokeless tobacco: Never Used   Substance and Sexual Activity    Alcohol use: Not Currently     Comment: 1 glass wine a month    Drug use: No    Sexual activity: Not Currently   Lifestyle    Physical activity     Days per week: Not on file     Minutes per session: Not on file    Stress: Not on file   Relationships    Social connections     Talks on phone: Not on file     Gets together: Not on file     Attends [employer] service: Not on file     Active member of club or organization: Not on file     Attends meetings of clubs or organizations: Not on file     Relationship status: Not on file    Intimate partner violence     Fear of current or ex partner: Not on file     Emotionally abused: Not on file     Physically abused: Not on file     Forced sexual activity: Not on file   Other Topics Concern    Not on file   Social History Narrative    Not on file       ROS: a ten point ROS with pertinent positives and negatives in hpi, otherwise negative. EXAM:  Blood pressure (!) 123/57, pulse 77, temperature 98 °F (36.7 °C), temperature source Oral, resp. rate 16, height 5' 6\" (1.676 m), weight 163 lb 14.4 oz (74.3 kg), SpO2 93 %. Gen:  awake, alert, cooperative, no apparent distress   EYES:  Lids and lashes normal, pupils equal, round and reactive to light, extra ocular muscles intact, sclera clear, conjunctiva normal  ENT:  Normocephalic, oral pharynx with moist mucus membranes, tonsils without erythema or exudates,  NECK:  Supple, symmetrical, trachea midline, no adenopathy,  LUNGS:  Clear to auscultate bilaterally, no rales ronchi or wheezing noted. CARDIOVASCULAR:  regular rate and rhythm, normal S1 and S2,no murmur/gallop/rub  ABDOMEN: Normal BS, Non tender, non distended, no HSM noted. MUSCULOSKELETAL:  ROM of all extremities grossly wnl  NEUROLOGIC: AOx 3,  Cranial nerves II-XII are grossly intact. Strength in upper extremities>lowerSKIN:  no bruising or bleeding, normal skin color, texture, turgor and no redness, warmth, or swelling        LABS in ER reviewed    Xr Elbow Left (min 3 Views)    Result Date: 2/6/2021  EXAMINATION: THREE XRAY VIEWS OF THE LEFT ELBOW 2/6/2021 5:38 pm COMPARISON: None. HISTORY: ORDERING SYSTEM PROVIDED HISTORY: fall TECHNOLOGIST PROVIDED HISTORY: Reason for exam:->fall Reason for Exam: left elbow pain Acuity: Acute Type of Exam: Initial Mechanism of Injury: fall FINDINGS: No fracture, dislocation, or joint effusion. Moderate enthesophyte at the triceps insertion. The joint spaces are preserved. The soft tissues are unremarkable. No acute osseous abnormality. Xr Elbow Right (min 3 Views)    Result Date: 2/6/2021  EXAMINATION: THREE XRAY VIEWS OF THE RIGHT ELBOW 2/6/2021 5:38 pm COMPARISON: None. HISTORY: ORDERING SYSTEM PROVIDED HISTORY: fall TECHNOLOGIST PROVIDED HISTORY: Reason for exam:->fall Reason for Exam: right elbow pain Acuity: Acute Type of Exam: Initial Mechanism of Injury: fall FINDINGS: A peripheral venous catheter is present in the antecubital fossa. No joint effusion. No fracture or dislocation identified. The soft tissues are unremarkable. No acute osseous abnormality.      Xr Knee Right (3 Views)    Result Date: 2/6/2021 EXAMINATION: THREE XRAY VIEWS OF THE RIGHT KNEE 2/6/2021 5:39 pm COMPARISON: None. HISTORY: ORDERING SYSTEM PROVIDED HISTORY: fall TECHNOLOGIST PROVIDED HISTORY: Reason for exam:->fall Reason for Exam: right knee pain Acuity: Acute Type of Exam: Initial Mechanism of Injury: fall FINDINGS: No fracture, dislocation, or joint effusion. Mild medial compartment degenerative changes. Atherosclerotic vascular calcifications. No acute osseous abnormality. Ct Head Wo Contrast    Result Date: 2/9/2021  EXAMINATION: CT OF THE HEAD WITHOUT CONTRAST  2/9/2021 7:26 pm TECHNIQUE: CT of the head was performed without the administration of intravenous contrast. Dose modulation, iterative reconstruction, and/or weight based adjustment of the mA/kV was utilized to reduce the radiation dose to as low as reasonably achievable. COMPARISON: None. HISTORY: ORDERING SYSTEM PROVIDED HISTORY: head pain TECHNOLOGIST PROVIDED HISTORY: Has a \"code stroke\" or \"stroke alert\" been called? ->No Reason for exam:->head pain Reason for Exam: Trauma, fall Acuity: Acute Type of Exam: Initial Mechanism of Injury: Trauma, fall FINDINGS: BRAIN/VENTRICLES: There is no acute intracranial hemorrhage, mass effect or midline shift. No abnormal extra-axial fluid collection. The gray-white differentiation is maintained without evidence of an acute infarct. Hypoattenuation of the periventricular and subcortical white matter is suggestive of chronic small vessel ischemic disease. Mild diffuse parenchymal volume loss is noted. There is no evidence of hydrocephalus. ORBITS: The visualized portion of the orbits demonstrate no acute abnormality. SINUSES: Is a small polyp versus retention cyst in the right sphenoid sinus. Other visualized paranasal sinuses and mastoid air cells are clear. SOFT TISSUES/SKULL:  No acute abnormality of the visualized skull or soft tissues. No acute intracranial abnormality.      Ct Cervical Spine Wo Contrast Result Date: 2/9/2021  EXAMINATION: CT OF THE CERVICAL SPINE WITHOUT CONTRAST 2/9/2021 7:27 pm TECHNIQUE: CT of the cervical spine was performed without the administration of intravenous contrast. Multiplanar reformatted images are provided for review. Dose modulation, iterative reconstruction, and/or weight based adjustment of the mA/kV was utilized to reduce the radiation dose to as low as reasonably achievable. COMPARISON: None. HISTORY: ORDERING SYSTEM PROVIDED HISTORY: pain TECHNOLOGIST PROVIDED HISTORY: Reason for exam:->pain Reason for Exam: Trauma, fall Acuity: Acute Type of Exam: Initial Mechanism of Injury: Trauma, fall FINDINGS: BONES/ALIGNMENT: There is no acute fracture or traumatic malalignment. DEGENERATIVE CHANGES: Multilevel cervical spondylosis noted, most prominent at C5-C6 SOFT TISSUES: There is no prevertebral soft tissue swelling. No apical pneumothorax. Ground-glass densities in bilateral upper lobes, due to multifocal pneumonia (such as COVID-19), contusion, or aspiration. No acute abnormality of the cervical spine. RECOMMENDATIONS: Ground-glass densities in the bilateral upper lobes, due to multifocal pneumonia (such as COVID-19), contusion, or aspiration.      Ct Lumbar Spine Wo Contrast    Result Date: 2/6/2021 EXAMINATION: CT OF THE LUMBAR SPINE WITHOUT CONTRAST  2/6/2021 TECHNIQUE: CT of the lumbar spine was performed without the administration of intravenous contrast. Multiplanar reformatted images are provided for review. Dose modulation, iterative reconstruction, and/or weight based adjustment of the mA/kV was utilized to reduce the radiation dose to as low as reasonably achievable. COMPARISON: August 10, 2020. HISTORY: ORDERING SYSTEM PROVIDED HISTORY: fall TECHNOLOGIST PROVIDED HISTORY: Reason for exam:->fall Decision Support Exception->Emergency Medical Condition (MA) Reason for Exam: low back pain Acuity: Acute Type of Exam: Initial Mechanism of Injury: fall Relevant Medical/Surgical History: hx of falls FINDINGS: BONES/ALIGNMENT: There is normal alignment of the spine. The vertebral body heights are maintained. No osseous destructive lesion is seen. DEGENERATIVE CHANGES: No significant degenerative changes of the lumbar spine. SOFT TISSUES/RETROPERITONEUM: Stable appearance to aortic stent graft. No acute fracture no change in alignment compared to prior study. Josefina Aver Chest Portable    Result Date: 2/9/2021  EXAMINATION: ONE XRAY VIEW OF THE CHEST 2/9/2021 7:39 pm COMPARISON: 09/01/2020 HISTORY: ORDERING SYSTEM PROVIDED HISTORY: chest pain TECHNOLOGIST PROVIDED HISTORY: Reason for exam:->chest pain Reason for Exam: Chest pain Acuity: Acute Type of Exam: Initial Additional signs and symptoms: Chest pain Initial evaluation FINDINGS: The patient has had a median sternotomy. Monitor wires overlie the chest. The trachea is midline. The cardiac silhouette is unremarkable. There are bilateral pulmonary airspace changes that are nonspecific and could represent infiltrates. Pulmonary nodules cannot be entirely excluded. There is no pleural fluid. Follow up to resolution is suggested. CT could better evaluate lung parenchyma if indicated. Bilateral pulmonary opacities in the mid lung fields and lower lung fields which may represent infiltrates. Pulmonary nodules cannot be excluded. Follow up to resolution is suggested. CT could better evaluate lung parenchyma if indicated. Mri Brain Wo Contrast    Result Date: 2/10/2021  EXAMINATION: MRI OF THE BRAIN WITHOUT CONTRAST  2/10/2021 3:59 pm TECHNIQUE: Multiplanar multisequence MRI of the brain was performed without the administration of intravenous contrast. COMPARISON: None. HISTORY: ORDERING SYSTEM PROVIDED HISTORY: weakness TECHNOLOGIST PROVIDED HISTORY: Reason for exam:->weakness Reason for Exam: weakness Acuity: Acute Type of Exam: Subsequent/Follow-up Additional signs and symptoms: limited mobility FINDINGS: INTRACRANIAL STRUCTURES/VENTRICLES: There is no acute infarct or mass. Parenchymal volume is commensurate with age. Mild chronic white matter microvascular ischemic changes are present. No mass effect or midline shift. No evidence of an acute intracranial hemorrhage. The ventricles and sulci are normal in size and configuration. The sellar/suprasellar regions appear unremarkable. The normal signal voids within the major intracranial vessels appear maintained. ORBITS: The visualized portion of the orbits demonstrate no acute abnormality. SINUSES: Mild left maxillary sinus mucoperiosteal thickening with frothy fluid in the axillary antrum. No mastoid effusion. BONES/SOFT TISSUES: The bone marrow signal intensity appears normal. The soft tissues demonstrate no acute abnormality. 1. No acute intracranial abnormality. 2. Mild chronic white matter microvascular ischemic changes.  3. Mild left maxillary sinus disease.   -  Patient Active Problem List   Diagnosis    Abdominal aortic aneurysm (AAA) without rupture (HCC)    Tobacco abuse    Essential hypertension    Smoker    Non-alcoholic fatty liver disease    Basal cell carcinoma of nose

## 2021-02-11 NOTE — CARE COORDINATION
CM met with the patient for follow-up discussion regarding discharge planning, patient sitting in recliner resting quietly. CM discussed PT/OT's recommendations for admission to the swing bed program for skilled therapy. Patient admitted that he is weaker than normal and would benefit form therapy and is now agreeable to placement in the swing bed program once deemed medically stable for discharge. CM will make referral to the swing bed coordinator. 11:51 AM  CM notified the swing bed coordinator of referral.  Dr. Papa Ramon notified via Fever.

## 2021-02-11 NOTE — PROGRESS NOTES
Physical Therapy    Facility/Department: [facility] UNIT  Initial Assessment    NAME: Zoila Salomon  : 1945  MRN: [id number]    Date of Service: 2021    Discharge Recommendations:  Home with assist PRN        Assessment   Assessment: patient is a 71 yo male admitted to [facility] due to falling @ home; patient would benefit from skilled PT inteventions include admission to swing bed  in order to appropriately address deficits with balance, strength, functional mobility and endurance; once the deficits are adddress, patient has the potential to return to his home and his PLOF  Treatment Diagnosis: impaired mobility, Hx of falls  Prognosis: Good  Decision Making: Medium Complexity  History: Pf- Hx of falls  Exam: gait/ balance  Clinical Presentation: changing characteristics of function due to weakness/unsteadiness on feet  Barriers to Learning: none  REQUIRES PT FOLLOW UP: Yes       Patient Diagnosis(es): There were no encounter diagnoses. has a past medical history of AAA (abdominal aortic aneurysm) (HCC), Basal cell carcinoma of nose, CAD (coronary artery disease), Constipation, echocardiogram, Essential hypertension, History of alcohol abuse, Hyperlipidemia, Hypertension, Missing teeth, acquired, Non-alcoholic fatty liver disease, Osteoarthritis, Spinal stenosis, Tremor, Type 2 diabetes mellitus without complication ([facility] [address] and Wears glasses. has a past surgical history that includes sinus surgery ('X); other surgical history (2016); other surgical history (2020); Coronary artery bypass graft (N/A, 3/4/2020); knee surgery (); Carpal tunnel release (Right, 1970's); [place] tooth extraction; and Cardiac catheterization (2020).     Restrictions  Restrictions/Precautions  Restrictions/Precautions: Fall Risk, General Precautions  Required Braces or Orthoses?: No  Position Activity Restriction  Other position/activity restrictions: 2L of O2  Vision/Hearing Vision Exceptions: Wears glasses for reading  Hearing: Within functional limits     Subjective  General  Chart Reviewed: Yes  Patient assessed for rehabilitation services?: Yes  Follows Commands: Within Functional Limits  Pain Screening  Patient Currently in Pain: Denies     Pre Treatment Pain Screening  Pain at present: 0    Orientation  Orientation  Overall Orientation Status: Within Normal Limits  Social/Functional History  Social/Functional History  Lives With: Spouse  Type of Home: House  Home Layout: One level  Home Access: Stairs to enter with rails  Entrance Stairs - Number of Steps: 2  Bathroom Shower/Tub: Walk-in shower, Shower chair without back  Bathroom Toilet: Standard  Bathroom Equipment: Grab bars in shower, Hand-held shower  Home Equipment: 4 wheeled walker, [employer] Help From: Family  ADL Assistance: Independent  Homemaking Assistance: Independent  Homemaking Responsibilities: Yes  Meal Prep Responsibility: Secondary  Laundry Responsibility: Secondary  Cleaning Responsibility: Secondary  Shopping Responsibility: Primary  Ambulation Assistance: Independent  Transfer Assistance: Independent  Active : Yes  Mode of Transportation: Car  Occupation: Retired  Leisure & Hobbies: Pt reports he doesn't do much in the winter but likes to be outside in the summer  Cognition        Objective     Observation/Palpation  Posture: Fair  Observation: Awake supine in bed with 2 L of O2    AROM RLE (degrees)  RLE AROM: WFL  AROM LLE (degrees)  LLE AROM : WFL  Strength RLE  Comment: grossly 4/5  Strength LLE  Comment: grossly 4/5        Bed mobility  Rolling to Left: Minimal assistance  Rolling to Right: Minimal assistance  Supine to Sit: Minimal assistance  Sit to Supine:  Moderate assistance  Scooting: Minimal assistance  Transfers  Sit to Stand: Minimal Assistance  Stand to sit: Modified independent  Ambulation 1  Device: Rolling Walker  Other Apparatus: O2  Assistance: Minimal assistance Gait Deviations: Slow Shakila  Distance: 10 ft     Balance  Standing - Static: Poor;+  Standing - Dynamic: Poor        Plan   Plan  Times per week: 5+  Current Treatment Recommendations: Strengthening, Gait Training, ADL/Self-care Training, IADL Training, Balance Training, Functional Mobility Training, Transfer Training, Endurance Training  Safety Devices  Type of devices: Gait belt, Left in chair, Call light within reach, Chair alarm in place    G-Code       OutComes Score                                                  AM-PAC Score      Basic Mobility Six Clicks Form [employer] AM-PAC Score Conversion Table   How much difficulty does the patient currently have Unable   (pt is unable to do activity) A Lot   (activity is a struggle, requires great effort/time) A Little   (pt can manage, but takes more effort/time than should) None   (pt has no difficulty) Raw Score Standardized Score CMS -100% Score CMS Modifier        6 23.55 100% CN   Turning over in bed (including adjusting bedclothes, sheets, and blankets)? []1 []2  [x]3  []4  7 26.42 92.36% CM        8 28.58 86.62% CM   Sitting down on and standing up from a chair with arms (e.g. wheelchair, bedside commode, etc.)? []1 []2 [x]3   []4   9 30.55 81.38% CM        10 32.29 76.75% CL   Moving from lying on back to sitting on the side of the bed? []1 []2  [x]3   []4   11 33.86 72.57% CL        12 35.33 68.66% CL   How much help from another person does the patient currently need Total   (Total/Dependent Assist) A Lot   (Max/Mod Assist) A Little   (Min/CGA/Supervision) None   (No human assistance) 13 36.74 64.91% CL        14 38.1 61.29% CL   Moving to and from a bed to a chair (including a wheelchair)? []1  []2   [x]3  []4   15 39.45 57.70% CK        16 40.78 54.16% CK   To walk in a hospital room? []1 []2   [x]3    []4  17 42.13 50.57% CK        18 43.63 46.58% CK   Climbing 3-5 steps with a railing?  [x]1  []2   []3    []4  19 45.44 41.77% CK 20 47.67 35.83% CJ   Raw Score 16  21 50.25 28.97% CJ   Standardized Score   22 53.28 20.91% CJ   CMS 0-100% Score   23 56.93 11.20% CI   CMS Modifier  24 61.14 0.00% CH     CH = 0% impaired  CI = 1-20% impaired  CJ = 20-40% impaired  CK = 40-60% impaired  CL = 60-80% impaired  CM = % impaired  CN = 100% impaired          Goals  Short term goals  Time Frame for Short term goals: 1 week  Short term goal 1: patient will safely  perform bed mobility activities with no greater CGA with HOB flat and no HR  Short term goal 2: patient will demonstrate the ability to safely transfer sit to stands and stand to sit from 2 different height surfaces with no greater than SBA  Short term goal 3: patient will demonstrate the ability to safely ambulate 150 ft with the least restrictive AD and no greater than SBA while wearing O2 per nc if necessary  Short term goal 4: patient will demonstrate the ability to safely stand for 5 consecutive minutes  while performing dynamic  balance activities with CGA and more than 1 hand for ssupport       Therapy Time   Individual Concurrent Group Co-treatment   Time In 1445         Time Out 1505         Minutes 20         Timed Code Treatment Minutes: 10 Minutes       GREER Álvarez, PT

## 2021-02-11 NOTE — PROGRESS NOTES
Hospitalist Progress Note       Katey Fowler M.D.  2/11/2021 6:45 AM  Admit Date: 2/9/2021    PCP: Christine Dunbar MD     Assessment and Plan:   1. Fall at home related to weakness and debility      Did not loose consciousness    Some abrasions on both knees    Head and neck CT W/O contrast    Impression:  No acute intracranial abnormality. Impression:  No acute abnormality of the cervical spine.   Fall precaution, PT/OT consult: rec SNF  MRI brain WO contrast: negative  Once cdiff is ruled out will transfer to [facility], no ins pre-auth necessary, may happen over weekend     2.  Pneumonia due to Unknown organism      Weakness,WBC15.4High K/CU MM hypoxia  Lactate 2.7High Panic mMOL/L    SARS-CoV-2, NAATNOT DETECTED    Chest X-ray   Bilateral pulmonary opacities in the mid lung fields and lower lung fields   which may represent infiltrates.  Pulmonary nodules cannot be excluded     Culture, fluid, antibiotics (Rocephin): culture results are pending  Day 2 abx: levaquin+ rocephin   mucinex     3.  Diabetes mellitus Type II       POCT glucose, hypoglycemic protocol       Home  Medications, sliding scale insulin with meal coverage  bg 99 this am     4.  Hyponatremia        [id number] MMOL/L       Replete sodium     5. Other health Concerns     Tremor, Osteoarthritis, Hypertension,spinal stenosis, and CAD     6.  Diarrhea  Imodium cdif cholestyramine    Patient Active Problem List:     Abdominal aortic aneurysm (AAA) without rupture (HCC)     Tobacco abuse     Essential hypertension     Smoker     Non-alcoholic fatty liver disease     Basal cell carcinoma of nose     Type 2 diabetes mellitus without complication, without long-term current use of insulin (HCC)     Other hyperlipidemia     Nonrheumatic aortic valve stenosis     CAD in native artery     Iliac artery aneurysm, right (HCC)     Other constipation     Spondylosis of lumbosacral region     Status post AAA (abdominal aortic aneurysm) repair EXAMINATION: CT OF THE HEAD WITHOUT CONTRAST  2/9/2021 7:26 pm TECHNIQUE: CT of the head was performed without the administration of intravenous contrast. Dose modulation, iterative reconstruction, and/or weight based adjustment of the mA/kV was utilized to reduce the radiation dose to as low as reasonably achievable. COMPARISON: None. HISTORY: ORDERING SYSTEM PROVIDED HISTORY: head pain TECHNOLOGIST PROVIDED HISTORY: Has a \"code stroke\" or \"stroke alert\" been called? ->No Reason for exam:->head pain Reason for Exam: Trauma, fall Acuity: Acute Type of Exam: Initial Mechanism of Injury: Trauma, fall FINDINGS: BRAIN/VENTRICLES: There is no acute intracranial hemorrhage, mass effect or midline shift. No abnormal extra-axial fluid collection. The gray-white differentiation is maintained without evidence of an acute infarct. Hypoattenuation of the periventricular and subcortical white matter is suggestive of chronic small vessel ischemic disease. Mild diffuse parenchymal volume loss is noted. There is no evidence of hydrocephalus. ORBITS: The visualized portion of the orbits demonstrate no acute abnormality. SINUSES: Is a small polyp versus retention cyst in the right sphenoid sinus. Other visualized paranasal sinuses and mastoid air cells are clear. SOFT TISSUES/SKULL:  No acute abnormality of the visualized skull or soft tissues. No acute intracranial abnormality.      Ct Cervical Spine Wo Contrast    Result Date: 2/9/2021 EXAMINATION: CT OF THE CERVICAL SPINE WITHOUT CONTRAST 2/9/2021 7:27 pm TECHNIQUE: CT of the cervical spine was performed without the administration of intravenous contrast. Multiplanar reformatted images are provided for review. Dose modulation, iterative reconstruction, and/or weight based adjustment of the mA/kV was utilized to reduce the radiation dose to as low as reasonably achievable. COMPARISON: None. HISTORY: ORDERING SYSTEM PROVIDED HISTORY: pain TECHNOLOGIST PROVIDED HISTORY: Reason for exam:->pain Reason for Exam: Trauma, fall Acuity: Acute Type of Exam: Initial Mechanism of Injury: Trauma, fall FINDINGS: BONES/ALIGNMENT: There is no acute fracture or traumatic malalignment. DEGENERATIVE CHANGES: Multilevel cervical spondylosis noted, most prominent at C5-C6 SOFT TISSUES: There is no prevertebral soft tissue swelling. No apical pneumothorax. Ground-glass densities in bilateral upper lobes, due to multifocal pneumonia (such as COVID-19), contusion, or aspiration. No acute abnormality of the cervical spine. RECOMMENDATIONS: Ground-glass densities in the bilateral upper lobes, due to multifocal pneumonia (such as COVID-19), contusion, or aspiration.      Ct Lumbar Spine Wo Contrast    Result Date: 2/6/2021 EXAMINATION: CT OF THE LUMBAR SPINE WITHOUT CONTRAST  2/6/2021 TECHNIQUE: CT of the lumbar spine was performed without the administration of intravenous contrast. Multiplanar reformatted images are provided for review. Dose modulation, iterative reconstruction, and/or weight based adjustment of the mA/kV was utilized to reduce the radiation dose to as low as reasonably achievable. COMPARISON: August 10, 2020. HISTORY: ORDERING SYSTEM PROVIDED HISTORY: fall TECHNOLOGIST PROVIDED HISTORY: Reason for exam:->fall Decision Support Exception->Emergency Medical Condition (MA) Reason for Exam: low back pain Acuity: Acute Type of Exam: Initial Mechanism of Injury: fall Relevant Medical/Surgical History: hx of falls FINDINGS: BONES/ALIGNMENT: There is normal alignment of the spine. The vertebral body heights are maintained. No osseous destructive lesion is seen. DEGENERATIVE CHANGES: No significant degenerative changes of the lumbar spine. SOFT TISSUES/RETROPERITONEUM: Stable appearance to aortic stent graft. No acute fracture no change in alignment compared to prior study. "[employer] Chest Portable    Result Date: 2/9/2021  EXAMINATION: ONE XRAY VIEW OF THE CHEST 2/9/2021 7:39 pm COMPARISON: 09/01/2020 HISTORY: ORDERING SYSTEM PROVIDED HISTORY: chest pain TECHNOLOGIST PROVIDED HISTORY: Reason for exam:->chest pain Reason for Exam: Chest pain Acuity: Acute Type of Exam: Initial Additional signs and symptoms: Chest pain Initial evaluation FINDINGS: The patient has had a median sternotomy. Monitor wires overlie the chest. The trachea is midline. The cardiac silhouette is unremarkable. There are bilateral pulmonary airspace changes that are nonspecific and could represent infiltrates. Pulmonary nodules cannot be entirely excluded. There is no pleural fluid. Follow up to resolution is suggested. CT could better evaluate lung parenchyma if indicated. Bilateral pulmonary opacities in the mid lung fields and lower lung fields which may represent infiltrates. Pulmonary nodules cannot be excluded. Follow up to resolution is suggested. CT could better evaluate lung parenchyma if indicated. Mri Brain Wo Contrast    Result Date: 2/10/2021  EXAMINATION: MRI OF THE BRAIN WITHOUT CONTRAST  2/10/2021 3:59 pm TECHNIQUE: Multiplanar multisequence MRI of the brain was performed without the administration of intravenous contrast. COMPARISON: None. HISTORY: ORDERING SYSTEM PROVIDED HISTORY: weakness TECHNOLOGIST PROVIDED HISTORY: Reason for exam:->weakness Reason for Exam: weakness Acuity: Acute Type of Exam: Subsequent/Follow-up Additional signs and symptoms: limited mobility FINDINGS: INTRACRANIAL STRUCTURES/VENTRICLES: There is no acute infarct or mass. Parenchymal volume is commensurate with age. Mild chronic white matter microvascular ischemic changes are present. No mass effect or midline shift. No evidence of an acute intracranial hemorrhage. The ventricles and sulci are normal in size and configuration. The sellar/suprasellar regions appear unremarkable. The normal signal voids within the major intracranial vessels appear maintained. ORBITS: The visualized portion of the orbits demonstrate no acute abnormality. SINUSES: Mild left maxillary sinus mucoperiosteal thickening with frothy fluid in the axillary antrum. No mastoid effusion. BONES/SOFT TISSUES: The bone marrow signal intensity appears normal. The soft tissues demonstrate no acute abnormality. 1. No acute intracranial abnormality. 2. Mild chronic white matter microvascular ischemic changes.  3. Mild left maxillary sinus disease.   -    DATA:    CBC   Recent Labs     02/09/21  1948 02/10/21  0200 02/11/21  0530   WBC 15.4* 13.5* 14.1*   HGB 13.8 11.7* 11.3*   HCT 41.0* 35.1* 35.6*   * 109* 100*      BMP   Recent Labs     02/09/21  1948 02/10/21  0200 02/11/21  0530   * 135 134*

## 2021-02-11 NOTE — CARE COORDINATION
SWING BED PROGRAM PRE-ADMISSION ASSESSMENT  (TRADITIONAL MEDICARE PATIENTS)  Patient Name: Marvin Aggarwal   : 1945  (18 y.o.) Gender: male   Inpatient Admission Dater:   02/10/2021  Room: - MRN: [id number]    Home Phone #:  [phone]  Emergency Contact and Phone Number:  Payal Beard  [phone]      Inpatient Admission Date: 2021 Date & Time of Referral: 2021     11:51 a.m. Referred By: Citlaly Spencer MD Referred from:  [x] [facility]   [] Other:     # of Skilled Care Days Used in Last 60 days: 0 Insurance: [x]  Medicare                      []  Secondary  Type:medicaid     Present Condition/Diagnosis:    Gait instability [R26.81]  General weakness [R53.1]  Multiple contusions [T07. XXXA]  Fall, initial encounter [id number]. XXXA]  Fall at home, initial encounter [N96. XXXA, J88.191]  Pneumonia of both lower lobes due to infectious organism [J18.9]  Pneumonia [J18.9]      Previous Medical History:   Past Medical History:   Diagnosis Date    AAA (abdominal aortic aneurysm) ([facility] [address] 2018    Infrarenal    Basal cell carcinoma of nose 2016    Dr Cam Hairston CAD (coronary artery disease)     Dr. Salinas Luong - severe aortic stenosis with a bicuspid aortic valve    Constipation     echocardiogram 10/29/2020    EF 55-60% normally functioning valve in aortic position, mild mitral regurg with two jets mild pulm htn.      Essential hypertension     History of alcohol abuse     Hyperlipidemia     Hypertension     Follows with PCP    Missing teeth, acquired     Non-alcoholic fatty liver disease     Osteoarthritis     Knees, lower back, shoulders,    Spinal stenosis     had epidural steroid injection 2020 - lumbar    Tremor     Left arm only    Type 2 diabetes mellitus without complication ([facility] [address]     Controlling with diet    Wears glasses         COGNITIVE/BEHAVIORAL  Change in cognitive status in last 90 days:  ( )no change  ( ) improved  ( ) deteriorated Behavioral changes in last seven days:  ( ) wandering  ( ) verbally abusive  ( )phys. Abusive                                                                         ( ) socially inappropriate  ( ) resists care  ADL Performance Last Seven (7) Days (Please Score)   Patients performance over all shifts during last seven (7) days   0 = Independent  No help or oversight  1 = Supervision  Oversight, encouragement or cueing provided  2 = Limited Assist  Receives physical help in guided maneuvering of limbs or other non-weight bearing assistance  3 = Extensive Assist  Patient performs part of the activity, weight bearing support was provided  4 = Dependence = Full staff performance of activity *NOTE: USE THE FOLLOWING SCORING FOR EATING ONLY:  1 = Supervision or Independent  2 = Limited Assist  3 = Dependent or Extensive Assist     SCORE   BED MOBILITY  How client moves to and from lying position, turns side to side, and positions body while in bed 2   TRANSFER  How resident moves between surfaces  to/from: bed, chair, wheelchair, standing position (EXCLUDE to/from bath/toilet) 2   TOILET USE  How client uses the toilet room (or commode, bedpan, urinal);transfers on/off toilet, cleanses, changes pad, manages ostomy or catheter, adjusts clothes 3   EATING (SCORE 1-3 ONLY*)  How client eats and drinks (regardless of skill).  Includes intake of nourishment by other means (e.g., tube feeding, total parenteral nutrition) 1   Estimated Pre-Admission ADL Value: 8     PATIENT WILL RECEIVE THE FOLLOWING SKILLED SERVICES AS A SWING BED PATIENT:       REHABILITATION (PT/OT/SP)    [] ULTRA HIGH 720 or more minutes minimum per week of at least two (2) disciplines  1st for at least five (5) days and 2nd for at least three (3) days   [] VERY HIGH 500 or more minutes minimum per week of at least one (1) discipline for at least five (5) days [x] HIGH 325 or more minutes minimum per week of at least one (1) discipline for at least five (5) days   [] MEDIUM 150 or more minutes minimum per week at least five (5) days of any combination with three (3) therapies   [] LOW Restorative nursing at least six (6) days, two (2) activities, or therapies for at least three (3) days at least forty-five (45) minute per week minimum services. EXTENSIVE SERVICES   [] Tracheostomy Care   [] Ventilator/Respirator   [] Infection Isolation   SPECIAL CARE HIGH   [] MS with ADL greater than or equal to 10  [] Quadriplegic with ADL greater than or equal to 5  [] emphysema/COPD and shortness of breath when lying flat  [] Fever w/at least one (1) of the following: [] dehydration [] pneumonia [] vomiting [] weight loss  [] feeding tube  [] Septicemia  [] Coma (not awake & completely dependent in ADL)  [] Diabetes and injections seven (7) days and Dr. order change two (2) or more days.   [] Parenteral/IV feeding  [] respiratory therapy for seven (7) days   SPECIAL CARE LOW:   [] Respiratory Therapy  [] Ulcers (2+sites all stages) w/treatment  [] Multiple Sclerosis  [] Cerebral Palsy  [] Parkinsons Disease  [] Oxygen Therapy  [] Extensive care services w/ADL less than 6  [] Fever w/at least one (1) of the following: [] dehydration [] pneumonia [] vomiting [] weight loss  [] Foot Infection or open lesions on the foot with treatment  [] tube-feeding  calories greater than or equal to 51% or tube feeding with total calories greater than or equal to 26% and fluid parental or enteral intake of greater than or equal to 50 ml per day   CLINICALLY COMPLEX   CURRENTLY:  [] Pneumonia  [] Hemiplegics with ADL with ADL Sum greater than or equal to 10  [] IV medications delivered post admission in the SNF  [] Surgical wounds or open lesions w/treatment      EVALUATORS SIGNATURE:Kathi Jerry DATE: 2/11/2021       [] ACCEPTED FOR ADMISSION ON: 02/11/2021 ELOS:  [x] 1-2wks  [] 2-3wks  [] 3-4wks   [] ADMISSION DENIED BASED ON:    [] [address] STAFF COORDINATOR

## 2021-02-11 NOTE — PLAN OF CARE
Problem: Falls - Risk of:  Goal: Will remain free from falls  Description: Will remain free from falls  2/11/2021 1124 by Belia Luis RN  Outcome: Ongoing  2/11/2021 0001 by Luke Almaraz RN  Outcome: Ongoing  Goal: Absence of physical injury  Description: Absence of physical injury  2/11/2021 1124 by Belia Luis RN  Outcome: Ongoing  2/11/2021 0001 by Luke Almaraz RN  Outcome: Ongoing     Problem: Discharge Planning:  Goal: Discharged to appropriate level of care  Description: Discharged to appropriate level of care  2/11/2021 1124 by Belia Luis RN  Outcome: Ongoing  2/11/2021 0001 by Luke Almaraz RN  Outcome: Ongoing  Goal: Participates in care planning  Description: Participates in care planning  2/11/2021 1124 by Belia Luis RN  Outcome: Ongoing  2/11/2021 0001 by Luke Almaraz RN  Outcome: Ongoing     Problem: Serum Glucose Level - Abnormal:  Goal: Ability to maintain appropriate glucose levels will improve  Description: Ability to maintain appropriate glucose levels will improve  2/11/2021 1124 by Belia Luis RN  Outcome: Ongoing  2/11/2021 0001 by Luke Almaraz RN  Outcome: Ongoing     Problem: Sensory Perception - Impaired:  Goal: Ability to maintain a stable neurologic state will improve  Description: Ability to maintain a stable neurologic state will improve  2/11/2021 1124 by Belia Luis RN  Outcome: Ongoing  2/11/2021 0001 by Luke Almaraz RN  Outcome: Ongoing     Problem: Airway Clearance - Ineffective:  Goal: Clear lung sounds  Description: Clear lung sounds  2/11/2021 1124 by Belia Luis RN  Outcome: Ongoing  2/11/2021 0001 by Luke Almaraz RN  Outcome: Ongoing  Goal: Ability to maintain a clear airway will improve  Description: Ability to maintain a clear airway will improve  2/11/2021 1124 by Belia Luis RN  Outcome: Ongoing  2/11/2021 0001 by Luke Almaraz RN  Outcome: Ongoing Problem: Fluid Volume - Deficit:  Goal: Achieves intake and output within specified parameters  Description: Achieves intake and output within specified parameters  2/11/2021 1124 by Minh Hernández RN  Outcome: Ongoing  2/11/2021 0001 by Edilma Quiroga RN  Outcome: Ongoing     Problem: Tobacco Use:  Goal: Will participate in inpatient tobacco-use cessation counseling  Description: Will participate in inpatient tobacco-use cessation counseling  2/11/2021 1124 by Minh Hernández RN  Outcome: Ongoing  2/11/2021 0001 by Edilma Quiroga RN  Outcome: Ongoing     Problem: Coping:  Goal: Participation in decision-making will improve  Description: Demonstration of participation in decision-making regarding own care will improve  2/11/2021 1124 by Minh Hernández RN  Outcome: Ongoing  2/11/2021 0001 by Edilma Quiroga RN  Outcome: Ongoing  Goal: Verbalizations of comfort with decisions will increase  Description: Verbalizations of comfort with decisions will increase  2/11/2021 1124 by Minh Hernández RN  Outcome: Ongoing  2/11/2021 0001 by Edilma Quiroga RN  Outcome: Ongoing     Problem: Health Behavior:  Goal: Ability to identify and utilize available resources and services will improve  Description: Ability to identify and utilize available resources and services will improve  2/11/2021 1124 by Minh Hernández RN  Outcome: Ongoing  2/11/2021 0001 by Edilma Quiroga RN  Outcome: Ongoing     Problem: Skin Integrity:  Goal: Will show no infection signs and symptoms  Description: Will show no infection signs and symptoms  2/11/2021 1124 by Minh Hernández RN  Outcome: Ongoing  2/11/2021 0001 by Edilma Quiroga RN  Outcome: Ongoing  Goal: Absence of new skin breakdown  Description: Absence of new skin breakdown  2/11/2021 1124 by Minh Hernández RN  Outcome: Ongoing  2/11/2021 0001 by Edilma Quiroga RN  Outcome: Ongoing

## 2021-02-12 ENCOUNTER — HOSPITAL ENCOUNTER (INPATIENT)
Age: 76
LOS: 6 days | Discharge: NURSING, REHAB OR LONG-TERM CARE | DRG: 193 | End: 2021-02-18
Attending: INTERNAL MEDICINE | Admitting: INTERNAL MEDICINE
Payer: MEDICARE

## 2021-02-12 ENCOUNTER — APPOINTMENT (OUTPATIENT)
Dept: GENERAL RADIOLOGY | Age: 76
DRG: 193 | End: 2021-02-12
Attending: INTERNAL MEDICINE
Payer: MEDICARE

## 2021-02-12 PROBLEM — J96.20 ACUTE AND CHR RESP FAILURE, UNSP W HYPOXIA OR HYPERCAPNIA (HCC): Status: ACTIVE | Noted: 2021-02-12

## 2021-02-12 LAB
ADENOVIRUS DETECTION BY PCR: NOT DETECTED
ANION GAP SERPL CALCULATED.3IONS-SCNC: 15 MMOL/L (ref 4–16)
BASOPHILS ABSOLUTE: 0 K/CU MM
BASOPHILS RELATIVE PERCENT: 0.1 % (ref 0–1)
BORDETELLA PARAPERTUSSIS BY PCR: NOT DETECTED
BORDETELLA PERTUSSIS PCR: NOT DETECTED
BUN BLDV-MCNC: 23 MG/DL (ref 6–23)
CALCIUM SERPL-MCNC: 9 MG/DL (ref 8.3–10.6)
CHLAMYDOPHILA PNEUMONIA PCR: NOT DETECTED
CHLORIDE BLD-SCNC: 106 MMOL/L (ref 99–110)
CLOSTRIDIUM DIFFICILE, PCR: NORMAL
CO2: 18 MMOL/L (ref 21–32)
CORONAVIRUS 229E PCR: NOT DETECTED
CORONAVIRUS HKU1 PCR: NOT DETECTED
CORONAVIRUS NL63 PCR: NOT DETECTED
CORONAVIRUS OC43 PCR: NOT DETECTED
CREAT SERPL-MCNC: 0.9 MG/DL (ref 0.9–1.3)
DIFFERENTIAL TYPE: ABNORMAL
EKG ATRIAL RATE: 77 BPM
EKG DIAGNOSIS: NORMAL
EKG P AXIS: 15 DEGREES
EKG P-R INTERVAL: 204 MS
EKG Q-T INTERVAL: 372 MS
EKG QRS DURATION: 100 MS
EKG QTC CALCULATION (BAZETT): 420 MS
EKG R AXIS: 18 DEGREES
EKG T AXIS: -10 DEGREES
EKG VENTRICULAR RATE: 77 BPM
EOSINOPHILS ABSOLUTE: 0 K/CU MM
EOSINOPHILS RELATIVE PERCENT: 0 % (ref 0–3)
GFR AFRICAN AMERICAN: >60 ML/MIN/1.73M2
GFR NON-AFRICAN AMERICAN: >60 ML/MIN/1.73M2
GLUCOSE BLD-MCNC: 104 MG/DL (ref 70–99)
GLUCOSE BLD-MCNC: 181 MG/DL (ref 70–99)
GLUCOSE BLD-MCNC: 187 MG/DL (ref 70–99)
GLUCOSE BLD-MCNC: 77 MG/DL (ref 70–99)
GLUCOSE BLD-MCNC: 81 MG/DL (ref 70–99)
HCT VFR BLD CALC: 30.5 % (ref 42–52)
HEMOGLOBIN: 10 GM/DL (ref 13.5–18)
HUMAN METAPNEUMOVIRUS PCR: NOT DETECTED
IMMATURE NEUTROPHIL %: 0.7 % (ref 0–0.43)
INFLUENZA A BY PCR: NOT DETECTED
INFLUENZA A H1 (2009) PCR: NOT DETECTED
INFLUENZA A H1 PANDEMIC PCR: NOT DETECTED
INFLUENZA A H3 PCR: NOT DETECTED
INFLUENZA B BY PCR: NOT DETECTED
LACTIC ACID, SEPSIS: 0.9 MMOL/L (ref 0.5–1.9)
LACTIC ACID, SEPSIS: 0.9 MMOL/L (ref 0.5–1.9)
LYMPHOCYTES ABSOLUTE: 0.6 K/CU MM
LYMPHOCYTES RELATIVE PERCENT: 4.6 % (ref 24–44)
MCH RBC QN AUTO: 30.8 PG (ref 27–31)
MCHC RBC AUTO-ENTMCNC: 32.8 % (ref 32–36)
MCV RBC AUTO: 93.8 FL (ref 78–100)
MONOCYTES ABSOLUTE: 0.2 K/CU MM
MONOCYTES RELATIVE PERCENT: 1.8 % (ref 0–4)
MYCOPLASMA PNEUMONIAE PCR: NOT DETECTED
PARAINFLUENZA 1 PCR: NOT DETECTED
PARAINFLUENZA 2 PCR: NOT DETECTED
PARAINFLUENZA 3 PCR: NOT DETECTED
PARAINFLUENZA 4 PCR: NOT DETECTED
PDW BLD-RTO: 15.5 % (ref 11.7–14.9)
PLATELET # BLD: 113 K/CU MM (ref 140–440)
PMV BLD AUTO: 10.5 FL (ref 7.5–11.1)
POTASSIUM SERPL-SCNC: 3.6 MMOL/L (ref 3.5–5.1)
PROCALCITONIN: 0.42
RBC # BLD: 3.25 M/CU MM (ref 4.6–6.2)
RHINOVIRUS ENTEROVIRUS PCR: NOT DETECTED
RSV PCR: NOT DETECTED
SARS-COV-2: NOT DETECTED
SEGMENTED NEUTROPHILS ABSOLUTE COUNT: 11.2 K/CU MM
SEGMENTED NEUTROPHILS RELATIVE PERCENT: 92.8 % (ref 36–66)
SODIUM BLD-SCNC: 139 MMOL/L (ref 135–145)
TOTAL IMMATURE NEUTOROPHIL: 0.08 K/CU MM
WBC # BLD: 12.1 K/CU MM (ref 4–10.5)

## 2021-02-12 PROCEDURE — 94640 AIRWAY INHALATION TREATMENT: CPT

## 2021-02-12 PROCEDURE — 2000000000 HC ICU R&B

## 2021-02-12 PROCEDURE — 6360000002 HC RX W HCPCS: Performed by: INTERNAL MEDICINE

## 2021-02-12 PROCEDURE — 6370000000 HC RX 637 (ALT 250 FOR IP): Performed by: INTERNAL MEDICINE

## 2021-02-12 PROCEDURE — 94761 N-INVAS EAR/PLS OXIMETRY MLT: CPT

## 2021-02-12 PROCEDURE — 80048 BASIC METABOLIC PNL TOTAL CA: CPT

## 2021-02-12 PROCEDURE — 2580000003 HC RX 258: Performed by: INTERNAL MEDICINE

## 2021-02-12 PROCEDURE — 83605 ASSAY OF LACTIC ACID: CPT

## 2021-02-12 PROCEDURE — 93005 ELECTROCARDIOGRAM TRACING: CPT | Performed by: INTERNAL MEDICINE

## 2021-02-12 PROCEDURE — 36415 COLL VENOUS BLD VENIPUNCTURE: CPT

## 2021-02-12 PROCEDURE — 0202U NFCT DS 22 TRGT SARS-COV-2: CPT

## 2021-02-12 PROCEDURE — 2700000000 HC OXYGEN THERAPY PER DAY

## 2021-02-12 PROCEDURE — 93010 ELECTROCARDIOGRAM REPORT: CPT | Performed by: INTERNAL MEDICINE

## 2021-02-12 PROCEDURE — 71045 X-RAY EXAM CHEST 1 VIEW: CPT

## 2021-02-12 PROCEDURE — 85025 COMPLETE CBC W/AUTO DIFF WBC: CPT

## 2021-02-12 PROCEDURE — 82962 GLUCOSE BLOOD TEST: CPT

## 2021-02-12 PROCEDURE — 84145 PROCALCITONIN (PCT): CPT

## 2021-02-12 RX ORDER — ACETAMINOPHEN 650 MG/1
650 SUPPOSITORY RECTAL EVERY 6 HOURS PRN
Status: CANCELLED | OUTPATIENT
Start: 2021-02-12

## 2021-02-12 RX ORDER — SODIUM CHLORIDE 0.9 % (FLUSH) 0.9 %
10 SYRINGE (ML) INJECTION EVERY 12 HOURS SCHEDULED
Status: CANCELLED | OUTPATIENT
Start: 2021-02-12

## 2021-02-12 RX ORDER — AMIODARONE HYDROCHLORIDE 200 MG/1
200 TABLET ORAL 2 TIMES DAILY
Status: DISCONTINUED | OUTPATIENT
Start: 2021-02-12 | End: 2021-02-18 | Stop reason: HOSPADM

## 2021-02-12 RX ORDER — SODIUM CHLORIDE 0.9 % (FLUSH) 0.9 %
10 SYRINGE (ML) INJECTION PRN
Status: CANCELLED | OUTPATIENT
Start: 2021-02-12

## 2021-02-12 RX ORDER — NICOTINE 21 MG/24HR
1 PATCH, TRANSDERMAL 24 HOURS TRANSDERMAL DAILY
Status: DISCONTINUED | OUTPATIENT
Start: 2021-02-12 | End: 2021-02-18 | Stop reason: HOSPADM

## 2021-02-12 RX ORDER — AMIODARONE HYDROCHLORIDE 200 MG/1
200 TABLET ORAL 2 TIMES DAILY
Status: CANCELLED | OUTPATIENT
Start: 2021-02-12

## 2021-02-12 RX ORDER — ACETAMINOPHEN 650 MG/1
650 SUPPOSITORY RECTAL EVERY 6 HOURS PRN
Status: DISCONTINUED | OUTPATIENT
Start: 2021-02-12 | End: 2021-02-18 | Stop reason: HOSPADM

## 2021-02-12 RX ORDER — DEXTROSE MONOHYDRATE 50 MG/ML
1000 INJECTION, SOLUTION INTRAVENOUS PRN
Status: CANCELLED | OUTPATIENT
Start: 2021-02-12

## 2021-02-12 RX ORDER — LEVOFLOXACIN 500 MG/1
500 TABLET, FILM COATED ORAL
Status: CANCELLED | OUTPATIENT
Start: 2021-02-12 | End: 2021-02-15

## 2021-02-12 RX ORDER — ATORVASTATIN CALCIUM 40 MG/1
40 TABLET, FILM COATED ORAL DAILY
Status: CANCELLED | OUTPATIENT
Start: 2021-02-12

## 2021-02-12 RX ORDER — PROMETHAZINE HYDROCHLORIDE 12.5 MG/1
12.5 TABLET ORAL EVERY 6 HOURS PRN
Status: CANCELLED | OUTPATIENT
Start: 2021-02-12

## 2021-02-12 RX ORDER — ASPIRIN 81 MG/1
81 TABLET ORAL DAILY
Status: DISCONTINUED | OUTPATIENT
Start: 2021-02-12 | End: 2021-02-18 | Stop reason: HOSPADM

## 2021-02-12 RX ORDER — POLYETHYLENE GLYCOL 3350 17 G/17G
17 POWDER, FOR SOLUTION ORAL DAILY PRN
Status: DISCONTINUED | OUTPATIENT
Start: 2021-02-12 | End: 2021-02-18 | Stop reason: HOSPADM

## 2021-02-12 RX ORDER — METHYLPREDNISOLONE SODIUM SUCCINATE 40 MG/ML
40 INJECTION, POWDER, LYOPHILIZED, FOR SOLUTION INTRAMUSCULAR; INTRAVENOUS DAILY
Status: DISCONTINUED | OUTPATIENT
Start: 2021-02-12 | End: 2021-02-14

## 2021-02-12 RX ORDER — CHOLESTYRAMINE LIGHT 4 G/5.7G
4 POWDER, FOR SUSPENSION ORAL 2 TIMES DAILY
Status: DISCONTINUED | OUTPATIENT
Start: 2021-02-12 | End: 2021-02-18 | Stop reason: HOSPADM

## 2021-02-12 RX ORDER — GLIPIZIDE 5 MG/1
2.5 TABLET ORAL
Status: DISCONTINUED | OUTPATIENT
Start: 2021-02-12 | End: 2021-02-15 | Stop reason: ALTCHOICE

## 2021-02-12 RX ORDER — ASPIRIN 81 MG/1
81 TABLET ORAL DAILY
Status: CANCELLED | OUTPATIENT
Start: 2021-02-12

## 2021-02-12 RX ORDER — SODIUM CHLORIDE 0.9 % (FLUSH) 0.9 %
10 SYRINGE (ML) INJECTION PRN
Status: DISCONTINUED | OUTPATIENT
Start: 2021-02-12 | End: 2021-02-18 | Stop reason: HOSPADM

## 2021-02-12 RX ORDER — METHYLPREDNISOLONE SODIUM SUCCINATE 40 MG/ML
40 INJECTION, POWDER, LYOPHILIZED, FOR SOLUTION INTRAMUSCULAR; INTRAVENOUS DAILY
Status: DISCONTINUED | OUTPATIENT
Start: 2021-02-12 | End: 2021-02-12 | Stop reason: HOSPADM

## 2021-02-12 RX ORDER — LISINOPRIL 10 MG/1
10 TABLET ORAL DAILY
Status: DISCONTINUED | OUTPATIENT
Start: 2021-02-12 | End: 2021-02-18 | Stop reason: HOSPADM

## 2021-02-12 RX ORDER — GUAIFENESIN 600 MG/1
600 TABLET, EXTENDED RELEASE ORAL 2 TIMES DAILY
Status: DISCONTINUED | OUTPATIENT
Start: 2021-02-12 | End: 2021-02-14

## 2021-02-12 RX ORDER — PRIMIDONE 50 MG/1
50 TABLET ORAL 2 TIMES DAILY
Status: DISCONTINUED | OUTPATIENT
Start: 2021-02-12 | End: 2021-02-18 | Stop reason: HOSPADM

## 2021-02-12 RX ORDER — LISINOPRIL 10 MG/1
10 TABLET ORAL DAILY
Status: CANCELLED | OUTPATIENT
Start: 2021-02-12

## 2021-02-12 RX ORDER — SODIUM CHLORIDE 0.9 % (FLUSH) 0.9 %
10 SYRINGE (ML) INJECTION EVERY 12 HOURS SCHEDULED
Status: DISCONTINUED | OUTPATIENT
Start: 2021-02-12 | End: 2021-02-12 | Stop reason: HOSPADM

## 2021-02-12 RX ORDER — NICOTINE POLACRILEX 4 MG
15 LOZENGE BUCCAL PRN
Status: CANCELLED | OUTPATIENT
Start: 2021-02-12

## 2021-02-12 RX ORDER — CARVEDILOL 3.12 MG/1
3.12 TABLET ORAL 2 TIMES DAILY WITH MEALS
Status: DISCONTINUED | OUTPATIENT
Start: 2021-02-12 | End: 2021-02-18 | Stop reason: HOSPADM

## 2021-02-12 RX ORDER — MIDODRINE HYDROCHLORIDE 2.5 MG/1
2.5 TABLET ORAL
Status: COMPLETED | OUTPATIENT
Start: 2021-02-12 | End: 2021-02-13

## 2021-02-12 RX ORDER — SODIUM CHLORIDE 0.9 % (FLUSH) 0.9 %
10 SYRINGE (ML) INJECTION EVERY 12 HOURS SCHEDULED
Status: DISCONTINUED | OUTPATIENT
Start: 2021-02-12 | End: 2021-02-18 | Stop reason: HOSPADM

## 2021-02-12 RX ORDER — GABAPENTIN 300 MG/1
300 CAPSULE ORAL 3 TIMES DAILY
Status: DISCONTINUED | OUTPATIENT
Start: 2021-02-12 | End: 2021-02-18 | Stop reason: HOSPADM

## 2021-02-12 RX ORDER — GUAIFENESIN 600 MG/1
600 TABLET, EXTENDED RELEASE ORAL 2 TIMES DAILY
Status: CANCELLED | OUTPATIENT
Start: 2021-02-12

## 2021-02-12 RX ORDER — DEXTROSE MONOHYDRATE 25 G/50ML
12.5 INJECTION, SOLUTION INTRAVENOUS PRN
Status: CANCELLED | OUTPATIENT
Start: 2021-02-12

## 2021-02-12 RX ORDER — DEXTROSE MONOHYDRATE 25 G/50ML
12.5 INJECTION, SOLUTION INTRAVENOUS PRN
Status: DISCONTINUED | OUTPATIENT
Start: 2021-02-12 | End: 2021-02-18 | Stop reason: HOSPADM

## 2021-02-12 RX ORDER — PRIMIDONE 50 MG/1
50 TABLET ORAL 2 TIMES DAILY
Status: CANCELLED | OUTPATIENT
Start: 2021-02-12

## 2021-02-12 RX ORDER — ONDANSETRON 2 MG/ML
4 INJECTION INTRAMUSCULAR; INTRAVENOUS EVERY 6 HOURS PRN
Status: DISCONTINUED | OUTPATIENT
Start: 2021-02-12 | End: 2021-02-18 | Stop reason: HOSPADM

## 2021-02-12 RX ORDER — SODIUM CHLORIDE 0.9 % (FLUSH) 0.9 %
10 SYRINGE (ML) INJECTION PRN
Status: DISCONTINUED | OUTPATIENT
Start: 2021-02-12 | End: 2021-02-12 | Stop reason: HOSPADM

## 2021-02-12 RX ORDER — NICOTINE POLACRILEX 4 MG
15 LOZENGE BUCCAL PRN
Status: DISCONTINUED | OUTPATIENT
Start: 2021-02-12 | End: 2021-02-18 | Stop reason: HOSPADM

## 2021-02-12 RX ORDER — DEXTROSE MONOHYDRATE 50 MG/ML
1000 INJECTION, SOLUTION INTRAVENOUS PRN
Status: DISCONTINUED | OUTPATIENT
Start: 2021-02-12 | End: 2021-02-18 | Stop reason: HOSPADM

## 2021-02-12 RX ORDER — DOCUSATE SODIUM 100 MG/1
100 CAPSULE, LIQUID FILLED ORAL DAILY PRN
Status: DISCONTINUED | OUTPATIENT
Start: 2021-02-12 | End: 2021-02-18 | Stop reason: HOSPADM

## 2021-02-12 RX ORDER — PROMETHAZINE HYDROCHLORIDE 12.5 MG/1
12.5 TABLET ORAL EVERY 6 HOURS PRN
Status: DISCONTINUED | OUTPATIENT
Start: 2021-02-12 | End: 2021-02-18 | Stop reason: HOSPADM

## 2021-02-12 RX ORDER — CHOLESTYRAMINE LIGHT 4 G/5.7G
4 POWDER, FOR SUSPENSION ORAL 2 TIMES DAILY
Status: CANCELLED | OUTPATIENT
Start: 2021-02-12

## 2021-02-12 RX ORDER — NICOTINE 21 MG/24HR
1 PATCH, TRANSDERMAL 24 HOURS TRANSDERMAL DAILY
Status: CANCELLED | OUTPATIENT
Start: 2021-02-12

## 2021-02-12 RX ORDER — LOPERAMIDE HYDROCHLORIDE 2 MG/1
2 CAPSULE ORAL 4 TIMES DAILY PRN
Status: CANCELLED | OUTPATIENT
Start: 2021-02-12

## 2021-02-12 RX ORDER — ONDANSETRON 2 MG/ML
4 INJECTION INTRAMUSCULAR; INTRAVENOUS EVERY 6 HOURS PRN
Status: CANCELLED | OUTPATIENT
Start: 2021-02-12

## 2021-02-12 RX ORDER — GLIPIZIDE 5 MG/1
2.5 TABLET ORAL
Status: CANCELLED | OUTPATIENT
Start: 2021-02-12

## 2021-02-12 RX ORDER — ACETAMINOPHEN 325 MG/1
650 TABLET ORAL EVERY 6 HOURS PRN
Status: DISCONTINUED | OUTPATIENT
Start: 2021-02-12 | End: 2021-02-18 | Stop reason: HOSPADM

## 2021-02-12 RX ORDER — POLYETHYLENE GLYCOL 3350 17 G/17G
17 POWDER, FOR SOLUTION ORAL DAILY PRN
Status: CANCELLED | OUTPATIENT
Start: 2021-02-12

## 2021-02-12 RX ORDER — ATORVASTATIN CALCIUM 40 MG/1
40 TABLET, FILM COATED ORAL DAILY
Status: DISCONTINUED | OUTPATIENT
Start: 2021-02-12 | End: 2021-02-18 | Stop reason: HOSPADM

## 2021-02-12 RX ORDER — LOPERAMIDE HYDROCHLORIDE 2 MG/1
2 CAPSULE ORAL 4 TIMES DAILY PRN
Status: DISCONTINUED | OUTPATIENT
Start: 2021-02-12 | End: 2021-02-18 | Stop reason: HOSPADM

## 2021-02-12 RX ORDER — CARVEDILOL 3.12 MG/1
3.12 TABLET ORAL 2 TIMES DAILY WITH MEALS
Status: CANCELLED | OUTPATIENT
Start: 2021-02-12

## 2021-02-12 RX ORDER — ACETAMINOPHEN 325 MG/1
650 TABLET ORAL EVERY 6 HOURS PRN
Status: CANCELLED | OUTPATIENT
Start: 2021-02-12

## 2021-02-12 RX ORDER — MIDODRINE HYDROCHLORIDE 2.5 MG/1
2.5 TABLET ORAL
Status: CANCELLED | OUTPATIENT
Start: 2021-02-12 | End: 2021-02-13

## 2021-02-12 RX ORDER — IPRATROPIUM BROMIDE AND ALBUTEROL SULFATE 2.5; .5 MG/3ML; MG/3ML
1 SOLUTION RESPIRATORY (INHALATION) EVERY 4 HOURS PRN
Status: CANCELLED | OUTPATIENT
Start: 2021-02-12

## 2021-02-12 RX ORDER — GABAPENTIN 300 MG/1
300 CAPSULE ORAL 3 TIMES DAILY
Status: CANCELLED | OUTPATIENT
Start: 2021-02-12

## 2021-02-12 RX ORDER — DOCUSATE SODIUM 100 MG/1
100 CAPSULE, LIQUID FILLED ORAL DAILY PRN
Status: CANCELLED | OUTPATIENT
Start: 2021-02-12

## 2021-02-12 RX ORDER — IPRATROPIUM BROMIDE AND ALBUTEROL SULFATE 2.5; .5 MG/3ML; MG/3ML
1 SOLUTION RESPIRATORY (INHALATION) EVERY 4 HOURS PRN
Status: DISCONTINUED | OUTPATIENT
Start: 2021-02-12 | End: 2021-02-16

## 2021-02-12 RX ADMIN — CARVEDILOL 3.12 MG: 3.12 TABLET, FILM COATED ORAL at 08:37

## 2021-02-12 RX ADMIN — GUAIFENESIN 600 MG: 600 TABLET, EXTENDED RELEASE ORAL at 20:40

## 2021-02-12 RX ADMIN — IPRATROPIUM BROMIDE AND ALBUTEROL SULFATE 1 AMPULE: .5; 3 SOLUTION RESPIRATORY (INHALATION) at 09:24

## 2021-02-12 RX ADMIN — GABAPENTIN 300 MG: 300 CAPSULE ORAL at 13:28

## 2021-02-12 RX ADMIN — GABAPENTIN 300 MG: 300 CAPSULE ORAL at 08:38

## 2021-02-12 RX ADMIN — SODIUM CHLORIDE, PRESERVATIVE FREE 10 ML: 5 INJECTION INTRAVENOUS at 08:41

## 2021-02-12 RX ADMIN — AMIODARONE HYDROCHLORIDE 200 MG: 200 TABLET ORAL at 08:39

## 2021-02-12 RX ADMIN — ATORVASTATIN CALCIUM 40 MG: 40 TABLET, FILM COATED ORAL at 08:38

## 2021-02-12 RX ADMIN — INSULIN LISPRO 1 UNITS: 100 INJECTION, SOLUTION INTRAVENOUS; SUBCUTANEOUS at 20:40

## 2021-02-12 RX ADMIN — GLIPIZIDE 2.5 MG: 5 TABLET ORAL at 08:39

## 2021-02-12 RX ADMIN — GUAIFENESIN 600 MG: 600 TABLET, EXTENDED RELEASE ORAL at 08:38

## 2021-02-12 RX ADMIN — AMIODARONE HYDROCHLORIDE 200 MG: 200 TABLET ORAL at 20:40

## 2021-02-12 RX ADMIN — CARVEDILOL 3.12 MG: 3.12 TABLET, FILM COATED ORAL at 17:33

## 2021-02-12 RX ADMIN — PRIMIDONE 50 MG: 50 TABLET ORAL at 08:39

## 2021-02-12 RX ADMIN — MIDODRINE HYDROCHLORIDE 2.5 MG: 2.5 TABLET ORAL at 13:28

## 2021-02-12 RX ADMIN — LISINOPRIL 10 MG: 10 TABLET ORAL at 08:45

## 2021-02-12 RX ADMIN — SODIUM CHLORIDE, PRESERVATIVE FREE 10 ML: 5 INJECTION INTRAVENOUS at 20:41

## 2021-02-12 RX ADMIN — ASPIRIN 81 MG: 81 TABLET, COATED ORAL at 08:37

## 2021-02-12 RX ADMIN — MIDODRINE HYDROCHLORIDE 2.5 MG: 2.5 TABLET ORAL at 08:39

## 2021-02-12 RX ADMIN — METHYLPREDNISOLONE SODIUM SUCCINATE 40 MG: 40 INJECTION, POWDER, FOR SOLUTION INTRAMUSCULAR; INTRAVENOUS at 11:18

## 2021-02-12 RX ADMIN — IPRATROPIUM BROMIDE AND ALBUTEROL SULFATE 1 AMPULE: .5; 3 SOLUTION RESPIRATORY (INHALATION) at 12:03

## 2021-02-12 RX ADMIN — GABAPENTIN 300 MG: 300 CAPSULE ORAL at 20:40

## 2021-02-12 RX ADMIN — PRIMIDONE 50 MG: 50 TABLET ORAL at 20:40

## 2021-02-12 RX ADMIN — MIDODRINE HYDROCHLORIDE 2.5 MG: 2.5 TABLET ORAL at 17:33

## 2021-02-12 RX ADMIN — IPRATROPIUM BROMIDE AND ALBUTEROL SULFATE 1 AMPULE: .5; 3 SOLUTION RESPIRATORY (INHALATION) at 19:35

## 2021-02-12 RX ADMIN — VANCOMYCIN HYDROCHLORIDE 1500 MG: 750 INJECTION, POWDER, LYOPHILIZED, FOR SOLUTION INTRAVENOUS at 08:45

## 2021-02-12 ASSESSMENT — PAIN SCALES - GENERAL: PAINLEVEL_OUTOF10: 0

## 2021-02-12 NOTE — PROGRESS NOTES
[address] [facility]  consulted by Dr. Derrek Spears for monitoring and adjustment. Indication for treatment: Pneumonia  Goal trough: 15 mcg/mL    Pertinent Laboratory Values:   Temp Readings from Last 3 Encounters:   02/11/21 99.8 °F (37.7 °C) (Infrared)   02/11/21 98.4 °F (36.9 °C) (Oral)   02/06/21 99.1 °F (37.3 °C) (Oral)     Recent Labs     02/09/21  2210 02/10/21  0200 02/11/21  0530 02/12/21  0510   WBC  --  13.5* 14.1* 12.1*   LACTATE 2.7* 0.8  --   --      Recent Labs     02/10/21  0200 02/11/21  0530 02/12/21  0510   BUN 18 21 23   CREATININE 0.8* 0.9 0.9     Estimated Creatinine Clearance: 66 mL/min (based on SCr of 0.9 mg/dL). No intake or output data in the 24 hours ending 02/12/21 0654    Pertinent Cultures:  Date    Source    Results  02/11   Blood    Pending  02/12                          Nasal MRSA Screen              Pending  02/12   Respiratory   Pending  02/12                          RESP PCR                            Pending    Assessment:  · WBC elevated at 12.1; Febrile (101.5 F)  · Renal function stable: SCr = 0.9, BUN = 23; No I/O data  · Day(s) of therapy: 1  · Vancomycin concentration:   · 02/14 - To be drawn    Plan:  · Vancomycin 1,500 mg IV initial dose; Follow with Vancomycin 1,250 mg IV Q 18 Hours  · Check Vancomycin trough prior to fourth dose  · Pharmacy will continue to monitor patient and adjust therapy as indicated    Ulysses 3 02/14/21 @ 14:30    Thank you for the consult.   Yasmeen Marquez, [address]   2/12/2021 6:54 AM

## 2021-02-12 NOTE — PLAN OF CARE
Problem: Falls - Risk of:  Goal: Will remain free from falls  Description: Will remain free from falls  Outcome: Ongoing  Goal: Absence of physical injury  Description: Absence of physical injury  Outcome: Ongoing     Problem: Discharge Planning:  Goal: Discharged to appropriate level of care  Description: Discharged to appropriate level of care  Outcome: Ongoing  Goal: Participates in care planning  Description: Participates in care planning  Outcome: Ongoing     Problem: Airway Clearance - Ineffective:  Goal: Clear lung sounds  Description: Clear lung sounds  Outcome: Ongoing  Goal: Ability to maintain a clear airway will improve  Description: Ability to maintain a clear airway will improve  Outcome: Ongoing     Problem: Fluid Volume - Deficit:  Goal: Achieves intake and output within specified parameters  Description: Achieves intake and output within specified parameters  Outcome: Ongoing     Problem: Gas Exchange - Impaired:  Goal: Levels of oxygenation will improve  Description: Levels of oxygenation will improve  Outcome: Ongoing     Problem: Hyperthermia:  Goal: Ability to maintain a body temperature in the normal range will improve  Description: Ability to maintain a body temperature in the normal range will improve  Outcome: Ongoing

## 2021-02-12 NOTE — H&P
History and Physical  Cory Bell MD    2/12/2021  Gerald Fuller  1945    PCP: Aidan Haywood MD    ASSESSMENT AND PLAN:    1. Fever 101.5  - vancomycin  - draw cultures  - discharge from swing bed, admit to med/surg        2. Fall at home related to weakness and debility      Did not loose consciousness    Some abrasions on both knees    Head and neck CT W/O contrast    Impression:  No acute intracranial abnormality. Impression:  No acute abnormality of the cervical spine.   Fall precaution, PT/OT consult: rec SNF  MRI brain WO contrast: negative        3.  Pneumonia due to Unknown organism with related acute hypoxic respiratory failure which is worse 2/12/21      Weakness,WBC15.4High K/CU MM hypoxia  Lactate 2.7High Panic mMOL/L    SARS-CoV-2, NAATNOT DETECTED    Chest X-ray   Bilateral pulmonary opacities in the mid lung fields and lower lung fields   which may represent infiltrates.  Pulmonary nodules cannot be excluded     Culture pending  Dc ivf  1-2 doses of midodrine remain  Day 3 abx: levaquin is changed to vancomycin, need for ekg is discontinued   mucinex  Cbc monitor x 2 more days     4.  Diabetes mellitus Type II       POCT glucose, hypoglycemic protocol       Home  Medications, sliding scale insulin with meal coverage  bg 99 this am     5.  Hyponatremia        [id number] MMOL/L       Replete sodium  electroltyes monitor x 2 more days     6. Other health Concerns     Tremor, Osteoarthritis, Hypertension,spinal stenosis, and CAD     7.  Diarrhea  Imodium cdif cholestyramine    Cc: fever HPI: Mari Gutierrez is a 70 y.o. Patient had low grade fever yesterday and then was discharged to [facility] but tolerated po medication. however despite being on antibiotics he developed a high grade fever 101.5 and required vancomycin and blood cultures which was not done. He is transferred to ICU for closer monitoring and treatment: he states he did not feel as confused as he did yesterday, he is not having chills anymore but still feels very weak. He can move his legs more than he can move his arms which he has not been able to lift above his shoulders. He still has a weak cough but can sometime clear his throat. PMHX:   Past Medical History:   Diagnosis Date    AAA (abdominal aortic aneurysm) ([facility] [address] 2018    Infrarenal    Basal cell carcinoma of nose 2016    Dr Gale Dexter CAD (coronary artery disease)     Dr. Escobar Smoker - severe aortic stenosis with a bicuspid aortic valve    Constipation     echocardiogram 10/29/2020    EF 55-60% normally functioning valve in aortic position, mild mitral regurg with two jets mild pulm htn.  Essential hypertension     History of alcohol abuse     Hyperlipidemia     Hypertension     Follows with PCP    Missing teeth, acquired     Non-alcoholic fatty liver disease     Osteoarthritis     Knees, lower back, shoulders,    Spinal stenosis     had epidural steroid injection 1/8/2020 - lumbar    Tremor     Left arm only    Type 2 diabetes mellitus without complication ([facility] [address]     Controlling with diet    Wears glasses      PSHX:  has a past surgical history that includes sinus surgery ([address]); other surgical history (12/09/2016); other surgical history (01/2020); Coronary artery bypass graft (N/A, 3/4/2020); knee surgery (2015); Carpal tunnel release (Right, 1970's); [place] tooth extraction; and Cardiac catheterization (02/26/2020). Meds - list of home medications reviewed in electronic chart and confirmed  Allergies:    Allergies   Allergen Reactions  Metformin And Related Diarrhea     Severe diarrhea, abd pain, and nausea    Amoxicillin Diarrhea    Other Nausea And Vomiting     ANTI-INFLAMMATORIES  Severe stomach pain--diarrhea  hypertension       FAM HX: family history includes Heart Disease in his father; High Blood Pressure in his father; High Cholesterol in his father; Creed Yueh in his father and mother.   Soc HX:   Social History     Socioeconomic History    Marital status:      Spouse name: Not on file    Number of children: Not on file    Years of education: Not on file    Highest education level: Not on file   Occupational History    Not on file   Social Needs    Financial resource strain: Not on file    Food insecurity     Worry: Not on file     Inability: Not on file    Transportation needs     Medical: Not on file     Non-medical: Not on file   Tobacco Use    Smoking status: Current Every Day Smoker     Packs/day: 1.50     Years: 58.00     Pack years: 87.00     Types: Cigarettes     Start date: 1962    Smokeless tobacco: Never Used   Substance and Sexual Activity    Alcohol use: Not Currently     Comment: 1 glass wine a month    Drug use: No    Sexual activity: Not Currently   Lifestyle    Physical activity     Days per week: Not on file     Minutes per session: Not on file    Stress: Not on file   Relationships    Social connections     Talks on phone: Not on file     Gets together: Not on file     Attends Christian service: Not on file     Active member of club or organization: Not on file     Attends meetings of clubs or organizations: Not on file     Relationship status: Not on file    Intimate partner violence     Fear of current or ex partner: Not on file     Emotionally abused: Not on file     Physically abused: Not on file     Forced sexual activity: Not on file   Other Topics Concern    Not on file   Social History Narrative    Not on file ROS: a ten point ROS with pertinent positives and negatives in hpi, otherwise negative. EXAM:  Blood pressure (!) 120/57, pulse 74, temperature 99.6 °F (37.6 °C), resp. rate 19, height 5' 6\" (1.676 m), weight 158 lb 1.6 oz (71.7 kg), SpO2 (!) 89 %. Gen:  awake, alert, cooperative, no apparent distress   EYES:  Lids and lashes normal, pupils equal, round and reactive to light, extra ocular muscles intact, sclera clear, conjunctiva normal  ENT:  Normocephalic, oral pharynx with moist mucus membranes, tonsils without erythema or exudates,  NECK:  Supple, symmetrical, trachea midline, no adenopathy,  LUNGS:  Clear to auscultate bilaterally, no rales ronchi or wheezing noted. CARDIOVASCULAR:  regular rate and rhythm, normal S1 and S2,no murmur/gallop/rub  ABDOMEN: Normal BS, Non tender, non distended, no HSM noted. MUSCULOSKELETAL:  ROM of all extremities grossly wnl  NEUROLOGIC: AOx 3,  Cranial nerves II-XII are grossly intact. Motor is 5 out of 5 bilaterally. Sensory is intact  SKIN:  no bruising or bleeding, normal skin color, texture, turgor and no redness, warmth, or swelling        LABS in ER reviewed    Xr Elbow Left (min 3 Views)    Result Date: 2/6/2021  EXAMINATION: THREE XRAY VIEWS OF THE LEFT ELBOW 2/6/2021 5:38 pm COMPARISON: None. HISTORY: ORDERING SYSTEM PROVIDED HISTORY: fall TECHNOLOGIST PROVIDED HISTORY: Reason for exam:->fall Reason for Exam: left elbow pain Acuity: Acute Type of Exam: Initial Mechanism of Injury: fall FINDINGS: No fracture, dislocation, or joint effusion. Moderate enthesophyte at the triceps insertion. The joint spaces are preserved. The soft tissues are unremarkable. No acute osseous abnormality.      Xr Elbow Right (min 3 Views)    Result Date: 2/6/2021 EXAMINATION: THREE XRAY VIEWS OF THE RIGHT ELBOW 2/6/2021 5:38 pm COMPARISON: None. HISTORY: ORDERING SYSTEM PROVIDED HISTORY: fall TECHNOLOGIST PROVIDED HISTORY: Reason for exam:->fall Reason for Exam: right elbow pain Acuity: Acute Type of Exam: Initial Mechanism of Injury: fall FINDINGS: A peripheral venous catheter is present in the antecubital fossa. No joint effusion. No fracture or dislocation identified. The soft tissues are unremarkable. No acute osseous abnormality. Xr Knee Right (3 Views)    Result Date: 2/6/2021  EXAMINATION: THREE XRAY VIEWS OF THE RIGHT KNEE 2/6/2021 5:39 pm COMPARISON: None. HISTORY: ORDERING SYSTEM PROVIDED HISTORY: fall TECHNOLOGIST PROVIDED HISTORY: Reason for exam:->fall Reason for Exam: right knee pain Acuity: Acute Type of Exam: Initial Mechanism of Injury: fall FINDINGS: No fracture, dislocation, or joint effusion. Mild medial compartment degenerative changes. Atherosclerotic vascular calcifications. No acute osseous abnormality.      Ct Head Wo Contrast    Result Date: 2/9/2021 EXAMINATION: CT OF THE HEAD WITHOUT CONTRAST  2/9/2021 7:26 pm TECHNIQUE: CT of the head was performed without the administration of intravenous contrast. Dose modulation, iterative reconstruction, and/or weight based adjustment of the mA/kV was utilized to reduce the radiation dose to as low as reasonably achievable. COMPARISON: None. HISTORY: ORDERING SYSTEM PROVIDED HISTORY: head pain TECHNOLOGIST PROVIDED HISTORY: Has a \"code stroke\" or \"stroke alert\" been called? ->No Reason for exam:->head pain Reason for Exam: Trauma, fall Acuity: Acute Type of Exam: Initial Mechanism of Injury: Trauma, fall FINDINGS: BRAIN/VENTRICLES: There is no acute intracranial hemorrhage, mass effect or midline shift. No abnormal extra-axial fluid collection. The gray-white differentiation is maintained without evidence of an acute infarct. Hypoattenuation of the periventricular and subcortical white matter is suggestive of chronic small vessel ischemic disease. Mild diffuse parenchymal volume loss is noted. There is no evidence of hydrocephalus. ORBITS: The visualized portion of the orbits demonstrate no acute abnormality. SINUSES: Is a small polyp versus retention cyst in the right sphenoid sinus. Other visualized paranasal sinuses and mastoid air cells are clear. SOFT TISSUES/SKULL:  No acute abnormality of the visualized skull or soft tissues. No acute intracranial abnormality.      Ct Cervical Spine Wo Contrast    Result Date: 2/9/2021 EXAMINATION: CT OF THE CERVICAL SPINE WITHOUT CONTRAST 2/9/2021 7:27 pm TECHNIQUE: CT of the cervical spine was performed without the administration of intravenous contrast. Multiplanar reformatted images are provided for review. Dose modulation, iterative reconstruction, and/or weight based adjustment of the mA/kV was utilized to reduce the radiation dose to as low as reasonably achievable. COMPARISON: None. HISTORY: ORDERING SYSTEM PROVIDED HISTORY: pain TECHNOLOGIST PROVIDED HISTORY: Reason for exam:->pain Reason for Exam: Trauma, fall Acuity: Acute Type of Exam: Initial Mechanism of Injury: Trauma, fall FINDINGS: BONES/ALIGNMENT: There is no acute fracture or traumatic malalignment. DEGENERATIVE CHANGES: Multilevel cervical spondylosis noted, most prominent at C5-C6 SOFT TISSUES: There is no prevertebral soft tissue swelling. No apical pneumothorax. Ground-glass densities in bilateral upper lobes, due to multifocal pneumonia (such as COVID-19), contusion, or aspiration. No acute abnormality of the cervical spine. RECOMMENDATIONS: Ground-glass densities in the bilateral upper lobes, due to multifocal pneumonia (such as COVID-19), contusion, or aspiration.      Ct Lumbar Spine Wo Contrast    Result Date: 2/6/2021 EXAMINATION: CT OF THE LUMBAR SPINE WITHOUT CONTRAST  2/6/2021 TECHNIQUE: CT of the lumbar spine was performed without the administration of intravenous contrast. Multiplanar reformatted images are provided for review. Dose modulation, iterative reconstruction, and/or weight based adjustment of the mA/kV was utilized to reduce the radiation dose to as low as reasonably achievable. COMPARISON: August 10, 2020. HISTORY: ORDERING SYSTEM PROVIDED HISTORY: fall TECHNOLOGIST PROVIDED HISTORY: Reason for exam:->fall Decision Support Exception->Emergency Medical Condition (MA) Reason for Exam: low back pain Acuity: Acute Type of Exam: Initial Mechanism of Injury: fall Relevant Medical/Surgical History: hx of falls FINDINGS: BONES/ALIGNMENT: There is normal alignment of the spine. The vertebral body heights are maintained. No osseous destructive lesion is seen. DEGENERATIVE CHANGES: No significant degenerative changes of the lumbar spine. SOFT TISSUES/RETROPERITONEUM: Stable appearance to aortic stent graft. No acute fracture no change in alignment compared to prior study. Cartagenia Chest Portable    Result Date: 2/9/2021  EXAMINATION: ONE XRAY VIEW OF THE CHEST 2/9/2021 7:39 pm COMPARISON: 09/01/2020 HISTORY: ORDERING SYSTEM PROVIDED HISTORY: chest pain TECHNOLOGIST PROVIDED HISTORY: Reason for exam:->chest pain Reason for Exam: Chest pain Acuity: Acute Type of Exam: Initial Additional signs and symptoms: Chest pain Initial evaluation FINDINGS: The patient has had a median sternotomy. Monitor wires overlie the chest. The trachea is midline. The cardiac silhouette is unremarkable. There are bilateral pulmonary airspace changes that are nonspecific and could represent infiltrates. Pulmonary nodules cannot be entirely excluded. There is no pleural fluid. Follow up to resolution is suggested. CT could better evaluate lung parenchyma if indicated. Bilateral pulmonary opacities in the mid lung fields and lower lung fields which may represent infiltrates. Pulmonary nodules cannot be excluded. Follow up to resolution is suggested. CT could better evaluate lung parenchyma if indicated. Mri Brain Wo Contrast    Result Date: 2/10/2021  EXAMINATION: MRI OF THE BRAIN WITHOUT CONTRAST  2/10/2021 3:59 pm TECHNIQUE: Multiplanar multisequence MRI of the brain was performed without the administration of intravenous contrast. COMPARISON: None. HISTORY: ORDERING SYSTEM PROVIDED HISTORY: weakness TECHNOLOGIST PROVIDED HISTORY: Reason for exam:->weakness Reason for Exam: weakness Acuity: Acute Type of Exam: Subsequent/Follow-up Additional signs and symptoms: limited mobility FINDINGS: INTRACRANIAL STRUCTURES/VENTRICLES: There is no acute infarct or mass. Parenchymal volume is commensurate with age. Mild chronic white matter microvascular ischemic changes are present. No mass effect or midline shift. No evidence of an acute intracranial hemorrhage. The ventricles and sulci are normal in size and configuration. The sellar/suprasellar regions appear unremarkable. The normal signal voids within the major intracranial vessels appear maintained. ORBITS: The visualized portion of the orbits demonstrate no acute abnormality. SINUSES: Mild left maxillary sinus mucoperiosteal thickening with frothy fluid in the axillary antrum. No mastoid effusion. BONES/SOFT TISSUES: The bone marrow signal intensity appears normal. The soft tissues demonstrate no acute abnormality. 1. No acute intracranial abnormality. 2. Mild chronic white matter microvascular ischemic changes.  3. Mild left maxillary sinus disease.   -  Patient Active Problem List   Diagnosis    Abdominal aortic aneurysm (AAA) without rupture (HCC)    Tobacco abuse    Essential hypertension    Smoker    Non-alcoholic fatty liver disease    Basal cell carcinoma of nose  Type 2 diabetes mellitus without complication, without long-term current use of insulin (HCC)    Other hyperlipidemia    Nonrheumatic aortic valve stenosis    CAD in native artery    Iliac artery aneurysm, right (HCC)    Other constipation    Spondylosis of lumbosacral region    Status post AAA (abdominal aortic aneurysm) repair    AAA (abdominal aortic aneurysm) (HCC)    Liver nodule    Thrombocytopenia (HCC)    Tremor, physiological    Pulmonary nodule    Fall at home, initial encounter    Type 2 diabetes mellitus ([facility]    Pneumonia    Weakness     ______________________________________________________________    Electronically signed by Annamaria Arreguin MD on 2/12/2021 at 8:46 AM

## 2021-02-12 NOTE — DISCHARGE SUMMARY
Joe Sadler 1945 [id number]  PCP:  Lupe Major MD    Admit date: 2/11/2021  Admitting Physician: Alton Camacho MD    Discharge date: 2/12/2021 Discharge Physician: Alton Camacho MD      Reason for admission: No chief complaint on file. Present on Admission:   Weakness       Discharge Diagnoses Include:  1. Fever 101.5  - vancomycin  - draw cultures  - discharge from swing bed, admit to med/surg      2. Fall at home related to weakness and debility      Did not loose consciousness    Some abrasions on both knees    Head and neck CT W/O contrast    Impression:  No acute intracranial abnormality. Impression:  No acute abnormality of the cervical spine.   Fall precaution, PT/OT consult: rec SNF  MRI brain WO contrast: negative       3.  Pneumonia due to Unknown organism      Weakness,WBC15.4High K/CU MM hypoxia  Lactate 2.7High Panic mMOL/L    SARS-CoV-2, NAATNOT DETECTED    Chest X-ray   Bilateral pulmonary opacities in the mid lung fields and lower lung fields   which may represent infiltrates.  Pulmonary nodules cannot be excluded     Culture pending  Dc ivf  Three doses of midodrine remain  Day 2 abx: levaquin for total of 4-5 more days; daily QTc monitoring with ekg while he is also on amiodarone   mucinex  Cbc monitor x 2 more days     4.  Diabetes mellitus Type II       POCT glucose, hypoglycemic protocol       Home  Medications, sliding scale insulin with meal coverage  bg 99 this am     5.  Hyponatremia        [id number] MMOL/L       Replete sodium  electroltyes monitor x 2 more days     6. Other health Concerns     Tremor, Osteoarthritis, Hypertension,spinal stenosis, and CAD     7.  Diarrhea  Imodium cdif cholestyramine      Hospital Course[de-identified]   Patient had low grade fever in the morning and was given tylenol  Then later in the evening around 7 pm had a fever spike of 101.5  Order was placed to notify physician of temp>101.3 and NP was not notified Diet: cardiac diet    Activity: activity as tolerated   Work:    Discharged Condition: stable    Prognosis: Fair    Disposition: home         Discharge Physician Signed: Escobar Gomez M.D. The patient was seen and examined on day of discharge and this discharge summary is in conjunction with any daily progress note from day of discharge.   Time spent on discharge in the examination, evaluation, counseling and review of medications and discharge plan: 32 minutes

## 2021-02-12 NOTE — PLAN OF CARE
Problem: Falls - Risk of:  Goal: Will remain free from falls  Description: Will remain free from falls  2/12/2021 1003 by Nikhil Pedraza RN  Outcome: Ongoing  2/12/2021 0655 by Toy Garcia RN  Outcome: Ongoing  Goal: Absence of physical injury  Description: Absence of physical injury  2/12/2021 1003 by Nikhil Pedraza RN  Outcome: Ongoing  2/12/2021 0655 by Toy Garcia RN  Outcome: Ongoing     Problem: Discharge Planning:  Goal: Discharged to appropriate level of care  Description: Discharged to appropriate level of care  2/12/2021 1003 by Nikhil Pedraza RN  Outcome: Ongoing  2/12/2021 0655 by Toy Garcia RN  Outcome: Ongoing  Goal: Participates in care planning  Description: Participates in care planning  2/12/2021 1003 by Nikhil Pedraza RN  Outcome: Ongoing  2/12/2021 0655 by Toy Garcia RN  Outcome: Ongoing     Problem: Airway Clearance - Ineffective:  Goal: Clear lung sounds  Description: Clear lung sounds  2/12/2021 1003 by Nikhil Pedraza RN  Outcome: Ongoing  2/12/2021 0655 by Toy Garcia RN  Outcome: Ongoing  Goal: Ability to maintain a clear airway will improve  Description: Ability to maintain a clear airway will improve  2/12/2021 1003 by Nikhil Pedraza RN  Outcome: Ongoing  2/12/2021 0655 by Toy Garcia RN  Outcome: Ongoing     Problem: Fluid Volume - Deficit:  Goal: Achieves intake and output within specified parameters  Description: Achieves intake and output within specified parameters  2/12/2021 1003 by Nikhil Pedraza RN  Outcome: Ongoing  2/12/2021 0655 by Toy Garcia RN  Outcome: Ongoing     Problem: Gas Exchange - Impaired:  Goal: Levels of oxygenation will improve  Description: Levels of oxygenation will improve  2/12/2021 1003 by Nikhil Pedraza RN  Outcome: Ongoing  2/12/2021 0655 by Toy Garcia RN  Outcome: Ongoing     Problem: Hyperthermia: Goal: Ability to maintain a body temperature in the normal range will improve  Description: Ability to maintain a body temperature in the normal range will improve  2/12/2021 1003 by Cory Curiel RN  Outcome: Ongoing  2/12/2021 0655 by Philip Dunn RN  Outcome: Ongoing

## 2021-02-12 NOTE — PROGRESS NOTES
Patient moved to room 04 in the ICU via bed per Dr's orders.  Patient tolerated move well, SaO2 91% on 2L NC.

## 2021-02-13 ENCOUNTER — APPOINTMENT (OUTPATIENT)
Dept: GENERAL RADIOLOGY | Age: 76
DRG: 193 | End: 2021-02-13
Attending: INTERNAL MEDICINE
Payer: MEDICARE

## 2021-02-13 LAB
ANION GAP SERPL CALCULATED.3IONS-SCNC: 13 MMOL/L (ref 4–16)
BASOPHILS ABSOLUTE: 0 K/CU MM
BASOPHILS RELATIVE PERCENT: 0.1 % (ref 0–1)
BUN BLDV-MCNC: 30 MG/DL (ref 6–23)
CALCIUM SERPL-MCNC: 9.2 MG/DL (ref 8.3–10.6)
CHLORIDE BLD-SCNC: 106 MMOL/L (ref 99–110)
CO2: 18 MMOL/L (ref 21–32)
CREAT SERPL-MCNC: 0.7 MG/DL (ref 0.9–1.3)
DIFFERENTIAL TYPE: ABNORMAL
EOSINOPHILS ABSOLUTE: 0 K/CU MM
EOSINOPHILS RELATIVE PERCENT: 0 % (ref 0–3)
GFR AFRICAN AMERICAN: >60 ML/MIN/1.73M2
GFR NON-AFRICAN AMERICAN: >60 ML/MIN/1.73M2
GLUCOSE BLD-MCNC: 166 MG/DL (ref 70–99)
GLUCOSE BLD-MCNC: 180 MG/DL (ref 70–99)
GLUCOSE BLD-MCNC: 245 MG/DL (ref 70–99)
GLUCOSE BLD-MCNC: 252 MG/DL (ref 70–99)
GLUCOSE BLD-MCNC: 281 MG/DL (ref 70–99)
HCT VFR BLD CALC: 31.4 % (ref 42–52)
HEMOGLOBIN: 10.1 GM/DL (ref 13.5–18)
IMMATURE NEUTROPHIL %: 0.9 % (ref 0–0.43)
LYMPHOCYTES ABSOLUTE: 0.4 K/CU MM
LYMPHOCYTES RELATIVE PERCENT: 2.7 % (ref 24–44)
MCH RBC QN AUTO: 30.7 PG (ref 27–31)
MCHC RBC AUTO-ENTMCNC: 32.2 % (ref 32–36)
MCV RBC AUTO: 95.4 FL (ref 78–100)
MONOCYTES ABSOLUTE: 0.2 K/CU MM
MONOCYTES RELATIVE PERCENT: 1.3 % (ref 0–4)
PDW BLD-RTO: 15.5 % (ref 11.7–14.9)
PLATELET # BLD: 115 K/CU MM (ref 140–440)
PMV BLD AUTO: 9.9 FL (ref 7.5–11.1)
POTASSIUM SERPL-SCNC: 3.7 MMOL/L (ref 3.5–5.1)
RBC # BLD: 3.29 M/CU MM (ref 4.6–6.2)
SEGMENTED NEUTROPHILS ABSOLUTE COUNT: 13.1 K/CU MM
SEGMENTED NEUTROPHILS RELATIVE PERCENT: 95 % (ref 36–66)
SODIUM BLD-SCNC: 137 MMOL/L (ref 135–145)
TOTAL IMMATURE NEUTOROPHIL: 0.12 K/CU MM
WBC # BLD: 13.8 K/CU MM (ref 4–10.5)

## 2021-02-13 PROCEDURE — 2000000000 HC ICU R&B

## 2021-02-13 PROCEDURE — 94761 N-INVAS EAR/PLS OXIMETRY MLT: CPT

## 2021-02-13 PROCEDURE — 2700000000 HC OXYGEN THERAPY PER DAY

## 2021-02-13 PROCEDURE — 85025 COMPLETE CBC W/AUTO DIFF WBC: CPT

## 2021-02-13 PROCEDURE — 6370000000 HC RX 637 (ALT 250 FOR IP): Performed by: INTERNAL MEDICINE

## 2021-02-13 PROCEDURE — 82962 GLUCOSE BLOOD TEST: CPT

## 2021-02-13 PROCEDURE — 6360000002 HC RX W HCPCS: Performed by: INTERNAL MEDICINE

## 2021-02-13 PROCEDURE — 80048 BASIC METABOLIC PNL TOTAL CA: CPT

## 2021-02-13 PROCEDURE — 2500000003 HC RX 250 WO HCPCS: Performed by: INTERNAL MEDICINE

## 2021-02-13 PROCEDURE — 71045 X-RAY EXAM CHEST 1 VIEW: CPT

## 2021-02-13 PROCEDURE — 2580000003 HC RX 258: Performed by: INTERNAL MEDICINE

## 2021-02-13 PROCEDURE — 36415 COLL VENOUS BLD VENIPUNCTURE: CPT

## 2021-02-13 RX ADMIN — AMIODARONE HYDROCHLORIDE 200 MG: 200 TABLET ORAL at 20:31

## 2021-02-13 RX ADMIN — INSULIN LISPRO 2 UNITS: 100 INJECTION, SOLUTION INTRAVENOUS; SUBCUTANEOUS at 20:36

## 2021-02-13 RX ADMIN — AMIODARONE HYDROCHLORIDE 200 MG: 200 TABLET ORAL at 08:06

## 2021-02-13 RX ADMIN — SODIUM CHLORIDE, PRESERVATIVE FREE 10 ML: 5 INJECTION INTRAVENOUS at 08:17

## 2021-02-13 RX ADMIN — VANCOMYCIN HYDROCHLORIDE: 750 INJECTION, POWDER, LYOPHILIZED, FOR SOLUTION INTRAVENOUS at 02:42

## 2021-02-13 RX ADMIN — GABAPENTIN 300 MG: 300 CAPSULE ORAL at 08:09

## 2021-02-13 RX ADMIN — MIDODRINE HYDROCHLORIDE 2.5 MG: 2.5 TABLET ORAL at 08:13

## 2021-02-13 RX ADMIN — CARVEDILOL 3.12 MG: 3.12 TABLET, FILM COATED ORAL at 08:08

## 2021-02-13 RX ADMIN — CEFEPIME HYDROCHLORIDE 2000 MG: 2 INJECTION, POWDER, FOR SOLUTION INTRAVENOUS at 10:53

## 2021-02-13 RX ADMIN — CHOLESTYRAMINE 4 G: 4 POWDER, FOR SUSPENSION ORAL at 12:42

## 2021-02-13 RX ADMIN — VANCOMYCIN HYDROCHLORIDE 750 MG: 750 INJECTION, POWDER, LYOPHILIZED, FOR SOLUTION INTRAVENOUS at 21:05

## 2021-02-13 RX ADMIN — ASPIRIN 81 MG: 81 TABLET, COATED ORAL at 08:08

## 2021-02-13 RX ADMIN — METHYLPREDNISOLONE SODIUM SUCCINATE 40 MG: 40 INJECTION, POWDER, FOR SOLUTION INTRAMUSCULAR; INTRAVENOUS at 08:09

## 2021-02-13 RX ADMIN — MIDODRINE HYDROCHLORIDE 2.5 MG: 2.5 TABLET ORAL at 12:42

## 2021-02-13 RX ADMIN — GABAPENTIN 300 MG: 300 CAPSULE ORAL at 20:31

## 2021-02-13 RX ADMIN — ATORVASTATIN CALCIUM 40 MG: 40 TABLET, FILM COATED ORAL at 08:12

## 2021-02-13 RX ADMIN — CEFEPIME HYDROCHLORIDE 2000 MG: 2 INJECTION, POWDER, FOR SOLUTION INTRAVENOUS at 20:31

## 2021-02-13 RX ADMIN — VANCOMYCIN HYDROCHLORIDE 500 MG: 500 INJECTION, POWDER, LYOPHILIZED, FOR SOLUTION INTRAVENOUS at 22:04

## 2021-02-13 RX ADMIN — PRIMIDONE 50 MG: 50 TABLET ORAL at 08:08

## 2021-02-13 RX ADMIN — GUAIFENESIN 600 MG: 600 TABLET, EXTENDED RELEASE ORAL at 08:06

## 2021-02-13 RX ADMIN — CARVEDILOL 3.12 MG: 3.12 TABLET, FILM COATED ORAL at 17:53

## 2021-02-13 RX ADMIN — GLIPIZIDE 2.5 MG: 5 TABLET ORAL at 08:07

## 2021-02-13 RX ADMIN — PRIMIDONE 50 MG: 50 TABLET ORAL at 20:30

## 2021-02-13 RX ADMIN — CHOLESTYRAMINE 4 G: 4 POWDER, FOR SUSPENSION ORAL at 17:53

## 2021-02-13 RX ADMIN — GUAIFENESIN 600 MG: 600 TABLET, EXTENDED RELEASE ORAL at 20:31

## 2021-02-13 RX ADMIN — METRONIDAZOLE 500 MG: 500 INJECTION, SOLUTION INTRAVENOUS at 17:53

## 2021-02-13 RX ADMIN — SODIUM CHLORIDE, PRESERVATIVE FREE 10 ML: 5 INJECTION INTRAVENOUS at 20:30

## 2021-02-13 RX ADMIN — GABAPENTIN 300 MG: 300 CAPSULE ORAL at 13:36

## 2021-02-13 RX ADMIN — METRONIDAZOLE 500 MG: 500 INJECTION, SOLUTION INTRAVENOUS at 08:28

## 2021-02-13 RX ADMIN — LISINOPRIL 10 MG: 10 TABLET ORAL at 08:06

## 2021-02-13 ASSESSMENT — PAIN SCALES - GENERAL
PAINLEVEL_OUTOF10: 0

## 2021-02-13 NOTE — PROGRESS NOTES
Hospitalist Progress Note       Louisa Asher M.D.  2021 6:18 AM  Admit Date: 2021    PCP: Tiffanie Bright MD     Assessment and Plan:    1. Fever 101.5   - vancomycin   - draw cultures   - discharge from swing bed, admit to med/surg     2. Fall at home related to weakness and debility      Did not loose consciousness    Some abrasions on both knees    Head and neck CT W/O contrast    Impression:  No acute intracranial abnormality. Impression:  No acute abnormality of the cervical spine.   Fall precaution, PT/OT consult: rec SN[facility]  MRI brain WO contrast: negative        3.  Pneumonia due to Unknown organism with related acute hypoxic respiratory failure which is worse 21      Weakness,WBC15.4High K/CU MM hypoxia; procal 0.418  Lactate 2.7High Panic mMOL/L resolved   SARS-CoV-2, NAATNOT DETECTED    Chest X-ray   Bilateral pulmonary opacities in the mid lung fields and lower lung fields   which may represent infiltrates.  Pulmonary nodules cannot be excluded     Culture pending: blood cultures NGTD 48 hrs; respiratory PCR negative; repeat blood cultures from fever spike  pending  1-2 doses of midodrine remain; bp stable  Day 4 abx:  vancomycin, cefepime+flagyl; patient allergic to PCN and is placed on this combination instead to adequately broaden coverage; will obtain speech swallow evaluation as well  mucinex  duoneb  solumedrol  Cbc monitor x 2 more days     4.  Diabetes mellitus Type II       POCT glucose, hypoglycemic protocol       Home  Medications, sliding scale insulin with meal coverage     5.  Hyponatremia        [id number] MMOL/L       Replete sodium  electroltyes monitor x 2 more days  -resolved     6. Other health Concerns     Tremor, Osteoarthritis, Hypertension,spinal stenosis, and CAD     7.  Diarrhea  Imodium cdif cholestyramine  - cdiff negative; supportive care      Patient Active Problem List:     Abdominal aortic aneurysm (AAA) without rupture (HCC)     Tobacco abuse Essential hypertension     Smoker     Non-alcoholic fatty liver disease     Basal cell carcinoma of nose     Type 2 diabetes mellitus without complication, without long-term current use of insulin (HCC)     Other hyperlipidemia     Nonrheumatic aortic valve stenosis     CAD in native artery     Iliac artery aneurysm, right (HCC)     Other constipation     Spondylosis of lumbosacral region     Status post AAA (abdominal aortic aneurysm) repair     AAA (abdominal aortic aneurysm) (HCC)     Liver nodule     Thrombocytopenia (HCC)     Tremor, physiological     Pulmonary nodule     Fall at home, initial encounter     Type 2 diabetes mellitus (HCC)     Pneumonia     Weakness     Acute and chr resp failure, unsp w hypoxia or hypercapnia (HCC)      Subjective:     No chief complaint on file. F/U:  Interval History: feels more shaky, trying to blow his nose, reports increased nasal congestion, no fever does have chills    Objective: Intake/Output Summary (Last 24 hours) at 2/13/2021 0618  Last data filed at 2/13/2021 0227  Gross per 24 hour   Intake 990 ml   Output 375 ml   Net 615 ml      Vitals:   Vitals:    02/13/21 0420   BP: 135/63   Pulse: 61   Resp: 13   Temp:    SpO2: 93%     Physical Exam:  Gen:  awake, alert, cooperative, no apparent distress, EYES:  Lids and lashes normal, pupils equal, round and reactive to light, extra ocular muscles intact, sclera clear, conjunctiva normal  ENT:  Normocephalic, oral pharynx with moist mucus membranes, tonsils without erythema or exudates,  NECK:  Supple, symmetrical, trachea midline, no adenopathy,  LUNGS:  Clear to auscultate bilaterally, no rales ronchi or wheezing noted. CARDIOVASCULAR:  regular rate and rhythm, normal S1 and S2, no S3 or S4, and no murmur noted  ABDOMEN: Normal BS, Non tender, non distended, no HSM noted.   MUSCULOSKELETAL:  ROM of all extremities grossly wnl; limited rom upper ext w lifting above horizontal impaired NEUROLOGIC: AOx 3,  Cranial nerves II-XII are grossly intact. Motor is LE>UE. Sensory is intact  SKIN:  no bruising or bleeding, normal skin color, texture, turgor and no redness, warmth, or swelling    Xr Elbow Left (min 3 Views)    Result Date: 2/6/2021  EXAMINATION: THREE XRAY VIEWS OF THE LEFT ELBOW 2/6/2021 5:38 pm COMPARISON: None. HISTORY: ORDERING SYSTEM PROVIDED HISTORY: fall TECHNOLOGIST PROVIDED HISTORY: Reason for exam:->fall Reason for Exam: left elbow pain Acuity: Acute Type of Exam: Initial Mechanism of Injury: fall FINDINGS: No fracture, dislocation, or joint effusion. Moderate enthesophyte at the triceps insertion. The joint spaces are preserved. The soft tissues are unremarkable. No acute osseous abnormality. Xr Elbow Right (min 3 Views)    Result Date: 2/6/2021  EXAMINATION: THREE XRAY VIEWS OF THE RIGHT ELBOW 2/6/2021 5:38 pm COMPARISON: None. HISTORY: ORDERING SYSTEM PROVIDED HISTORY: fall TECHNOLOGIST PROVIDED HISTORY: Reason for exam:->fall Reason for Exam: right elbow pain Acuity: Acute Type of Exam: Initial Mechanism of Injury: fall FINDINGS: A peripheral venous catheter is present in the antecubital fossa. No joint effusion. No fracture or dislocation identified. The soft tissues are unremarkable. No acute osseous abnormality. Xr Knee Right (3 Views)    Result Date: 2/6/2021  EXAMINATION: THREE XRAY VIEWS OF THE RIGHT KNEE 2/6/2021 5:39 pm COMPARISON: None. HISTORY: ORDERING SYSTEM PROVIDED HISTORY: fall TECHNOLOGIST PROVIDED HISTORY: Reason for exam:->fall Reason for Exam: right knee pain Acuity: Acute Type of Exam: Initial Mechanism of Injury: fall FINDINGS: No fracture, dislocation, or joint effusion. Mild medial compartment degenerative changes. Atherosclerotic vascular calcifications. No acute osseous abnormality.      Ct Head Wo Contrast    Result Date: 2/9/2021 EXAMINATION: CT OF THE HEAD WITHOUT CONTRAST  2/9/2021 7:26 pm TECHNIQUE: CT of the head was performed without the administration of intravenous contrast. Dose modulation, iterative reconstruction, and/or weight based adjustment of the mA/kV was utilized to reduce the radiation dose to as low as reasonably achievable. COMPARISON: None. HISTORY: ORDERING SYSTEM PROVIDED HISTORY: head pain TECHNOLOGIST PROVIDED HISTORY: Has a \"code stroke\" or \"stroke alert\" been called? ->No Reason for exam:->head pain Reason for Exam: Trauma, fall Acuity: Acute Type of Exam: Initial Mechanism of Injury: Trauma, fall FINDINGS: BRAIN/VENTRICLES: There is no acute intracranial hemorrhage, mass effect or midline shift. No abnormal extra-axial fluid collection. The gray-white differentiation is maintained without evidence of an acute infarct. Hypoattenuation of the periventricular and subcortical white matter is suggestive of chronic small vessel ischemic disease. Mild diffuse parenchymal volume loss is noted. There is no evidence of hydrocephalus. ORBITS: The visualized portion of the orbits demonstrate no acute abnormality. SINUSES: Is a small polyp versus retention cyst in the right sphenoid sinus. Other visualized paranasal sinuses and mastoid air cells are clear. SOFT TISSUES/SKULL:  No acute abnormality of the visualized skull or soft tissues. No acute intracranial abnormality.      Ct Cervical Spine Wo Contrast    Result Date: 2/9/2021 EXAMINATION: CT OF THE CERVICAL SPINE WITHOUT CONTRAST 2/9/2021 7:27 pm TECHNIQUE: CT of the cervical spine was performed without the administration of intravenous contrast. Multiplanar reformatted images are provided for review. Dose modulation, iterative reconstruction, and/or weight based adjustment of the mA/kV was utilized to reduce the radiation dose to as low as reasonably achievable. COMPARISON: None. HISTORY: ORDERING SYSTEM PROVIDED HISTORY: pain TECHNOLOGIST PROVIDED HISTORY: Reason for exam:->pain Reason for Exam: Trauma, fall Acuity: Acute Type of Exam: Initial Mechanism of Injury: Trauma, fall FINDINGS: BONES/ALIGNMENT: There is no acute fracture or traumatic malalignment. DEGENERATIVE CHANGES: Multilevel cervical spondylosis noted, most prominent at C5-C6 SOFT TISSUES: There is no prevertebral soft tissue swelling. No apical pneumothorax. Ground-glass densities in bilateral upper lobes, due to multifocal pneumonia (such as COVID-19), contusion, or aspiration. No acute abnormality of the cervical spine. RECOMMENDATIONS: Ground-glass densities in the bilateral upper lobes, due to multifocal pneumonia (such as COVID-19), contusion, or aspiration.      Ct Lumbar Spine Wo Contrast    Result Date: 2/6/2021 EXAMINATION: ONE XRAY VIEW OF THE CHEST 2/9/2021 7:39 pm COMPARISON: 09/01/2020 HISTORY: ORDERING SYSTEM PROVIDED HISTORY: chest pain TECHNOLOGIST PROVIDED HISTORY: Reason for exam:->chest pain Reason for Exam: Chest pain Acuity: Acute Type of Exam: Initial Additional signs and symptoms: Chest pain Initial evaluation FINDINGS: The patient has had a median sternotomy. Monitor wires overlie the chest. The trachea is midline. The cardiac silhouette is unremarkable. There are bilateral pulmonary airspace changes that are nonspecific and could represent infiltrates. Pulmonary nodules cannot be entirely excluded. There is no pleural fluid. Follow up to resolution is suggested. CT could better evaluate lung parenchyma if indicated. Bilateral pulmonary opacities in the mid lung fields and lower lung fields which may represent infiltrates. Pulmonary nodules cannot be excluded. Follow up to resolution is suggested. CT could better evaluate lung parenchyma if indicated.      Mri Brain Wo Contrast    Result Date: 2/10/2021 EXAMINATION: MRI OF THE BRAIN WITHOUT CONTRAST  2/10/2021 3:59 pm TECHNIQUE: Multiplanar multisequence MRI of the brain was performed without the administration of intravenous contrast. COMPARISON: None. HISTORY: ORDERING SYSTEM PROVIDED HISTORY: weakness TECHNOLOGIST PROVIDED HISTORY: Reason for exam:->weakness Reason for Exam: weakness Acuity: Acute Type of Exam: Subsequent/Follow-up Additional signs and symptoms: limited mobility FINDINGS: INTRACRANIAL STRUCTURES/VENTRICLES: There is no acute infarct or mass. Parenchymal volume is commensurate with age. Mild chronic white matter microvascular ischemic changes are present. No mass effect or midline shift. No evidence of an acute intracranial hemorrhage. The ventricles and sulci are normal in size and configuration. The sellar/suprasellar regions appear unremarkable. The normal signal voids within the major intracranial vessels appear maintained. ORBITS: The visualized portion of the orbits demonstrate no acute abnormality. SINUSES: Mild left maxillary sinus mucoperiosteal thickening with frothy fluid in the axillary antrum. No mastoid effusion. BONES/SOFT TISSUES: The bone marrow signal intensity appears normal. The soft tissues demonstrate no acute abnormality. 1. No acute intracranial abnormality. 2. Mild chronic white matter microvascular ischemic changes. 3. Mild left maxillary sinus disease.   -    DATA:    CBC   Recent Labs     02/11/21  0530 02/12/21  0510 02/13/21  0530   WBC 14.1* 12.1* 13.8*   HGB 11.3* 10.0* 10.1*   HCT 35.6* 30.5* 31.4*   * 113* 115*      BMP   Recent Labs     02/11/21  0530 02/12/21  0510   * 139   K 3.6 3.6    106   CO2 19* 18*   BUN 21 23   CREATININE 0.9 0.9     LFT'S No results for input(s): AST, ALT, ALB, BILIDIR, BILITOT, ALKPHOS in the last 72 hours. COAG No results for input(s): INR in the last 72 hours. CARDIAC ENZYMES  No results for input(s): CKTOTAL, CKMB, CKMBINDEX, TROPONINI in the last 72 hours.   U/A:    Lab Results   Component Value Date    NITRITE NEG 07/22/2020    COLORU YELLOW 02/09/2021    WBCUA 1 TO 2 02/09/2021    RBCUA NO CELLS SEEN 02/09/2021    MUCUS 1+ 02/09/2021    BACTERIA MODERATE 02/09/2021    CLARITYU CLEAR 02/09/2021    SPECGRAV 1.020 02/09/2021    LEUKOCYTESUR NEGATIVE 02/09/2021    BLOODU NEGATIVE 02/09/2021    GLUCOSEU NEG 07/22/2020         Ricky Borjas MD  Rounding Hospitalist

## 2021-02-13 NOTE — PLAN OF CARE
Problem: Falls - Risk of:  Goal: Will remain free from falls  Description: Will remain free from falls  2/12/2021 2351 by Johnathan Long LPN  Outcome: Ongoing  2/12/2021 1003 by Briana Juares RN  Outcome: Ongoing  Goal: Absence of physical injury  Description: Absence of physical injury  2/12/2021 2351 by Johnathan Long LPN  Outcome: Ongoing  2/12/2021 1003 by Briana Juares RN  Outcome: Ongoing     Problem: Discharge Planning:  Goal: Discharged to appropriate level of care  Description: Discharged to appropriate level of care  2/12/2021 2351 by Johnathan Long LPN  Outcome: Ongoing  2/12/2021 1003 by Briana Juares RN  Outcome: Ongoing  Goal: Participates in care planning  Description: Participates in care planning  2/12/2021 2351 by Johnathan Long LPN  Outcome: Ongoing  2/12/2021 1003 by Briana Juares RN  Outcome: Ongoing     Problem: Airway Clearance - Ineffective:  Goal: Clear lung sounds  Description: Clear lung sounds  2/12/2021 2351 by Johnathan Long LPN  Outcome: Ongoing  2/12/2021 1003 by Briana Juares RN  Outcome: Ongoing  Goal: Ability to maintain a clear airway will improve  Description: Ability to maintain a clear airway will improve  2/12/2021 2351 by Johnathan Long LPN  Outcome: Ongoing  2/12/2021 1003 by Briana Juares RN  Outcome: Ongoing     Problem: Fluid Volume - Deficit:  Goal: Achieves intake and output within specified parameters  Description: Achieves intake and output within specified parameters  2/12/2021 2351 by Johnathan Long LPN  Outcome: Ongoing  2/12/2021 1003 by Briana Juares RN  Outcome: Ongoing     Problem: Gas Exchange - Impaired:  Goal: Levels of oxygenation will improve  Description: Levels of oxygenation will improve  2/12/2021 2351 by Johnathan Long LPN  Outcome: Ongoing  2/12/2021 1003 by Briana Juares RN  Outcome: Ongoing     Problem: Hyperthermia: Goal: Ability to maintain a body temperature in the normal range will improve  Description: Ability to maintain a body temperature in the normal range will improve  2/12/2021 2351 by Cristhian Clark LPN  Outcome: Ongoing  2/12/2021 1003 by Anaid Fall RN  Outcome: Ongoing

## 2021-02-14 ENCOUNTER — APPOINTMENT (OUTPATIENT)
Dept: GENERAL RADIOLOGY | Age: 76
DRG: 193 | End: 2021-02-14
Attending: INTERNAL MEDICINE
Payer: MEDICARE

## 2021-02-14 LAB
GLUCOSE BLD-MCNC: 174 MG/DL (ref 70–99)
GLUCOSE BLD-MCNC: 218 MG/DL (ref 70–99)
GLUCOSE BLD-MCNC: 250 MG/DL (ref 70–99)
GLUCOSE BLD-MCNC: 268 MG/DL (ref 70–99)

## 2021-02-14 PROCEDURE — 2700000000 HC OXYGEN THERAPY PER DAY

## 2021-02-14 PROCEDURE — 2000000000 HC ICU R&B

## 2021-02-14 PROCEDURE — 36415 COLL VENOUS BLD VENIPUNCTURE: CPT

## 2021-02-14 PROCEDURE — 87081 CULTURE SCREEN ONLY: CPT

## 2021-02-14 PROCEDURE — 6360000002 HC RX W HCPCS: Performed by: INTERNAL MEDICINE

## 2021-02-14 PROCEDURE — 2500000003 HC RX 250 WO HCPCS: Performed by: INTERNAL MEDICINE

## 2021-02-14 PROCEDURE — 92610 EVALUATE SWALLOWING FUNCTION: CPT

## 2021-02-14 PROCEDURE — 6370000000 HC RX 637 (ALT 250 FOR IP): Performed by: INTERNAL MEDICINE

## 2021-02-14 PROCEDURE — 94640 AIRWAY INHALATION TREATMENT: CPT

## 2021-02-14 PROCEDURE — 2580000003 HC RX 258: Performed by: INTERNAL MEDICINE

## 2021-02-14 PROCEDURE — 71045 X-RAY EXAM CHEST 1 VIEW: CPT

## 2021-02-14 PROCEDURE — 80202 ASSAY OF VANCOMYCIN: CPT

## 2021-02-14 RX ORDER — MIDODRINE HYDROCHLORIDE 5 MG/1
5 TABLET ORAL 3 TIMES DAILY PRN
Status: DISCONTINUED | OUTPATIENT
Start: 2021-02-14 | End: 2021-02-18 | Stop reason: HOSPADM

## 2021-02-14 RX ORDER — METHYLPREDNISOLONE SODIUM SUCCINATE 40 MG/ML
40 INJECTION, POWDER, LYOPHILIZED, FOR SOLUTION INTRAMUSCULAR; INTRAVENOUS EVERY 12 HOURS
Status: DISCONTINUED | OUTPATIENT
Start: 2021-02-14 | End: 2021-02-16

## 2021-02-14 RX ORDER — GUAIFENESIN 600 MG/1
1200 TABLET, EXTENDED RELEASE ORAL 2 TIMES DAILY
Status: DISCONTINUED | OUTPATIENT
Start: 2021-02-14 | End: 2021-02-18 | Stop reason: HOSPADM

## 2021-02-14 RX ADMIN — METRONIDAZOLE 500 MG: 500 INJECTION, SOLUTION INTRAVENOUS at 23:49

## 2021-02-14 RX ADMIN — CARVEDILOL 3.12 MG: 3.12 TABLET, FILM COATED ORAL at 08:40

## 2021-02-14 RX ADMIN — SODIUM CHLORIDE, PRESERVATIVE FREE 10 ML: 5 INJECTION INTRAVENOUS at 20:14

## 2021-02-14 RX ADMIN — IPRATROPIUM BROMIDE AND ALBUTEROL SULFATE 1 AMPULE: .5; 3 SOLUTION RESPIRATORY (INHALATION) at 10:36

## 2021-02-14 RX ADMIN — METRONIDAZOLE 500 MG: 500 INJECTION, SOLUTION INTRAVENOUS at 00:21

## 2021-02-14 RX ADMIN — CEFEPIME HYDROCHLORIDE 2000 MG: 2 INJECTION, POWDER, FOR SOLUTION INTRAVENOUS at 10:18

## 2021-02-14 RX ADMIN — IPRATROPIUM BROMIDE AND ALBUTEROL SULFATE 1 AMPULE: .5; 3 SOLUTION RESPIRATORY (INHALATION) at 15:07

## 2021-02-14 RX ADMIN — CHOLESTYRAMINE 4 G: 4 POWDER, FOR SUSPENSION ORAL at 17:44

## 2021-02-14 RX ADMIN — METRONIDAZOLE 500 MG: 500 INJECTION, SOLUTION INTRAVENOUS at 16:45

## 2021-02-14 RX ADMIN — GABAPENTIN 300 MG: 300 CAPSULE ORAL at 08:42

## 2021-02-14 RX ADMIN — GABAPENTIN 300 MG: 300 CAPSULE ORAL at 16:44

## 2021-02-14 RX ADMIN — AMIODARONE HYDROCHLORIDE 200 MG: 200 TABLET ORAL at 08:40

## 2021-02-14 RX ADMIN — METHYLPREDNISOLONE SODIUM SUCCINATE 40 MG: 40 INJECTION, POWDER, FOR SOLUTION INTRAMUSCULAR; INTRAVENOUS at 08:23

## 2021-02-14 RX ADMIN — ASPIRIN 81 MG: 81 TABLET, COATED ORAL at 08:40

## 2021-02-14 RX ADMIN — PRIMIDONE 50 MG: 50 TABLET ORAL at 21:05

## 2021-02-14 RX ADMIN — SODIUM CHLORIDE, PRESERVATIVE FREE 10 ML: 5 INJECTION INTRAVENOUS at 08:23

## 2021-02-14 RX ADMIN — METRONIDAZOLE 500 MG: 500 INJECTION, SOLUTION INTRAVENOUS at 08:26

## 2021-02-14 RX ADMIN — GABAPENTIN 300 MG: 300 CAPSULE ORAL at 21:05

## 2021-02-14 RX ADMIN — LISINOPRIL 10 MG: 10 TABLET ORAL at 08:40

## 2021-02-14 RX ADMIN — ATORVASTATIN CALCIUM 40 MG: 40 TABLET, FILM COATED ORAL at 08:40

## 2021-02-14 RX ADMIN — CARVEDILOL 3.12 MG: 3.12 TABLET, FILM COATED ORAL at 16:44

## 2021-02-14 RX ADMIN — CEFEPIME HYDROCHLORIDE 2000 MG: 2 INJECTION, POWDER, FOR SOLUTION INTRAVENOUS at 21:06

## 2021-02-14 RX ADMIN — GLIPIZIDE 2.5 MG: 5 TABLET ORAL at 08:41

## 2021-02-14 RX ADMIN — AMIODARONE HYDROCHLORIDE 200 MG: 200 TABLET ORAL at 21:06

## 2021-02-14 RX ADMIN — PRIMIDONE 50 MG: 50 TABLET ORAL at 08:40

## 2021-02-14 RX ADMIN — INSULIN LISPRO 1 UNITS: 100 INJECTION, SOLUTION INTRAVENOUS; SUBCUTANEOUS at 21:06

## 2021-02-14 RX ADMIN — GUAIFENESIN 600 MG: 600 TABLET, EXTENDED RELEASE ORAL at 08:42

## 2021-02-14 RX ADMIN — SODIUM CHLORIDE, PRESERVATIVE FREE 10 ML: 5 INJECTION INTRAVENOUS at 23:50

## 2021-02-14 RX ADMIN — CHOLESTYRAMINE 4 G: 4 POWDER, FOR SUSPENSION ORAL at 11:26

## 2021-02-14 RX ADMIN — VANCOMYCIN HYDROCHLORIDE 500 MG: 500 INJECTION, POWDER, LYOPHILIZED, FOR SOLUTION INTRAVENOUS at 18:20

## 2021-02-14 RX ADMIN — GUAIFENESIN 1200 MG: 600 TABLET, EXTENDED RELEASE ORAL at 21:06

## 2021-02-14 RX ADMIN — METHYLPREDNISOLONE SODIUM SUCCINATE 40 MG: 40 INJECTION, POWDER, FOR SOLUTION INTRAMUSCULAR; INTRAVENOUS at 20:14

## 2021-02-14 RX ADMIN — VANCOMYCIN HYDROCHLORIDE 750 MG: 750 INJECTION, POWDER, LYOPHILIZED, FOR SOLUTION INTRAVENOUS at 16:59

## 2021-02-14 ASSESSMENT — PAIN SCALES - GENERAL
PAINLEVEL_OUTOF10: 0

## 2021-02-14 NOTE — PLAN OF CARE
Goal: Absence of physical injury  Description: Absence of physical injury  2/14/2021 0838 by Suki Marie RN  Outcome: Ongoing  2/14/2021 0109 by Angeles Kirkland LPN  Outcome: Ongoing

## 2021-02-14 NOTE — PROGRESS NOTES
Speech Language Pathology  Facility/Department: [facility] UNIT   CLINICAL BEDSIDE SWALLOW EVALUATION    NAME: Taryn Garnett  : 1945  MRN: [id number]    ADMISSION DATE: 2021  ADMITTING DIAGNOSIS: has Abdominal aortic aneurysm (AAA) without rupture ([address] Tobacco abuse; Essential hypertension; Smoker; Non-alcoholic fatty liver disease; Basal cell carcinoma of nose; Type 2 diabetes mellitus without complication, without long-term current use of insulin ([address] Other hyperlipidemia; Nonrheumatic aortic valve stenosis; CAD in native artery; Iliac artery aneurysm, right ([address] Other constipation; Spondylosis of lumbosacral region; Status post AAA (abdominal aortic aneurysm) repair; AAA (abdominal aortic aneurysm) ([address] Liver nodule; Thrombocytopenia ([address] Tremor, physiological; Pulmonary nodule; Fall at home, initial encounter; Type 2 diabetes mellitus ([address] Pneumonia; Weakness; and Acute and chr resp failure, unsp w hypoxia or hypercapnia ([address] on their problem list.  ONSET DATE: 21    IMPRESSION: Taryn Garnett is a 70 y.o. male referred following episode of pneumonia and respiratory failure with swallow assessment to ensure safest and least restrictive diet. Patient seen with afternoon meal pleasant and participatory, Oral mech exam WNL. Patient trial full meal regular and thin liquids via cup rim and straw, no overt ss aspiration penetration. Patient with independent diet tolerance monitoring. Patient with variable O2 sat with education on pursed lip breathing and respiratory coordination during intake to maximize safety. Patient verbalized and demonstrated understanding. At this time patient does not appear to have a swallowing issue. Recommend continued regular and thin liquids with evaluation only at this time.  Thank you for your referral.    Date of Eval: 2021  Evaluating Therapist: Jai Montoya    Current Diet level:  Current Diet : Regular  Current Liquid Diet : Thin Primary Complaint       Pain:  Pain Assessment  Pain Assessment: 0-10  Pain Level: 0    Reason for Referral  Bao Pretty was referred for a bedside swallow evaluation to assess the efficiency of his swallow function, identify signs and symptoms of aspiration and make recommendations regarding safe dietary consistencies, effective compensatory strategies, and safe eating environment. Impression  Dysphagia Diagnosis: Swallow function appears grossly intact  Dysphagia Impression : Patient with no observable difficulty with intake this date. Treatment Plan  Requires SLP Intervention: No  Duration/Frequency of Treatment: 1x/eval only  D/C Recommendations: To be determined       Recommended Diet and Intervention  Diet Solids Recommendation: Regular  Liquid Consistency Recommendation: Thin  Recommended Form of Meds: PO          Compensatory Swallowing Strategies       Treatment/Goals  Dysphagia Goals: The patient will tolerate recommended diet without observed clinical signs of aspiration    General  Chart Reviewed: Yes  Subjective  Subjective: Patient seen at bedside during afternoon meal pleasant and conversational.  Behavior/Cognition: Cooperative; Alert  Temperature Spikes Noted: No  Respiratory Status: O2 via nasual cannula  Breath Sounds: Diminished;Clear  O2 Device: Nasal cannula  Liters of Oxygen: 10 L  Communication Observation: Functional  Follows Directions: Simple  Dentition: Dentures top; Adequate  Patient Positioning: Upright in bed  Baseline Vocal Quality: Hoarse  Prior Dysphagia History: Unknown  Consistencies Administered: Reg solid; Dysphagia Soft and Bite-Sized (Dysphagia III); Thin           Vision/Hearing  Vision  Vision: Within Functional Limits  Vision Exceptions: Wears glasses at all times    Oral Motor Deficits  Oral/Motor  Oral Motor:  Within functional limits    Oral Phase Dysfunction  Oral Phase  Oral Phase: WNL     Indicators of Pharyngeal Phase Dysfunction   Pharyngeal Phase Pharyngeal Phase: WNL    Prognosis       Education  Patient Education: Reviewed aspiration precautions and safe intake practices.   Patient Education Response: Verbalizes understanding             Therapy Time  SLP Individual Minutes  Time In: 7911  Time Out: [address]  Minutes: [address] [place], [place]  2/14/2021 1:41 PM

## 2021-02-14 NOTE — PROGRESS NOTES
Tremor, Osteoarthritis, Hypertension,spinal stenosis, and CAD     7. Diarrhea  Imodium cdif cholestyramine  - cdiff negative; supportive care         Patient Active Problem List:     Abdominal aortic aneurysm (AAA) without rupture (HCC)     Tobacco abuse     Essential hypertension     Smoker     Non-alcoholic fatty liver disease     Basal cell carcinoma of nose     Type 2 diabetes mellitus without complication, without long-term current use of insulin (HCC)     Other hyperlipidemia     Nonrheumatic aortic valve stenosis     CAD in native artery     Iliac artery aneurysm, right (HCC)     Other constipation     Spondylosis of lumbosacral region     Status post AAA (abdominal aortic aneurysm) repair     AAA (abdominal aortic aneurysm) (HCC)     Liver nodule     Thrombocytopenia (HCC)     Tremor, physiological     Pulmonary nodule     Fall at home, initial encounter     Type 2 diabetes mellitus (HCC)     Pneumonia     Weakness     Acute and chr resp failure, unsp w hypoxia or hypercapnia (HCC)      Subjective:     No chief complaint on file. F/U:  Interval History: feels ok other than early morning blood draws and cxr beign done; still having weakness and cough is getting stronger    Objective:        Intake/Output Summary (Last 24 hours) at 2/14/2021 0938  Last data filed at 2/14/2021 0831  Gross per 24 hour   Intake 700.23 ml   Output 350 ml   Net 350.23 ml      Vitals:   Vitals:    02/14/21 0800   BP: 117/72   Pulse: 59   Resp: 19   Temp:    SpO2: 91%     Physical Exam:  Gen:  awake, alert, cooperative, no apparent distress, EYES:  Lids and lashes normal, pupils equal, round and reactive to light, extra ocular muscles intact, sclera clear, conjunctiva normal  ENT:  Normocephalic, oral pharynx with moist mucus membranes, tonsils without erythema or exudates,  NECK:  Supple, symmetrical, trachea midline, no adenopathy, EXAMINATION: THREE XRAY VIEWS OF THE RIGHT KNEE 2/6/2021 5:39 pm COMPARISON: None. HISTORY: ORDERING SYSTEM PROVIDED HISTORY: fall TECHNOLOGIST PROVIDED HISTORY: Reason for exam:->fall Reason for Exam: right knee pain Acuity: Acute Type of Exam: Initial Mechanism of Injury: fall FINDINGS: No fracture, dislocation, or joint effusion. Mild medial compartment degenerative changes. Atherosclerotic vascular calcifications. No acute osseous abnormality. Ct Head Wo Contrast    Result Date: 2/9/2021  EXAMINATION: CT OF THE HEAD WITHOUT CONTRAST  2/9/2021 7:26 pm TECHNIQUE: CT of the head was performed without the administration of intravenous contrast. Dose modulation, iterative reconstruction, and/or weight based adjustment of the mA/kV was utilized to reduce the radiation dose to as low as reasonably achievable. COMPARISON: None. HISTORY: ORDERING SYSTEM PROVIDED HISTORY: head pain TECHNOLOGIST PROVIDED HISTORY: Has a \"code stroke\" or \"stroke alert\" been called? ->No Reason for exam:->head pain Reason for Exam: Trauma, fall Acuity: Acute Type of Exam: Initial Mechanism of Injury: Trauma, fall FINDINGS: BRAIN/VENTRICLES: There is no acute intracranial hemorrhage, mass effect or midline shift. No abnormal extra-axial fluid collection. The gray-white differentiation is maintained without evidence of an acute infarct. Hypoattenuation of the periventricular and subcortical white matter is suggestive of chronic small vessel ischemic disease. Mild diffuse parenchymal volume loss is noted. There is no evidence of hydrocephalus. ORBITS: The visualized portion of the orbits demonstrate no acute abnormality. SINUSES: Is a small polyp versus retention cyst in the right sphenoid sinus. Other visualized paranasal sinuses and mastoid air cells are clear. SOFT TISSUES/SKULL:  No acute abnormality of the visualized skull or soft tissues. No acute intracranial abnormality.      Ct Cervical Spine Wo Contrast Result Date: 2/9/2021  EXAMINATION: CT OF THE CERVICAL SPINE WITHOUT CONTRAST 2/9/2021 7:27 pm TECHNIQUE: CT of the cervical spine was performed without the administration of intravenous contrast. Multiplanar reformatted images are provided for review. Dose modulation, iterative reconstruction, and/or weight based adjustment of the mA/kV was utilized to reduce the radiation dose to as low as reasonably achievable. COMPARISON: None. HISTORY: ORDERING SYSTEM PROVIDED HISTORY: pain TECHNOLOGIST PROVIDED HISTORY: Reason for exam:->pain Reason for Exam: Trauma, fall Acuity: Acute Type of Exam: Initial Mechanism of Injury: Trauma, fall FINDINGS: BONES/ALIGNMENT: There is no acute fracture or traumatic malalignment. DEGENERATIVE CHANGES: Multilevel cervical spondylosis noted, most prominent at C5-C6 SOFT TISSUES: There is no prevertebral soft tissue swelling. No apical pneumothorax. Ground-glass densities in bilateral upper lobes, due to multifocal pneumonia (such as COVID-19), contusion, or aspiration. No acute abnormality of the cervical spine. RECOMMENDATIONS: Ground-glass densities in the bilateral upper lobes, due to multifocal pneumonia (such as COVID-19), contusion, or aspiration.      Ct Lumbar Spine Wo Contrast    Result Date: 2/6/2021 EXAMINATION: CT OF THE LUMBAR SPINE WITHOUT CONTRAST  2/6/2021 TECHNIQUE: CT of the lumbar spine was performed without the administration of intravenous contrast. Multiplanar reformatted images are provided for review. Dose modulation, iterative reconstruction, and/or weight based adjustment of the mA/kV was utilized to reduce the radiation dose to as low as reasonably achievable. COMPARISON: August 10, 2020. HISTORY: ORDERING SYSTEM PROVIDED HISTORY: fall TECHNOLOGIST PROVIDED HISTORY: Reason for exam:->fall Decision Support Exception->Emergency Medical Condition (MA) Reason for Exam: low back pain Acuity: Acute Type of Exam: Initial Mechanism of Injury: fall Relevant Medical/Surgical History: hx of falls FINDINGS: BONES/ALIGNMENT: There is normal alignment of the spine. The vertebral body heights are maintained. No osseous destructive lesion is seen. DEGENERATIVE CHANGES: No significant degenerative changes of the lumbar spine. SOFT TISSUES/RETROPERITONEUM: Stable appearance to aortic stent graft. No acute fracture no change in alignment compared to prior study. [facility] Chest Portable    Result Date: 2/14/2021  EXAMINATION: ONE XRAY VIEW OF THE CHEST 2/14/2021 6:24 am COMPARISON: February 13, 2021 HISTORY: ORDERING SYSTEM PROVIDED HISTORY: acute hypoxic respiratory failure, pneumonia TECHNOLOGIST PROVIDED HISTORY: Reason for exam:->acute hypoxic respiratory failure, pneumonia Reason for Exam: Pt c/o:  Acute hypoxic respiratory failure, pneumonia Acuity: Acute Type of Exam: Initial FINDINGS: Cardiac silhouette is enlarged. No pneumothorax. No pleural effusion. Granular somewhat ground-glass appearing opacities diffusely throughout the lungs. Overall, this appears similar to previous exams. No significant change in diffuse ground-glass opacities throughout the lungs.      Xr Chest Portable    Result Date: 2/13/2021 EXAMINATION: ONE XRAY VIEW OF THE CHEST 2/13/2021 9:29 am COMPARISON: 02/12/2021 HISTORY: ORDERING SYSTEM PROVIDED HISTORY: acute hypoxic respiratory failure, pneumonia TECHNOLOGIST PROVIDED HISTORY: Reason for exam:->acute hypoxic respiratory failure, pneumonia Acuity: Acute Type of Exam: Initial Additional signs and symptoms: acute hypoxic respiratory failure, pneumonia, hx HTN FINDINGS: Diffuse hazy airspace opacities, similar to slightly worsened bilaterally. No dense consolidations. No pleural effusions or pneumothoraces. Cardiac and mediastinal silhouettes are stable. Stable appearance to bony structures. Diffuse hazy airspace opacities, similar to slightly worsened bilaterally may be on the basis of pneumonia or pulmonary edema. Xr Chest Portable    Result Date: 2/12/2021  EXAMINATION: ONE XRAY VIEW OF THE CHEST 2/12/2021 9:50 am COMPARISON: 02/09/2021 HISTORY: ORDERING SYSTEM PROVIDED HISTORY: fever sob TECHNOLOGIST PROVIDED HISTORY: Reason for exam:->fever sob Additional signs and symptoms: SOB FINDINGS: Median sternotomy wires are identified. Cardiac mediastinal silhouettes appear within normal limits for size. Multifocal infiltrates are seen throughout the lungs bilaterally, with areas of bronchial thickening and increasing consolidation. Osteopenia. Degenerative changes in the shoulders and spine. No pneumothorax is identified. Worsening interstitial and alveolar infiltrates with bronchial thickening as well as some early consolidation concerning for multifocal pneumonia.      Xr Chest Portable    Result Date: 2/9/2021 EXAMINATION: MRI OF THE BRAIN WITHOUT CONTRAST  2/10/2021 3:59 pm TECHNIQUE: Multiplanar multisequence MRI of the brain was performed without the administration of intravenous contrast. COMPARISON: None. HISTORY: ORDERING SYSTEM PROVIDED HISTORY: weakness TECHNOLOGIST PROVIDED HISTORY: Reason for exam:->weakness Reason for Exam: weakness Acuity: Acute Type of Exam: Subsequent/Follow-up Additional signs and symptoms: limited mobility FINDINGS: INTRACRANIAL STRUCTURES/VENTRICLES: There is no acute infarct or mass. Parenchymal volume is commensurate with age. Mild chronic white matter microvascular ischemic changes are present. No mass effect or midline shift. No evidence of an acute intracranial hemorrhage. The ventricles and sulci are normal in size and configuration. The sellar/suprasellar regions appear unremarkable. The normal signal voids within the major intracranial vessels appear maintained. ORBITS: The visualized portion of the orbits demonstrate no acute abnormality. SINUSES: Mild left maxillary sinus mucoperiosteal thickening with frothy fluid in the axillary antrum. No mastoid effusion. BONES/SOFT TISSUES: The bone marrow signal intensity appears normal. The soft tissues demonstrate no acute abnormality. 1. No acute intracranial abnormality. 2. Mild chronic white matter microvascular ischemic changes. 3. Mild left maxillary sinus disease.   -    DATA:    CBC   Recent Labs     02/12/21  0510 02/13/21  0530   WBC 12.1* 13.8*   HGB 10.0* 10.1*   HCT 30.5* 31.4*   * 115*      BMP   Recent Labs     02/12/21  0510 02/13/21  0530    137   K 3.6 3.7    106   CO2 18* 18*   BUN 23 30*   CREATININE 0.9 0.7*     LFT'S No results for input(s): AST, ALT, ALB, BILIDIR, BILITOT, ALKPHOS in the last 72 hours. COAG No results for input(s): INR in the last 72 hours. CARDIAC ENZYMES  No results for input(s): CKTOTAL, CKMB, CKMBINDEX, TROPONINI in the last 72 hours.   U/A:    Lab Results Component Value Date    NITRITE NEG 07/22/2020    COLORU YELLOW 02/09/2021    WBCUA 1 TO 2 02/09/2021    RBCUA NO CELLS SEEN 02/09/2021    MUCUS 1+ 02/09/2021    BACTERIA MODERATE 02/09/2021    CLARITYU CLEAR 02/09/2021    SPECGRAV 1.020 02/09/2021    LEUKOCYTESUR NEGATIVE 02/09/2021    BLOODU NEGATIVE 02/09/2021    GLUCOSEU NEG 07/22/2020         Braulio Thomas MD  [employer]ounding Hospitalist

## 2021-02-15 ENCOUNTER — CARE COORDINATION (OUTPATIENT)
Dept: CARE COORDINATION | Age: 76
End: 2021-02-15

## 2021-02-15 LAB
ANION GAP SERPL CALCULATED.3IONS-SCNC: 12 MMOL/L (ref 4–16)
BUN BLDV-MCNC: 32 MG/DL (ref 6–23)
CALCIUM SERPL-MCNC: 9 MG/DL (ref 8.3–10.6)
CHLORIDE BLD-SCNC: 108 MMOL/L (ref 99–110)
CO2: 19 MMOL/L (ref 21–32)
CREAT SERPL-MCNC: 0.7 MG/DL (ref 0.9–1.3)
CULTURE: NORMAL
CULTURE: NORMAL
DOSE AMOUNT: ABNORMAL
DOSE TIME: ABNORMAL
GFR AFRICAN AMERICAN: >60 ML/MIN/1.73M2
GFR NON-AFRICAN AMERICAN: >60 ML/MIN/1.73M2
GLUCOSE BLD-MCNC: 207 MG/DL (ref 70–99)
GLUCOSE BLD-MCNC: 223 MG/DL (ref 70–99)
GLUCOSE BLD-MCNC: 241 MG/DL (ref 70–99)
GLUCOSE BLD-MCNC: 262 MG/DL (ref 70–99)
GLUCOSE BLD-MCNC: 321 MG/DL (ref 70–99)
HCT VFR BLD CALC: 31.4 % (ref 42–52)
HEMOGLOBIN: 10.3 GM/DL (ref 13.5–18)
Lab: NORMAL
Lab: NORMAL
MCH RBC QN AUTO: 30.9 PG (ref 27–31)
MCHC RBC AUTO-ENTMCNC: 32.8 % (ref 32–36)
MCV RBC AUTO: 94.3 FL (ref 78–100)
PDW BLD-RTO: 15.6 % (ref 11.7–14.9)
PLATELET # BLD: 123 K/CU MM (ref 140–440)
PMV BLD AUTO: 10.4 FL (ref 7.5–11.1)
POTASSIUM SERPL-SCNC: 3.9 MMOL/L (ref 3.5–5.1)
RBC # BLD: 3.33 M/CU MM (ref 4.6–6.2)
SODIUM BLD-SCNC: 139 MMOL/L (ref 135–145)
SPECIMEN: NORMAL
SPECIMEN: NORMAL
VANCOMYCIN TROUGH: 4.4 UG/ML (ref 10–20)
WBC # BLD: 14.7 K/CU MM (ref 4–10.5)

## 2021-02-15 PROCEDURE — 85027 COMPLETE CBC AUTOMATED: CPT

## 2021-02-15 PROCEDURE — 36415 COLL VENOUS BLD VENIPUNCTURE: CPT

## 2021-02-15 PROCEDURE — 2500000003 HC RX 250 WO HCPCS: Performed by: INTERNAL MEDICINE

## 2021-02-15 PROCEDURE — 6360000002 HC RX W HCPCS: Performed by: INTERNAL MEDICINE

## 2021-02-15 PROCEDURE — 6370000000 HC RX 637 (ALT 250 FOR IP): Performed by: INTERNAL MEDICINE

## 2021-02-15 PROCEDURE — 82962 GLUCOSE BLOOD TEST: CPT

## 2021-02-15 PROCEDURE — 2580000003 HC RX 258: Performed by: INTERNAL MEDICINE

## 2021-02-15 PROCEDURE — 2700000000 HC OXYGEN THERAPY PER DAY

## 2021-02-15 PROCEDURE — 80048 BASIC METABOLIC PNL TOTAL CA: CPT

## 2021-02-15 PROCEDURE — 2000000000 HC ICU R&B

## 2021-02-15 PROCEDURE — 94667 MNPJ CHEST WALL 1ST: CPT

## 2021-02-15 PROCEDURE — 94640 AIRWAY INHALATION TREATMENT: CPT

## 2021-02-15 RX ORDER — GLIPIZIDE 2.5 MG/1
2.5 TABLET, EXTENDED RELEASE ORAL
Status: DISCONTINUED | OUTPATIENT
Start: 2021-02-15 | End: 2021-02-18 | Stop reason: HOSPADM

## 2021-02-15 RX ADMIN — IPRATROPIUM BROMIDE AND ALBUTEROL SULFATE 1 AMPULE: .5; 3 SOLUTION RESPIRATORY (INHALATION) at 10:33

## 2021-02-15 RX ADMIN — ATORVASTATIN CALCIUM 40 MG: 40 TABLET, FILM COATED ORAL at 09:08

## 2021-02-15 RX ADMIN — METHYLPREDNISOLONE SODIUM SUCCINATE 40 MG: 40 INJECTION, POWDER, FOR SOLUTION INTRAMUSCULAR; INTRAVENOUS at 20:14

## 2021-02-15 RX ADMIN — CARVEDILOL 3.12 MG: 3.12 TABLET, FILM COATED ORAL at 16:59

## 2021-02-15 RX ADMIN — SODIUM CHLORIDE, PRESERVATIVE FREE 10 ML: 5 INJECTION INTRAVENOUS at 09:08

## 2021-02-15 RX ADMIN — CARVEDILOL 3.12 MG: 3.12 TABLET, FILM COATED ORAL at 08:01

## 2021-02-15 RX ADMIN — GABAPENTIN 300 MG: 300 CAPSULE ORAL at 13:37

## 2021-02-15 RX ADMIN — IPRATROPIUM BROMIDE AND ALBUTEROL SULFATE 1 AMPULE: .5; 3 SOLUTION RESPIRATORY (INHALATION) at 07:53

## 2021-02-15 RX ADMIN — INSULIN LISPRO 2 UNITS: 100 INJECTION, SOLUTION INTRAVENOUS; SUBCUTANEOUS at 20:14

## 2021-02-15 RX ADMIN — CEFEPIME HYDROCHLORIDE 2000 MG: 2 INJECTION, POWDER, FOR SOLUTION INTRAVENOUS at 09:07

## 2021-02-15 RX ADMIN — METRONIDAZOLE 500 MG: 500 INJECTION, SOLUTION INTRAVENOUS at 09:07

## 2021-02-15 RX ADMIN — METHYLPREDNISOLONE SODIUM SUCCINATE 40 MG: 40 INJECTION, POWDER, FOR SOLUTION INTRAMUSCULAR; INTRAVENOUS at 09:07

## 2021-02-15 RX ADMIN — GUAIFENESIN 1200 MG: 600 TABLET, EXTENDED RELEASE ORAL at 20:14

## 2021-02-15 RX ADMIN — GABAPENTIN 300 MG: 300 CAPSULE ORAL at 09:08

## 2021-02-15 RX ADMIN — PRIMIDONE 50 MG: 50 TABLET ORAL at 09:08

## 2021-02-15 RX ADMIN — ASPIRIN 81 MG: 81 TABLET, COATED ORAL at 09:08

## 2021-02-15 RX ADMIN — GLIPIZIDE 2.5 MG: 2.5 TABLET, FILM COATED, EXTENDED RELEASE ORAL at 09:10

## 2021-02-15 RX ADMIN — SODIUM CHLORIDE, PRESERVATIVE FREE 10 ML: 5 INJECTION INTRAVENOUS at 20:13

## 2021-02-15 RX ADMIN — VANCOMYCIN HYDROCHLORIDE: 750 INJECTION, POWDER, LYOPHILIZED, FOR SOLUTION INTRAVENOUS at 12:23

## 2021-02-15 RX ADMIN — PRIMIDONE 50 MG: 50 TABLET ORAL at 20:14

## 2021-02-15 RX ADMIN — GUAIFENESIN 1200 MG: 600 TABLET, EXTENDED RELEASE ORAL at 09:08

## 2021-02-15 RX ADMIN — IPRATROPIUM BROMIDE AND ALBUTEROL SULFATE 1 AMPULE: .5; 3 SOLUTION RESPIRATORY (INHALATION) at 19:43

## 2021-02-15 RX ADMIN — LISINOPRIL 10 MG: 10 TABLET ORAL at 09:08

## 2021-02-15 RX ADMIN — GABAPENTIN 300 MG: 300 CAPSULE ORAL at 20:14

## 2021-02-15 RX ADMIN — METRONIDAZOLE 500 MG: 500 INJECTION, SOLUTION INTRAVENOUS at 16:07

## 2021-02-15 RX ADMIN — SODIUM CHLORIDE, PRESERVATIVE FREE 10 ML: 5 INJECTION INTRAVENOUS at 16:07

## 2021-02-15 RX ADMIN — AMIODARONE HYDROCHLORIDE 200 MG: 200 TABLET ORAL at 20:14

## 2021-02-15 RX ADMIN — AMIODARONE HYDROCHLORIDE 200 MG: 200 TABLET ORAL at 09:00

## 2021-02-15 RX ADMIN — CEFEPIME HYDROCHLORIDE 2000 MG: 2 INJECTION, POWDER, FOR SOLUTION INTRAVENOUS at 20:13

## 2021-02-15 RX ADMIN — IPRATROPIUM BROMIDE AND ALBUTEROL SULFATE 1 AMPULE: .5; 3 SOLUTION RESPIRATORY (INHALATION) at 14:26

## 2021-02-15 ASSESSMENT — PAIN SCALES - GENERAL
PAINLEVEL_OUTOF10: 0

## 2021-02-15 NOTE — PLAN OF CARE
Problem: Falls - Risk of:  Goal: Will remain free from falls  Description: Will remain free from falls  2/14/2021 2020 by Jovi Rushing RN  Outcome: Ongoing  2/14/2021 0838 by Emily Solares RN  Outcome: Ongoing  Goal: Absence of physical injury  Description: Absence of physical injury  2/14/2021 2020 by Jovi Rushing RN  Outcome: Ongoing  2/14/2021 0838 by Emily Solares RN  Outcome: Ongoing     Problem: Discharge Planning:  Goal: Discharged to appropriate level of care  Description: Discharged to appropriate level of care  2/14/2021 2020 by Jovi Rushing RN  Outcome: Ongoing  2/14/2021 0838 by Emily Solares RN  Outcome: Ongoing  Goal: Participates in care planning  Description: Participates in care planning  2/14/2021 2020 by Jovi Rushing RN  Outcome: Ongoing  2/14/2021 0838 by Emily Solares RN  Outcome: Ongoing     Problem: Airway Clearance - Ineffective:  Goal: Clear lung sounds  Description: Clear lung sounds  2/14/2021 2020 by Jovi Rushing RN  Outcome: Ongoing  2/14/2021 0838 by Emily Solares RN  Outcome: Ongoing  Goal: Ability to maintain a clear airway will improve  Description: Ability to maintain a clear airway will improve  2/14/2021 2020 by Jovi Rushing RN  Outcome: Ongoing  2/14/2021 0838 by Emily Solares RN  Outcome: Ongoing     Problem: Fluid Volume - Deficit:  Goal: Achieves intake and output within specified parameters  Description: Achieves intake and output within specified parameters  2/14/2021 2020 by Jovi Rushing RN  Outcome: Ongoing  2/14/2021 0838 by Emily Solares RN  Outcome: Ongoing     Problem: Gas Exchange - Impaired:  Goal: Levels of oxygenation will improve  Description: Levels of oxygenation will improve  2/14/2021 2020 by Jovi Rushing RN  Outcome: Ongoing  2/14/2021 0838 by Emily Solares RN  Outcome: Ongoing     Problem: Hyperthermia:  Goal: Ability to maintain a body temperature in the normal range will improve Description: Ability to maintain a body temperature in the normal range will improve  2/14/2021 2020 by Ene Donaldson RN  Outcome: Ongoing  2/14/2021 0838 by Felipa Solis RN  Outcome: Ongoing

## 2021-02-15 NOTE — PROGRESS NOTES
Hospitalist Progress Note       Clay Landon M.D.  2/15/2021 7:29 AM  Admit Date: 2/12/2021    PCP: Poppy Quinones MD     Assessment and Plan:    1. Pneumonia due to Unknown organism with related acute hypoxic respiratory failure       Weakness,WBC15.4High K/CU MM hypoxia; procal 0.418  Lactate 2.7High Panic mMOL/L resolved   SARS-CoV-2, NAATNOT DETECTED    Chest X-ray   Bilateral pulmonary opacities in the mid lung fields and lower lung fields   which may represent infiltrates.  Pulmonary nodules cannot be excluded     Culture pending: blood cultures NGTD 48 hrs; respiratory PCR negative; repeat blood cultures from fever spike 2/12 pending    bp more stable today will add midodrine as prn  Day 5 abx:  vancomycin, cefepime+flagyl; patient allergic to PCN and is placed on this combination instead to adequately broaden coverage;   speech swallow evaluation: no aspiration  mucinex dose is increased  duoneb  Solumedrol dose increased to bid  + incentive spirometer, acapella and chest physiotherapy  Cbc   cxr shows no improvement, and is now on optiflow     2.  Diabetes mellitus Type II       POCT glucose, hypoglycemic protocol       Home  Medications, sliding scale insulin with meal coverage     3.  Hyponatremia   -resolved     4.  Diarrhea  Imodium cdif cholestyramine  - cdiff negative; supportive care      Patient Active Problem List:     Abdominal aortic aneurysm (AAA) without rupture (HCC)     Tobacco abuse     Essential hypertension     Smoker     Non-alcoholic fatty liver disease     Basal cell carcinoma of nose     Type 2 diabetes mellitus without complication, without long-term current use of insulin (HCC)     Other hyperlipidemia     Nonrheumatic aortic valve stenosis     CAD in native artery     Iliac artery aneurysm, right (HCC)     Other constipation     Spondylosis of lumbosacral region     Status post AAA (abdominal aortic aneurysm) repair     AAA (abdominal aortic aneurysm) ([facility] [address] Liver nodule     Thrombocytopenia (HCC)     Tremor, physiological     Pulmonary nodule     Fall at home, initial encounter     Type 2 diabetes mellitus ([facility] [address]     Pneumonia     Weakness     Acute and chr resp failure, unsp w hypoxia or hypercapnia (HCC)      Subjective:     No chief complaint on file. F/U:  Interval History: no new issues    Objective: Intake/Output Summary (Last 24 hours) at 2/15/2021 0729  Last data filed at 2/14/2021 2131  Gross per 24 hour   Intake 678.36 ml   Output 570 ml   Net 108.36 ml      Vitals:   Vitals:    02/15/21 0610   BP: (!) 150/61   Pulse: 59   Resp: 15   Temp:    SpO2: 92%     Physical Exam:  Gen:  awake, alert, cooperative, no apparent distress, EYES:  Lids and lashes normal, pupils equal, round and reactive to light, extra ocular muscles intact, sclera clear, conjunctiva normal  ENT:  Normocephalic, oral pharynx with moist mucus membranes, tonsils without erythema or exudates,  NECK:  Supple, symmetrical, trachea midline, no adenopathy,  LUNGS:  Course diminishedCARDIOVASCULAR:  regular rate and rhythm, normal S1 and S2, no S3 or S4, and no murmur noted  ABDOMEN: Normal BS, Non tender, non distended, no HSM noted. MUSCULOSKELETAL:  ROM of all extremities grossly wnl  NEUROLOGIC: AOx 3,  Cranial nerves II-XII are grossly intact. Motor is 5 out of 5 bilaterally.   Sensory is intact  SKIN:  no bruising or bleeding, normal skin color, texture, turgor and no redness, warmth, or swelling    Xr Elbow Left (min 3 Views)    Result Date: 2/6/2021 EXAMINATION: THREE XRAY VIEWS OF THE LEFT ELBOW 2/6/2021 5:38 pm COMPARISON: None. HISTORY: ORDERING SYSTEM PROVIDED HISTORY: fall TECHNOLOGIST PROVIDED HISTORY: Reason for exam:->fall Reason for Exam: left elbow pain Acuity: Acute Type of Exam: Initial Mechanism of Injury: fall FINDINGS: No fracture, dislocation, or joint effusion. Moderate enthesophyte at the triceps insertion. The joint spaces are preserved. The soft tissues are unremarkable. No acute osseous abnormality. Xr Elbow Right (min 3 Views)    Result Date: 2/6/2021  EXAMINATION: THREE XRAY VIEWS OF THE RIGHT ELBOW 2/6/2021 5:38 pm COMPARISON: None. HISTORY: ORDERING SYSTEM PROVIDED HISTORY: fall TECHNOLOGIST PROVIDED HISTORY: Reason for exam:->fall Reason for Exam: right elbow pain Acuity: Acute Type of Exam: Initial Mechanism of Injury: fall FINDINGS: A peripheral venous catheter is present in the antecubital fossa. No joint effusion. No fracture or dislocation identified. The soft tissues are unremarkable. No acute osseous abnormality. Xr Knee Right (3 Views)    Result Date: 2/6/2021  EXAMINATION: THREE XRAY VIEWS OF THE RIGHT KNEE 2/6/2021 5:39 pm COMPARISON: None. HISTORY: ORDERING SYSTEM PROVIDED HISTORY: fall TECHNOLOGIST PROVIDED HISTORY: Reason for exam:->fall Reason for Exam: right knee pain Acuity: Acute Type of Exam: Initial Mechanism of Injury: fall FINDINGS: No fracture, dislocation, or joint effusion. Mild medial compartment degenerative changes. Atherosclerotic vascular calcifications. No acute osseous abnormality.      Ct Head Wo Contrast    Result Date: 2/9/2021 EXAMINATION: CT OF THE HEAD WITHOUT CONTRAST  2/9/2021 7:26 pm TECHNIQUE: CT of the head was performed without the administration of intravenous contrast. Dose modulation, iterative reconstruction, and/or weight based adjustment of the mA/kV was utilized to reduce the radiation dose to as low as reasonably achievable. COMPARISON: None. HISTORY: ORDERING SYSTEM PROVIDED HISTORY: head pain TECHNOLOGIST PROVIDED HISTORY: Has a \"code stroke\" or \"stroke alert\" been called? ->No Reason for exam:->head pain Reason for Exam: Trauma, fall Acuity: Acute Type of Exam: Initial Mechanism of Injury: Trauma, fall FINDINGS: BRAIN/VENTRICLES: There is no acute intracranial hemorrhage, mass effect or midline shift. No abnormal extra-axial fluid collection. The gray-white differentiation is maintained without evidence of an acute infarct. Hypoattenuation of the periventricular and subcortical white matter is suggestive of chronic small vessel ischemic disease. Mild diffuse parenchymal volume loss is noted. There is no evidence of hydrocephalus. ORBITS: The visualized portion of the orbits demonstrate no acute abnormality. SINUSES: Is a small polyp versus retention cyst in the right sphenoid sinus. Other visualized paranasal sinuses and mastoid air cells are clear. SOFT TISSUES/SKULL:  No acute abnormality of the visualized skull or soft tissues. No acute intracranial abnormality.      Ct Cervical Spine Wo Contrast    Result Date: 2/9/2021 EXAMINATION: CT OF THE CERVICAL SPINE WITHOUT CONTRAST 2/9/2021 7:27 pm TECHNIQUE: CT of the cervical spine was performed without the administration of intravenous contrast. Multiplanar reformatted images are provided for review. Dose modulation, iterative reconstruction, and/or weight based adjustment of the mA/kV was utilized to reduce the radiation dose to as low as reasonably achievable. COMPARISON: None. HISTORY: ORDERING SYSTEM PROVIDED HISTORY: pain TECHNOLOGIST PROVIDED HISTORY: Reason for exam:->pain Reason for Exam: Trauma, fall Acuity: Acute Type of Exam: Initial Mechanism of Injury: Trauma, fall FINDINGS: BONES/ALIGNMENT: There is no acute fracture or traumatic malalignment. DEGENERATIVE CHANGES: Multilevel cervical spondylosis noted, most prominent at C5-C6 SOFT TISSUES: There is no prevertebral soft tissue swelling. No apical pneumothorax. Ground-glass densities in bilateral upper lobes, due to multifocal pneumonia (such as COVID-19), contusion, or aspiration. No acute abnormality of the cervical spine. RECOMMENDATIONS: Ground-glass densities in the bilateral upper lobes, due to multifocal pneumonia (such as COVID-19), contusion, or aspiration.      Ct Lumbar Spine Wo Contrast    Result Date: 2/6/2021 EXAMINATION: CT OF THE LUMBAR SPINE WITHOUT CONTRAST  2/6/2021 TECHNIQUE: CT of the lumbar spine was performed without the administration of intravenous contrast. Multiplanar reformatted images are provided for review. Dose modulation, iterative reconstruction, and/or weight based adjustment of the mA/kV was utilized to reduce the radiation dose to as low as reasonably achievable. COMPARISON: August 10, 2020. HISTORY: ORDERING SYSTEM PROVIDED HISTORY: fall TECHNOLOGIST PROVIDED HISTORY: Reason for exam:->fall Decision Support Exception->Emergency Medical Condition (MA) Reason for Exam: low back pain Acuity: Acute Type of Exam: Initial Mechanism of Injury: fall Relevant Medical/Surgical History: hx of falls FINDINGS: BONES/ALIGNMENT: There is normal alignment of the spine. The vertebral body heights are maintained. No osseous destructive lesion is seen. DEGENERATIVE CHANGES: No significant degenerative changes of the lumbar spine. SOFT TISSUES/RETROPERITONEUM: Stable appearance to aortic stent graft. No acute fracture no change in alignment compared to prior study. [facility] Chest Portable    Result Date: 2/14/2021  EXAMINATION: ONE XRAY VIEW OF THE CHEST 2/14/2021 6:24 am COMPARISON: February 13, 2021 HISTORY: ORDERING SYSTEM PROVIDED HISTORY: acute hypoxic respiratory failure, pneumonia TECHNOLOGIST PROVIDED HISTORY: Reason for exam:->acute hypoxic respiratory failure, pneumonia Reason for Exam: Pt c/o:  Acute hypoxic respiratory failure, pneumonia Acuity: Acute Type of Exam: Initial FINDINGS: Cardiac silhouette is enlarged. No pneumothorax. No pleural effusion. Granular somewhat ground-glass appearing opacities diffusely throughout the lungs. Overall, this appears similar to previous exams. No significant change in diffuse ground-glass opacities throughout the lungs.      Xr Chest Portable    Result Date: 2/13/2021 EXAMINATION: ONE XRAY VIEW OF THE CHEST 2/13/2021 9:29 am COMPARISON: 02/12/2021 HISTORY: ORDERING SYSTEM PROVIDED HISTORY: acute hypoxic respiratory failure, pneumonia TECHNOLOGIST PROVIDED HISTORY: Reason for exam:->acute hypoxic respiratory failure, pneumonia Acuity: Acute Type of Exam: Initial Additional signs and symptoms: acute hypoxic respiratory failure, pneumonia, hx HTN FINDINGS: Diffuse hazy airspace opacities, similar to slightly worsened bilaterally. No dense consolidations. No pleural effusions or pneumothoraces. Cardiac and mediastinal silhouettes are stable. Stable appearance to bony structures. Diffuse hazy airspace opacities, similar to slightly worsened bilaterally may be on the basis of pneumonia or pulmonary edema. Xr Chest Portable    Result Date: 2/12/2021  EXAMINATION: ONE XRAY VIEW OF THE CHEST 2/12/2021 9:50 am COMPARISON: 02/09/2021 HISTORY: ORDERING SYSTEM PROVIDED HISTORY: fever sob TECHNOLOGIST PROVIDED HISTORY: Reason for exam:->fever sob Additional signs and symptoms: SOB FINDINGS: Median sternotomy wires are identified. Cardiac mediastinal silhouettes appear within normal limits for size. Multifocal infiltrates are seen throughout the lungs bilaterally, with areas of bronchial thickening and increasing consolidation. Osteopenia. Degenerative changes in the shoulders and spine. No pneumothorax is identified. Worsening interstitial and alveolar infiltrates with bronchial thickening as well as some early consolidation concerning for multifocal pneumonia.      Xr Chest Portable    Result Date: 2/9/2021 EXAMINATION: MRI OF THE BRAIN WITHOUT CONTRAST  2/10/2021 3:59 pm TECHNIQUE: Multiplanar multisequence MRI of the brain was performed without the administration of intravenous contrast. COMPARISON: None. HISTORY: ORDERING SYSTEM PROVIDED HISTORY: weakness TECHNOLOGIST PROVIDED HISTORY: Reason for exam:->weakness Reason for Exam: weakness Acuity: Acute Type of Exam: Subsequent/Follow-up Additional signs and symptoms: limited mobility FINDINGS: INTRACRANIAL STRUCTURES/VENTRICLES: There is no acute infarct or mass. Parenchymal volume is commensurate with age. Mild chronic white matter microvascular ischemic changes are present. No mass effect or midline shift. No evidence of an acute intracranial hemorrhage. The ventricles and sulci are normal in size and configuration. The sellar/suprasellar regions appear unremarkable. The normal signal voids within the major intracranial vessels appear maintained. ORBITS: The visualized portion of the orbits demonstrate no acute abnormality. SINUSES: Mild left maxillary sinus mucoperiosteal thickening with frothy fluid in the axillary antrum. No mastoid effusion. BONES/SOFT TISSUES: The bone marrow signal intensity appears normal. The soft tissues demonstrate no acute abnormality. 1. No acute intracranial abnormality. 2. Mild chronic white matter microvascular ischemic changes. 3. Mild left maxillary sinus disease.   -    DATA:    CBC   Recent Labs     02/13/21  0530 02/15/21  0600   WBC 13.8* 14.7*   HGB 10.1* 10.3*   HCT 31.4* 31.4*   * 123*      BMP   Recent Labs     02/13/21  0530 02/15/21  0600    139   K 3.7 3.9    108   CO2 18* 19*   BUN 30* 32*   CREATININE 0.7* 0.7*     LFT'S No results for input(s): AST, ALT, ALB, BILIDIR, BILITOT, ALKPHOS in the last 72 hours. COAG No results for input(s): INR in the last 72 hours. CARDIAC ENZYMES  No results for input(s): CKTOTAL, CKMB, CKMBINDEX, TROPONINI in the last 72 hours.   U/A:    Lab Results Component Value Date    NITRITE NEG 07/22/2020    COLORU YELLOW 02/09/2021    WBCUA 1 TO 2 02/09/2021    RBCUA NO CELLS SEEN 02/09/2021    MUCUS 1+ 02/09/2021    BACTERIA MODERATE 02/09/2021    CLARITYU CLEAR 02/09/2021    SPECGRAV 1.020 02/09/2021    LEUKOCYTESUR NEGATIVE 02/09/2021    BLOODU NEGATIVE 02/09/2021    GLUCOSEU NEG 07/22/2020         Dionna Smart MD  Rounding Hospitalist

## 2021-02-16 ENCOUNTER — APPOINTMENT (OUTPATIENT)
Dept: CT IMAGING | Age: 76
DRG: 193 | End: 2021-02-16
Attending: INTERNAL MEDICINE
Payer: MEDICARE

## 2021-02-16 LAB
ANION GAP SERPL CALCULATED.3IONS-SCNC: 2 MMOL/L (ref 4–16)
BASOPHILS ABSOLUTE: 0 K/CU MM
BASOPHILS RELATIVE PERCENT: 0.1 % (ref 0–1)
BUN BLDV-MCNC: 29 MG/DL (ref 6–23)
CALCIUM SERPL-MCNC: 8.9 MG/DL (ref 8.3–10.6)
CHLORIDE BLD-SCNC: 103 MMOL/L (ref 99–110)
CO2: 29 MMOL/L (ref 21–32)
CREAT SERPL-MCNC: 0.7 MG/DL (ref 0.9–1.3)
DIFFERENTIAL TYPE: ABNORMAL
EOSINOPHILS ABSOLUTE: 0 K/CU MM
EOSINOPHILS RELATIVE PERCENT: 0.1 % (ref 0–3)
GFR AFRICAN AMERICAN: >60 ML/MIN/1.73M2
GFR NON-AFRICAN AMERICAN: >60 ML/MIN/1.73M2
GLUCOSE BLD-MCNC: 122 MG/DL (ref 70–99)
GLUCOSE BLD-MCNC: 179 MG/DL (ref 70–99)
GLUCOSE BLD-MCNC: 203 MG/DL (ref 70–99)
GLUCOSE BLD-MCNC: 216 MG/DL (ref 70–99)
GLUCOSE BLD-MCNC: 243 MG/DL (ref 70–99)
HCT VFR BLD CALC: 33.5 % (ref 42–52)
HEMOGLOBIN: 10.8 GM/DL (ref 13.5–18)
IMMATURE NEUTROPHIL %: 0.9 % (ref 0–0.43)
LYMPHOCYTES ABSOLUTE: 0.3 K/CU MM
LYMPHOCYTES RELATIVE PERCENT: 1.8 % (ref 24–44)
MAGNESIUM: 1.9 MG/DL (ref 1.8–2.4)
MCH RBC QN AUTO: 30.7 PG (ref 27–31)
MCHC RBC AUTO-ENTMCNC: 32.2 % (ref 32–36)
MCV RBC AUTO: 95.2 FL (ref 78–100)
MONOCYTES ABSOLUTE: 0.2 K/CU MM
MONOCYTES RELATIVE PERCENT: 0.8 % (ref 0–4)
PDW BLD-RTO: 15.6 % (ref 11.7–14.9)
PLATELET # BLD: 140 K/CU MM (ref 140–440)
PMV BLD AUTO: 10.4 FL (ref 7.5–11.1)
POTASSIUM SERPL-SCNC: 3.7 MMOL/L (ref 3.5–5.1)
RBC # BLD: 3.52 M/CU MM (ref 4.6–6.2)
SEGMENTED NEUTROPHILS ABSOLUTE COUNT: 17.3 K/CU MM
SEGMENTED NEUTROPHILS RELATIVE PERCENT: 96.3 % (ref 36–66)
SODIUM BLD-SCNC: 134 MMOL/L (ref 135–145)
TOTAL IMMATURE NEUTOROPHIL: 0.16 K/CU MM
WBC # BLD: 18 K/CU MM (ref 4–10.5)

## 2021-02-16 PROCEDURE — 85025 COMPLETE CBC W/AUTO DIFF WBC: CPT

## 2021-02-16 PROCEDURE — 2700000000 HC OXYGEN THERAPY PER DAY

## 2021-02-16 PROCEDURE — 2500000003 HC RX 250 WO HCPCS: Performed by: INTERNAL MEDICINE

## 2021-02-16 PROCEDURE — 6370000000 HC RX 637 (ALT 250 FOR IP): Performed by: INTERNAL MEDICINE

## 2021-02-16 PROCEDURE — 94640 AIRWAY INHALATION TREATMENT: CPT

## 2021-02-16 PROCEDURE — 2000000000 HC ICU R&B

## 2021-02-16 PROCEDURE — 2580000003 HC RX 258: Performed by: INTERNAL MEDICINE

## 2021-02-16 PROCEDURE — 80048 BASIC METABOLIC PNL TOTAL CA: CPT

## 2021-02-16 PROCEDURE — 36415 COLL VENOUS BLD VENIPUNCTURE: CPT

## 2021-02-16 PROCEDURE — 82962 GLUCOSE BLOOD TEST: CPT

## 2021-02-16 PROCEDURE — 71250 CT THORAX DX C-: CPT

## 2021-02-16 PROCEDURE — 83735 ASSAY OF MAGNESIUM: CPT

## 2021-02-16 PROCEDURE — 6360000002 HC RX W HCPCS: Performed by: INTERNAL MEDICINE

## 2021-02-16 PROCEDURE — 94668 MNPJ CHEST WALL SBSQ: CPT

## 2021-02-16 RX ORDER — IPRATROPIUM BROMIDE AND ALBUTEROL SULFATE 2.5; .5 MG/3ML; MG/3ML
1 SOLUTION RESPIRATORY (INHALATION)
Status: DISCONTINUED | OUTPATIENT
Start: 2021-02-16 | End: 2021-02-18 | Stop reason: HOSPADM

## 2021-02-16 RX ADMIN — AMIODARONE HYDROCHLORIDE 200 MG: 200 TABLET ORAL at 10:02

## 2021-02-16 RX ADMIN — LISINOPRIL 10 MG: 10 TABLET ORAL at 10:01

## 2021-02-16 RX ADMIN — CEFEPIME HYDROCHLORIDE 2000 MG: 2 INJECTION, POWDER, FOR SOLUTION INTRAVENOUS at 20:31

## 2021-02-16 RX ADMIN — CARVEDILOL 3.12 MG: 3.12 TABLET, FILM COATED ORAL at 10:01

## 2021-02-16 RX ADMIN — METRONIDAZOLE 500 MG: 500 INJECTION, SOLUTION INTRAVENOUS at 23:34

## 2021-02-16 RX ADMIN — METRONIDAZOLE 500 MG: 500 INJECTION, SOLUTION INTRAVENOUS at 00:28

## 2021-02-16 RX ADMIN — PRIMIDONE 50 MG: 50 TABLET ORAL at 10:01

## 2021-02-16 RX ADMIN — IPRATROPIUM BROMIDE AND ALBUTEROL SULFATE 1 AMPULE: .5; 3 SOLUTION RESPIRATORY (INHALATION) at 14:27

## 2021-02-16 RX ADMIN — GABAPENTIN 300 MG: 300 CAPSULE ORAL at 20:31

## 2021-02-16 RX ADMIN — GUAIFENESIN 1200 MG: 600 TABLET, EXTENDED RELEASE ORAL at 20:31

## 2021-02-16 RX ADMIN — SODIUM CHLORIDE, PRESERVATIVE FREE 10 ML: 5 INJECTION INTRAVENOUS at 00:28

## 2021-02-16 RX ADMIN — IPRATROPIUM BROMIDE AND ALBUTEROL SULFATE 1 AMPULE: .5; 3 SOLUTION RESPIRATORY (INHALATION) at 20:34

## 2021-02-16 RX ADMIN — GLIPIZIDE 2.5 MG: 2.5 TABLET, FILM COATED, EXTENDED RELEASE ORAL at 10:01

## 2021-02-16 RX ADMIN — ASPIRIN 81 MG: 81 TABLET, COATED ORAL at 10:01

## 2021-02-16 RX ADMIN — VANCOMYCIN HYDROCHLORIDE: 750 INJECTION, POWDER, LYOPHILIZED, FOR SOLUTION INTRAVENOUS at 01:33

## 2021-02-16 RX ADMIN — SODIUM CHLORIDE, PRESERVATIVE FREE 10 ML: 5 INJECTION INTRAVENOUS at 16:01

## 2021-02-16 RX ADMIN — PRIMIDONE 50 MG: 50 TABLET ORAL at 20:31

## 2021-02-16 RX ADMIN — ATORVASTATIN CALCIUM 40 MG: 40 TABLET, FILM COATED ORAL at 10:02

## 2021-02-16 RX ADMIN — LOPERAMIDE HYDROCHLORIDE 2 MG: 2 CAPSULE ORAL at 20:31

## 2021-02-16 RX ADMIN — METRONIDAZOLE 500 MG: 500 INJECTION, SOLUTION INTRAVENOUS at 16:01

## 2021-02-16 RX ADMIN — METRONIDAZOLE 500 MG: 500 INJECTION, SOLUTION INTRAVENOUS at 09:59

## 2021-02-16 RX ADMIN — CEFEPIME HYDROCHLORIDE 2000 MG: 2 INJECTION, POWDER, FOR SOLUTION INTRAVENOUS at 09:59

## 2021-02-16 RX ADMIN — CARVEDILOL 3.12 MG: 3.12 TABLET, FILM COATED ORAL at 17:06

## 2021-02-16 RX ADMIN — SODIUM CHLORIDE, PRESERVATIVE FREE 10 ML: 5 INJECTION INTRAVENOUS at 20:31

## 2021-02-16 RX ADMIN — SODIUM CHLORIDE, PRESERVATIVE FREE 10 ML: 5 INJECTION INTRAVENOUS at 10:00

## 2021-02-16 RX ADMIN — GUAIFENESIN 1200 MG: 600 TABLET, EXTENDED RELEASE ORAL at 10:01

## 2021-02-16 RX ADMIN — VANCOMYCIN HYDROCHLORIDE: 750 INJECTION, POWDER, LYOPHILIZED, FOR SOLUTION INTRAVENOUS at 12:10

## 2021-02-16 RX ADMIN — GABAPENTIN 300 MG: 300 CAPSULE ORAL at 14:07

## 2021-02-16 RX ADMIN — AMIODARONE HYDROCHLORIDE 200 MG: 200 TABLET ORAL at 20:31

## 2021-02-16 RX ADMIN — IPRATROPIUM BROMIDE AND ALBUTEROL SULFATE 1 AMPULE: .5; 3 SOLUTION RESPIRATORY (INHALATION) at 10:48

## 2021-02-16 RX ADMIN — IPRATROPIUM BROMIDE AND ALBUTEROL SULFATE 1 AMPULE: .5; 3 SOLUTION RESPIRATORY (INHALATION) at 07:28

## 2021-02-16 RX ADMIN — GABAPENTIN 300 MG: 300 CAPSULE ORAL at 10:01

## 2021-02-16 RX ADMIN — SODIUM CHLORIDE, PRESERVATIVE FREE 10 ML: 5 INJECTION INTRAVENOUS at 23:34

## 2021-02-16 ASSESSMENT — PAIN SCALES - GENERAL
PAINLEVEL_OUTOF10: 0

## 2021-02-16 NOTE — PROGRESS NOTES
[facility]  consulted by Dr. Marvin Banks for monitoring and adjustment. Indication for treatment: Pneumonia  Goal trough: 15 mcg/mL    2/9/21 blood culture - NO GROWTH AT 5 DAYS. 2/14/21 Culture, MRSA, Screening  Order: [id number]  Status: In process   Visible to patient:  No (not released) Next appt:  04/26/2021 at 01:00 PM in Cardiology Dakota Castillo MD)  Specimen Information: Nares         Pertinent Laboratory Values:   Temp Readings from Last 3 Encounters:   02/15/21 98 °F (36.7 °C) (Infrared)   02/11/21 99.8 °F (37.7 °C) (Infrared)   02/11/21 98.4 °F (36.9 °C) (Oral)     Recent Labs     02/15/21  0600   WBC 14.7*     Recent Labs     02/15/21  0600   BUN 32*   CREATININE 0.7*     Estimated Creatinine Clearance: 92 mL/min (A) (based on SCr of 0.7 mg/dL (L)). Intake/Output Summary (Last 24 hours) at 2/16/2021 0722  Last data filed at 2/15/2021 1804  Gross per 24 hour   Intake 240 ml   Output 550 ml   Net -310 ml       Pertinent Cultures:  Date    Source    Results  02/11   Blood    Pending  02/12                          Nasal MRSA Screen              Pending  02/12   Respiratory   Pending  02/12                          RESP PCR                            Pending    Assessment:  · WBC elevated at 12.1; Febrile (101.5 F)  · Renal function stable: SCr = 0.9, BUN = 23; No I/O data  · Day(s) of therapy: 1  · Vancomycin concentration:   · 02/14 - To be drawn    Day of therapy: 4  Plan:  · Vancomycin 1,500 mg IV initial dose; Follow with Vancomycin 1,250 mg IV Q 18 Hours  · Check Vancomycin trough prior to fourth dose  · Pharmacy will continue to monitor patient and adjust therapy as indicated    VANCOMYCIN CONCENTRATION SCHEDULED FOR 02/14/21 @ 14:30  2/15/21 0745, trough level is not available at this time. Later the trough came back at 4.4, increase frequency to Q12H. Vancomycin trough level scheduled for 2/17/21 at 1130AM.     Thank you for the consult. [address] [place] [address], [facility]   2/16/2021 7:22 AM

## 2021-02-16 NOTE — PROGRESS NOTES
Hospitalist Progress Note      Name:  Lady Powers /Age/Sex: 1945  (50 y.o. male)   MRN & CSN:  [id number] & [id number] Admission Date/Time: 2021  6:28 AM   Location:   PCP: Otoniel Lerma MD         Hospital Day: 5    Assessment and Plan:     1. Pneumonia: Unknown organism. Respiratory panel negative. CT of the chest today demonstrates diffuse bilateral groundglass opacities throughout the lungs and small bilateral pleural effusions. Findings may be related to multifocal pneumonia versus pulmonary edema. Also, stable 0.5 cm left lower lobe pulmonary nodule with Fleischner Society guidelines for follow-up. Continue with IV vancomycin, cefepime and metronidazole at this time. Have discontinued Solu-Medrol oral as possibly complicating leukocytosis as persistent but patient clinically improved. Continue duo nebs and budesonide at this time. 2.  DM type II: Continue home medications and sliding scale at this time    3. Hyponatremia: Resolved    4. Diarrhea: C. difficile negative. Resolved    5. Tobacco abuse: Patient was a smoker until recently admitted. Patient states he is quit for good. Encouraged to continue same. If needing nicotine patch will provide same and if counseling is needed will facilitate same as well. 6.  Spondylosis of the lumbar sacral region: Possibly causing patient's falls. Patient was in swing bed for same.   Will further investigate  Diet DIET CARB CONTROL;   DVT Prophylaxis [x] Lovenox, []  Heparin, [] SCDs, []No VTE prophylaxis, patient ambulating   GI Prophylaxis [] PPI, [] H2 Blocker, [] No GI prophylaxis, patient is receiving diet/Tube Feeds   Code Status DNR-CCA   Disposition Patient requires continued admission due to on IV antibiotics   MDM [] Low, [x] Moderate,[x]  High  Patient's risk as above due to as above     History of Present Illness: Pt S&E. No acute events overnight. Patient resting comfortably. Needing less O2 requirement. Denies headache blurred vision or dizziness. Denies any chest pain palpitation shortness of breath. Denies any fever chills or nausea or vomiting. Denies any abdominal pain. Denies any muscle or joint pain. Shortness of breath is improved as he describes. 10-14 point ROS reviewed negative, unless as noted above    Objective: Intake/Output Summary (Last 24 hours) at 2/16/2021 1646  Last data filed at 2/16/2021 1628  Gross per 24 hour   Intake 720 ml   Output 450 ml   Net 270 ml      Vitals:   Vitals:    02/16/21 1605   BP:    Pulse: 59   Resp: 14   Temp:    SpO2: 93%     Physical Exam:    GEN Awake disheveled elderly white male, sitting upright in bed in no apparent distress. Appears given age. EYES Pupils are equally round. No scleral erythema, discharge, or conjunctivitis. HENT Mucous membranes are moist.   NECK No apparent thyromegaly or masses. RESP bibasilar crackles and diminished otherwise but clear, no wheezes, rales or rhonchi. Symmetric chest movement while on supplemental O2.  CARDIO/VASC S1/S2 auscultated. Regular rate without appreciable murmurs, rubs, or gallops. Peripheral pulses equal bilaterally and palpable. No peripheral edema. GI Abdomen is soft without significant tenderness, masses, or guarding. Bowel sounds are normoactive. Rectal exam deferred.  [place] catheter is not present. HEME/LYMPH No petechiae or ecchymoses. MSK No gross joint deformities. Spontaneous movement of all extremities  SKIN Normal coloration, warm, dry. Sternal incision healed/scar  NEURO Cranial nerves appear grossly intact, normal speech, no lateralizing weakness. PSYCH Awake, alert, oriented x 4. Affect appropriate.     Medications:   Medications:    ipratropium-albuterol  1 ampule Inhalation Q4H WA    glipiZIDE  2.5 mg Oral Daily with breakfast    IVPB builder   Intravenous Q12H  guaiFENesin  1,200 mg Oral BID    cefepime  2,000 mg Intravenous Q12H    metroNIDAZOLE  500 mg Intravenous Q8H    sodium chloride flush  10 mL Intravenous 2 times per day    nicotine  1 patch Transdermal Daily    sodium chloride flush  10 mL Intravenous 2 times per day    amiodarone  200 mg Oral BID    aspirin  81 mg Oral Daily    atorvastatin  40 mg Oral Daily    carvedilol  3.125 mg Oral BID WC    cholestyramine light  4 g Oral BID    gabapentin  300 mg Oral TID    insulin lispro  0-6 Units Subcutaneous TID WC    insulin lispro  0-3 Units Subcutaneous Nightly    lisinopril  10 mg Oral Daily    primidone  50 mg Oral BID      Infusions:    dextrose       PRN Meds:     midodrine, 5 mg, TID PRN      sodium chloride flush, 10 mL, PRN      acetaminophen, 650 mg, Q6H PRN    Or      acetaminophen, 650 mg, Q6H PRN      polyethylene glycol, 17 g, Daily PRN      promethazine, 12.5 mg, Q6H PRN    Or      ondansetron, 4 mg, Q6H PRN      sodium chloride flush, 10 mL, PRN      dextrose, 1,000 mL, PRN      dextrose, 12.5 g, PRN      docusate sodium, 100 mg, Daily PRN      glucagon (rDNA), 1 mg, PRN      glucose, 15 g, PRN      loperamide, 2 mg, 4x Daily PRN          Electronically signed by Shawn Mackey MD on 2/16/2021 at 4:46 PM

## 2021-02-16 NOTE — CARE COORDINATION
Patient was admitted from [facility] ED 2/9/21 after a fall at home. Patient was subsequently admitted to the swing bed program for skilled PT/OT 2/11/21. Patient's temperature spiked overnight, he developed shortness of breath, and required high flow oxygen. Decision was made by the hospitalist to discharge the patient from the swing bed program 2/12/21 and admit to the inpatient unit for a higher level of care.

## 2021-02-16 NOTE — PROGRESS NOTES
Oral intakes will improve to 51-75% of most meals during his stay       Nutrition Monitoring and Evaluation:   Behavioral-Environmental Outcomes:  None Identified   Food/Nutrient Intake Outcomes:  Food and Nutrient Intake  Physical Signs/Symptoms Outcomes:  Meal Time Behavior, Biochemical Data     Discharge Planning:    Continue current diet     Electronically signed by Ulysses Lot, RD, DIMAS on 2/16/21 at 2:18 PM EST    Contact: [phone]

## 2021-02-17 LAB
ANION GAP SERPL CALCULATED.3IONS-SCNC: 8 MMOL/L (ref 4–16)
BASOPHILS ABSOLUTE: 0 K/CU MM
BASOPHILS RELATIVE PERCENT: 0.2 % (ref 0–1)
BUN BLDV-MCNC: 21 MG/DL (ref 6–23)
CALCIUM SERPL-MCNC: 8.2 MG/DL (ref 8.3–10.6)
CHLORIDE BLD-SCNC: 102 MMOL/L (ref 99–110)
CO2: 25 MMOL/L (ref 21–32)
CREAT SERPL-MCNC: 0.6 MG/DL (ref 0.9–1.3)
CULTURE: NORMAL
DIFFERENTIAL TYPE: ABNORMAL
DOSE AMOUNT: NORMAL
DOSE TIME: NORMAL
EOSINOPHILS ABSOLUTE: 0.2 K/CU MM
EOSINOPHILS RELATIVE PERCENT: 1.5 % (ref 0–3)
GFR AFRICAN AMERICAN: >60 ML/MIN/1.73M2
GFR NON-AFRICAN AMERICAN: >60 ML/MIN/1.73M2
GLUCOSE BLD-MCNC: 105 MG/DL (ref 70–99)
GLUCOSE BLD-MCNC: 138 MG/DL (ref 70–99)
GLUCOSE BLD-MCNC: 139 MG/DL (ref 70–99)
GLUCOSE BLD-MCNC: 80 MG/DL (ref 70–99)
GLUCOSE BLD-MCNC: 88 MG/DL (ref 70–99)
HCT VFR BLD CALC: 32.9 % (ref 42–52)
HEMOGLOBIN: 10.6 GM/DL (ref 13.5–18)
IMMATURE NEUTROPHIL %: 0.8 % (ref 0–0.43)
LYMPHOCYTES ABSOLUTE: 0.6 K/CU MM
LYMPHOCYTES RELATIVE PERCENT: 4.5 % (ref 24–44)
Lab: NORMAL
MCH RBC QN AUTO: 29.9 PG (ref 27–31)
MCHC RBC AUTO-ENTMCNC: 32.2 % (ref 32–36)
MCV RBC AUTO: 92.9 FL (ref 78–100)
MONOCYTES ABSOLUTE: 0.1 K/CU MM
MONOCYTES RELATIVE PERCENT: 0.7 % (ref 0–4)
PDW BLD-RTO: 15.3 % (ref 11.7–14.9)
PLATELET # BLD: 126 K/CU MM (ref 140–440)
PMV BLD AUTO: 10.4 FL (ref 7.5–11.1)
POTASSIUM SERPL-SCNC: 3.4 MMOL/L (ref 3.5–5.1)
RBC # BLD: 3.54 M/CU MM (ref 4.6–6.2)
SEGMENTED NEUTROPHILS ABSOLUTE COUNT: 11.7 K/CU MM
SEGMENTED NEUTROPHILS RELATIVE PERCENT: 92.3 % (ref 36–66)
SODIUM BLD-SCNC: 135 MMOL/L (ref 135–145)
SPECIMEN: NORMAL
TOTAL IMMATURE NEUTOROPHIL: 0.1 K/CU MM
VANCOMYCIN TROUGH: 11.4 UG/ML (ref 10–20)
WBC # BLD: 12.7 K/CU MM (ref 4–10.5)

## 2021-02-17 PROCEDURE — 2500000003 HC RX 250 WO HCPCS: Performed by: INTERNAL MEDICINE

## 2021-02-17 PROCEDURE — 36415 COLL VENOUS BLD VENIPUNCTURE: CPT

## 2021-02-17 PROCEDURE — 97116 GAIT TRAINING THERAPY: CPT

## 2021-02-17 PROCEDURE — 2700000000 HC OXYGEN THERAPY PER DAY

## 2021-02-17 PROCEDURE — 97162 PT EVAL MOD COMPLEX 30 MIN: CPT

## 2021-02-17 PROCEDURE — 94640 AIRWAY INHALATION TREATMENT: CPT

## 2021-02-17 PROCEDURE — 85025 COMPLETE CBC W/AUTO DIFF WBC: CPT

## 2021-02-17 PROCEDURE — 94668 MNPJ CHEST WALL SBSQ: CPT

## 2021-02-17 PROCEDURE — 6370000000 HC RX 637 (ALT 250 FOR IP): Performed by: INTERNAL MEDICINE

## 2021-02-17 PROCEDURE — 97530 THERAPEUTIC ACTIVITIES: CPT

## 2021-02-17 PROCEDURE — 2140000000 HC CCU INTERMEDIATE R&B

## 2021-02-17 PROCEDURE — 2580000003 HC RX 258: Performed by: INTERNAL MEDICINE

## 2021-02-17 PROCEDURE — 80048 BASIC METABOLIC PNL TOTAL CA: CPT

## 2021-02-17 PROCEDURE — 6360000002 HC RX W HCPCS: Performed by: INTERNAL MEDICINE

## 2021-02-17 PROCEDURE — 97166 OT EVAL MOD COMPLEX 45 MIN: CPT

## 2021-02-17 PROCEDURE — 80202 ASSAY OF VANCOMYCIN: CPT

## 2021-02-17 PROCEDURE — 82962 GLUCOSE BLOOD TEST: CPT

## 2021-02-17 PROCEDURE — 94761 N-INVAS EAR/PLS OXIMETRY MLT: CPT

## 2021-02-17 RX ORDER — ALBUTEROL SULFATE 2.5 MG/3ML
2.5 SOLUTION RESPIRATORY (INHALATION) EVERY 6 HOURS PRN
Status: DISCONTINUED | OUTPATIENT
Start: 2021-02-17 | End: 2021-02-18 | Stop reason: HOSPADM

## 2021-02-17 RX ORDER — ALBUTEROL SULFATE 2.5 MG/3ML
SOLUTION RESPIRATORY (INHALATION)
Status: DISPENSED
Start: 2021-02-17 | End: 2021-02-17

## 2021-02-17 RX ADMIN — SODIUM CHLORIDE, PRESERVATIVE FREE 10 ML: 5 INJECTION INTRAVENOUS at 08:14

## 2021-02-17 RX ADMIN — PRIMIDONE 50 MG: 50 TABLET ORAL at 20:16

## 2021-02-17 RX ADMIN — CEFEPIME HYDROCHLORIDE 2000 MG: 2 INJECTION, POWDER, FOR SOLUTION INTRAVENOUS at 09:43

## 2021-02-17 RX ADMIN — GABAPENTIN 300 MG: 300 CAPSULE ORAL at 09:43

## 2021-02-17 RX ADMIN — GUAIFENESIN 1200 MG: 600 TABLET, EXTENDED RELEASE ORAL at 09:42

## 2021-02-17 RX ADMIN — AMIODARONE HYDROCHLORIDE 200 MG: 200 TABLET ORAL at 20:16

## 2021-02-17 RX ADMIN — GUAIFENESIN 1200 MG: 600 TABLET, EXTENDED RELEASE ORAL at 20:16

## 2021-02-17 RX ADMIN — SODIUM CHLORIDE, PRESERVATIVE FREE 10 ML: 5 INJECTION INTRAVENOUS at 20:24

## 2021-02-17 RX ADMIN — IPRATROPIUM BROMIDE AND ALBUTEROL SULFATE 1 AMPULE: .5; 3 SOLUTION RESPIRATORY (INHALATION) at 15:51

## 2021-02-17 RX ADMIN — CEFEPIME HYDROCHLORIDE 2000 MG: 2 INJECTION, POWDER, FOR SOLUTION INTRAVENOUS at 20:23

## 2021-02-17 RX ADMIN — ASPIRIN 81 MG: 81 TABLET, COATED ORAL at 09:42

## 2021-02-17 RX ADMIN — AMIODARONE HYDROCHLORIDE 200 MG: 200 TABLET ORAL at 09:43

## 2021-02-17 RX ADMIN — IPRATROPIUM BROMIDE AND ALBUTEROL SULFATE 1 AMPULE: .5; 3 SOLUTION RESPIRATORY (INHALATION) at 18:36

## 2021-02-17 RX ADMIN — METRONIDAZOLE 500 MG: 500 INJECTION, SOLUTION INTRAVENOUS at 23:17

## 2021-02-17 RX ADMIN — LISINOPRIL 10 MG: 10 TABLET ORAL at 09:43

## 2021-02-17 RX ADMIN — PRIMIDONE 50 MG: 50 TABLET ORAL at 09:42

## 2021-02-17 RX ADMIN — GABAPENTIN 300 MG: 300 CAPSULE ORAL at 20:16

## 2021-02-17 RX ADMIN — CARVEDILOL 3.12 MG: 3.12 TABLET, FILM COATED ORAL at 09:47

## 2021-02-17 RX ADMIN — METRONIDAZOLE 500 MG: 500 INJECTION, SOLUTION INTRAVENOUS at 16:14

## 2021-02-17 RX ADMIN — ATORVASTATIN CALCIUM 40 MG: 40 TABLET, FILM COATED ORAL at 09:42

## 2021-02-17 RX ADMIN — CARVEDILOL 3.12 MG: 3.12 TABLET, FILM COATED ORAL at 17:27

## 2021-02-17 RX ADMIN — ALBUTEROL SULFATE 2.5 MG: 2.5 SOLUTION RESPIRATORY (INHALATION) at 06:01

## 2021-02-17 RX ADMIN — VANCOMYCIN HYDROCHLORIDE: 750 INJECTION, POWDER, LYOPHILIZED, FOR SOLUTION INTRAVENOUS at 00:26

## 2021-02-17 RX ADMIN — IPRATROPIUM BROMIDE AND ALBUTEROL SULFATE 1 AMPULE: .5; 3 SOLUTION RESPIRATORY (INHALATION) at 12:06

## 2021-02-17 RX ADMIN — VANCOMYCIN HYDROCHLORIDE: 750 INJECTION, POWDER, LYOPHILIZED, FOR SOLUTION INTRAVENOUS at 12:44

## 2021-02-17 RX ADMIN — GABAPENTIN 300 MG: 300 CAPSULE ORAL at 13:44

## 2021-02-17 RX ADMIN — METRONIDAZOLE 500 MG: 500 INJECTION, SOLUTION INTRAVENOUS at 08:14

## 2021-02-17 RX ADMIN — IPRATROPIUM BROMIDE AND ALBUTEROL SULFATE 1 AMPULE: .5; 3 SOLUTION RESPIRATORY (INHALATION) at 07:35

## 2021-02-17 ASSESSMENT — PAIN SCALES - GENERAL: PAINLEVEL_OUTOF10: 0

## 2021-02-17 NOTE — PROGRESS NOTES
Pt has been running low oxygen saturations throughout the night. He consistently is running from 86%-91%. He currently is on 6L NC. Called respiratory as he at this time is running 83% with a good oxygen pleth on the monitor and pt has no symptoms and no complaints of anything. RT will be up to assess pt. Pt has been coughing infrequently and has not coughed anything up. Currently in bed with call light in reach, bed alarm on and resp even and unlabored.

## 2021-02-17 NOTE — PROGRESS NOTES
Physical Therapy    Facility/Department: [facility] UNIT  Initial Assessment    NAME: Estefany Juarez  : 1945  MRN: [id number]    Date of Service: 2021    Discharge Recommendations:  Subacute/Skilled Nursing Facility(recommend swing bed)        Assessment   Body structures, Functions, Activity limitations: Decreased high-level IADLs;Decreased endurance;Decreased strength  Assessment: patient is a 71 yo male admitted to [facility] due to falling @ home; patient would benefit from skilled PT inteventions include admission to swing bed  in order to appropriately address deficits with balance, strength, functional mobility and endurance; once the deficits are adddress, patient has the potential to return to his home and his PLOF  Treatment Diagnosis: impaired mobility, Hx of falls  History: Pf- Hx of falls  Exam: gait/ balance  Clinical Presentation: changing characteristics of function due to weakness/unsteadiness on feet  Barriers to Learning: none  REQUIRES PT FOLLOW UP: Yes  Activity Tolerance  Activity Tolerance: Patient limited by fatigue;Patient limited by endurance       Patient Diagnosis(es): There were no encounter diagnoses. has a past medical history of AAA (abdominal aortic aneurysm) (HCC), Basal cell carcinoma of nose, CAD (coronary artery disease), Constipation, echocardiogram, Essential hypertension, History of alcohol abuse, Hyperlipidemia, Hypertension, Missing teeth, acquired, Non-alcoholic fatty liver disease, Osteoarthritis, Spinal stenosis, Tremor, Type 2 diabetes mellitus without complication ([facility] [address] and Wears glasses. has a past surgical history that includes sinus surgery (3341'C); other surgical history (2016); other surgical history (2020); Coronary artery bypass graft (N/A, 3/4/2020); knee surgery (); Carpal tunnel release (Right, 1970's); Casey tooth extraction; and Cardiac catheterization (2020).     Restrictions  Restrictions/Precautions Restrictions/Precautions: Fall Risk, General Precautions  Required Braces or Orthoses?: No  Vision/Hearing  Vision Exceptions: Wears glasses at all times  Hearing: Within functional limits     Subjective  General  Chart Reviewed: Yes  Patient assessed for rehabilitation services?: Yes  Follows Commands: Within Functional Limits  Pain Screening  Patient Currently in Pain: Denies     Pre Treatment Pain Screening  Pain at present: 0    Orientation  Orientation  Overall Orientation Status: Within Normal Limits  Social/Functional History  Social/Functional History  Lives With: Spouse  Type of Home: House  Home Layout: Two level, Performs ADL's on one level, Able to Live on Main level with bedroom/bathroom  Home Access: Stairs to enter with rails  Entrance Stairs - Number of Steps: 2  Bathroom Shower/Tub: Walk-in shower, Shower chair without back  Bathroom Toilet: Standard  Bathroom Equipment: Grab bars in shower, Hand-held shower  Home Equipment: 4 wheeled walker, [employer] Help From: Family  ADL Assistance: Independent  Homemaking Assistance: Independent  Homemaking Responsibilities: Yes  Meal Prep Responsibility: Primary  Laundry Responsibility: Primary  Cleaning Responsibility: Primary  Shopping Responsibility: Primary  Ambulation Assistance: Independent  Transfer Assistance: Independent  Active : Yes  Mode of Transportation: Car  Occupation: Retired  Leisure & Hobbies: Pt reports he doesn't do much in the winter but likes to be outside in the summer  Cognition        Objective     Observation/Palpation  Posture: Fair  Observation: Awake supine in bed with O2, tele, BP cuff and pulse ox    AROM RLE (degrees)  RLE AROM: WFL  AROM LLE (degrees)  LLE AROM : WFL  Strength RLE  Comment: grossly 4/5  Strength LLE  Comment: grossly 4/5        Bed mobility  Rolling to Left: Minimal assistance  Rolling to Right: Minimal assistance  Supine to Sit: Moderate assistance  Sit to Supine:  Moderate assistance Scooting: Minimal assistance  Transfers  Sit to Stand: Contact guard assistance  Stand to sit: Modified independent  Ambulation  Ambulation?: Yes  Ambulation 1  Device: Rolling Walker  Other Apparatus: O2  Assistance: Minimal assistance  Gait Deviations: Slow Shakila  Distance: 5 ft     Balance  Standing - Static: Fair  Standing - Dynamic: Fair        Plan   Safety Devices  Type of devices: Gait belt, Left in chair, Call light within reach, Chair alarm in place    G-Code       OutComes Score                                                  AM-PAC Score             Goals  Short term goals  Time Frame for Short term goals: 1 week  Short term goal 1: patient will safely  perform bed mobility activities with no greater CGA with HOB flat and no HR  Short term goal 2: patient will demonstrate the ability to safely transfer sit to stands and stand to sit from 2 different height surfaces with no greater than SBA  Short term goal 3: patient will demonstrate the ability to safely ambulate 150 ft with the least restrictive AD and no greater than SBA while wearing O2 per nc if necessary  Short term goal 4: patient will demonstrate the ability to safely stand for 5 consecutive minutes  while performing dynamic  balance activities with CGA and more than 1 hand for ssupport       Therapy Time   Individual Concurrent Group Co-treatment   Time In 1430         Time Out 1505         Minutes 35         Timed Code Treatment Minutes: 10 Minutes       GREER Rodarte, PT

## 2021-02-17 NOTE — PROGRESS NOTES
[address] [facility]  consulted by Dr. Bria Villar for monitoring and adjustment. Indication for treatment: Pneumonia  Goal trough: 15 mcg/mL    2/9/21 blood culture - NO GROWTH AT 5 DAYS. 2/14/21 Culture, MRSA, Screening  Order: [id number]  Status: In process   Visible to patient:  No (not released) Next appt:  04/26/2021 at 01:00 PM in Cardiology Mary Beatty MD)  Specimen Information: Nares         Pertinent Laboratory Values:   Temp Readings from Last 3 Encounters:   02/16/21 97.8 °F (36.6 °C)   02/11/21 99.8 °F (37.7 °C) (Infrared)   02/11/21 98.4 °F (36.9 °C) (Oral)     Recent Labs     02/15/21  0600 02/16/21  1030 02/17/21  0600   WBC 14.7* 18.0* 12.7*     Recent Labs     02/15/21  0600 02/16/21  1030 02/17/21  0600   BUN 32* 29* 21   CREATININE 0.7* 0.7* 0.6*     Estimated Creatinine Clearance: 107 mL/min (A) (based on SCr of 0.6 mg/dL (L)). Intake/Output Summary (Last 24 hours) at 2/17/2021 0707  Last data filed at 2/17/2021 0150  Gross per 24 hour   Intake 720 ml   Output 750 ml   Net -30 ml       Pertinent Cultures:  Date    Source    Results  02/11   Blood    Pending  02/12                          Nasal MRSA Screen              Pending  02/12   Respiratory   Pending  02/12                          RESP PCR                            Pending    Assessment:  · WBC elevated at 12.1; Febrile (101.5 F)  · Renal function stable: SCr = 0.9, BUN = 23; No I/O data  · Day(s) of therapy: 1  · Vancomycin concentration:   · 02/14 - To be drawn    Day of therapy: 6  Plan:  · Vancomycin 1,500 mg IV initial dose; Follow with Vancomycin 1,250 mg IV Q 18 Hours  · Check Vancomycin trough prior to fourth dose  · Pharmacy will continue to monitor patient and adjust therapy as indicated    VANCOMYCIN CONCENTRATION SCHEDULED FOR 02/14/21 @ 14:30  2/15/21 0745, trough level is not available at this time. Later the trough came back at 4.4, increase frequency to Q12H. Vancomycin trough level scheduled for 2/17/21 at 1130AM.     Thank you for the consult.   [address] [place] [address], Formerly [facility]   2/17/2021 7:07 AM

## 2021-02-17 NOTE — PROGRESS NOTES
Occupational Therapy   Occupational Therapy Initial Assessment  Date: 2021   Patient Name: Jerome Wynn  MRN: [id number]     : 1945    Date of Service: 2021    Discharge Recommendations:  [address], Continue to assess pending progress(swing)       Assessment   Performance deficits / Impairments: Decreased functional mobility ; Decreased safe awareness;Decreased coordination;Decreased balance;Decreased ADL status; Decreased endurance;Decreased high-level IADLs;Decreased strength  Assessment: Pt is a 70year old male with hx of falls. Pt presents with weakness and decreased endurance and ability to impairements to perform functional mobiilty and ADLs indep. Pt would benefit from continued OT to address deficits and increase functional I  Treatment Diagnosis: Weakness  Prognosis: Good  Decision Making: Medium Complexity  OT Education: OT Role;Plan of Care;Energy Conservation;Transfer Training  REQUIRES OT FOLLOW UP: Yes  Activity Tolerance  Activity Tolerance: Patient limited by fatigue;Treatment limited secondary to medical complications (free text)  Safety Devices  Safety Devices in place: Yes  Type of devices: All fall risk precautions in place;Gait belt;Patient at risk for falls;Call light within reach; Left in chair;Chair alarm in place;Nurse notified           Patient Diagnosis(es): There were no encounter diagnoses. has a past medical history of AAA (abdominal aortic aneurysm) (HCC), Basal cell carcinoma of nose, CAD (coronary artery disease), Constipation, echocardiogram, Essential hypertension, History of alcohol abuse, Hyperlipidemia, Hypertension, Missing teeth, acquired, Non-alcoholic fatty liver disease, Osteoarthritis, Spinal stenosis, Tremor, Type 2 diabetes mellitus without complication ([facility] [address] and Wears glasses. Tone LUE  LUE Tone: Normotonic     Bed mobility  Rolling to Left: Minimal assistance  Supine to Sit: Moderate assistance  Scooting: Minimal assistance  Transfers  Stand Step Transfers: Minimal assistance  Sit to stand: Contact guard assistance  Stand to sit: Minimal assistance     Cognition  Overall Cognitive Status: WFL        Sensation  Overall Sensation Status: WFL(per pt report.)        LUE AROM (degrees)  LUE AROM : WFL  RUE PROM (degrees)  RUE PROM: WFL  LUE Strength  L Hand General: 4-/5  RUE Strength  R Hand General: 4-/5                   Plan   Plan  Times per week: 4+  Times per day: Daily  Current Treatment Recommendations: Strengthening, Safety Education & Training, Balance Training, Patient/Caregiver Education & Training, Self-Care / ADL, Home Management Training, Equipment Evaluation, Education, & procurement, Functional Mobility Training, Endurance Training         Goals  Short term goals  Time Frame for Short term goals: Until goals met or discharge  Short term goal 1: Pt will complete all UB ADLs mod I for increased functional I  Short term goal 2: Pt will complete all LB ADLs min A for increased functional I  Short term goal 3: Pt will complete all aspects of toileting SBA for increased functional I  Short term goal 4: Pt will complete all functional transfers and bed mobility SBA in prep for EOB/OOB ADL tasks.   Short term goal 5: PT will participate in therex/theract for 5+ minutes with emphasis on UE strengthning and dynamic balance for increased I with IADL/home management tasks  Patient Goals   Patient goals : to return home       Therapy Time   Individual Concurrent Group Co-treatment   Time In       1429   Time Out       1503   Minutes       34   Timed Code Treatment Minutes: [address] R/L [id number]

## 2021-02-18 ENCOUNTER — HOSPITAL ENCOUNTER (INPATIENT)
Age: 76
LOS: 15 days | Discharge: HOME | DRG: 193 | End: 2021-03-05
Attending: INTERNAL MEDICINE | Admitting: INTERNAL MEDICINE
Payer: MEDICARE

## 2021-02-18 ENCOUNTER — APPOINTMENT (OUTPATIENT)
Dept: GENERAL RADIOLOGY | Age: 76
DRG: 193 | End: 2021-02-18
Attending: INTERNAL MEDICINE
Payer: MEDICARE

## 2021-02-18 VITALS
SYSTOLIC BLOOD PRESSURE: 139 MMHG | RESPIRATION RATE: 15 BRPM | HEART RATE: 67 BPM | WEIGHT: 164.4 LBS | BODY MASS INDEX: 26.42 KG/M2 | OXYGEN SATURATION: 97 % | DIASTOLIC BLOOD PRESSURE: 64 MMHG | TEMPERATURE: 97.8 F | HEIGHT: 66 IN

## 2021-02-18 LAB
BASOPHILS ABSOLUTE: 0 K/CU MM
BASOPHILS RELATIVE PERCENT: 0.2 % (ref 0–1)
DIFFERENTIAL TYPE: ABNORMAL
EOSINOPHILS ABSOLUTE: 0.3 K/CU MM
EOSINOPHILS RELATIVE PERCENT: 2.5 % (ref 0–3)
GLUCOSE BLD-MCNC: 104 MG/DL (ref 70–99)
GLUCOSE BLD-MCNC: 132 MG/DL (ref 70–99)
GLUCOSE BLD-MCNC: 168 MG/DL (ref 70–99)
GLUCOSE BLD-MCNC: 171 MG/DL (ref 70–99)
HCT VFR BLD CALC: 33.1 % (ref 42–52)
HEMOGLOBIN: 11.1 GM/DL (ref 13.5–18)
IMMATURE NEUTROPHIL %: 1.2 % (ref 0–0.43)
LYMPHOCYTES ABSOLUTE: 0.6 K/CU MM
LYMPHOCYTES RELATIVE PERCENT: 4.7 % (ref 24–44)
MCH RBC QN AUTO: 30.4 PG (ref 27–31)
MCHC RBC AUTO-ENTMCNC: 33.5 % (ref 32–36)
MCV RBC AUTO: 90.7 FL (ref 78–100)
MONOCYTES ABSOLUTE: 0.1 K/CU MM
MONOCYTES RELATIVE PERCENT: 1.1 % (ref 0–4)
PDW BLD-RTO: 15.1 % (ref 11.7–14.9)
PLATELET # BLD: 118 K/CU MM (ref 140–440)
PMV BLD AUTO: 10.2 FL (ref 7.5–11.1)
RBC # BLD: 3.65 M/CU MM (ref 4.6–6.2)
SEGMENTED NEUTROPHILS ABSOLUTE COUNT: 10.7 K/CU MM
SEGMENTED NEUTROPHILS RELATIVE PERCENT: 90.3 % (ref 36–66)
TOTAL IMMATURE NEUTOROPHIL: 0.14 K/CU MM
WBC # BLD: 11.9 K/CU MM (ref 4–10.5)

## 2021-02-18 PROCEDURE — 97530 THERAPEUTIC ACTIVITIES: CPT

## 2021-02-18 PROCEDURE — 6370000000 HC RX 637 (ALT 250 FOR IP): Performed by: INTERNAL MEDICINE

## 2021-02-18 PROCEDURE — 1200000002 HC SEMI PRIVATE SWING BED

## 2021-02-18 PROCEDURE — 71046 X-RAY EXAM CHEST 2 VIEWS: CPT

## 2021-02-18 PROCEDURE — 2700000000 HC OXYGEN THERAPY PER DAY

## 2021-02-18 PROCEDURE — 6370000000 HC RX 637 (ALT 250 FOR IP): Performed by: NURSE PRACTITIONER

## 2021-02-18 PROCEDURE — 85025 COMPLETE CBC W/AUTO DIFF WBC: CPT

## 2021-02-18 PROCEDURE — 94640 AIRWAY INHALATION TREATMENT: CPT

## 2021-02-18 PROCEDURE — 36415 COLL VENOUS BLD VENIPUNCTURE: CPT

## 2021-02-18 PROCEDURE — 94761 N-INVAS EAR/PLS OXIMETRY MLT: CPT

## 2021-02-18 PROCEDURE — 2580000003 HC RX 258: Performed by: INTERNAL MEDICINE

## 2021-02-18 PROCEDURE — 6360000002 HC RX W HCPCS: Performed by: INTERNAL MEDICINE

## 2021-02-18 PROCEDURE — 97110 THERAPEUTIC EXERCISES: CPT

## 2021-02-18 PROCEDURE — 97116 GAIT TRAINING THERAPY: CPT

## 2021-02-18 PROCEDURE — 2500000003 HC RX 250 WO HCPCS: Performed by: INTERNAL MEDICINE

## 2021-02-18 PROCEDURE — 94150 VITAL CAPACITY TEST: CPT

## 2021-02-18 PROCEDURE — 82962 GLUCOSE BLOOD TEST: CPT

## 2021-02-18 RX ORDER — LISINOPRIL 10 MG/1
10 TABLET ORAL DAILY
Status: CANCELLED | OUTPATIENT
Start: 2021-02-19

## 2021-02-18 RX ORDER — ATORVASTATIN CALCIUM 40 MG/1
40 TABLET, FILM COATED ORAL DAILY
Status: CANCELLED | OUTPATIENT
Start: 2021-02-19

## 2021-02-18 RX ORDER — ONDANSETRON 2 MG/ML
4 INJECTION INTRAMUSCULAR; INTRAVENOUS EVERY 6 HOURS PRN
Status: CANCELLED | OUTPATIENT
Start: 2021-02-18

## 2021-02-18 RX ORDER — MIDODRINE HYDROCHLORIDE 5 MG/1
5 TABLET ORAL 3 TIMES DAILY PRN
Status: DISCONTINUED | OUTPATIENT
Start: 2021-02-18 | End: 2021-03-05 | Stop reason: HOSPADM

## 2021-02-18 RX ORDER — ACETAMINOPHEN 650 MG/1
650 SUPPOSITORY RECTAL EVERY 6 HOURS PRN
Status: DISCONTINUED | OUTPATIENT
Start: 2021-02-18 | End: 2021-03-05 | Stop reason: HOSPADM

## 2021-02-18 RX ORDER — CARVEDILOL 3.12 MG/1
3.12 TABLET ORAL 2 TIMES DAILY WITH MEALS
Status: CANCELLED | OUTPATIENT
Start: 2021-02-18

## 2021-02-18 RX ORDER — ATORVASTATIN CALCIUM 40 MG/1
40 TABLET, FILM COATED ORAL NIGHTLY
Status: DISCONTINUED | OUTPATIENT
Start: 2021-02-19 | End: 2021-03-05 | Stop reason: HOSPADM

## 2021-02-18 RX ORDER — AMIODARONE HYDROCHLORIDE 200 MG/1
200 TABLET ORAL 2 TIMES DAILY
Status: CANCELLED | OUTPATIENT
Start: 2021-02-18

## 2021-02-18 RX ORDER — CHOLESTYRAMINE LIGHT 4 G/5.7G
4 POWDER, FOR SUSPENSION ORAL 2 TIMES DAILY
Status: CANCELLED | OUTPATIENT
Start: 2021-02-18

## 2021-02-18 RX ORDER — SODIUM CHLORIDE 0.9 % (FLUSH) 0.9 %
10 SYRINGE (ML) INJECTION PRN
Status: CANCELLED | OUTPATIENT
Start: 2021-02-18

## 2021-02-18 RX ORDER — GUAIFENESIN 600 MG/1
1200 TABLET, EXTENDED RELEASE ORAL 2 TIMES DAILY
Status: CANCELLED | OUTPATIENT
Start: 2021-02-18

## 2021-02-18 RX ORDER — SODIUM CHLORIDE 0.9 % (FLUSH) 0.9 %
10 SYRINGE (ML) INJECTION EVERY 12 HOURS SCHEDULED
Status: CANCELLED | OUTPATIENT
Start: 2021-02-18

## 2021-02-18 RX ORDER — CARVEDILOL 3.12 MG/1
3.12 TABLET ORAL 2 TIMES DAILY WITH MEALS
Status: DISCONTINUED | OUTPATIENT
Start: 2021-02-18 | End: 2021-03-05 | Stop reason: HOSPADM

## 2021-02-18 RX ORDER — DEXTROSE MONOHYDRATE 25 G/50ML
12.5 INJECTION, SOLUTION INTRAVENOUS PRN
Status: CANCELLED | OUTPATIENT
Start: 2021-02-18

## 2021-02-18 RX ORDER — DEXTROSE MONOHYDRATE 50 MG/ML
1000 INJECTION, SOLUTION INTRAVENOUS PRN
Status: CANCELLED | OUTPATIENT
Start: 2021-02-18

## 2021-02-18 RX ORDER — ONDANSETRON 2 MG/ML
4 INJECTION INTRAMUSCULAR; INTRAVENOUS EVERY 6 HOURS PRN
Status: DISCONTINUED | OUTPATIENT
Start: 2021-02-18 | End: 2021-03-05 | Stop reason: HOSPADM

## 2021-02-18 RX ORDER — CEFDINIR 300 MG/1
300 CAPSULE ORAL EVERY 12 HOURS SCHEDULED
Status: CANCELLED | OUTPATIENT
Start: 2021-02-18

## 2021-02-18 RX ORDER — ALBUTEROL SULFATE 2.5 MG/3ML
2.5 SOLUTION RESPIRATORY (INHALATION) EVERY 6 HOURS PRN
Status: CANCELLED | OUTPATIENT
Start: 2021-02-18

## 2021-02-18 RX ORDER — LISINOPRIL 10 MG/1
10 TABLET ORAL DAILY
Status: DISCONTINUED | OUTPATIENT
Start: 2021-02-19 | End: 2021-03-02

## 2021-02-18 RX ORDER — NICOTINE POLACRILEX 4 MG
15 LOZENGE BUCCAL PRN
Status: CANCELLED | OUTPATIENT
Start: 2021-02-18

## 2021-02-18 RX ORDER — MIDODRINE HYDROCHLORIDE 5 MG/1
5 TABLET ORAL 3 TIMES DAILY PRN
Status: CANCELLED | OUTPATIENT
Start: 2021-02-18

## 2021-02-18 RX ORDER — GABAPENTIN 300 MG/1
300 CAPSULE ORAL 3 TIMES DAILY
Status: DISCONTINUED | OUTPATIENT
Start: 2021-02-18 | End: 2021-03-05 | Stop reason: HOSPADM

## 2021-02-18 RX ORDER — PROMETHAZINE HYDROCHLORIDE 12.5 MG/1
12.5 TABLET ORAL EVERY 6 HOURS PRN
Status: CANCELLED | OUTPATIENT
Start: 2021-02-18

## 2021-02-18 RX ORDER — ACETAMINOPHEN 650 MG/1
650 SUPPOSITORY RECTAL EVERY 6 HOURS PRN
Status: CANCELLED | OUTPATIENT
Start: 2021-02-18

## 2021-02-18 RX ORDER — LOPERAMIDE HYDROCHLORIDE 2 MG/1
2 CAPSULE ORAL 4 TIMES DAILY PRN
Status: DISCONTINUED | OUTPATIENT
Start: 2021-02-18 | End: 2021-03-05 | Stop reason: HOSPADM

## 2021-02-18 RX ORDER — SODIUM CHLORIDE 0.9 % (FLUSH) 0.9 %
10 SYRINGE (ML) INJECTION PRN
Status: DISCONTINUED | OUTPATIENT
Start: 2021-02-18 | End: 2021-02-18

## 2021-02-18 RX ORDER — PRIMIDONE 50 MG/1
50 TABLET ORAL 2 TIMES DAILY
Status: DISCONTINUED | OUTPATIENT
Start: 2021-02-18 | End: 2021-03-05 | Stop reason: HOSPADM

## 2021-02-18 RX ORDER — GLIPIZIDE 5 MG/1
2.5 TABLET ORAL
Status: CANCELLED | OUTPATIENT
Start: 2021-02-19

## 2021-02-18 RX ORDER — NICOTINE 21 MG/24HR
1 PATCH, TRANSDERMAL 24 HOURS TRANSDERMAL DAILY
Status: CANCELLED | OUTPATIENT
Start: 2021-02-19

## 2021-02-18 RX ORDER — ASPIRIN 81 MG/1
81 TABLET ORAL DAILY
Status: DISCONTINUED | OUTPATIENT
Start: 2021-02-19 | End: 2021-03-05 | Stop reason: HOSPADM

## 2021-02-18 RX ORDER — IPRATROPIUM BROMIDE AND ALBUTEROL SULFATE 2.5; .5 MG/3ML; MG/3ML
1 SOLUTION RESPIRATORY (INHALATION)
Status: DISCONTINUED | OUTPATIENT
Start: 2021-02-18 | End: 2021-03-05 | Stop reason: HOSPADM

## 2021-02-18 RX ORDER — PROMETHAZINE HYDROCHLORIDE 12.5 MG/1
12.5 TABLET ORAL EVERY 6 HOURS PRN
Status: DISCONTINUED | OUTPATIENT
Start: 2021-02-18 | End: 2021-03-05 | Stop reason: HOSPADM

## 2021-02-18 RX ORDER — SODIUM CHLORIDE 0.9 % (FLUSH) 0.9 %
10 SYRINGE (ML) INJECTION PRN
Status: DISCONTINUED | OUTPATIENT
Start: 2021-02-18 | End: 2021-03-05 | Stop reason: HOSPADM

## 2021-02-18 RX ORDER — CHOLESTYRAMINE LIGHT 4 G/5.7G
4 POWDER, FOR SUSPENSION ORAL 2 TIMES DAILY
Status: DISCONTINUED | OUTPATIENT
Start: 2021-02-18 | End: 2021-03-05 | Stop reason: HOSPADM

## 2021-02-18 RX ORDER — SODIUM CHLORIDE 0.9 % (FLUSH) 0.9 %
10 SYRINGE (ML) INJECTION EVERY 12 HOURS SCHEDULED
Status: DISCONTINUED | OUTPATIENT
Start: 2021-02-18 | End: 2021-02-19

## 2021-02-18 RX ORDER — ACETAMINOPHEN 325 MG/1
650 TABLET ORAL EVERY 6 HOURS PRN
Status: DISCONTINUED | OUTPATIENT
Start: 2021-02-18 | End: 2021-03-05 | Stop reason: HOSPADM

## 2021-02-18 RX ORDER — NICOTINE POLACRILEX 4 MG
15 LOZENGE BUCCAL PRN
Status: DISCONTINUED | OUTPATIENT
Start: 2021-02-18 | End: 2021-03-05 | Stop reason: HOSPADM

## 2021-02-18 RX ORDER — CEFDINIR 300 MG/1
300 CAPSULE ORAL EVERY 12 HOURS SCHEDULED
Status: DISCONTINUED | OUTPATIENT
Start: 2021-02-18 | End: 2021-02-18 | Stop reason: HOSPADM

## 2021-02-18 RX ORDER — GLIPIZIDE 5 MG/1
2.5 TABLET ORAL
Status: DISCONTINUED | OUTPATIENT
Start: 2021-02-19 | End: 2021-03-05 | Stop reason: HOSPADM

## 2021-02-18 RX ORDER — GUAIFENESIN 600 MG/1
1200 TABLET, EXTENDED RELEASE ORAL 2 TIMES DAILY
Status: DISCONTINUED | OUTPATIENT
Start: 2021-02-18 | End: 2021-03-03

## 2021-02-18 RX ORDER — CEFDINIR 300 MG/1
300 CAPSULE ORAL EVERY 12 HOURS SCHEDULED
Status: COMPLETED | OUTPATIENT
Start: 2021-02-18 | End: 2021-02-24

## 2021-02-18 RX ORDER — ALBUTEROL SULFATE 2.5 MG/3ML
2.5 SOLUTION RESPIRATORY (INHALATION) EVERY 6 HOURS PRN
Status: DISCONTINUED | OUTPATIENT
Start: 2021-02-18 | End: 2021-03-05 | Stop reason: HOSPADM

## 2021-02-18 RX ORDER — IPRATROPIUM BROMIDE AND ALBUTEROL SULFATE 2.5; .5 MG/3ML; MG/3ML
1 SOLUTION RESPIRATORY (INHALATION)
Status: CANCELLED | OUTPATIENT
Start: 2021-02-18

## 2021-02-18 RX ORDER — ACETAMINOPHEN 325 MG/1
650 TABLET ORAL EVERY 6 HOURS PRN
Status: CANCELLED | OUTPATIENT
Start: 2021-02-18

## 2021-02-18 RX ORDER — SODIUM CHLORIDE 0.9 % (FLUSH) 0.9 %
10 SYRINGE (ML) INJECTION EVERY 12 HOURS SCHEDULED
Status: DISCONTINUED | OUTPATIENT
Start: 2021-02-18 | End: 2021-02-18

## 2021-02-18 RX ORDER — GABAPENTIN 300 MG/1
300 CAPSULE ORAL 3 TIMES DAILY
Status: CANCELLED | OUTPATIENT
Start: 2021-02-18

## 2021-02-18 RX ORDER — PRIMIDONE 50 MG/1
50 TABLET ORAL 2 TIMES DAILY
Status: CANCELLED | OUTPATIENT
Start: 2021-02-18

## 2021-02-18 RX ORDER — NICOTINE 21 MG/24HR
1 PATCH, TRANSDERMAL 24 HOURS TRANSDERMAL DAILY
Status: DISCONTINUED | OUTPATIENT
Start: 2021-02-19 | End: 2021-03-05 | Stop reason: HOSPADM

## 2021-02-18 RX ORDER — DEXTROSE MONOHYDRATE 25 G/50ML
12.5 INJECTION, SOLUTION INTRAVENOUS PRN
Status: DISCONTINUED | OUTPATIENT
Start: 2021-02-18 | End: 2021-03-05 | Stop reason: HOSPADM

## 2021-02-18 RX ORDER — AMIODARONE HYDROCHLORIDE 200 MG/1
200 TABLET ORAL 2 TIMES DAILY
Status: DISCONTINUED | OUTPATIENT
Start: 2021-02-18 | End: 2021-03-05 | Stop reason: HOSPADM

## 2021-02-18 RX ORDER — DEXTROSE MONOHYDRATE 50 MG/ML
1000 INJECTION, SOLUTION INTRAVENOUS PRN
Status: DISCONTINUED | OUTPATIENT
Start: 2021-02-18 | End: 2021-03-05 | Stop reason: HOSPADM

## 2021-02-18 RX ORDER — LOPERAMIDE HYDROCHLORIDE 2 MG/1
2 CAPSULE ORAL 4 TIMES DAILY PRN
Status: CANCELLED | OUTPATIENT
Start: 2021-02-18

## 2021-02-18 RX ORDER — ASPIRIN 81 MG/1
81 TABLET ORAL DAILY
Status: CANCELLED | OUTPATIENT
Start: 2021-02-19

## 2021-02-18 RX ADMIN — SALINE NASAL SPRAY 1 SPRAY: 1.5 SOLUTION NASAL at 01:45

## 2021-02-18 RX ADMIN — CHOLESTYRAMINE 4 G: 4 POWDER, FOR SUSPENSION ORAL at 10:44

## 2021-02-18 RX ADMIN — GUAIFENESIN 1200 MG: 600 TABLET, EXTENDED RELEASE ORAL at 20:45

## 2021-02-18 RX ADMIN — GLIPIZIDE 2.5 MG: 2.5 TABLET, FILM COATED, EXTENDED RELEASE ORAL at 10:43

## 2021-02-18 RX ADMIN — PRIMIDONE 50 MG: 50 TABLET ORAL at 20:45

## 2021-02-18 RX ADMIN — VANCOMYCIN HYDROCHLORIDE: 750 INJECTION, POWDER, LYOPHILIZED, FOR SOLUTION INTRAVENOUS at 00:18

## 2021-02-18 RX ADMIN — AMIODARONE HYDROCHLORIDE 200 MG: 200 TABLET ORAL at 20:46

## 2021-02-18 RX ADMIN — GABAPENTIN 300 MG: 300 CAPSULE ORAL at 20:45

## 2021-02-18 RX ADMIN — LISINOPRIL 10 MG: 10 TABLET ORAL at 10:43

## 2021-02-18 RX ADMIN — GUAIFENESIN 1200 MG: 600 TABLET, EXTENDED RELEASE ORAL at 10:45

## 2021-02-18 RX ADMIN — ASPIRIN 81 MG: 81 TABLET, COATED ORAL at 10:43

## 2021-02-18 RX ADMIN — GABAPENTIN 300 MG: 300 CAPSULE ORAL at 14:46

## 2021-02-18 RX ADMIN — CHOLESTYRAMINE 4 G: 4 POWDER, FOR SUSPENSION ORAL at 20:45

## 2021-02-18 RX ADMIN — IPRATROPIUM BROMIDE AND ALBUTEROL SULFATE 1 AMPULE: .5; 3 SOLUTION RESPIRATORY (INHALATION) at 18:51

## 2021-02-18 RX ADMIN — IPRATROPIUM BROMIDE AND ALBUTEROL SULFATE 1 AMPULE: .5; 3 SOLUTION RESPIRATORY (INHALATION) at 15:13

## 2021-02-18 RX ADMIN — GABAPENTIN 300 MG: 300 CAPSULE ORAL at 10:44

## 2021-02-18 RX ADMIN — ATORVASTATIN CALCIUM 40 MG: 40 TABLET, FILM COATED ORAL at 10:43

## 2021-02-18 RX ADMIN — SODIUM CHLORIDE, PRESERVATIVE FREE 10 ML: 5 INJECTION INTRAVENOUS at 10:47

## 2021-02-18 RX ADMIN — SODIUM CHLORIDE, PRESERVATIVE FREE 10 ML: 5 INJECTION INTRAVENOUS at 10:46

## 2021-02-18 RX ADMIN — METRONIDAZOLE 500 MG: 500 INJECTION, SOLUTION INTRAVENOUS at 10:46

## 2021-02-18 RX ADMIN — CEFDINIR 300 MG: 300 CAPSULE ORAL at 20:45

## 2021-02-18 RX ADMIN — CARVEDILOL 3.12 MG: 3.12 TABLET, FILM COATED ORAL at 18:23

## 2021-02-18 RX ADMIN — PRIMIDONE 50 MG: 50 TABLET ORAL at 10:44

## 2021-02-18 RX ADMIN — BENZOCAINE AND MENTHOL 1 LOZENGE: 15; 3.6 LOZENGE ORAL at 01:45

## 2021-02-18 RX ADMIN — ACETAMINOPHEN 650 MG: 325 TABLET ORAL at 10:44

## 2021-02-18 RX ADMIN — AMIODARONE HYDROCHLORIDE 200 MG: 200 TABLET ORAL at 10:44

## 2021-02-18 RX ADMIN — ACETAMINOPHEN 650 MG: 325 TABLET ORAL at 22:31

## 2021-02-18 RX ADMIN — CARVEDILOL 3.12 MG: 3.12 TABLET, FILM COATED ORAL at 10:44

## 2021-02-18 RX ADMIN — CEFEPIME HYDROCHLORIDE 2000 MG: 2 INJECTION, POWDER, FOR SOLUTION INTRAVENOUS at 10:55

## 2021-02-18 RX ADMIN — IPRATROPIUM BROMIDE AND ALBUTEROL SULFATE 1 AMPULE: .5; 3 SOLUTION RESPIRATORY (INHALATION) at 11:08

## 2021-02-18 ASSESSMENT — PAIN DESCRIPTION - PROGRESSION
CLINICAL_PROGRESSION: GRADUALLY WORSENING
CLINICAL_PROGRESSION: NOT CHANGED
CLINICAL_PROGRESSION: NOT CHANGED

## 2021-02-18 ASSESSMENT — PAIN DESCRIPTION - ONSET
ONSET: ON-GOING
ONSET: ON-GOING

## 2021-02-18 ASSESSMENT — PAIN SCALES - GENERAL
PAINLEVEL_OUTOF10: 0

## 2021-02-18 ASSESSMENT — PAIN DESCRIPTION - PAIN TYPE
TYPE: ACUTE PAIN
TYPE: ACUTE PAIN

## 2021-02-18 ASSESSMENT — PAIN DESCRIPTION - LOCATION: LOCATION: BUTTOCKS

## 2021-02-18 ASSESSMENT — PAIN DESCRIPTION - FREQUENCY: FREQUENCY: CONTINUOUS

## 2021-02-18 NOTE — FLOWSHEET NOTE
Physical Therapy DailyTreatment Note   Date: 2021 Room: [unfilled]   Name: Melonie Joseph : 1945   MRN: [id number]   Admission Date:2021        Rehabilitation Diagnosis: Weakness [R53.1]  Acute and chr resp failure, unsp w hypoxia or hypercapnia (HCC) [J96.20]    Objective                                                                                    Goals:  (Update in navigator)  Short term goals  Time Frame for Short term goals: 1 week  Short term goal 1: patient will safely  perform bed mobility activities with no greater CGA with HOB flat and no HR  Short term goal 2: patient will demonstrate the ability to safely transfer sit to stands and stand to sit from 2 different height surfaces with no greater than SBA  Short term goal 3: patient will demonstrate the ability to safely ambulate 150 ft with the least restrictive AD and no greater than SBA while wearing O2 per nc if necessary  Short term goal 4: patient will demonstrate the ability to safely stand for 5 consecutive minutes  while performing dynamic  balance activities with CGA and more than 1 hand for ssupport:   :        Plan of Care                                                                              Times per week: 5+ days per week for a minimum of 15 minutes/day  Treatment to include      Date  2021   TIMES IN/OUT   [id number]   Restrictions/Precautions Restrictions/Precautions: Fall Risk, General Precautions         Current Diet/Swallowing Issues DIET CARB CONTROL;    Communication with other providers: [x] Ok to see per nursing at morning huddle  [] Medical hold and reason  [] Spoke with (team    member) regarding   Subjective observations: Patient semi reclined in bed upon entering agreeable to working with therapy  [x]   Gait belt used during tx session   Pain level/location: Pre-tx    Post-tx     Bed Mobility   Supine - sit Min A     Transfers Sit to stand CG assist VC's for hand placement Stand to sit CG assist VC's for hand placementCommode Min assist VC's for hand placement   Standing Tolerance    Amb/Locomotion: AD/Distance/Assist Pt ambulated 5 feet x2 with  CGA- Min assist using RW            Steps (#)/Assist/Rails/AD    Ramp:AD/Assist    Curb:height AD/Assist    Uneven:type AD/Assist    W/C distance/Assist      Strategies that improved performance: Verbal and tactile cues   Barriers to progress/participation: Multiple rest breaks   Alarm placed/where? Fall Risk  [ ] left in bed  [x ] left in wheelchair [ ] call light within reach  [ ] bed alarm on  [ ] personal alarm on [ ] [de-identified]  [ ] other staff present:   Discharge recommendations  Anticipated discharge date:  TBD  Destination: []home alone   []home alone with assist prn  []Continuous supervision  []SNF  [] Assisted living   Continued therapy: []C PT  []OUTPATIENT  PT   [] No Further PT  Equipment needs:       Therapeutic activities/exercises completed this date: patient was seated on the bed to return to supine when radiology entered needing to take the patient for an x-ray. The patient was placed in the wheelchair and left with radiology. Modality/intervention used:   [x ] Therapeutic Exercise    [ ] Modalities:   [ ] Therapeutic Activity    [ ] Ultrasound   [ ] Elec Stim   [x ] Gait Training     [ ] Cervical Traction  [ ] Lumbar Traction   [ ] Neuromuscular Re-education   [ ] Cold/hotpack  [ ] Iontophoresis   [ ] Instruction in HEP     [ ] Kelsy Purdue   [ ] Manual Therapy   [ ] Aquatic Therapy     Patient/Caregiver Education and Training:  Patient was able to perform kj care but was assited by the therapist to ensure the patient was clean.     Exercise/Equipment/Modalities this date   AP 20x   Glut sets 10x   QS 10x   Heel sldies 10x   Hip AB/AD 10x   SLR 10x     Treatment Plan for Next Session:      Assessment / Impression                                                          Treatment/Activity Tolerance: [x] Tolerated Treatment well:     [] Patient limited by fatigue/pain:       [] Patient limited by medical complications:    [] Adverse Reaction to Tx:   [] Significant change in status    Plan:   [x ] Continue per plan of care [ ] [place] current plan   [ ] Plan of care initiated [ ] Hold pending MD visit [ ] Discharged    Billing:  Billed units: 39  15gt 15te 15ta       Signed: Garrison Bass PTA, 2/18/2021, 11:29 AM

## 2021-02-18 NOTE — PLAN OF CARE
Problem: Falls - Risk of:  Goal: Will remain free from falls  Description: Will remain free from falls  2/17/2021 2313 by Christiano Parra RN  Outcome: Ongoing  2/17/2021 2313 by Christiano Parra RN  Outcome: Ongoing  Goal: Absence of physical injury  Description: Absence of physical injury  Outcome: Ongoing     Problem: Discharge Planning:  Goal: Discharged to appropriate level of care  Description: Discharged to appropriate level of care  Outcome: Ongoing  Goal: Participates in care planning  Description: Participates in care planning  Outcome: Ongoing     Problem: Airway Clearance - Ineffective:  Goal: Clear lung sounds  Description: Clear lung sounds  Outcome: Ongoing  Goal: Ability to maintain a clear airway will improve  Description: Ability to maintain a clear airway will improve  Outcome: Ongoing     Problem: Fluid Volume - Deficit:  Goal: Achieves intake and output within specified parameters  Description: Achieves intake and output within specified parameters  Outcome: Ongoing     Problem: Gas Exchange - Impaired:  Goal: Levels of oxygenation will improve  Description: Levels of oxygenation will improve  Outcome: Ongoing     Problem: Hyperthermia:  Goal: Ability to maintain a body temperature in the normal range will improve  Description: Ability to maintain a body temperature in the normal range will improve  Outcome: Ongoing     Problem: Serum Glucose Level - Abnormal:  Goal: Ability to maintain appropriate glucose levels will improve  Description: Ability to maintain appropriate glucose levels will improve  Outcome: Ongoing     Problem: Sensory Perception - Impaired:  Goal: Ability to maintain a stable neurologic state will improve  Description: Ability to maintain a stable neurologic state will improve  Outcome: Ongoing

## 2021-02-18 NOTE — CARE COORDINATION
[facility]  Swing Bed Evaluation for Certification for [address] meets skilled criteria due to the need for   [x ] Therapy; physical and occupational; for decreased strength, balance and self     care activities. [ ] Tpn   [ ] Wing [place] care  [ ] IV therapy  [ ] Wound care   Mari Gutierrez lives [ ] Alone   [ x] With Spouse  [ ] Other:   and plans on returning there at discharge. Mari Gutierrez prefers this facilityl for skilled care. Mari Gutierrez will require skilled care on a daily basis beginning 2/18/21, if medically stable.         Estimated length of stay [ x] 1-2 weeks   [ ] 2-3 weeks  [ ] 3-4 weeks       Ladonna Joshi LSW  Date: 2/18/21             Cosigned by:    Revision History

## 2021-02-18 NOTE — DISCHARGE SUMMARY
Discharge Summary    Name:  Som Torres /Age/Sex: 1945  (18 y.o. male)   MRN & CSN:  [id number] & [id number] Admission Date/Time: 2021  6:28 AM   Attending:  Susan Escobar MD Discharging Physician: Brenda Lowery MD     HPI:   57-year-old white male with a low-grade fever yesterday was discharged to swing bed tolerated p.o. medications. However, despite oral antibiotics patient developed high-grade fever 101.5 requiring investigation found to have multifocal pneumonia. Patient started on triple IV antibiotic. Transferred to ICU from swing bed. Please see H&P for full details    Hospital Course:     1. Acute hypoxic respiratory failure: On intake imaging demonstrated concern for multifocal pneumonia. Previously not needing supplemental O2. Lactate was 2.7. Respiratory panel to include Covid was negative. Patient placed on triple IV antibiotic therapy for concern for aspiration pneumonia. Vancomycin with pharmacy to dose, cefepime and metronidazole. Patient continued IV antibiotics x7 days. Radiographs demonstrate subtle improvement. Leukocytosis 11.9 on day of transfer to swing bed. Patient still needing supplement oxygen 6 L nasal cannula. Attempting to wean from same. It may be the case the patient will need permanent O2 supplemental.  Continue to follow    2. Multifocal pneumonia: Respiratory panel negative to include COVID-19. Triple antibiotics as above. Leukocytosis improved. Transition the patient to cefdinir 300 mg twice daily over the next 7 days. This a.m. interval chest x-ray demonstrates bilateral airspace opacities slightly improved continue follow-up to resolution is recommended. Imaging resulted below. Continue to monitor during swing bed    3. History of falls: Patient has spinal stenosis uses a walker. PT OT evaluate the patient deemed appropriate for swing bed for continued therapy for weakness. Plan to continue same. 4.  Fever of 101.5 on admission: Resolved. Blood cultures were negative    5. DM type II: Controlled continue home medications and sliding scale. 6.  Diarrhea: Resolved. C. difficile negative    7. Hyponatremia: Borderline on intake resolved. The patient expressed appropriate understanding of and agreement with the discharge recommendations, medications, and plan for discharge to swing bed for continued oral antibiotic and PT/OT. Consults this admission:  IP CONSULT TO PHARMACY    Discharge Instruction:   Follow up appointments: Primary care physician:  within 2 weeks    Diet:  diabetic diet   Activity: activity as tolerated  Disposition: Discharged to:   []Home, []C, [x]SNF, []Acute Rehab, []Hospice. Swing bed  Condition on discharge: Stable    Discharge Medications:      [facility] Medication Instructions LUGEMINI:[id number]    Printed on:02/18/21 3776   Medication Information                      amiodarone (CORDARONE) 200 MG tablet  TAKE 1 TABLET BY MOUTH 2 TIMES DAILY             aspirin 81 MG tablet  Take 81 mg by mouth daily              atorvastatin (LIPITOR) 40 MG tablet  Take 1 tablet by mouth daily             blood glucose monitor kit and supplies  Test 3 times a day & as needed for symptoms of irregular blood glucose. blood glucose test strips (TRUE METRIX BLOOD GLUCOSE TEST) strip  1 each by In Vitro route daily As needed. Blood Pressure Monitoring (BLOOD PRESSURE KIT) NASH  Use as directed             carvedilol (COREG) 3.125 MG tablet  TAKE 1 TABLET BY MOUTH 2 TIMES DAILY             furosemide (LASIX) 20 MG tablet  Take 1 tablet by mouth 2 times daily             gabapentin (NEURONTIN) 300 MG capsule  Take 1 capsule by mouth 3 times daily for 30 days.              glipiZIDE (GLUCOTROL XL) 2.5 MG extended release tablet  Take 1 tablet by mouth daily             lisinopril (PRINIVIL;ZESTRIL) 10 MG tablet  Take 1 tablet by mouth daily polyethylene glycol (GLYCOLAX) 17 GM/SCOOP powder  Take 17 g by mouth daily             primidone (MYSOLINE) 50 MG tablet  Take 1 tablet by mouth 2 times daily                 Objective Findings at Discharge:   /64   Pulse 67   Temp 97.8 °F (36.6 °C) (Infrared)   Resp 15   Ht 5' 6\" (1.676 m)   Wt 164 lb 6.4 oz (74.6 kg)   SpO2 92%   BMI 26.53 kg/m²            PHYSICAL EXAM   GEN Awake frail-appearing elderly white male, sitting upright in bed in no apparent distress. Appears given age. EYES Pupils are equally round. No scleral erythema, discharge, or conjunctivitis. HENT Mucous membranes are dry. NECK No apparent thyromegaly or masses. RESP minimal left-sided basilar crackles clear but diminished upper lung fields, no wheezes, rales or rhonchi. Symmetric chest movement while on 6 L supplemental O2.  CARDIO/VASC S1/S2 auscultated. Regular rate without appreciable murmurs, rubs, or gallops. Peripheral pulses equal bilaterally and palpable. No peripheral edema. GI Abdomen is soft without significant tenderness, masses, or guarding. Bowel sounds are normoactive. Rectal exam deferred.  Moctezuma catheter is not present. HEME/LYMPH No petechiae or ecchymoses. Ecchymosis noted in the left lower quadrant of the abdomen secondary to Lovenox injections  MSK No gross joint deformities. Spontaneous movement of all extremities but slow and restricted  SKIN Normal coloration, warm, dry. NEURO Cranial nerves appear grossly intact, normal speech, no lateralizing weakness. PSYCH Awake, alert, oriented x 3. Affect appropriate. BMP/CBC  Recent Labs     02/16/21  1030 02/17/21  0600 02/18/21  0615   * 135  --    K 3.7 3.4*  --     102  --    CO2 29 25  --    BUN 29* 21  --    CREATININE 0.7* 0.6*  --    WBC 18.0* 12.7* 11.9*   HCT 33.5* 32.9* 33.1*    126* 118*       IMAGING: Chest x-ray on intake:  FINDINGS:   The patient has had a median sternotomy.  Monitor wires overlie the chest. Upper Abdomen: No acute upper abdominal pathology is noted.  Stable 1.7 cm   right adrenal adenoma is noted.       Soft Tissues/Bones: Multilevel degenerative changes of the spine are noted.           Impression   1.  Diffuse bilateral ground-glass opacities throughout the lungs and small   bilateral pleural effusions.  Findings may be related to multifocal pneumonia   versus pulmonary edema.       2.  Stable 0.5 cm left lower lobe pulmonary nodule.       RECOMMENDATIONS:   Fleischner Society guidelines for follow-up and management of incidentally   detected pulmonary nodules:       Single Solid Nodule:       Nodule size less than 6 mm   In a low-risk patient, no routine follow-up. In a high-risk patient, optional CT at 12 months.       - Low risk patients include individuals with minimal or absent history of   smoking and other known risk factors.       - High risk patients include individuals with a history or smoking or known   risk factors.         Interval chest x-ray on 1/18:  FINDINGS:   Stable cardiomediastinal silhouette.  There has been interval improvement in   bilateral airspace opacities.  There is no pleural effusion or pneumothorax. The osseous structures are stable.           Impression   Bilateral airspace opacities slightly improved since prior examination.    Continued follow-up to resolution is recommended.         Discharge Time of 37 minutes    Electronically signed by Shawn Mackey MD on 2/18/2021 at 1:05 PM

## 2021-02-18 NOTE — PROGRESS NOTES
[address] [facility]  consulted by Dr. Danny Gutierrez for monitoring and adjustment. Indication for treatment: Pneumonia  Goal trough: 15 mcg/mL    2/9/21 blood culture - NO GROWTH AT 5 DAYS. 2/14/21 Culture, MRSA, Screening, Final Report No Staph aureus MRSA isolated by culture. No Staph aureus MSSA isolated by culture. Order: [id number]  Status: In process   Visible to patient:  No (not released) Next appt:  04/26/2021 at 01:00 PM in Cardiology Александр Salgado MD)  Specimen Information: Nares         Pertinent Laboratory Values:   Temp Readings from Last 3 Encounters:   02/18/21 97.8 °F (36.6 °C)   02/11/21 99.8 °F (37.7 °C) (Infrared)   02/11/21 98.4 °F (36.9 °C) (Oral)     Recent Labs     02/16/21  1030 02/17/21  0600 02/18/21  0615   WBC 18.0* 12.7* 11.9*     Recent Labs     02/16/21  1030 02/17/21  0600   BUN 29* 21   CREATININE 0.7* 0.6*     Estimated Creatinine Clearance: 99 mL/min (A) (based on SCr of 0.6 mg/dL (L)). Intake/Output Summary (Last 24 hours) at 2/18/2021 0745  Last data filed at 2/18/2021 0218  Gross per 24 hour   Intake 640 ml   Output 925 ml   Net -285 ml       Pertinent Cultures:  Date    Source    Results  02/11   Blood    Pending  02/12                          Nasal MRSA Screen              Pending  02/12   Respiratory   Pending  02/12                          RESP PCR                            Pending    Assessment:  · WBC elevated at 12.1; Febrile (101.5 F)  · Renal function stable: SCr = 0.9, BUN = 23; No I/O data  · Day(s) of therapy: 1  · Vancomycin concentration:   · 02/14 - To be drawn    Day of therapy: 7  Plan:  · Pharmacy will continue to monitor patient and adjust therapy as indicated  · 2/17/21 Trough came back at 11.4, WBC's have decreased from 18.0 on 2/16 to 11.9 on 2/18. The culture from 2/14 = Final Report No Staph aureus MRSA isolated by culture. No Staph aureus MSSA isolated by culture. · Continue 1250mg IV Q12H. VANCOMYCIN CONCENTRATION SCHEDULED FOR: TBD    Thank you for the consult.   [address] [place] [address]   2/18/2021 7:45 AM

## 2021-02-18 NOTE — PROGRESS NOTES
Hospitalist Progress Note      Name:  Jerome Wynn /Age/Sex: 1945  (4 y.o. male)   MRN & CSN:  [id number] & [id number] Admission Date/Time: 2021  6:28 AM   Location:  008- PCP: Zachary Hidalgo, [address] Day: 7    Assessment and Plan:     1. Pneumonia: None speciated, currently on triple IV antibiotic therapy day 6. Leukocytosis improved 12.7. Discontinued Solu-Medrol with improved leukocytosis. Continue other supportive treatments to include nebs, incentive spirometry. 2.  Acute on chronic hypoxic respiratory failure: Patient still needing increased oxygen demand overnight. Mostly when sleeping. Patient alert sitting upright improved work of breathing and O2 saturation. Continue to monitor continue to attempt weaning    3. DM type II: POC glucose below 150s. Continue sliding scale home regimen    4. Hyponatremia resolved    5. Diarrhea: Improved. C. difficile negative    6. Tobacco abuse: Patient greater than a week cessation but has been in the hospital for same. 7.  Spondylosis lumbar region: Patient fall risk. Work with PT yesterday. Anticipate if no worsening of symptoms and stable will transition the patient to swing bed for continued therapy and IV antibiotic    Diet DIET CARB CONTROL;   DVT Prophylaxis [] Lovenox, []  Heparin, [] SCDs, []No VTE prophylaxis, patient ambulating   GI Prophylaxis [] PPI, [] H2 Blocker, [] No GI prophylaxis, patient is receiving diet/Tube Feeds   Code Status DNR-CCA   Disposition Patient requires continued admission due to continued IV antibiotic   MDM [] Low, [x] Moderate,[x]  High  Patient's risk as above due to as above     History of Present Illness:     Pt S&E. No acute events overnight. Patient resting comfortably. Denies any headache blurry vision or dizziness. Denies chest pain palpitations or shortness of breath that is worsened. Denies abdominal pain. Denies fever chills nausea or vomiting. 10-14 point ROS reviewed negative, unless as noted above    Objective: Intake/Output Summary (Last 24 hours) at 2/18/2021 0746  Last data filed at 2/18/2021 1741  Gross per 24 hour   Intake 640 ml   Output 925 ml   Net -285 ml      Vitals:   Vitals:    02/18/21 0722   BP:    Pulse:    Resp:    Temp:    SpO2: (!) 89%     Physical Exam:    GEN    Awake  frail-appearing elderly white male, sitting upright in bed in no apparent distress. Appears given age. EYES   Pupils are equally round. No scleral erythema, discharge, or conjunctivitis. HENT  Mucous membranes are moist.   NECK  No apparent thyromegaly or masses. RESP  bibasilar crackles persistent with coarse and diminished of the lung fields, no wheezes, rales or rhonchi. Symmetric chest movement while on supplemental O2.  CARDIO/VASC           S1/S2 auscultated. Regular rate without appreciable murmurs, rubs, or gallops. Peripheral pulses equal bilaterally and palpable. No peripheral edema. GI        Abdomen is soft without significant tenderness, masses, or guarding. Bowel sounds are normoactive. Rectal exam deferred.        Moctezuma catheter is not present. HEME/LYMPH            No petechiae or ecchymoses. MSK    No gross joint deformities. Spontaneous movement of all extremities decreased range of motion lower extremities  SKIN    Normal coloration, warm, dry. Sternal incision healed/scar  NEURO           Cranial nerves appear grossly intact, normal speech, no lateralizing weakness. PSYCH            Awake, alert, oriented x 4. Affect appropriate.     Medications:   Medications:    ipratropium-albuterol  1 ampule Inhalation Q4H WA    insulin lispro  0-12 Units Subcutaneous TID WC    insulin lispro  0-6 Units Subcutaneous Nightly    glipiZIDE  2.5 mg Oral Daily with breakfast    IVPB builder   Intravenous Q12H    guaiFENesin  1,200 mg Oral BID    cefepime  2,000 mg Intravenous Q12H    metroNIDAZOLE  500 mg Intravenous Q8H  sodium chloride flush  10 mL Intravenous 2 times per day    nicotine  1 patch Transdermal Daily    sodium chloride flush  10 mL Intravenous 2 times per day    amiodarone  200 mg Oral BID    aspirin  81 mg Oral Daily    atorvastatin  40 mg Oral Daily    carvedilol  3.125 mg Oral BID WC    cholestyramine light  4 g Oral BID    gabapentin  300 mg Oral TID    lisinopril  10 mg Oral Daily    primidone  50 mg Oral BID      Infusions:    dextrose       PRN Meds:     sodium chloride, 1 spray, PRN      benzocaine-menthol, 1 lozenge, Q2H PRN      albuterol, 2.5 mg, Q6H PRN      midodrine, 5 mg, TID PRN      sodium chloride flush, 10 mL, PRN      acetaminophen, 650 mg, Q6H PRN    Or      acetaminophen, 650 mg, Q6H PRN      polyethylene glycol, 17 g, Daily PRN      promethazine, 12.5 mg, Q6H PRN    Or      ondansetron, 4 mg, Q6H PRN      sodium chloride flush, 10 mL, PRN      dextrose, 1,000 mL, PRN      dextrose, 12.5 g, PRN      docusate sodium, 100 mg, Daily PRN      glucagon (rDNA), 1 mg, PRN      glucose, 15 g, PRN      loperamide, 2 mg, 4x Daily PRN          Electronically signed by Lianet Lee MD on 2/18/2021 at 7:46 AM

## 2021-02-18 NOTE — CARE COORDINATION
SWING BED PROGRAM PRE-ADMISSION ASSESSMENT  (TRADITIONAL MEDICARE PATIENTS)  Patient Name: Addi Kenyon      : 1945  (89 y.o.) Gender: male   Inpatient Admission Dater:   2021/  Room: 008/008-01 MRN: [id number]    Home Phone #:  [phone]  Emergency Contact and Phone Number:      Inpatient Admission Date: 2021 Date & Time of Referral: 21   10:00 a.m. Referred By:  Dr. Shiela Madera Referred from:  [x] [facility]   [] Other:     # of Skilled Care Days Used in Last 61 days:1 Insurance: [x]  Medicare                      []  Secondary  Type:     Present Condition/Diagnosis:    Weakness [R53.1]  Acute and chr resp failure, unsp w hypoxia or hypercapnia ([facility] [address] [J96.20]      Previous Medical History:   Past Medical History:   Diagnosis Date    AAA (abdominal aortic aneurysm) ([facility] [address] 2018    Infrarenal    Basal cell carcinoma of nose     Dr Lupe Man CAD (coronary artery disease)     Dr. Deejay Lew - severe aortic stenosis with a bicuspid aortic valve    Constipation     echocardiogram 10/29/2020    EF 55-60% normally functioning valve in aortic position, mild mitral regurg with two jets mild pulm htn.  Essential hypertension     History of alcohol abuse     Hyperlipidemia     Hypertension     Follows with PCP    Missing teeth, acquired     Non-alcoholic fatty liver disease     Osteoarthritis     Knees, lower back, shoulders,    Spinal stenosis     had epidural steroid injection 2020 - lumbar    Tremor     Left arm only    Type 2 diabetes mellitus without complication ([facility] [address]     Controlling with diet    Wears glasses         COGNITIVE/BEHAVIORAL  Change in cognitive status in last 90 days:  ( )no change  ( ) improved  ( ) deteriorated  Behavioral changes in last seven days:  ( ) wandering  ( ) verbally abusive  ( )phys.  Abusive                                                                         ( ) socially inappropriate  ( ) resists care ADL Performance Last Seven (7) Days (Please Score)   Patients performance over all shifts during last seven (7) days   0 = Independent  No help or oversight  1 = Supervision  Oversight, encouragement or cueing provided  2 = Limited Assist  Receives physical help in guided maneuvering of limbs or other non-weight bearing assistance  3 = Extensive Assist  Patient performs part of the activity, weight bearing support was provided  4 = Dependence = Full staff performance of activity *NOTE: USE THE FOLLOWING SCORING FOR EATING ONLY:  1 = Supervision or Independent  2 = Limited Assist  3 = Dependent or Extensive Assist     SCORE   BED MOBILITY  How client moves to and from lying position, turns side to side, and positions body while in bed 2   TRANSFER  How resident moves between surfaces  to/from: bed, chair, wheelchair, standing position (EXCLUDE to/from bath/toilet) 2   TOILET USE  How client uses the toilet room (or commode, bedpan, urinal);transfers on/off toilet, cleanses, changes pad, manages ostomy or catheter, adjusts clothes 3   EATING (SCORE 1-3 ONLY*)  How client eats and drinks (regardless of skill).  Includes intake of nourishment by other means (e.g., tube feeding, total parenteral nutrition) 1   Estimated Pre-Admission ADL Value: 8     PATIENT WILL RECEIVE THE FOLLOWING SKILLED SERVICES AS A SWING BED PATIENT:       REHABILITATION (PT/OT/SP)    [] ULTRA HIGH 720 or more minutes minimum per week of at least two (2) disciplines  1st for at least five (5) days and 2nd for at least three (3) days   [] VERY HIGH 500 or more minutes minimum per week of at least one (1) discipline for at least five (5) days   [x] HIGH 325 or more minutes minimum per week of at least one (1) discipline for at least five (5) days   [] MEDIUM 150 or more minutes minimum per week at least five (5) days of any combination with three (3) therapies [] LOW Restorative nursing at least six (6) days, two (2) activities, or therapies for at least three (3) days at least forty-five (45) minute per week minimum services. EXTENSIVE SERVICES   [] Tracheostomy Care   [] Ventilator/Respirator   [] Infection Isolation   SPECIAL CARE HIGH   [] MS with ADL greater than or equal to 10  [] Quadriplegic with ADL greater than or equal to 5  [] emphysema/COPD and shortness of breath when lying flat  [] Fever w/at least one (1) of the following: [] dehydration [] pneumonia [] vomiting [] weight loss  [] feeding tube  [] Septicemia  [] Coma (not awake & completely dependent in ADL)  [] Diabetes and injections seven (7) days and Dr. order change two (2) or more days.   [] Parenteral/IV feeding  [] respiratory therapy for seven (7) days   SPECIAL CARE LOW:   [] Respiratory Therapy  [] Ulcers (2+sites all stages) w/treatment  [] Multiple Sclerosis  [] Cerebral Palsy  [] Parkinsons Disease  [] Oxygen Therapy  [] Extensive care services w/ADL less than 6  [] Fever w/at least one (1) of the following: [] dehydration [] pneumonia [] vomiting [] weight loss  [] Foot Infection or open lesions on the foot with treatment  [] tube-feeding  calories greater than or equal to 51% or tube feeding with total calories greater than or equal to 26% and fluid parental or enteral intake of greater than or equal to 50 ml per day   CLINICALLY COMPLEX   CURRENTLY:  [] Pneumonia  [] Hemiplegics with ADL with ADL Sum greater than or equal to 10  [] IV medications delivered post admission in the SNF  [] Surgical wounds or open lesions w/treatment      EVALUATORS SIGNATURE:Kathi Jerry DATE: 2/18/2021       [x] ACCEPTED FOR ADMISSION ON:  02/21/2021                              ELOS:  [x] 1-2wks  [] 2-3wks  [] 3-4wks   [] ADMISSION DENIED BASED ON:    [] [address]  STAFF COORDINATOR

## 2021-02-18 NOTE — PROGRESS NOTES
Patient was transferred to swing bed from inpatient, room 8, skin assessment not changed since morning assessment, abrasion left elbow, bilateral knees, buttocks red but blanchable, scattered bruises, no other skin issues noted, patient is previously oriented to room and hospital policies.

## 2021-02-19 LAB
BASOPHILS ABSOLUTE: 0 K/CU MM
BASOPHILS RELATIVE PERCENT: 0.1 % (ref 0–1)
DIFFERENTIAL TYPE: ABNORMAL
EOSINOPHILS ABSOLUTE: 0.3 K/CU MM
EOSINOPHILS RELATIVE PERCENT: 2.9 % (ref 0–3)
GLUCOSE BLD-MCNC: 115 MG/DL (ref 70–99)
GLUCOSE BLD-MCNC: 133 MG/DL (ref 70–99)
GLUCOSE BLD-MCNC: 139 MG/DL (ref 70–99)
GLUCOSE BLD-MCNC: 95 MG/DL (ref 70–99)
HCT VFR BLD CALC: 30.7 % (ref 42–52)
HEMOGLOBIN: 10.1 GM/DL (ref 13.5–18)
IMMATURE NEUTROPHIL %: 1.4 % (ref 0–0.43)
LYMPHOCYTES ABSOLUTE: 0.6 K/CU MM
LYMPHOCYTES RELATIVE PERCENT: 7.1 % (ref 24–44)
MCH RBC QN AUTO: 30.3 PG (ref 27–31)
MCHC RBC AUTO-ENTMCNC: 32.9 % (ref 32–36)
MCV RBC AUTO: 92.2 FL (ref 78–100)
MONOCYTES ABSOLUTE: 0.1 K/CU MM
MONOCYTES RELATIVE PERCENT: 1.6 % (ref 0–4)
PDW BLD-RTO: 15.2 % (ref 11.7–14.9)
PLATELET # BLD: 117 K/CU MM (ref 140–440)
PMV BLD AUTO: 10.5 FL (ref 7.5–11.1)
RBC # BLD: 3.33 M/CU MM (ref 4.6–6.2)
SEGMENTED NEUTROPHILS ABSOLUTE COUNT: 7.7 K/CU MM
SEGMENTED NEUTROPHILS RELATIVE PERCENT: 86.9 % (ref 36–66)
TOTAL IMMATURE NEUTOROPHIL: 0.12 K/CU MM
WBC # BLD: 8.8 K/CU MM (ref 4–10.5)

## 2021-02-19 PROCEDURE — 97110 THERAPEUTIC EXERCISES: CPT

## 2021-02-19 PROCEDURE — 82962 GLUCOSE BLOOD TEST: CPT

## 2021-02-19 PROCEDURE — 85025 COMPLETE CBC W/AUTO DIFF WBC: CPT

## 2021-02-19 PROCEDURE — 94640 AIRWAY INHALATION TREATMENT: CPT

## 2021-02-19 PROCEDURE — 94761 N-INVAS EAR/PLS OXIMETRY MLT: CPT

## 2021-02-19 PROCEDURE — 2700000000 HC OXYGEN THERAPY PER DAY

## 2021-02-19 PROCEDURE — 97530 THERAPEUTIC ACTIVITIES: CPT

## 2021-02-19 PROCEDURE — 36415 COLL VENOUS BLD VENIPUNCTURE: CPT

## 2021-02-19 PROCEDURE — 97535 SELF CARE MNGMENT TRAINING: CPT

## 2021-02-19 PROCEDURE — 1200000002 HC SEMI PRIVATE SWING BED

## 2021-02-19 PROCEDURE — 6370000000 HC RX 637 (ALT 250 FOR IP): Performed by: INTERNAL MEDICINE

## 2021-02-19 PROCEDURE — 97166 OT EVAL MOD COMPLEX 45 MIN: CPT

## 2021-02-19 PROCEDURE — 97162 PT EVAL MOD COMPLEX 30 MIN: CPT

## 2021-02-19 RX ADMIN — LISINOPRIL 10 MG: 10 TABLET ORAL at 08:14

## 2021-02-19 RX ADMIN — ASPIRIN 81 MG: 81 TABLET, COATED ORAL at 08:14

## 2021-02-19 RX ADMIN — AMIODARONE HYDROCHLORIDE 200 MG: 200 TABLET ORAL at 20:58

## 2021-02-19 RX ADMIN — CEFDINIR 300 MG: 300 CAPSULE ORAL at 20:58

## 2021-02-19 RX ADMIN — GUAIFENESIN 1200 MG: 600 TABLET, EXTENDED RELEASE ORAL at 08:13

## 2021-02-19 RX ADMIN — ACETAMINOPHEN 650 MG: 325 TABLET ORAL at 20:58

## 2021-02-19 RX ADMIN — CHOLESTYRAMINE 4 G: 4 POWDER, FOR SUSPENSION ORAL at 20:58

## 2021-02-19 RX ADMIN — IPRATROPIUM BROMIDE AND ALBUTEROL SULFATE 1 AMPULE: .5; 3 SOLUTION RESPIRATORY (INHALATION) at 19:35

## 2021-02-19 RX ADMIN — IPRATROPIUM BROMIDE AND ALBUTEROL SULFATE 1 AMPULE: .5; 3 SOLUTION RESPIRATORY (INHALATION) at 15:24

## 2021-02-19 RX ADMIN — CHOLESTYRAMINE 4 G: 4 POWDER, FOR SUSPENSION ORAL at 11:58

## 2021-02-19 RX ADMIN — IPRATROPIUM BROMIDE AND ALBUTEROL SULFATE 1 AMPULE: .5; 3 SOLUTION RESPIRATORY (INHALATION) at 11:09

## 2021-02-19 RX ADMIN — PROMETHAZINE HYDROCHLORIDE 12.5 MG: 12.5 TABLET ORAL at 00:35

## 2021-02-19 RX ADMIN — PRIMIDONE 50 MG: 50 TABLET ORAL at 20:58

## 2021-02-19 RX ADMIN — PRIMIDONE 50 MG: 50 TABLET ORAL at 08:14

## 2021-02-19 RX ADMIN — CEFDINIR 300 MG: 300 CAPSULE ORAL at 08:13

## 2021-02-19 RX ADMIN — CARVEDILOL 3.12 MG: 3.12 TABLET, FILM COATED ORAL at 08:14

## 2021-02-19 RX ADMIN — PROMETHAZINE HYDROCHLORIDE 12.5 MG: 12.5 TABLET ORAL at 22:36

## 2021-02-19 RX ADMIN — GABAPENTIN 300 MG: 300 CAPSULE ORAL at 20:58

## 2021-02-19 RX ADMIN — ATORVASTATIN CALCIUM 40 MG: 40 TABLET, FILM COATED ORAL at 20:58

## 2021-02-19 RX ADMIN — AMIODARONE HYDROCHLORIDE 200 MG: 200 TABLET ORAL at 08:14

## 2021-02-19 RX ADMIN — IPRATROPIUM BROMIDE AND ALBUTEROL SULFATE 1 AMPULE: .5; 3 SOLUTION RESPIRATORY (INHALATION) at 07:33

## 2021-02-19 RX ADMIN — ACETAMINOPHEN 650 MG: 325 TABLET ORAL at 12:04

## 2021-02-19 RX ADMIN — GLIPIZIDE 2.5 MG: 5 TABLET ORAL at 08:14

## 2021-02-19 RX ADMIN — GUAIFENESIN 1200 MG: 600 TABLET, EXTENDED RELEASE ORAL at 20:58

## 2021-02-19 RX ADMIN — GABAPENTIN 300 MG: 300 CAPSULE ORAL at 08:14

## 2021-02-19 RX ADMIN — GABAPENTIN 300 MG: 300 CAPSULE ORAL at 14:16

## 2021-02-19 RX ADMIN — CARVEDILOL 3.12 MG: 3.12 TABLET, FILM COATED ORAL at 17:25

## 2021-02-19 ASSESSMENT — PAIN DESCRIPTION - FREQUENCY
FREQUENCY: CONTINUOUS

## 2021-02-19 ASSESSMENT — PAIN - FUNCTIONAL ASSESSMENT
PAIN_FUNCTIONAL_ASSESSMENT: ACTIVITIES ARE NOT PREVENTED
PAIN_FUNCTIONAL_ASSESSMENT: ACTIVITIES ARE NOT PREVENTED

## 2021-02-19 ASSESSMENT — PAIN SCALES - GENERAL
PAINLEVEL_OUTOF10: 3
PAINLEVEL_OUTOF10: 10
PAINLEVEL_OUTOF10: 0
PAINLEVEL_OUTOF10: 5
PAINLEVEL_OUTOF10: 1

## 2021-02-19 ASSESSMENT — PAIN DESCRIPTION - ONSET
ONSET: ON-GOING
ONSET: GRADUAL

## 2021-02-19 ASSESSMENT — PAIN DESCRIPTION - LOCATION: LOCATION: BUTTOCKS

## 2021-02-19 ASSESSMENT — PAIN DESCRIPTION - PAIN TYPE
TYPE: CHRONIC PAIN
TYPE: ACUTE PAIN

## 2021-02-19 ASSESSMENT — PAIN DESCRIPTION - PROGRESSION: CLINICAL_PROGRESSION: RESOLVED

## 2021-02-19 ASSESSMENT — PAIN DESCRIPTION - ORIENTATION: ORIENTATION: RIGHT;LEFT

## 2021-02-19 NOTE — PLAN OF CARE
Problem: OXYGENATION/RESPIRATORY FUNCTION  Goal: Patient will maintain patent airway  2/19/2021 1128 by Janice Beth RN  Outcome: Ongoing  2/19/2021 0035 by Sinda Ormond, RN  Outcome: Ongoing  Goal: Patient will achieve/maintain normal respiratory rate/effort  Description: Respiratory rate and effort will be within normal limits for the patient  2/19/2021 1128 by Janice Beth RN  Outcome: Ongoing  2/19/2021 0035 by Sinda Ormond, RN  Outcome: Ongoing     Problem: IP MOBILITY  Goal: LTG - patient will demonstrate kitchen mobility  2/19/2021 1128 by Janice Beth RN  Outcome: Ongoing  2/19/2021 0035 by Sinda Ormond, RN  Outcome: Ongoing  Goal: LTG - patient will ambulate community distance  2/19/2021 1128 by Janice Beth RN  Outcome: Ongoing  2/19/2021 0035 by Sinda Ormond, RN  Outcome: Ongoing  Goal: LTG - patient will ambulate household distance  2/19/2021 1128 by Janice Beth RN  Outcome: Ongoing  2/19/2021 0035 by Sinda Ormond, RN  Outcome: Ongoing  Goal: LTG - Patient will navigate 4-6 steps with rails/device  2/19/2021 1128 by Janice Beth RN  Outcome: Ongoing  2/19/2021 0035 by Sinda Ormond, RN  Outcome: Ongoing  Goal: LTG - patient will demonstrate safe mobility requirements  2/19/2021 1128 by Janice Beth RN  Outcome: Ongoing  2/19/2021 0035 by Sinda Ormond, RN  Outcome: Ongoing  Goal: STG - Patient will ambulate  2/19/2021 1128 by Janice Beth RN  Outcome: Ongoing  2/19/2021 0035 by Sinda Ormond, RN  Outcome: Ongoing     Problem: IP MOBILITY  Goal: LTG - patient will demonstrate kitchen mobility  2/19/2021 1128 by Janice Beth RN  Outcome: Ongoing  2/19/2021 0035 by Sinda Ormond, RN  Outcome: Ongoing  Goal: LTG - patient will ambulate community distance  2/19/2021 1128 by Janice Beth RN  Outcome: Ongoing  2/19/2021 0035 by Sinda Ormond, RN  Outcome: Ongoing  Goal: LTG - patient will ambulate household distance  2/19/2021 1128 by Janice Beth RN Outcome: Ongoing  2/19/2021 0035 by Randall Mcelroy RN  Outcome: Ongoing  Goal: LTG - Patient will navigate 4-6 steps with rails/device  2/19/2021 1128 by Jhonathan Ramos RN  Outcome: Ongoing  2/19/2021 0035 by Randall Mcelroy RN  Outcome: Ongoing  Goal: LTG - patient will demonstrate safe mobility requirements  2/19/2021 1128 by Jhonathan Ramos RN  Outcome: Ongoing  2/19/2021 0035 by Randall Mcelroy RN  Outcome: Ongoing  Goal: STG - Patient will ambulate  2/19/2021 1128 by Jhonathan Ramos RN  Outcome: Ongoing  2/19/2021 0035 by Randall Mcelroy RN  Outcome: Ongoing     Problem: Falls - Risk of:  Goal: Will remain free from falls  Description: Will remain free from falls  2/19/2021 1128 by Jhonathan Ramos RN  Outcome: Ongoing  2/19/2021 0035 by Randall Mcelroy RN  Outcome: Ongoing  Goal: Absence of physical injury  Description: Absence of physical injury  2/19/2021 1128 by Jhonathan Ramos RN  Outcome: Ongoing  2/19/2021 0035 by Randall Mcelroy RN  Outcome: Ongoing

## 2021-02-19 NOTE — PROGRESS NOTES
Occupational Therapy    Occupational Therapy Treatment Note  Name: Renetta Weaver MRN: [id number] :   1945   Date:  2021   Admission Date: 2021 Room:  008[facility]-01   Restrictions/Precautions:  Restrictions/Precautions  Restrictions/Precautions: Fall Risk, General Precautions  Required Braces or Orthoses?: No     Communication with other providers:   Cleared for treatment by RN. Subjective:  Patient states:  \"I want to get better to go home\"  Pain:   Location, Type, Intensity (0/10 to 10/10): none rated    Objective:    Observation:  Pt alert and oriented. Objective Measures:  Pt 97% on 8L of O2    Treatment, including education:  Transfers    Sit to supine :SBA  Scooting :SBA  Sit to stand :SBA  Stand to sit :SBA  SPT:CGA          Therapeutic Exercise:  Cues were given for technique, safety, recruitment, and rationale. Cues were verbal and/or tactile. For BUE strengthening for ADL & functional mobility Indep pt performed BUE strengthening HEP c 2# hand weights x 20 reps x 6 exercises with Minimal difficulty. Therapeutic Activity Training:   Therapeutic activity training was instructed today. Cues were given for safety, sequence, UE/LE placement, awareness, and balance. Activities performed today included bed mobility training, sup-sit, sit-stand, SPT. Pt completed functional mobility with RW x 10' x 4 with multiple stand rest breaks for recovery due to low functional endurance. Pt stood x 5 min with SBA with RW to facilitate increased endurance/strength for ADL tasks and transfers. Safety Measures: Gait belt used, Left in bed, Pull/Bed Alarm activated and call light left in reach        Assessment / Impression:        Patient's tolerance of treatment: Good   Adverse Reaction: None  Significant change in status and impact:  None  Barriers to improvement:  None    Plan for Next Session:    Continue with POC.     Time in: 1315  Time out:  1408  Timed treatment minutes:  53 Total treatment time:  48  Electronically signed by:    MELVIN Noyola 1990 2/19/2021, 2:31 PM    Previously filed values:  Patient Goals   Patient goals : to return home  Short term goals  Time Frame for Short term goals: Until goals met or discharge  Short term goal 1: Pt will complete all UB ADLs mod I for increased functional I  Short term goal 2: Pt will complete all LB ADLs min A for increased functional I  Short term goal 3: Pt will complete all aspects of toileting SBA for increased functional I  Short term goal 4: Pt will complete all functional transfers and bed mobility SBA in prep for EOB/OOB ADL tasks.   Short term goal 5: Pt will participate in therex/theract for 5+ minutes with emphasis on UE strengthning and dynamic balance for increased I with IADL/home management tasks

## 2021-02-19 NOTE — PROGRESS NOTES
Physical Therapy    Facility/Department: [facility] UNIT  Initial Assessment    NAME: Guicho Gardner  : 1945  MRN: [id number]    Date of Service: 2021    Discharge Recommendations:  Home with assist PRN   PT Equipment Recommendations  Other: continue to assess    Assessment   Body structures, Functions, Activity limitations: Decreased functional mobility ; Decreased safe awareness;Decreased balance;Decreased coordination;Decreased ADL status; Decreased ROM; Decreased endurance;Decreased high-level IADLs;Decreased strength;Decreased posture  Assessment: Pt is a pleasant 69 yo male who would benefit from skilled PT to address decreased ROM, strength, balance, endurance, and functional mobility following a fall at home. Prognosis: Good  Decision Making: Medium Complexity  History: see below, history of falls  Exam: see below  Clinical Presentation: evolving  PT Education: Transfer Training;Equipment;PT Role;Functional Mobility Training;Plan of Care;General Safety; Energy Conservation;Pressure Relief; Adaptive Device Training;Gait Training  Patient Education: PT adjusted walker and discussed proper walker height. Barriers to Learning: none  REQUIRES PT FOLLOW UP: Yes  Activity Tolerance  Activity Tolerance: Patient limited by endurance; Patient Tolerated treatment well;Patient limited by fatigue;Treatment limited secondary to medical complications (free text)       Patient Diagnosis(es): There were no encounter diagnoses. has a past medical history of AAA (abdominal aortic aneurysm) (HCC), Basal cell carcinoma of nose, CAD (coronary artery disease), Constipation, echocardiogram, Essential hypertension, History of alcohol abuse, Hyperlipidemia, Hypertension, Missing teeth, acquired, Non-alcoholic fatty liver disease, Osteoarthritis, Spinal stenosis, Tremor, Type 2 diabetes mellitus without complication ([facility] [address] and Wears glasses. has a past surgical history that includes sinus surgery (0369'W); other surgical history (12/09/2016); other surgical history (01/2020); Coronary artery bypass graft (N/A, 3/4/2020); knee surgery (2015); Carpal tunnel release (Right, 1970's); [place] tooth extraction; and Cardiac catheterization (02/26/2020). Restrictions  Restrictions/Precautions  Restrictions/Precautions: Fall Risk  Required Braces or Orthoses?: No  Position Activity Restriction  Other position/activity restrictions: 7 L O2. Vision/Hearing  Vision Exceptions: Wears glasses at all times  Hearing: Within functional limits     Subjective  General  Chart Reviewed: Yes  Patient assessed for rehabilitation services?: Yes  Family / Caregiver Present: No  Follows Commands: Within Functional Limits  General Comment  Comments: pt presented in bed with HOB. Subjective  Subjective: Pt reports he is feeling pretty good today.   Pain Screening  Patient Currently in Pain: Denies  Pain Assessment  Pain Assessment: 0-10  Pain Level: 0  Vital Signs  Patient Currently in Pain: Denies       Orientation  Orientation  Overall Orientation Status: Within Normal Limits  Social/Functional History  Social/Functional History  Lives With: Spouse  Type of Home: House  Home Layout: Two level, Performs ADL's on one level, Able to Live on Main level with bedroom/bathroom  Home Access: Stairs to enter with rails  Entrance Stairs - Number of Steps: 2 steps  Entrance Stairs - Rails: Right(going up)  Bathroom Shower/Tub: Walk-in shower, Shower chair without back  Bathroom Toilet: Standard  Bathroom Equipment: Grab bars in shower, Hand-held shower  Home Equipment: 4 wheeled walker, Cane(pt reports he was using no AD prior to falling.)  Receives Help From: Family  ADL Assistance: Independent  Homemaking Assistance: Independent  Homemaking Responsibilities: Yes  Meal Prep Responsibility: Secondary(pt reports they do the cooking 50/50)  Laundry Responsibility: No Cleaning Responsibility: Secondary(pt reports he does the dishes.)  Shopping Responsibility: Primary(pt reports he was doing the grocery shopping. he reports he gets worn out walking through store.)  Ambulation Assistance: Independent  Transfer Assistance: Independent  Active : Yes  Mode of Transportation: Car  Occupation: Retired  Type of occupation: pt is retired from [employer]. Leisure & Hobbies: Pt reports he shoots troublesome animals. IADL Comments: pt reports he was I with dressing and bathing. Additional Comments: Pt reports he has a hx of falls. Reports he is normally walking when he falls and wasn't using an AD  Cognition        Objective     Observation/Palpation  Observation: Awake supine in bed with 7 L hi flow O2    AROM RLE (degrees)  RLE AROM: Exceptions  RLE General AROM: slightly decreased hip flexion  AROM LLE (degrees)  LLE AROM : WFL  Strength RLE  Strength RLE: Exception  R Hip Flexion: 2+/5  R Hip ABduction: 4-/5  R Hip ADduction: 3/5;3+/5  R Knee Flexion: 4/5  R Knee Extension: 3-/5(shaking noted with R knee ext and pt had to throw LE out to raise it, he lacked control.)  Strength LLE  Strength LLE: Exception  L Hip Flexion: 3-/5  L Hip ABduction: 4+/5  L Hip ADduction: 4/5;4-/5  L Knee Flexion: 4/5  L Knee Extension: 4-/5  Tone RLE  RLE Tone: Normotonic  Tone LLE  LLE Tone: Normotonic  Motor Control  Gross Motor? : (pt demonstrated a tremor in his L hand and reports that has been going on for about a month.)     Bed mobility  Supine to Sit: Supervision  Sit to Supine: Stand by assistance  Scootin Person assistance;Dependent/Total  Comment: pt used HR and bed elevated.   Transfers  Sit to Stand: Contact guard assistance  Stand to sit: Stand by assistance  Bed to Chair: Contact guard assistance  Stand Pivot Transfers: Contact guard assistance  Ambulation  Ambulation?: Yes  Ambulation 1  Surface: level tile  Device: Rolling Walker  Other Apparatus: O2(7 L hi-jessica O2.) Assistance: Contact guard assistance  Quality of Gait: Pt demonstrated very slow step-to crouched gait. Pt demonstrated flexed posture and sunk into flexion and crouching more as he became tired. He demonstrated poor foot clearance with short shuffling steps. Distance: 18 feet in room. Comments: pt reports prior to his fall he ambulated normally with larger steps. Stairs/Curb  Stairs?: No     Balance  Posture: Poor  Sitting - Static: Fair  Sitting - Dynamic: Fair  Standing - Static: Fair  Standing - Dynamic: Poor  Exercises  Hip Flexion: seated marching 1x10 B LE  Knee Long Arc Quad: 1x10 with 5\" hold B LE  Other exercises  Other exercises?: Yes  Other exercises 1: resisted hip add: folded pillow squeeze 1x10     Plan   Plan  Times per week: Mon-Sat 5+/week  Times per day: Daily  Current Treatment Recommendations: Strengthening, Gait Training, ADL/Self-care Training, IADL Training, Balance Training, Functional Mobility Training, Transfer Training, Endurance Training, Neuromuscular Re-education, Patient/Caregiver Education & Training, ROM, Equipment Evaluation, Education, & procurement, Home Exercise Program, Stair training, Safety Education & Training, Positioning(ther ex, ther act, bed mobility)  Safety Devices  Type of devices: Gait belt, Nurse notified, Call light within reach, Left in bed, Bed alarm in place    Goals  Short term goals  Time Frame for Short term goals: 1 week or until discharge:  Short term goal 1: Pt will demonstrate the ability to safely perform bed mobility mod I with no HR and HOB flat. Short term goal 2: Pt will demonstrate the ability to safely stand x8 consecutive mintues while performing dynamic balance activities with supervision and no more than 1 hand for support. Short term goal 3: pt will demonstrate the ability to safely ambulate 150 feet consecutive feet with step through gait and  the least restrictive AD and superivsion. Short term goal 4: Pt will demonstrate the ability to safely ambulate up and down 2 steps with a R HR and supervision in 2/2 trials. Patient Goals   Patient goals : to strengthen LE so he can return home.        Therapy Time   Individual Concurrent Group Co-treatment   Time In 2326         Time Out 1701         Minutes 30         Timed Code Treatment Minutes: [address] Replaced by [facility], [address] [facility] DPT [id number]

## 2021-02-19 NOTE — H&P
History and Physical      Name:  Theresa Delong /Age/Sex: 1945  (6 y.o. male)   MRN & CSN:  [id number] & [id number] Admission Date/Time: 2021  3:59 PM   Location:   PCP: Britt Howell, [address] Day: 2    Assessment and Plan:     1. Weakness and debility: Patient to continue working with PT/OT in swing bed. Has history of lumbar spinal spondylosis. Patient did ambulate with a walker outside of the room. Improved continue program    2. Acute hypoxic respiratory failure: Patient continues to need supplemental O2. However, is a smoker most likely has COPD component. Also, complicated due to multifocal pneumonia that is improving. Will attempt to wean patient to an O2 saturation between 88 and 92%. Patient continues to improve he might need home O2 will consider home eval going forward. 3.  Multifocal pneumonia: Previously on vancomycin, cefepime and metronidazole IV. Transition to cefdinir oral 300 mg twice daily continue for 7 more days. Interval radiographs repeat in a.m. Radiographs from yesterday showed interval improvement however slightly. Continue incentive spirometry, Acapella. Continue to monitor    4. History of falls: Secondary to lumbar spinal stenosis. Uses a walker. We will continue with PT/OT.     5.  Type II DM: Controlled continue home medications and sliding scale      Diet DIET CARB CONTROL;   DVT Prophylaxis [x] Lovenox, []  Heparin, [] SCDs, [] No VTE prophylaxis, patient ambulating   GI Prophylaxis [] PPI, [] H2 Blocker, [] No GI prophylaxis, patient is receiving diet/Tube Feeds   Code Status DNR-CCA   Disposition Patient requires continued admission due to in swing bed   MDM [] Low, [x] Moderate,[]  High  Patient's risk as above due to      History of Present Illness:     Chief Complaint: Weakness worsened in the setting of multifocal pneumonia Brandie Rodriguez is a 70 y.o.  male  who presents with recent hospitalization due to falls and acute hypoxic respiratory failure in the setting of multifocal pneumonia. Patient did receive 7 days IV antibiotic transition to oral.  Improved throughout stay. Denies headache blurry vision or dizziness. Shortness of breath is improved. Patient reports using incentive spirometry able to pull 2.5 L of air which is an improvement. In good spirits today. Denies chest pain palpitations. Denies any abdominal pain. Denies fever chills nausea or vomiting. 10-14 point ROS reviewed negative, unless as noted above    Objective: Intake/Output Summary (Last 24 hours) at 2/19/2021 1137  Last data filed at 2/19/2021 0212  Gross per 24 hour   Intake 440 ml   Output 500 ml   Net -60 ml      Vitals:   Vitals:    02/19/21 1113   BP:    Pulse:    Resp:    Temp:    SpO2: 95%     Physical Exam:    GEN Awake frail elderly white male, sitting upright in bed in no apparent distress and improved appearance. Appears given age. EYES Pupils are equally round. No scleral erythema, discharge, or conjunctivitis. HENT Mucous membranes are moist.  Poor dentition  NECK No apparent thyromegaly or masses. RESP Clear to auscultation upper lung fields but faint persistent left basilar crackle, no wheezes, rales or rhonchi. Symmetric chest movement while on supplemental O2.  CARDIO/VASC S1/S2 auscultated. Regular rate without appreciable murmurs, rubs, or gallops. Peripheral pulses equal bilaterally and palpable. No peripheral edema. GI Abdomen is soft without significant tenderness, masses, or guarding. Bowel sounds are normoactive. Rectal exam deferred.  [place] catheter is not present. HEME/LYMPH No petechiae or ecchymoses. Persistent ecchymosis left lower quadrant secondary to Lovenox injection   MSK No gross joint deformities. Spontaneous movement of all extremities  SKIN Normal coloration, warm, dry. NEURO Cranial nerves appear grossly intact, normal speech, no lateralizing weakness. PSYCH Awake, alert, oriented x 3. Affect appropriate. Past Medical History:      Past Medical History:   Diagnosis Date    AAA (abdominal aortic aneurysm) ([facility] [address] 2018    Infrarenal    Basal cell carcinoma of nose 2016    Dr Star Primrose CAD (coronary artery disease)     Dr. Jonny Mijares - severe aortic stenosis with a bicuspid aortic valve    Constipation     echocardiogram 10/29/2020    EF 55-60% normally functioning valve in aortic position, mild mitral regurg with two jets mild pulm htn.  Essential hypertension     History of alcohol abuse     Hyperlipidemia     Hypertension     Follows with PCP    Missing teeth, acquired     Non-alcoholic fatty liver disease     Osteoarthritis     Knees, lower back, shoulders,    Spinal stenosis     had epidural steroid injection 1/8/2020 - lumbar    Tremor     Left arm only    Type 2 diabetes mellitus without complication ([facility] [address]     Controlling with diet    Wears glasses      PSHX:  has a past surgical history that includes sinus surgery (9556'G); other surgical history (12/09/2016); other surgical history (01/2020); Coronary artery bypass graft (N/A, 3/4/2020); knee surgery (2015); Carpal tunnel release (Right, 1970's); [place] tooth extraction; and Cardiac catheterization (02/26/2020). Allergies: Allergies   Allergen Reactions    Metformin And Related Diarrhea     Severe diarrhea, abd pain, and nausea    Amoxicillin Diarrhea    Other Nausea And Vomiting     ANTI-INFLAMMATORIES  Severe stomach pain--diarrhea  hypertension       FAM HX: family history includes Heart Disease in his father; High Blood Pressure in his father; High Cholesterol in his father; Eugene Nanyc in his father and mother.   Soc HX:   Social History     Socioeconomic History    Marital status:      Spouse name: None    Number of children: None    Years of education: None  Highest education level: None   Occupational History    None   Social Needs    Financial resource strain: None    Food insecurity     Worry: None     Inability: None    Transportation needs     Medical: None     Non-medical: None   Tobacco Use    Smoking status: Current Every Day Smoker     Packs/day: 1.50     Years: 58.00     Pack years: 87.00     Types: Cigarettes     Start date: 1962    Smokeless tobacco: Never Used   Substance and Sexual Activity    Alcohol use: Not Currently     Comment: 1 glass wine a month    Drug use: No    Sexual activity: Not Currently   Lifestyle    Physical activity     Days per week: None     Minutes per session: None    Stress: None   Relationships    Social connections     Talks on phone: None     Gets together: None     Attends Bahai service: None     Active member of club or organization: None     Attends meetings of clubs or organizations: None     Relationship status: None    Intimate partner violence     Fear of current or ex partner: None     Emotionally abused: None     Physically abused: None     Forced sexual activity: None   Other Topics Concern    None   Social History Narrative    None       Medications:   Medications:    nicotine  1 patch Transdermal Daily    amiodarone  200 mg Oral BID    aspirin  81 mg Oral Daily    atorvastatin  40 mg Oral Nightly    carvedilol  3.125 mg Oral BID WC    cefdinir  300 mg Oral 2 times per day    cholestyramine light  4 g Oral BID    gabapentin  300 mg Oral TID    glipiZIDE  2.5 mg Oral QAM AC    guaiFENesin  1,200 mg Oral BID    insulin lispro  0-12 Units Subcutaneous TID WC    insulin lispro  0-6 Units Subcutaneous Nightly    ipratropium-albuterol  1 ampule Inhalation Q4H WA    lisinopril  10 mg Oral Daily    primidone  50 mg Oral BID      Infusions:    dextrose       PRN Meds:     sodium chloride flush, 10 mL, PRN      acetaminophen, 650 mg, Q6H PRN    Or      acetaminophen, 650 mg, Q6H PRN   promethazine, 12.5 mg, Q6H PRN    Or      ondansetron, 4 mg, Q6H PRN      albuterol, 2.5 mg, Q6H PRN      benzocaine-menthol, 1 lozenge, Q2H PRN      dextrose, 1,000 mL, PRN      dextrose, 12.5 g, PRN      glucagon (rDNA), 1 mg, PRN      glucose, 15 g, PRN      loperamide, 2 mg, 4x Daily PRN      midodrine, 5 mg, TID PRN      sodium chloride, 1 spray, PRN          Electronically signed by Lady Beard MD on 2/19/2021 at 11:37 AM

## 2021-02-19 NOTE — PLAN OF CARE
Problem: Falls - Risk of:  Goal: Will remain free from falls  Description: Will remain free from falls  2/19/2021 0035 by Deven Dee RN  Outcome: Ongoing  2/18/2021 1821 by Shavonne Sheriff RN  Outcome: Ongoing  Goal: Absence of physical injury  Description: Absence of physical injury  2/19/2021 0035 by Deven Dee RN  Outcome: Ongoing  2/18/2021 1821 by Shavonne Sheriff RN  Outcome: Ongoing     Problem: IP MOBILITY  Goal: LTG - patient will demonstrate kitchen mobility  2/19/2021 0035 by Deven Dee RN  Outcome: Ongoing  2/18/2021 1821 by Shavonne Sheriff RN  Outcome: Ongoing  Goal: LTG - patient will ambulate community distance  2/19/2021 0035 by Deven Dee RN  Outcome: Ongoing  2/18/2021 1821 by Shavonne Sheriff RN  Outcome: Ongoing  Goal: LTG - patient will ambulate household distance  2/19/2021 0035 by Deven Dee RN  Outcome: Ongoing  2/18/2021 1821 by Shavonne Sheriff RN  Outcome: Ongoing  Goal: LTG - Patient will navigate 4-6 steps with rails/device  2/19/2021 0035 by Deven Dee RN  Outcome: Ongoing  2/18/2021 1821 by Shavonne Sheriff RN  Outcome: Ongoing  Goal: LTG - patient will demonstrate safe mobility requirements  2/19/2021 0035 by Deven Dee RN  Outcome: Ongoing  2/18/2021 1821 by Shavonne Sheriff RN  Outcome: Ongoing  Goal: STG - Patient will ambulate  2/19/2021 0035 by Deven Dee RN  Outcome: Ongoing  2/18/2021 1821 by Shavonne Sheriff RN  Outcome: Ongoing     Problem: OXYGENATION/RESPIRATORY FUNCTION  Goal: Patient will maintain patent airway  2/19/2021 0035 by Deven Dee RN  Outcome: Ongoing  2/18/2021 1821 by Shavonne Sheriff RN  Outcome: Ongoing  Goal: Patient will achieve/maintain normal respiratory rate/effort  Description: Respiratory rate and effort will be within normal limits for the patient  2/19/2021 0035 by Deven Dee RN  Outcome: Ongoing  2/18/2021 1821 by Shavonne Sheriff RN  Outcome: Ongoing

## 2021-02-20 LAB
BASOPHILS ABSOLUTE: 0 K/CU MM
BASOPHILS RELATIVE PERCENT: 0.2 % (ref 0–1)
DIFFERENTIAL TYPE: ABNORMAL
EOSINOPHILS ABSOLUTE: 0.2 K/CU MM
EOSINOPHILS RELATIVE PERCENT: 2.3 % (ref 0–3)
GLUCOSE BLD-MCNC: 105 MG/DL (ref 70–99)
GLUCOSE BLD-MCNC: 106 MG/DL (ref 70–99)
GLUCOSE BLD-MCNC: 155 MG/DL (ref 70–99)
GLUCOSE BLD-MCNC: 175 MG/DL (ref 70–99)
HCT VFR BLD CALC: 30.9 % (ref 42–52)
HEMOGLOBIN: 10.3 GM/DL (ref 13.5–18)
IMMATURE NEUTROPHIL %: 1.4 % (ref 0–0.43)
LYMPHOCYTES ABSOLUTE: 0.6 K/CU MM
LYMPHOCYTES RELATIVE PERCENT: 7.1 % (ref 24–44)
MCH RBC QN AUTO: 30.7 PG (ref 27–31)
MCHC RBC AUTO-ENTMCNC: 33.3 % (ref 32–36)
MCV RBC AUTO: 92.2 FL (ref 78–100)
MONOCYTES ABSOLUTE: 0.2 K/CU MM
MONOCYTES RELATIVE PERCENT: 2 % (ref 0–4)
PDW BLD-RTO: 15.2 % (ref 11.7–14.9)
PLATELET # BLD: 130 K/CU MM (ref 140–440)
PMV BLD AUTO: 10.4 FL (ref 7.5–11.1)
RBC # BLD: 3.35 M/CU MM (ref 4.6–6.2)
SEGMENTED NEUTROPHILS ABSOLUTE COUNT: 7.9 K/CU MM
SEGMENTED NEUTROPHILS RELATIVE PERCENT: 87 % (ref 36–66)
TOTAL IMMATURE NEUTOROPHIL: 0.13 K/CU MM
WBC # BLD: 9.1 K/CU MM (ref 4–10.5)

## 2021-02-20 PROCEDURE — 94669 MECHANICAL CHEST WALL OSCILL: CPT

## 2021-02-20 PROCEDURE — 94761 N-INVAS EAR/PLS OXIMETRY MLT: CPT

## 2021-02-20 PROCEDURE — 6370000000 HC RX 637 (ALT 250 FOR IP): Performed by: INTERNAL MEDICINE

## 2021-02-20 PROCEDURE — 94640 AIRWAY INHALATION TREATMENT: CPT

## 2021-02-20 PROCEDURE — 2700000000 HC OXYGEN THERAPY PER DAY

## 2021-02-20 PROCEDURE — 85025 COMPLETE CBC W/AUTO DIFF WBC: CPT

## 2021-02-20 PROCEDURE — 82962 GLUCOSE BLOOD TEST: CPT

## 2021-02-20 PROCEDURE — 1200000002 HC SEMI PRIVATE SWING BED

## 2021-02-20 PROCEDURE — 97110 THERAPEUTIC EXERCISES: CPT

## 2021-02-20 PROCEDURE — 36415 COLL VENOUS BLD VENIPUNCTURE: CPT

## 2021-02-20 RX ORDER — TRAZODONE HYDROCHLORIDE 50 MG/1
50 TABLET ORAL NIGHTLY PRN
Status: DISCONTINUED | OUTPATIENT
Start: 2021-02-20 | End: 2021-02-26

## 2021-02-20 RX ADMIN — CARVEDILOL 3.12 MG: 3.12 TABLET, FILM COATED ORAL at 16:59

## 2021-02-20 RX ADMIN — IPRATROPIUM BROMIDE AND ALBUTEROL SULFATE 1 AMPULE: .5; 3 SOLUTION RESPIRATORY (INHALATION) at 15:37

## 2021-02-20 RX ADMIN — CEFDINIR 300 MG: 300 CAPSULE ORAL at 20:26

## 2021-02-20 RX ADMIN — GLIPIZIDE 2.5 MG: 5 TABLET ORAL at 08:25

## 2021-02-20 RX ADMIN — CEFDINIR 300 MG: 300 CAPSULE ORAL at 08:12

## 2021-02-20 RX ADMIN — ASPIRIN 81 MG: 81 TABLET, COATED ORAL at 08:12

## 2021-02-20 RX ADMIN — IPRATROPIUM BROMIDE AND ALBUTEROL SULFATE 1 AMPULE: .5; 3 SOLUTION RESPIRATORY (INHALATION) at 19:23

## 2021-02-20 RX ADMIN — BENZOCAINE AND MENTHOL 1 LOZENGE: 15; 3.6 LOZENGE ORAL at 22:36

## 2021-02-20 RX ADMIN — GUAIFENESIN 1200 MG: 600 TABLET, EXTENDED RELEASE ORAL at 08:11

## 2021-02-20 RX ADMIN — INSULIN LISPRO 2 UNITS: 100 INJECTION, SOLUTION INTRAVENOUS; SUBCUTANEOUS at 12:30

## 2021-02-20 RX ADMIN — AMIODARONE HYDROCHLORIDE 200 MG: 200 TABLET ORAL at 20:26

## 2021-02-20 RX ADMIN — GABAPENTIN 300 MG: 300 CAPSULE ORAL at 08:12

## 2021-02-20 RX ADMIN — GABAPENTIN 300 MG: 300 CAPSULE ORAL at 20:25

## 2021-02-20 RX ADMIN — GABAPENTIN 300 MG: 300 CAPSULE ORAL at 14:09

## 2021-02-20 RX ADMIN — IPRATROPIUM BROMIDE AND ALBUTEROL SULFATE 1 AMPULE: .5; 3 SOLUTION RESPIRATORY (INHALATION) at 07:42

## 2021-02-20 RX ADMIN — IPRATROPIUM BROMIDE AND ALBUTEROL SULFATE 1 AMPULE: .5; 3 SOLUTION RESPIRATORY (INHALATION) at 11:44

## 2021-02-20 RX ADMIN — PRIMIDONE 50 MG: 50 TABLET ORAL at 20:26

## 2021-02-20 RX ADMIN — ACETAMINOPHEN 650 MG: 325 TABLET ORAL at 20:25

## 2021-02-20 RX ADMIN — GUAIFENESIN 1200 MG: 600 TABLET, EXTENDED RELEASE ORAL at 20:25

## 2021-02-20 RX ADMIN — TRAZODONE HYDROCHLORIDE 50 MG: 50 TABLET ORAL at 20:25

## 2021-02-20 RX ADMIN — CARVEDILOL 3.12 MG: 3.12 TABLET, FILM COATED ORAL at 08:13

## 2021-02-20 RX ADMIN — CHOLESTYRAMINE 4 G: 4 POWDER, FOR SUSPENSION ORAL at 20:25

## 2021-02-20 RX ADMIN — PRIMIDONE 50 MG: 50 TABLET ORAL at 08:12

## 2021-02-20 RX ADMIN — AMIODARONE HYDROCHLORIDE 200 MG: 200 TABLET ORAL at 08:13

## 2021-02-20 RX ADMIN — CHOLESTYRAMINE 4 G: 4 POWDER, FOR SUSPENSION ORAL at 08:12

## 2021-02-20 RX ADMIN — ATORVASTATIN CALCIUM 40 MG: 40 TABLET, FILM COATED ORAL at 20:26

## 2021-02-20 RX ADMIN — LISINOPRIL 10 MG: 10 TABLET ORAL at 08:13

## 2021-02-20 ASSESSMENT — PAIN SCALES - GENERAL
PAINLEVEL_OUTOF10: 0
PAINLEVEL_OUTOF10: 2

## 2021-02-20 ASSESSMENT — PAIN - FUNCTIONAL ASSESSMENT: PAIN_FUNCTIONAL_ASSESSMENT: ACTIVITIES ARE NOT PREVENTED

## 2021-02-20 ASSESSMENT — PAIN DESCRIPTION - ORIENTATION: ORIENTATION: RIGHT;LEFT

## 2021-02-20 NOTE — PLAN OF CARE
Problem: Falls - Risk of:  Goal: Will remain free from falls  Description: Will remain free from falls  2/20/2021 0112 by Gustavo Zepeda RN  Outcome: Ongoing  2/19/2021 1128 by Margarita Magallon RN  Outcome: Ongoing  Goal: Absence of physical injury  Description: Absence of physical injury  2/20/2021 0112 by Gustavo Zepeda RN  Outcome: Ongoing  2/19/2021 1128 by Margarita Magallon RN  Outcome: Ongoing     Problem: IP MOBILITY  Goal: LTG - patient will demonstrate kitchen mobility  2/20/2021 0112 by Gustavo Zepeda RN  Outcome: Ongoing  2/19/2021 1128 by Margarita Magallon RN  Outcome: Ongoing  Goal: LTG - patient will ambulate community distance  2/20/2021 0112 by Gustavo Zepeda RN  Outcome: Ongoing  2/19/2021 1128 by Margarita Magallon RN  Outcome: Ongoing  Goal: LTG - patient will ambulate household distance  2/20/2021 0112 by Gustavo Zepeda RN  Outcome: Ongoing  2/19/2021 1128 by Margarita Magallon RN  Outcome: Ongoing  Goal: LTG - Patient will navigate 4-6 steps with rails/device  2/20/2021 0112 by Gustavo Zepeda RN  Outcome: Ongoing  2/19/2021 1128 by Margarita Magallon RN  Outcome: Ongoing  Goal: LTG - patient will demonstrate safe mobility requirements  2/20/2021 0112 by Gustavo Zepeda RN  Outcome: Ongoing  2/19/2021 1128 by Margarita Magallon RN  Outcome: Ongoing  Goal: STG - Patient will ambulate  2/20/2021 0112 by Gustavo Zepeda RN  Outcome: Ongoing  2/19/2021 1128 by Margarita Magallon RN  Outcome: Ongoing     Problem: OXYGENATION/RESPIRATORY FUNCTION  Goal: Patient will maintain patent airway  2/20/2021 0112 by Gustavo Zepeda RN  Outcome: Ongoing  2/19/2021 1128 by Margarita Magallon RN  Outcome: Ongoing  Goal: Patient will achieve/maintain normal respiratory rate/effort  Description: Respiratory rate and effort will be within normal limits for the patient  2/20/2021 0112 by Gustavo Zepeda RN  Outcome: Ongoing  2/19/2021 1128 by Margarita Magallon RN  Outcome: Ongoing     Problem: Pain:

## 2021-02-20 NOTE — PLAN OF CARE
Problem: Falls - Risk of:  Goal: Will remain free from falls  Description: Will remain free from falls  2/20/2021 0829 by Darrin Mccabe  Outcome: Ongoing  2/20/2021 0112 by Lidia Cabrales RN  Outcome: Ongoing  Goal: Absence of physical injury  Description: Absence of physical injury  2/20/2021 0829 by Darrin Mccabe  Outcome: Ongoing  2/20/2021 0112 by Lidia Cabrales RN  Outcome: Ongoing     Problem: IP MOBILITY  Goal: LTG - patient will demonstrate kitchen mobility  2/20/2021 0829 by Darrin Mccabe  Outcome: Ongoing  2/20/2021 0112 by Lidia Cabrales RN  Outcome: Ongoing  Goal: LTG - patient will ambulate community distance  2/20/2021 0829 by Darrin Mccabe  Outcome: Ongoing  2/20/2021 0112 by Lidia Cabrales RN  Outcome: Ongoing  Goal: LTG - patient will ambulate household distance  2/20/2021 0829 by Darrin Mccabe  Outcome: Ongoing  2/20/2021 0112 by Lidia Cabrales RN  Outcome: Ongoing  Goal: LTG - Patient will navigate 4-6 steps with rails/device  2/20/2021 0829 by Darrin Mccabe  Outcome: Ongoing  2/20/2021 0112 by Lidia Cabrales RN  Outcome: Ongoing  Goal: LTG - patient will demonstrate safe mobility requirements  2/20/2021 0829 by Darrin Mccabe  Outcome: Ongoing  2/20/2021 0112 by Lidia Cabrales RN  Outcome: Ongoing  Goal: STG - Patient will ambulate  2/20/2021 0829 by Darrin Mccabe  Outcome: Ongoing  2/20/2021 0112 by Lidia Cabrales RN  Outcome: Ongoing     Problem: OXYGENATION/RESPIRATORY FUNCTION  Goal: Patient will maintain patent airway  2/20/2021 0829 by Darrin Mccabe  Outcome: Ongoing  2/20/2021 0112 by Lidia Cabrales RN  Outcome: Ongoing  Goal: Patient will achieve/maintain normal respiratory rate/effort  Description: Respiratory rate and effort will be within normal limits for the patient  2/20/2021 3513 by Darrin Mccabe  Outcome: Ongoing  2/20/2021 0112 by Lidia Cabrales RN  Outcome: Ongoing     Problem: Pain:  Goal: Pain level will decrease Description: Pain level will decrease  2/20/2021 0829 by Max Vazquez  Outcome: Ongoing  2/20/2021 0112 by Edvin Shankar RN  Outcome: Ongoing  Goal: Control of acute pain  Description: Control of acute pain  2/20/2021 0829 by Max Vazquez  Outcome: Ongoing  2/20/2021 0112 by Edvin Shankar RN  Outcome: Ongoing  Goal: Control of chronic pain  Description: Control of chronic pain  2/20/2021 0829 by Max Vazquez  Outcome: Ongoing  2/20/2021 0112 by Edvin Shankar RN  Outcome: Ongoing

## 2021-02-20 NOTE — FLOWSHEET NOTE
Physical Therapy DailyTreatment Note   Date: 2021 Room: [unfilled]   Name: Dede Maynard : 1945   MRN: [id number]   Admission Date:2021        Rehabilitation Diagnosis: Weakness [R53.1]    Objective                                                                                    Goals:   Short term goals  Time Frame for Short term goals: 1 week or until discharge:  Short term goal 1: Pt will demonstrate the ability to safely perform bed mobility mod I with no HR and HOB flat. Short term goal 2: Pt will demonstrate the ability to safely stand x8 consecutive mintues while performing dynamic balance activities with supervision and no more than 1 hand for support. Short term goal 3: pt will demonstrate the ability to safely ambulate 150 feet consecutive feet with step through gait and  the least restrictive AD and superivsion. Short term goal 4: Pt will demonstrate the ability to safely ambulate up and down 2 steps with a R HR and supervision in 2/2 trials. :   :      Plan of Care                                                                              Times per week: 5+ days per week for a minimum of 15+ minutes/day  Treatment to include Current Treatment Recommendations: Strengthening, Gait Training, ADL/Self-care Training, IADL Training, Balance Training, Functional Mobility Training, Transfer Training, Endurance Training, Neuromuscular Re-education, Patient/Caregiver Education & Training, ROM, Equipment Evaluation, Education, & procurement, [employer], Stair training, Safety Education & Training, Positioning(ther ex, ther act, bed mobility)    Date  2021   TIMES IN/OUT   1:20-1:50 pm   Restrictions/Precautions Restrictions/Precautions: Fall Risk         Current Diet/Swallowing Issues DIET CARB CONTROL;    Communication with other providers: [x] Ok to see per nursing   [] Medical hold and reason  [] Spoke with (team member) regarding Subjective observations:  Pt presented in chair and reports he is exhausted and is not sure he can walk very far. He reports he has not been able to sleep for 2 nights and they are talking about getting him sleeping pills. [x]   Gait belt used during tx session   Pain level/location: Pre-tx  Not rated  Post-tx not rated, pt pt reports constant pain in lower legs below knees. Bed Mobility   Sit to supine: min A   Scooting: dependent x2   Transfers Sit to stand : CGA  Stand to sit CGA poor hand placement. Commode not performed   Standing Tolerance Pt stood in the doorway x2' while looking out and PT hooking up O2. Amb/Locomotion: AD/Distance/Assist Pt ambulated 32' with RW, CGA, and 6 L hi flow O2. Pt continues to demonstrated flexed posture and crouched gait. Pt able to stand upright with cues, but unable to maintain while ambulating. Pt continues to demonstrate decreased step length, but it was improved today from yesterday. He also continues to demonstrate decreased foot clearance. Steps (#)/Assist/Rails/AD Not performed   Ramp:AD/Assist Not performed   Curb:height AD/Assist Not performed   Strategies that improved performance: Pt is hard of hearing and required PT to speak loudly. Barriers to progress/participation: fatigue   Alarm placed/where?   Fall Risk  [x] left in bed  [ ] left in chair [x] call light within reach  [x] bed alarm on  [ ] personal alarm on [ ] Johny Alva  [ ] other staff present:   Discharge recommendations  Anticipated discharge date: TBD  Destination: []home alone   []home alone with assist prn  [x]Continuous supervision  []SNF  [] Assisted living   Continued therapy: [x]HHC PT  []OUTPATIENT  PT   [x]  Further PT  Equipment needs: continue to assess     Therapeutic activities/exercises completed this date: see below    Modality/intervention used:   [x] Therapeutic Exercise    [ ] Modalities:   [x] Therapeutic Activity    [ ] Ultrasound   [ ] Elec Stim [ ] Gait Training     [ ] Cervical Traction  [ ] Lumbar Traction   [ ] Neuromuscular Re-education   [ ] Cold/hotpack  [ ] Iontophoresis   [ ] Instruction in HEP     [ ] Vasopneumatic   [ ] Manual Therapy   [ ] Aquatic Therapy     Patient/Caregiver Education and Training: pt educated on therapy plan over the weekend and to perform exercises on his own again today and a couple times tomorrow.      Exercise/Equipment/Modalities this date   Seated marching: 1x10 B LE   LAQ: 1x10 with 3\" hold B LE difficulty on R   Resisted hip add: 1x10 B LE folded pillow squeeze   Resisted hip abd :1x10 YTB    Resisted knee flexion: 1x10 YTB   Resisted shoulder abd: 1x10 B UE YTB    Resisted biceps curls: 1x10 B UE YTB     Treatment Plan for Next Session: Take pt down to gym in wheelchair and begin NuStep    Assessment / Impression                                                          Treatment/Activity Tolerance:   [x] Tolerated Treatment well:     [x] Patient limited by fatigue/pain:       [] Patient limited by medical complications:    [] Adverse Reaction to Tx:   [] Significant change in status    Plan:   [x] Continue per plan of care [ ] Andi Ramos current plan   [ ] Plan of care initiated [ ] Hold pending MD visit [ ] Discharged    Billing:  Billed units: 2 therapeutic exercise      Signed: Jaci Stoll DPT [id number]  2/20/2021, 1:54 PM

## 2021-02-20 NOTE — PROGRESS NOTES
Comprehensive Nutrition Assessment    Type and Reason for Visit:  Initial(Swing Bed)    Nutrition Recommendations/Plan: Continue with carb controlled diet    Nutrition Assessment:  Pt admitted after a fall and pneumonia. No signficant weight changes. Intakes %. Denies chewing or swallowing issues. Malnutrition Assessment:  Malnutrition Status:  No malnutrition    Context:  Acute Illness     Findings of the 6 clinical characteristics of malnutrition:    Estimated Daily Nutrient Needs:  Energy (kcal):  0368-9051; Weight Used for Energy Requirements:  Current(22-25)     Protein (g):  65-77; Weight Used for Protein Requirements:  Ideal(1-1.2)        Fluid (ml/day):  4398-1428; Method Used for Fluid Requirements:  1 ml/kcal      Nutrition Related Findings:  labs reviewed      Wounds:  None       Current Nutrition Therapies:    DIET CARB CONTROL; Anthropometric Measures:  · Height: 5' 6\" (167.6 cm)  · Current Body Weight: 165 lb (74.8 kg)   · Admission Body Weight: 165 lb (74.8 kg)    · Usual Body Weight: 165 lb (74.8 kg)     · Ideal Body Weight: 142 lbs; % Ideal Body Weight 116.2 %   · BMI: 26.6  · Adjusted Body Weight:  ; No Adjustment   · BMI Categories: Overweight (BMI 25.0-29. 9)       Nutrition Diagnosis:   · In context of acute illness or injury related to pain as evidenced by intake 26-50%, intake 51-75%      Nutrition Interventions:   Food and/or Nutrient Delivery:  Continue Current Diet  Nutrition Education/Counseling:  Education not indicated   Coordination of Nutrition Care:  Continue to monitor while inpatient    Goals:  Oral intakes will improve to % of most meals       Nutrition Monitoring and Evaluation:   Behavioral-Environmental Outcomes:  None Identified   Food/Nutrient Intake Outcomes:  Food and Nutrient Intake  Physical Signs/Symptoms Outcomes:  Biochemical Data, Weight, Meal Time Behavior     Discharge Planning:    Continue current diet Electronically signed by Jerald Diaz RD, LD on 2/20/21 at 6:36 PM EST    Contact: [phone]

## 2021-02-21 ENCOUNTER — APPOINTMENT (OUTPATIENT)
Dept: GENERAL RADIOLOGY | Age: 76
DRG: 193 | End: 2021-02-21
Attending: INTERNAL MEDICINE
Payer: MEDICARE

## 2021-02-21 LAB
BASOPHILS ABSOLUTE: 0 K/CU MM
BASOPHILS RELATIVE PERCENT: 0.4 % (ref 0–1)
DIFFERENTIAL TYPE: ABNORMAL
EOSINOPHILS ABSOLUTE: 0.1 K/CU MM
EOSINOPHILS RELATIVE PERCENT: 1.5 % (ref 0–3)
GLUCOSE BLD-MCNC: 124 MG/DL (ref 70–99)
GLUCOSE BLD-MCNC: 135 MG/DL (ref 70–99)
GLUCOSE BLD-MCNC: 143 MG/DL (ref 70–99)
GLUCOSE BLD-MCNC: 184 MG/DL (ref 70–99)
HCT VFR BLD CALC: 28.3 % (ref 42–52)
HEMOGLOBIN: 9.3 GM/DL (ref 13.5–18)
IMMATURE NEUTROPHIL %: 1.4 % (ref 0–0.43)
LYMPHOCYTES ABSOLUTE: 0.7 K/CU MM
LYMPHOCYTES RELATIVE PERCENT: 8.3 % (ref 24–44)
MCH RBC QN AUTO: 30.7 PG (ref 27–31)
MCHC RBC AUTO-ENTMCNC: 32.9 % (ref 32–36)
MCV RBC AUTO: 93.4 FL (ref 78–100)
MONOCYTES ABSOLUTE: 0.2 K/CU MM
MONOCYTES RELATIVE PERCENT: 2.8 % (ref 0–4)
PDW BLD-RTO: 15.3 % (ref 11.7–14.9)
PLATELET # BLD: 135 K/CU MM (ref 140–440)
PMV BLD AUTO: 10 FL (ref 7.5–11.1)
RBC # BLD: 3.03 M/CU MM (ref 4.6–6.2)
SEGMENTED NEUTROPHILS ABSOLUTE COUNT: 6.8 K/CU MM
SEGMENTED NEUTROPHILS RELATIVE PERCENT: 85.6 % (ref 36–66)
TOTAL IMMATURE NEUTOROPHIL: 0.11 K/CU MM
WBC # BLD: 7.9 K/CU MM (ref 4–10.5)

## 2021-02-21 PROCEDURE — 71046 X-RAY EXAM CHEST 2 VIEWS: CPT

## 2021-02-21 PROCEDURE — 1200000002 HC SEMI PRIVATE SWING BED

## 2021-02-21 PROCEDURE — 6370000000 HC RX 637 (ALT 250 FOR IP): Performed by: INTERNAL MEDICINE

## 2021-02-21 PROCEDURE — 82962 GLUCOSE BLOOD TEST: CPT

## 2021-02-21 PROCEDURE — 94640 AIRWAY INHALATION TREATMENT: CPT

## 2021-02-21 PROCEDURE — 36415 COLL VENOUS BLD VENIPUNCTURE: CPT

## 2021-02-21 PROCEDURE — 2700000000 HC OXYGEN THERAPY PER DAY

## 2021-02-21 PROCEDURE — 85025 COMPLETE CBC W/AUTO DIFF WBC: CPT

## 2021-02-21 RX ADMIN — ASPIRIN 81 MG: 81 TABLET, COATED ORAL at 08:12

## 2021-02-21 RX ADMIN — GABAPENTIN 300 MG: 300 CAPSULE ORAL at 20:17

## 2021-02-21 RX ADMIN — PRIMIDONE 50 MG: 50 TABLET ORAL at 08:11

## 2021-02-21 RX ADMIN — IPRATROPIUM BROMIDE AND ALBUTEROL SULFATE 1 AMPULE: .5; 3 SOLUTION RESPIRATORY (INHALATION) at 19:21

## 2021-02-21 RX ADMIN — LISINOPRIL 10 MG: 10 TABLET ORAL at 08:12

## 2021-02-21 RX ADMIN — IPRATROPIUM BROMIDE AND ALBUTEROL SULFATE 1 AMPULE: .5; 3 SOLUTION RESPIRATORY (INHALATION) at 07:11

## 2021-02-21 RX ADMIN — CEFDINIR 300 MG: 300 CAPSULE ORAL at 08:12

## 2021-02-21 RX ADMIN — PRIMIDONE 50 MG: 50 TABLET ORAL at 20:17

## 2021-02-21 RX ADMIN — AMIODARONE HYDROCHLORIDE 200 MG: 200 TABLET ORAL at 08:11

## 2021-02-21 RX ADMIN — GUAIFENESIN 1200 MG: 600 TABLET, EXTENDED RELEASE ORAL at 20:17

## 2021-02-21 RX ADMIN — GUAIFENESIN 1200 MG: 600 TABLET, EXTENDED RELEASE ORAL at 08:12

## 2021-02-21 RX ADMIN — CARVEDILOL 3.12 MG: 3.12 TABLET, FILM COATED ORAL at 08:12

## 2021-02-21 RX ADMIN — BENZOCAINE AND MENTHOL 1 LOZENGE: 15; 3.6 LOZENGE ORAL at 22:56

## 2021-02-21 RX ADMIN — TRAZODONE HYDROCHLORIDE 50 MG: 50 TABLET ORAL at 22:12

## 2021-02-21 RX ADMIN — ATORVASTATIN CALCIUM 40 MG: 40 TABLET, FILM COATED ORAL at 20:17

## 2021-02-21 RX ADMIN — IPRATROPIUM BROMIDE AND ALBUTEROL SULFATE 1 AMPULE: .5; 3 SOLUTION RESPIRATORY (INHALATION) at 15:18

## 2021-02-21 RX ADMIN — GABAPENTIN 300 MG: 300 CAPSULE ORAL at 14:21

## 2021-02-21 RX ADMIN — CARVEDILOL 3.12 MG: 3.12 TABLET, FILM COATED ORAL at 16:55

## 2021-02-21 RX ADMIN — IPRATROPIUM BROMIDE AND ALBUTEROL SULFATE 1 AMPULE: .5; 3 SOLUTION RESPIRATORY (INHALATION) at 10:29

## 2021-02-21 RX ADMIN — CEFDINIR 300 MG: 300 CAPSULE ORAL at 20:17

## 2021-02-21 RX ADMIN — AMIODARONE HYDROCHLORIDE 200 MG: 200 TABLET ORAL at 20:17

## 2021-02-21 RX ADMIN — INSULIN LISPRO 2 UNITS: 100 INJECTION, SOLUTION INTRAVENOUS; SUBCUTANEOUS at 12:08

## 2021-02-21 RX ADMIN — GABAPENTIN 300 MG: 300 CAPSULE ORAL at 08:11

## 2021-02-21 RX ADMIN — INSULIN LISPRO 2 UNITS: 100 INJECTION, SOLUTION INTRAVENOUS; SUBCUTANEOUS at 16:55

## 2021-02-21 RX ADMIN — GLIPIZIDE 2.5 MG: 5 TABLET ORAL at 08:15

## 2021-02-21 ASSESSMENT — PAIN DESCRIPTION - ORIENTATION: ORIENTATION: RIGHT;LEFT

## 2021-02-21 ASSESSMENT — PAIN SCALES - GENERAL
PAINLEVEL_OUTOF10: 0
PAINLEVEL_OUTOF10: 0

## 2021-02-21 ASSESSMENT — PAIN DESCRIPTION - PAIN TYPE: TYPE: ACUTE PAIN

## 2021-02-21 ASSESSMENT — PAIN DESCRIPTION - LOCATION: LOCATION: BUTTOCKS

## 2021-02-21 NOTE — PLAN OF CARE
Problem: Falls - Risk of:  Goal: Will remain free from falls  Description: Will remain free from falls  Outcome: Met This Shift  Goal: Absence of physical injury  Description: Absence of physical injury  Outcome: Met This Shift     Problem: IP MOBILITY  Goal: LTG - patient will demonstrate kitchen mobility  Outcome: Ongoing  Goal: LTG - patient will ambulate community distance  Outcome: Ongoing  Goal: LTG - patient will ambulate household distance  Outcome: Ongoing  Goal: LTG - Patient will navigate 4-6 steps with rails/device  Outcome: Ongoing  Goal: LTG - patient will demonstrate safe mobility requirements  Outcome: Ongoing  Goal: STG - Patient will ambulate  Outcome: Ongoing     Problem: OXYGENATION/RESPIRATORY FUNCTION  Goal: Patient will maintain patent airway  Outcome: Ongoing  Goal: Patient will achieve/maintain normal respiratory rate/effort  Description: Respiratory rate and effort will be within normal limits for the patient  Outcome: Ongoing     Problem: Pain:  Goal: Pain level will decrease  Description: Pain level will decrease  Outcome: Ongoing  Goal: Control of acute pain  Description: Control of acute pain  Outcome: Ongoing  Goal: Control of chronic pain  Description: Control of chronic pain  Outcome: Ongoing

## 2021-02-21 NOTE — PLAN OF CARE
Problem: Falls - Risk of:  Goal: Will remain free from falls  Description: Will remain free from falls  2/20/2021 2203 by Violetta Traylor RN  Outcome: Ongoing  2/20/2021 0829 by Dedrick Lion  Outcome: Ongoing  Goal: Absence of physical injury  Description: Absence of physical injury  2/20/2021 2203 by Violetta Traylor RN  Outcome: Ongoing  2/20/2021 0829 by Dedrick Lion  Outcome: Ongoing     Problem: IP MOBILITY  Goal: LTG - patient will demonstrate kitchen mobility  2/20/2021 2203 by Violetta Traylor RN  Outcome: Ongoing  2/20/2021 0829 by Dedrick Lion  Outcome: Ongoing  Goal: LTG - patient will ambulate community distance  2/20/2021 2203 by Violetta Traylor RN  Outcome: Ongoing  2/20/2021 0829 by Dedrick Lion  Outcome: Ongoing  Goal: LTG - patient will ambulate household distance  2/20/2021 2203 by Violetta Traylor RN  Outcome: Ongoing  2/20/2021 0829 by Dedrick Lion  Outcome: Ongoing  Goal: LTG - Patient will navigate 4-6 steps with rails/device  2/20/2021 2203 by Violetta Traylor RN  Outcome: Ongoing  2/20/2021 0829 by Dedrick Lion  Outcome: Ongoing  Goal: LTG - patient will demonstrate safe mobility requirements  2/20/2021 2203 by Violetta Traylor RN  Outcome: Ongoing  2/20/2021 0829 by Dedrick Lion  Outcome: Ongoing  Goal: STG - Patient will ambulate  2/20/2021 2203 by Violetta Traylor RN  Outcome: Ongoing  2/20/2021 0829 by Dedrick Lion  Outcome: Ongoing     Problem: OXYGENATION/RESPIRATORY FUNCTION  Goal: Patient will maintain patent airway  2/20/2021 2203 by Violetta Traylor RN  Outcome: Ongoing  2/20/2021 0829 by Dedrick Lion  Outcome: Ongoing  Goal: Patient will achieve/maintain normal respiratory rate/effort  Description: Respiratory rate and effort will be within normal limits for the patient  2/20/2021 2203 by Violetta Traylor RN  Outcome: Ongoing  2/20/2021 0829 by Dedrick Lion  Outcome: Ongoing     Problem: Pain:

## 2021-02-22 ENCOUNTER — CARE COORDINATION (OUTPATIENT)
Dept: CARE COORDINATION | Age: 76
End: 2021-02-22

## 2021-02-22 LAB
ANION GAP SERPL CALCULATED.3IONS-SCNC: 12 MMOL/L (ref 4–16)
BUN BLDV-MCNC: 10 MG/DL (ref 6–23)
CALCIUM SERPL-MCNC: 8.4 MG/DL (ref 8.3–10.6)
CHLORIDE BLD-SCNC: 104 MMOL/L (ref 99–110)
CO2: 22 MMOL/L (ref 21–32)
CREAT SERPL-MCNC: 0.6 MG/DL (ref 0.9–1.3)
GFR AFRICAN AMERICAN: >60 ML/MIN/1.73M2
GFR NON-AFRICAN AMERICAN: >60 ML/MIN/1.73M2
GLUCOSE BLD-MCNC: 111 MG/DL (ref 70–99)
GLUCOSE BLD-MCNC: 123 MG/DL (ref 70–99)
GLUCOSE BLD-MCNC: 129 MG/DL (ref 70–99)
GLUCOSE BLD-MCNC: 187 MG/DL (ref 70–99)
GLUCOSE BLD-MCNC: 96 MG/DL (ref 70–99)
MAGNESIUM: 1.7 MG/DL (ref 1.8–2.4)
POTASSIUM SERPL-SCNC: 3.8 MMOL/L (ref 3.5–5.1)
SODIUM BLD-SCNC: 138 MMOL/L (ref 135–145)

## 2021-02-22 PROCEDURE — 2700000000 HC OXYGEN THERAPY PER DAY

## 2021-02-22 PROCEDURE — 97110 THERAPEUTIC EXERCISES: CPT

## 2021-02-22 PROCEDURE — 6370000000 HC RX 637 (ALT 250 FOR IP): Performed by: INTERNAL MEDICINE

## 2021-02-22 PROCEDURE — 97530 THERAPEUTIC ACTIVITIES: CPT

## 2021-02-22 PROCEDURE — 83735 ASSAY OF MAGNESIUM: CPT

## 2021-02-22 PROCEDURE — 1200000002 HC SEMI PRIVATE SWING BED

## 2021-02-22 PROCEDURE — 36415 COLL VENOUS BLD VENIPUNCTURE: CPT

## 2021-02-22 PROCEDURE — 97112 NEUROMUSCULAR REEDUCATION: CPT

## 2021-02-22 PROCEDURE — 97116 GAIT TRAINING THERAPY: CPT

## 2021-02-22 PROCEDURE — 80048 BASIC METABOLIC PNL TOTAL CA: CPT

## 2021-02-22 PROCEDURE — 94640 AIRWAY INHALATION TREATMENT: CPT

## 2021-02-22 PROCEDURE — 82962 GLUCOSE BLOOD TEST: CPT

## 2021-02-22 RX ORDER — LANOLIN ALCOHOL/MO/W.PET/CERES
400 CREAM (GRAM) TOPICAL DAILY
Status: DISCONTINUED | OUTPATIENT
Start: 2021-02-22 | End: 2021-03-02

## 2021-02-22 RX ADMIN — AMIODARONE HYDROCHLORIDE 200 MG: 200 TABLET ORAL at 09:12

## 2021-02-22 RX ADMIN — IPRATROPIUM BROMIDE AND ALBUTEROL SULFATE 1 AMPULE: .5; 3 SOLUTION RESPIRATORY (INHALATION) at 14:55

## 2021-02-22 RX ADMIN — GLIPIZIDE 2.5 MG: 5 TABLET ORAL at 09:36

## 2021-02-22 RX ADMIN — GABAPENTIN 300 MG: 300 CAPSULE ORAL at 09:12

## 2021-02-22 RX ADMIN — Medication 400 MG: at 09:12

## 2021-02-22 RX ADMIN — CHOLESTYRAMINE 4 G: 4 POWDER, FOR SUSPENSION ORAL at 21:07

## 2021-02-22 RX ADMIN — ASPIRIN 81 MG: 81 TABLET, COATED ORAL at 09:12

## 2021-02-22 RX ADMIN — PRIMIDONE 50 MG: 50 TABLET ORAL at 21:06

## 2021-02-22 RX ADMIN — CEFDINIR 300 MG: 300 CAPSULE ORAL at 21:07

## 2021-02-22 RX ADMIN — GABAPENTIN 300 MG: 300 CAPSULE ORAL at 13:14

## 2021-02-22 RX ADMIN — PRIMIDONE 50 MG: 50 TABLET ORAL at 09:12

## 2021-02-22 RX ADMIN — TRAZODONE HYDROCHLORIDE 50 MG: 50 TABLET ORAL at 22:49

## 2021-02-22 RX ADMIN — CARVEDILOL 3.12 MG: 3.12 TABLET, FILM COATED ORAL at 16:56

## 2021-02-22 RX ADMIN — IPRATROPIUM BROMIDE AND ALBUTEROL SULFATE 1 AMPULE: .5; 3 SOLUTION RESPIRATORY (INHALATION) at 07:15

## 2021-02-22 RX ADMIN — GUAIFENESIN 1200 MG: 600 TABLET, EXTENDED RELEASE ORAL at 21:06

## 2021-02-22 RX ADMIN — LISINOPRIL 10 MG: 10 TABLET ORAL at 09:12

## 2021-02-22 RX ADMIN — ATORVASTATIN CALCIUM 40 MG: 40 TABLET, FILM COATED ORAL at 21:06

## 2021-02-22 RX ADMIN — GABAPENTIN 300 MG: 300 CAPSULE ORAL at 21:06

## 2021-02-22 RX ADMIN — BENZOCAINE AND MENTHOL 1 LOZENGE: 15; 3.6 LOZENGE ORAL at 22:49

## 2021-02-22 RX ADMIN — IPRATROPIUM BROMIDE AND ALBUTEROL SULFATE 1 AMPULE: .5; 3 SOLUTION RESPIRATORY (INHALATION) at 10:26

## 2021-02-22 RX ADMIN — IPRATROPIUM BROMIDE AND ALBUTEROL SULFATE 1 AMPULE: .5; 3 SOLUTION RESPIRATORY (INHALATION) at 18:23

## 2021-02-22 RX ADMIN — GUAIFENESIN 1200 MG: 600 TABLET, EXTENDED RELEASE ORAL at 09:11

## 2021-02-22 RX ADMIN — AMIODARONE HYDROCHLORIDE 200 MG: 200 TABLET ORAL at 21:06

## 2021-02-22 RX ADMIN — CEFDINIR 300 MG: 300 CAPSULE ORAL at 09:12

## 2021-02-22 RX ADMIN — CARVEDILOL 3.12 MG: 3.12 TABLET, FILM COATED ORAL at 09:11

## 2021-02-22 ASSESSMENT — PAIN SCALES - GENERAL
PAINLEVEL_OUTOF10: 0
PAINLEVEL_OUTOF10: 2

## 2021-02-22 NOTE — PROGRESS NOTES
Occupational Therapy    Occupational Therapy Treatment Note  Name: Madelin Schmidt MRN: [id number] :   1945   Date:  2021   Admission Date: 2021 Room:  [address] [place] [address]   Restrictions/Precautions:  Restrictions/Precautions  Restrictions/Precautions: Fall Risk  Required Braces or Orthoses?: No     Communication with other providers:   Cleared for treatment by RN. Subjective:  Patient states:  \"I don't want to get dressed until I go home\"  Pain:   Location, Type, Intensity (0/10 to 10/10): No pain noted. Objective:    Observation:  Pt alert and oriented. Treatment, including education:  Transfers  Supine to sit :SBA  Sit to supine :SBA  Scooting :SBA  Rolling :SBA  Sit to stand :SBA  Stand to sit :SBA  SPT:SBA        Therapeutic Activity Training:   Therapeutic activity training was instructed today. Cues were given for safety, sequence, UE/LE placement, awareness, and balance. Activities performed today included bed mobility training, sup-sit, sit-stand, SPT. Pt stood x 4-5 min with CGA x 3 attmepts with CGA to facilitate increased endurance/strength for ADL tasks and transfers. Pt completed functional mobility with RW x 65' x 2. Safety Measures: Gait belt used, Left in bed, Pull/Bed Alarm activated and call light left in reach        Assessment / Impression:        Patient's tolerance of treatment: Good   Adverse Reaction: None  Significant change in status and impact:  None  Barriers to improvement:  Dec strength and endurance    Plan for Next Session:    Continue with POC.     Time in:  1410  Time out:  1453  Timed treatment minutes:  43  Total treatment time:  37  Electronically signed by:    [address]    2021, 3:28 PM    Previously filed values:  Patient Goals   Patient goals : to return home  Short term goals  Time Frame for Short term goals: Until goals met or discharge  Short term goal 1: Pt will complete all UB ADLs mod I for increased functional I Short term goal 2: Pt will complete all LB ADLs min A for increased functional I  Short term goal 3: Pt will complete all aspects of toileting SBA for increased functional I  Short term goal 4: Pt will complete all functional transfers and bed mobility SBA in prep for EOB/OOB ADL tasks.   Short term goal 5: Pt will participate in therex/theract for 5+ minutes with emphasis on UE strengthning and dynamic balance for increased I with IADL/home management tasks

## 2021-02-22 NOTE — PROGRESS NOTES
Patient left safely in the bed, with call light/phone in reach with alarm applied. Gait belt and mask were used for transfers and gait. Pt requires increased time and effort due to need of rest breaks during both sessions this date. Assessment / Impression:       Patient's tolerance of treatment:  good   Adverse Reaction: none  Significant change in status and impact:  none  Barriers to improvement:  Fatigue    Plan for Next Session:    Will cont to work towards pt's goals per his tolerance    Time in:  938  Time out:  1018  Timed treatment minutes:  40  2ndTime in:  1410  2ndTime out:  1453  Timed treatment minutes: 43  Total treatment time:  80    Previously filed items:  Social/Functional History  Lives With: Spouse  Type of Home: House  Home Layout: Two level, Performs ADL's on one level, Able to Live on Main level with bedroom/bathroom  Home Access: Stairs to enter with rails  Entrance Stairs - Number of Steps: 2 steps  Entrance Stairs - Rails: Right(going up)  Bathroom Shower/Tub: Walk-in shower, Shower chair without back  Bathroom Toilet: Standard  Bathroom Equipment: Grab bars in shower, Hand-held shower  Home Equipment: 4 wheeled walker, Cane(pt reports he was using no AD prior to falling.)  Receives Help From: Family  ADL Assistance: Independent  Homemaking Assistance: Independent  Homemaking Responsibilities: Yes  Meal Prep Responsibility: Secondary(pt reports they do the cooking 50/50)  Laundry Responsibility: No  Cleaning Responsibility: Secondary(pt reports he does the dishes.)  Shopping Responsibility: Primary(pt reports he was doing the grocery shopping. he reports he gets worn out walking through store.)  Ambulation Assistance: Independent  Transfer Assistance: Independent  Active : Yes  Mode of Transportation: Car  Occupation: Retired  Type of occupation: pt is retired from [employer]. Leisure & Hobbies: Pt reports he shoots troublesome animals. IADL Comments: pt reports he was I with dressing and bathing. Additional Comments: Pt reports he has a hx of falls. Reports he is normally walking when he falls and wasn't using an AD  Short term goals  Time Frame for Short term goals: 1 week or until discharge:  Short term goal 1: Pt will demonstrate the ability to safely perform bed mobility mod I with no HR and HOB flat. Short term goal 2: Pt will demonstrate the ability to safely stand x8 consecutive mintues while performing dynamic balance activities with supervision and no more than 1 hand for support. Short term goal 3: pt will demonstrate the ability to safely ambulate 150 feet consecutive feet with step through gait and  the least restrictive AD and superivsion. Short term goal 4: Pt will demonstrate the ability to safely ambulate up and down 2 steps with a R HR and supervision in 2/2 trials.        Electronically signed by:    Eliot Peña PTA  2/22/2021, 11:05 AM

## 2021-02-23 LAB
EKG ATRIAL RATE: 78 BPM
EKG DIAGNOSIS: NORMAL
EKG P AXIS: 104 DEGREES
EKG P-R INTERVAL: 212 MS
EKG Q-T INTERVAL: 380 MS
EKG QRS DURATION: 80 MS
EKG QTC CALCULATION (BAZETT): 433 MS
EKG R AXIS: 12 DEGREES
EKG T AXIS: 10 DEGREES
EKG VENTRICULAR RATE: 78 BPM
GLUCOSE BLD-MCNC: 132 MG/DL (ref 70–99)
GLUCOSE BLD-MCNC: 144 MG/DL (ref 70–99)
GLUCOSE BLD-MCNC: 148 MG/DL (ref 70–99)
GLUCOSE BLD-MCNC: 97 MG/DL (ref 70–99)

## 2021-02-23 PROCEDURE — 97110 THERAPEUTIC EXERCISES: CPT

## 2021-02-23 PROCEDURE — 6370000000 HC RX 637 (ALT 250 FOR IP): Performed by: INTERNAL MEDICINE

## 2021-02-23 PROCEDURE — 82962 GLUCOSE BLOOD TEST: CPT

## 2021-02-23 PROCEDURE — 2700000000 HC OXYGEN THERAPY PER DAY

## 2021-02-23 PROCEDURE — 97116 GAIT TRAINING THERAPY: CPT

## 2021-02-23 PROCEDURE — 1200000002 HC SEMI PRIVATE SWING BED

## 2021-02-23 PROCEDURE — 97530 THERAPEUTIC ACTIVITIES: CPT

## 2021-02-23 PROCEDURE — 94640 AIRWAY INHALATION TREATMENT: CPT

## 2021-02-23 RX ORDER — TRAMADOL HYDROCHLORIDE 50 MG/1
25 TABLET ORAL EVERY 6 HOURS PRN
Status: DISCONTINUED | OUTPATIENT
Start: 2021-02-23 | End: 2021-03-05 | Stop reason: HOSPADM

## 2021-02-23 RX ORDER — IBUPROFEN 400 MG/1
400 TABLET ORAL EVERY 6 HOURS PRN
Status: DISCONTINUED | OUTPATIENT
Start: 2021-02-23 | End: 2021-03-05 | Stop reason: HOSPADM

## 2021-02-23 RX ADMIN — ATORVASTATIN CALCIUM 40 MG: 40 TABLET, FILM COATED ORAL at 21:21

## 2021-02-23 RX ADMIN — LISINOPRIL 10 MG: 10 TABLET ORAL at 08:14

## 2021-02-23 RX ADMIN — PRIMIDONE 50 MG: 50 TABLET ORAL at 08:14

## 2021-02-23 RX ADMIN — AMIODARONE HYDROCHLORIDE 200 MG: 200 TABLET ORAL at 08:14

## 2021-02-23 RX ADMIN — INSULIN LISPRO 2 UNITS: 100 INJECTION, SOLUTION INTRAVENOUS; SUBCUTANEOUS at 08:15

## 2021-02-23 RX ADMIN — IPRATROPIUM BROMIDE AND ALBUTEROL SULFATE 1 AMPULE: .5; 3 SOLUTION RESPIRATORY (INHALATION) at 20:29

## 2021-02-23 RX ADMIN — CEFDINIR 300 MG: 300 CAPSULE ORAL at 21:21

## 2021-02-23 RX ADMIN — GLIPIZIDE 2.5 MG: 5 TABLET ORAL at 08:22

## 2021-02-23 RX ADMIN — CEFDINIR 300 MG: 300 CAPSULE ORAL at 08:14

## 2021-02-23 RX ADMIN — CARVEDILOL 3.12 MG: 3.12 TABLET, FILM COATED ORAL at 16:52

## 2021-02-23 RX ADMIN — IPRATROPIUM BROMIDE AND ALBUTEROL SULFATE 1 AMPULE: .5; 3 SOLUTION RESPIRATORY (INHALATION) at 14:18

## 2021-02-23 RX ADMIN — GUAIFENESIN 1200 MG: 600 TABLET, EXTENDED RELEASE ORAL at 08:14

## 2021-02-23 RX ADMIN — AMIODARONE HYDROCHLORIDE 200 MG: 200 TABLET ORAL at 21:21

## 2021-02-23 RX ADMIN — INSULIN LISPRO 2 UNITS: 100 INJECTION, SOLUTION INTRAVENOUS; SUBCUTANEOUS at 16:50

## 2021-02-23 RX ADMIN — IPRATROPIUM BROMIDE AND ALBUTEROL SULFATE 1 AMPULE: .5; 3 SOLUTION RESPIRATORY (INHALATION) at 07:09

## 2021-02-23 RX ADMIN — Medication 400 MG: at 08:14

## 2021-02-23 RX ADMIN — CARVEDILOL 3.12 MG: 3.12 TABLET, FILM COATED ORAL at 08:14

## 2021-02-23 RX ADMIN — ASPIRIN 81 MG: 81 TABLET, COATED ORAL at 08:14

## 2021-02-23 RX ADMIN — GABAPENTIN 300 MG: 300 CAPSULE ORAL at 08:14

## 2021-02-23 RX ADMIN — IPRATROPIUM BROMIDE AND ALBUTEROL SULFATE 1 AMPULE: .5; 3 SOLUTION RESPIRATORY (INHALATION) at 10:24

## 2021-02-23 RX ADMIN — PRIMIDONE 50 MG: 50 TABLET ORAL at 21:22

## 2021-02-23 RX ADMIN — TRAZODONE HYDROCHLORIDE 50 MG: 50 TABLET ORAL at 22:05

## 2021-02-23 RX ADMIN — GUAIFENESIN 1200 MG: 600 TABLET, EXTENDED RELEASE ORAL at 21:21

## 2021-02-23 RX ADMIN — GABAPENTIN 300 MG: 300 CAPSULE ORAL at 21:21

## 2021-02-23 RX ADMIN — GABAPENTIN 300 MG: 300 CAPSULE ORAL at 15:03

## 2021-02-23 ASSESSMENT — PAIN SCALES - GENERAL: PAINLEVEL_OUTOF10: 0

## 2021-02-23 NOTE — PROGRESS NOTES
Occupational Therapy    Occupational Therapy Treatment Note  Name: Gemini Notice MRN: [id number] :   1945   Date:  2021   Admission Date: 2021 Room:  [address] [place], [place] [id number]   Restrictions/Precautions:  Restrictions/Precautions  Restrictions/Precautions: Fall Risk  Required Braces or Orthoses?: No     Communication with other providers:   Cleared for treatment by RN. Subjective:  Patient states:  \"I guess I will get out and take a walk\"  Pain:   Location, Type, Intensity (0/10 to 10/10):  0/10    Objective:    Observation:  Pt alert and oriented. Treatment, including education:  Transfers  Supine to sit :Sup  Sit to supine :Sup  Scooting :Sup  Rolling :SUP  Sit to stand :SBA  Stand to sit :SBA  SPT:SBA      Therapeutic Exercise:  Cues were given for technique, safety, recruitment, and rationale. Cues were verbal and/or tactile. Pt completed Nustep on level 2 x 6 min. Therapeutic Activity Training:   Therapeutic activity training was instructed today. Cues were given for safety, sequence, UE/LE placement, awareness, and balance. Activities performed today included bed mobility training, sup-sit, sit-stand, SPT. Pt completed 50' x 2 with functional mobility with RW. Pt stood x 5 min x 2 with SBA with RW to facilitate increased endurance/strength for ADL tasks and transfers. Safety Measures: Gait belt used, Left in bed, Pull/Bed Alarm activated and call light left in reach        Assessment / Impression:        Patient's tolerance of treatment: Good   Adverse Reaction: None  Significant change in status and impact:  None  Barriers to improvement:  None    Plan for Next Session:    Continue with POC.     Time in:  [id number] 08 11  Time out:  0812  1502  Timed treatment minutes:  40  Total treatment time:  40  Electronically signed by:    MELVIN Martinez 2021, 3:19 PM    Previously filed values:  Patient Goals   Patient goals : to return home  Short term goals Time Frame for Short term goals: Until goals met or discharge  Short term goal 1: Pt will complete all UB ADLs mod I for increased functional I  Short term goal 2: Pt will complete all LB ADLs min A for increased functional I  Short term goal 3: Pt will complete all aspects of toileting SBA for increased functional I  Short term goal 4: Pt will complete all functional transfers and bed mobility SBA in prep for EOB/OOB ADL tasks.   Short term goal 5: Pt will participate in therex/theract for 5+ minutes with emphasis on UE strengthning and dynamic balance for increased I with IADL/home management tasks

## 2021-02-23 NOTE — CARE COORDINATION
[facility] BED WEEKLY TEAM SHEET     Sujeytannerkulwinder Jones   2/23/2021   WEEK # 1    Care Management    Issues to be resolved  before discharge: increase strength, mobility and endurance    Family Education: staff and patient to update    Discharge Plan: patient states he wants to go home w/[facility]   Patient/Family Adjustment     Goals of previous week:   [x] 1st team   [] met   [] partially met    [] not met             Why goals were not met: bilateral weakness-- strength ranges from 2/5 to 4/5. Pt walked 65 feet and goal is 150 feet.     Skilled Level of Care Remains   [x] yes   [] no    Estimated length of stay: 1- 2 weeks  Patient/Family Concerns/input: none at this time    Patient/Family  Signature _________________  2/23/2021       Care Management Signature:  Henna Walters RN

## 2021-02-23 NOTE — PROGRESS NOTES
Physical Therapy    Physical Therapy Treatment Note  Name: Theresa Pang MRN: [id number] :   1945   Date:  2021   Admission Date: 2021 Room:  [address] [place] [address]     Restrictions/Precautions:  Restrictions/Precautions  Restrictions/Precautions: Fall Risk  Required Braces or Orthoses?: No         Communication with other providers:  RN approves tx session. Subjective:  Patient states:  Agreeable to tx session; \"I hate that chair\"    Pain:   Location, Type, Intensity (0/10 to 10/10):  No c/o pain    Objective:    Observation:  Pt in chair upon arrival.    Treatment, including education/measures:  Transfers  Sit to supine :Sukumar for LE negotiation   Scooting :SBA with bed features  Rolling :CGA with bed features  Sit to stand :CG/Sukumar  Stand to sit :CGA; VC's for eccentric control and to reach back for chair  Pt states concerns about STS from lower heights \"my couch is lower to the ground at home\". Pt completes 3 STS from lower surface chair with Sukumra and VC/TC's for hand placement. Gait:  Pt amb with RW for 65 ft x2 with CG assist; pt amb with decreased lissette and step length, with forward posture. Also noted that during amb pt does not achieve full knee extension on either LE. Pt needed VC's for pathway, posture, safety. Pt able to tolerate 6min on Nu-step L1 this date    Safety  Patient left safely in the bed, with call light/phone in reach with alarm applied. Gait belt and mask were used for transfers and gait.     Assessment / Impression:       Patient's tolerance of treatment:  good   Adverse Reaction: none  Significant change in status and impact:  none  Barriers to improvement:  Fatigue    Plan for Next Session:    Will cont to work towards pt's goals per his tolerance    Time in:  934  Time out:  1005  Timed treatment minutes: 31   Total treatment time:  31    Previously filed items:  Social/Functional History  Lives With: Spouse  Type of Home: [employer] Home Layout: Two level, Performs ADL's on one level, Able to Live on Main level with bedroom/bathroom  Home Access: Stairs to enter with rails  Entrance Stairs - Number of Steps: 2 steps  Entrance Stairs - Rails: Right(going up)  Bathroom Shower/Tub: Walk-in shower, Shower chair without back  Bathroom Toilet: Standard  Bathroom Equipment: Grab bars in shower, Hand-held shower  Home Equipment: 4 wheeled walker, Cane(pt reports he was using no AD prior to falling.)  Receives Help From: Family  ADL Assistance: Independent  Homemaking Assistance: Independent  Homemaking Responsibilities: Yes  Meal Prep Responsibility: Secondary(pt reports they do the cooking 50/50)  Laundry Responsibility: No  Cleaning Responsibility: Secondary(pt reports he does the dishes.)  Shopping Responsibility: Primary(pt reports he was doing the grocery shopping. he reports he gets worn out walking through store.)  Ambulation Assistance: Independent  Transfer Assistance: Independent  Active : Yes  Mode of Transportation: Car  Occupation: Retired  Type of occupation: pt is retired from [employer]. Leisure & Hobbies: Pt reports he shoots troublesome animals. IADL Comments: pt reports he was I with dressing and bathing. Additional Comments: Pt reports he has a hx of falls. Reports he is normally walking when he falls and wasn't using an AD  Short term goals  Time Frame for Short term goals: 1 week or until discharge:  Short term goal 1: Pt will demonstrate the ability to safely perform bed mobility mod I with no HR and HOB flat. Short term goal 2: Pt will demonstrate the ability to safely stand x8 consecutive mintues while performing dynamic balance activities with supervision and no more than 1 hand for support. Short term goal 3: pt will demonstrate the ability to safely ambulate 150 feet consecutive feet with step through gait and  the least restrictive AD and superivsion. Short term goal 4: Pt will demonstrate the ability to safely ambulate up and down 2 steps with a R HR and supervision in 2/2 trials.        Electronically signed by:    Mae Yen PTA/ Sasha Reza PT  2/23/2021, 10:17 AM

## 2021-02-23 NOTE — PROGRESS NOTES
Physical Therapy    Physical Therapy Treatment Note  Name: Rosalie Mckenna MRN: [id number] :   1945   Date:  2021   Admission Date: 2021 Room:  [address] [place], [place] [id number]     Restrictions/Precautions:  Restrictions/Precautions  Restrictions/Precautions: Fall Risk  Required Braces or Orthoses?: No         Communication with other providers:  RN approves tx session. Nursing notified of patients request for more cream on his heel. Subjective:  Patient states:  Agreeable to tx session; But refused amb this afternoon, and states: \"I hate that chair and this bed is very uncomfortable\" patient also c/o his heel hurting and needing more cream on it. Pain:   Location, Type, Intensity (0/10 to 10/10):  No c/o pain    Objective:    Observation:  Patient semi reclined in bed upon entering    Treatment, including education/measures:   Exercises:  AP 20x, Glut sets 20x, QS 20x, Heel sldies 10x B, Hip AB/AD 10x B, SLR 10x B    Safety  Patient left safely in the bed, with call light/phone in reach with alarm applied.       Assessment / Impression:       Patient's tolerance of treatment:  good   Adverse Reaction: none  Significant change in status and impact:  none  Barriers to improvement:  Fatigue    Plan for Next Session:    Will cont to work towards pt's goals per his tolerance    Time in:   1540  Time out: 1555    Timed treatment minutes: 15te    Total treatment time: 15      Previously filed items:  Social/Functional History  Lives With: Spouse  Type of Home: House  Home Layout: Two level, Performs ADL's on one level, Able to Live on Main level with bedroom/bathroom  Home Access: Stairs to enter with rails  Entrance Stairs - Number of Steps: 2 steps  Entrance Stairs - Rails: Right(going up)  Bathroom Shower/Tub: Walk-in shower, Shower chair without back  Bathroom Toilet: Standard  Bathroom Equipment: Grab bars in shower, Hand-held shower Home Equipment: 4 wheeled walker, Cane(pt reports he was using no AD prior to falling.)  Receives Help From: Family  ADL Assistance: Independent  Homemaking Assistance: Independent  Homemaking Responsibilities: Yes  Meal Prep Responsibility: Secondary(pt reports they do the cooking 50/50)  Laundry Responsibility: No  Cleaning Responsibility: Secondary(pt reports he does the dishes.)  Shopping Responsibility: Primary(pt reports he was doing the grocery shopping. he reports he gets worn out walking through store.)  Ambulation Assistance: Independent  Transfer Assistance: Independent  Active : Yes  Mode of Transportation: Car  Occupation: Retired  Type of occupation: pt is retired from [employer]. Leisure & Hobbies: Pt reports he shoots troublesome animals. IADL Comments: pt reports he was I with dressing and bathing. Additional Comments: Pt reports he has a hx of falls. Reports he is normally walking when he falls and wasn't using an AD  Short term goals  Time Frame for Short term goals: 1 week or until discharge:  Short term goal 1: Pt will demonstrate the ability to safely perform bed mobility mod I with no HR and HOB flat. Short term goal 2: Pt will demonstrate the ability to safely stand x8 consecutive mintues while performing dynamic balance activities with supervision and no more than 1 hand for support. Short term goal 3: pt will demonstrate the ability to safely ambulate 150 feet consecutive feet with step through gait and  the least restrictive AD and superivsion. Short term goal 4: Pt will demonstrate the ability to safely ambulate up and down 2 steps with a R HR and supervision in 2/2 trials.        Electronically signed by:     Purnima Valles PTA  2/23/2021, 4:31 PM

## 2021-02-23 NOTE — PATIENT CARE CONFERENCE
SWING BED WEEKLY TEAM SHEET      Galindo Jezchari   2/23/2021             WEEK# 1`    PHYSICAL THERAPY       Ambulation: Distance:  72 ft    Device:  RW   Assist:  CGA    Wt bearing:      W/C skills:  Propulsion: n/a      W/C parts management:  n/a   Stairs:  Amount/size:n/a       Assist:  n/a      Device:n/a      Pain:     Transfers:   Sit to stand: CGA /min A   Stand to sit:      CGA         Bed Mobility:  Rolls right: CGA     Rolls left: CGA    Positioning:     Supine to sit:  CGA     Sit to supine:  Min A for leg control        Strength/ROM:  B leg weakness- strength ranges from 2/5 to 4/5     Balance:     Static sitting    [] P  [] F-   [] F    [x] F+    [] G               Dynamic Sitting           [] P  [] F-   [] F    [x] F+    [] G                     Static standing            [] P  [] F-   [] F    [x] F+    [] G   [x]  With  [] Without device Dynamic standing       [] P  [] F-   [] F    [x] F+    [] G   [x]  With  [] Without device  (P= poor   F= fair   G= good)    Issues to be resolved before discharge    Goals of previous week  [x] 1st team   [] met   [] partially met    [] not met                                          Why the goals were not met     Goals:   Short term goal 1: Pt will demonstrate the ability to safely perform bed mobility mod I with no HR and HOB flat. Short term goal 2: Pt will demonstrate the ability to safely stand x8 consecutive mintues while performing dynamic balance activities with supervision and no more than 1 hand for support. Short term goal 3: pt will demonstrate the ability to safely ambulate 150 feet consecutive feet with step through gait and  the least restrictive AD and superivsion. Short term goal 4: Pt will demonstrate the ability to safely ambulate up and down 2 steps with a R HR and supervision in 2/2 trials. Patient Goals   Patient goals : to strengthen LE so he can return home.   Updated Goals:        Physical Therapy Signature:  Tanesha Hutton, PT

## 2021-02-24 LAB
GLUCOSE BLD-MCNC: 104 MG/DL (ref 70–99)
GLUCOSE BLD-MCNC: 126 MG/DL (ref 70–99)
GLUCOSE BLD-MCNC: 131 MG/DL (ref 70–99)
GLUCOSE BLD-MCNC: 301 MG/DL (ref 70–99)

## 2021-02-24 PROCEDURE — 97535 SELF CARE MNGMENT TRAINING: CPT

## 2021-02-24 PROCEDURE — 82962 GLUCOSE BLOOD TEST: CPT

## 2021-02-24 PROCEDURE — 6370000000 HC RX 637 (ALT 250 FOR IP): Performed by: NURSE PRACTITIONER

## 2021-02-24 PROCEDURE — 97530 THERAPEUTIC ACTIVITIES: CPT

## 2021-02-24 PROCEDURE — 94761 N-INVAS EAR/PLS OXIMETRY MLT: CPT

## 2021-02-24 PROCEDURE — 6370000000 HC RX 637 (ALT 250 FOR IP): Performed by: INTERNAL MEDICINE

## 2021-02-24 PROCEDURE — 94640 AIRWAY INHALATION TREATMENT: CPT

## 2021-02-24 PROCEDURE — 1200000002 HC SEMI PRIVATE SWING BED

## 2021-02-24 PROCEDURE — 97116 GAIT TRAINING THERAPY: CPT

## 2021-02-24 PROCEDURE — 2700000000 HC OXYGEN THERAPY PER DAY

## 2021-02-24 PROCEDURE — 97110 THERAPEUTIC EXERCISES: CPT

## 2021-02-24 RX ADMIN — ACETAMINOPHEN 650 MG: 325 TABLET ORAL at 10:24

## 2021-02-24 RX ADMIN — CARVEDILOL 3.12 MG: 3.12 TABLET, FILM COATED ORAL at 18:59

## 2021-02-24 RX ADMIN — GABAPENTIN 300 MG: 300 CAPSULE ORAL at 19:54

## 2021-02-24 RX ADMIN — BENZOCAINE AND MENTHOL 1 LOZENGE: 15; 3.6 LOZENGE ORAL at 21:54

## 2021-02-24 RX ADMIN — CARVEDILOL 3.12 MG: 3.12 TABLET, FILM COATED ORAL at 08:09

## 2021-02-24 RX ADMIN — CEFDINIR 300 MG: 300 CAPSULE ORAL at 08:08

## 2021-02-24 RX ADMIN — GUAIFENESIN 1200 MG: 600 TABLET, EXTENDED RELEASE ORAL at 08:08

## 2021-02-24 RX ADMIN — CEFDINIR 300 MG: 300 CAPSULE ORAL at 19:53

## 2021-02-24 RX ADMIN — ASPIRIN 81 MG: 81 TABLET, COATED ORAL at 08:08

## 2021-02-24 RX ADMIN — GLIPIZIDE 2.5 MG: 5 TABLET ORAL at 08:09

## 2021-02-24 RX ADMIN — IPRATROPIUM BROMIDE AND ALBUTEROL SULFATE 1 AMPULE: .5; 3 SOLUTION RESPIRATORY (INHALATION) at 07:22

## 2021-02-24 RX ADMIN — TRAMADOL HYDROCHLORIDE 25 MG: 50 TABLET, COATED ORAL at 18:59

## 2021-02-24 RX ADMIN — Medication 400 MG: at 08:10

## 2021-02-24 RX ADMIN — IPRATROPIUM BROMIDE AND ALBUTEROL SULFATE 1 AMPULE: .5; 3 SOLUTION RESPIRATORY (INHALATION) at 19:08

## 2021-02-24 RX ADMIN — PRIMIDONE 50 MG: 50 TABLET ORAL at 08:10

## 2021-02-24 RX ADMIN — LISINOPRIL 10 MG: 10 TABLET ORAL at 08:10

## 2021-02-24 RX ADMIN — AMIODARONE HYDROCHLORIDE 200 MG: 200 TABLET ORAL at 08:10

## 2021-02-24 RX ADMIN — IPRATROPIUM BROMIDE AND ALBUTEROL SULFATE 1 AMPULE: .5; 3 SOLUTION RESPIRATORY (INHALATION) at 11:40

## 2021-02-24 RX ADMIN — TRAZODONE HYDROCHLORIDE 50 MG: 50 TABLET ORAL at 21:54

## 2021-02-24 RX ADMIN — GUAIFENESIN 1200 MG: 600 TABLET, EXTENDED RELEASE ORAL at 19:53

## 2021-02-24 RX ADMIN — PRIMIDONE 50 MG: 50 TABLET ORAL at 19:53

## 2021-02-24 RX ADMIN — AMIODARONE HYDROCHLORIDE 200 MG: 200 TABLET ORAL at 19:53

## 2021-02-24 RX ADMIN — CHOLESTYRAMINE 4 G: 4 POWDER, FOR SUSPENSION ORAL at 10:24

## 2021-02-24 RX ADMIN — GABAPENTIN 300 MG: 300 CAPSULE ORAL at 08:10

## 2021-02-24 RX ADMIN — ATORVASTATIN CALCIUM 40 MG: 40 TABLET, FILM COATED ORAL at 19:54

## 2021-02-24 RX ADMIN — GABAPENTIN 300 MG: 300 CAPSULE ORAL at 13:34

## 2021-02-24 RX ADMIN — IPRATROPIUM BROMIDE AND ALBUTEROL SULFATE 1 AMPULE: .5; 3 SOLUTION RESPIRATORY (INHALATION) at 15:38

## 2021-02-24 ASSESSMENT — PAIN - FUNCTIONAL ASSESSMENT
PAIN_FUNCTIONAL_ASSESSMENT: ACTIVITIES ARE NOT PREVENTED

## 2021-02-24 ASSESSMENT — PAIN SCALES - GENERAL
PAINLEVEL_OUTOF10: 4
PAINLEVEL_OUTOF10: 2
PAINLEVEL_OUTOF10: 0
PAINLEVEL_OUTOF10: 1

## 2021-02-24 ASSESSMENT — PAIN DESCRIPTION - PAIN TYPE: TYPE: ACUTE PAIN

## 2021-02-24 ASSESSMENT — PAIN DESCRIPTION - DESCRIPTORS
DESCRIPTORS: OTHER (COMMENT)
DESCRIPTORS: ACHING;CONSTANT
DESCRIPTORS: ACHING

## 2021-02-24 ASSESSMENT — PAIN DESCRIPTION - PROGRESSION
CLINICAL_PROGRESSION: GRADUALLY IMPROVING
CLINICAL_PROGRESSION: GRADUALLY WORSENING

## 2021-02-24 ASSESSMENT — PAIN DESCRIPTION - ORIENTATION
ORIENTATION: RIGHT;LEFT
ORIENTATION: RIGHT;LEFT;LOWER;UPPER

## 2021-02-24 ASSESSMENT — PAIN DESCRIPTION - LOCATION
LOCATION: BUTTOCKS
LOCATION: BUTTOCKS

## 2021-02-24 ASSESSMENT — PAIN DESCRIPTION - ONSET: ONSET: ON-GOING

## 2021-02-24 NOTE — PROGRESS NOTES
Hospitalist Progress Note      Name:  Philippe Kinsey /Age/Sex: 1945  (58 y.o. male)   MRN & CSN:  [id number] & [id number] Admission Date/Time: 2021  3:59 PM   Location:  008008-01 PCP: Janelle Matos, [address] Day: 7    Assessment and Plan:     1. Weakness and debility: Patient continues work with PT/OT. Improving with his strength endurance and distance of walking. Yesterday use recumbent bicycle. Still needing supplemental O2 with PT/OT but able to participate fully. 2.  Acute hypoxic respiratory failure: Patient still needs supplemental O2 with PT/OT as well as nighttime or sleeping. Patient desats into the mid to low 80s. Will most likely need O2 on discharge for home. 3.  Multifocal pneumonia: Inpatient admission diagnosis. Interval improvement on radiographs. Patient continued on cefdinir 300 mg twice daily for total of 7 days. Patient with improved measures on spirometry able to max of 2.5 L. Continue same. Continue supportive measures as well    4. History of falls: Continues with PT/OT. Has walker at baseline. Also: With a history of lumbar spinal stenosis. Discussed today the patient does get steroid injections from time to time for same. Improved endurance and strength and distance walked with PT/OT. 5.  Non-insulin-dependent diabetes: Continue glipizide. Low-dose sliding scale with hypoglycemic parameters. Well-controlled under 200 this a.m. POC 97 most recent 126 preprandial.    6.  CAD:Continue carvedilol 3.125 mg twice daily, continue atorvastatin 40 mg daily, aspirin 81 mg daily at this time. 7.  Hypertension: Controlled for age 80/61. Continue lisinopril 10 mg daily. 8.  History of pulmonary nodule. CT lung screen 0.5 cm solid nodule in the left lower base was not present on . Recommend short-term follow-up low-dose CT in 6 months. 9.  Arrhythmia: Patient could not quantify but has been on amiodarone 200 mg twice daily. Record review demonstrates this is a chronic medication. Problem list does not identify same. Recommend quantifying with cardiology versus PCP  Diet DIET CARB CONTROL;   DVT Prophylaxis [x] Lovenox, []  Heparin, [] SCDs, []No VTE prophylaxis, patient ambulating   GI Prophylaxis [] PPI, [x] H2 Blocker, [] No GI prophylaxis, patient is receiving diet/Tube Feeds   Code Status DNR-CCA   Disposition Patient requires continued admission due to in swing bed   MDM [] Low, [x] Moderate,[x]  High  Patient's risk as above due to as above     History of Present Illness:     Pt S&E. No acute events overnight. Patient resting comfortably. Denies any headache blurred vision dizziness. Denies any chest pain palpitations or worsening shortness of breath. Denies any fever chills nausea or vomiting. Denies diarrhea or abdominal pain. Low back pain is persistent but improved with PT/OT. Patient's only complaint is sitting in chair is uncomfortable due to the chair itself. 10-14 point ROS reviewed negative, unless as noted above    Objective: Intake/Output Summary (Last 24 hours) at 2/24/2021 1504  Last data filed at 2/24/2021 1501  Gross per 24 hour   Intake 485 ml   Output 675 ml   Net -190 ml      Vitals:   Vitals:    02/24/21 1141   BP:    Pulse:    Resp:    Temp:    SpO2: 90%     Physical Exam:    GEN Awake elderly white male, sitting upright in bed in no apparent distress. Appears given age. EYES Pupils are equally round. No scleral erythema, discharge, or conjunctivitis. HENT Mucous membranes are moist.   NECK No apparent thyromegaly or masses. RESP Clear to auscultation upper lung fields with persistent faint left basilar crackles, no wheezes, rales or rhonchi. Symmetric shallow chest movement while on room air. CARDIO/VASC S1/S2 auscultated. Regular rate without appreciable murmurs, rubs, or gallops. Peripheral pulses equal bilaterally and palpable. No peripheral edema. GI Abdomen is soft without significant tenderness, masses, or guarding. Bowel sounds are normoactive. Rectal exam deferred.  [place] catheter is not present. HEME/LYMPH No petechiae or ecchymoses. MSK No gross joint deformities. Spontaneous movement of all extremities  SKIN Normal coloration, warm, dry. NEURO Cranial nerves appear grossly intact, normal speech, no lateralizing weakness. PSYCH Awake, alert, oriented x 4. Affect appropriate.     Medications:   Medications:    magnesium oxide  400 mg Oral Daily    nicotine  1 patch Transdermal Daily    amiodarone  200 mg Oral BID    aspirin  81 mg Oral Daily    atorvastatin  40 mg Oral Nightly    carvedilol  3.125 mg Oral BID WC    cefdinir  300 mg Oral 2 times per day    cholestyramine light  4 g Oral BID    gabapentin  300 mg Oral TID    glipiZIDE  2.5 mg Oral QAM AC    guaiFENesin  1,200 mg Oral BID    insulin lispro  0-12 Units Subcutaneous TID WC    insulin lispro  0-6 Units Subcutaneous Nightly    ipratropium-albuterol  1 ampule Inhalation Q4H WA    lisinopril  10 mg Oral Daily    primidone  50 mg Oral BID      Infusions:    dextrose       PRN Meds:     ibuprofen, 400 mg, Q6H PRN      traMADol, 25 mg, Q6H PRN      traZODone, 50 mg, Nightly PRN      sodium chloride flush, 10 mL, PRN      acetaminophen, 650 mg, Q6H PRN    Or      acetaminophen, 650 mg, Q6H PRN      promethazine, 12.5 mg, Q6H PRN    Or      ondansetron, 4 mg, Q6H PRN      albuterol, 2.5 mg, Q6H PRN      benzocaine-menthol, 1 lozenge, Q2H PRN      dextrose, 1,000 mL, PRN      dextrose, 12.5 g, PRN      glucagon (rDNA), 1 mg, PRN      glucose, 15 g, PRN      loperamide, 2 mg, 4x Daily PRN      midodrine, 5 mg, TID PRN      sodium chloride, 1 spray, PRN Electronically signed by Aaron Leslie MD on 2/24/2021 at 3:04 PM

## 2021-02-24 NOTE — PROGRESS NOTES
Occupational Therapy    Occupational Therapy Treatment Note  Name: Honey Robert MRN: [id number] :   1945   Date:  2021   Admission Date: 2021 Room:  [facility]   Restrictions/Precautions:  Restrictions/Precautions  Restrictions/Precautions: Fall Risk  Required Braces or Orthoses?: No     Communication with other providers:   Cleared for treatment by  RN. Subjective:  Patient states: \"My butts hurt\"  Pain:   Location, Type, Intensity (0/10 to 10/10): Not rated \"a lot\" butt and heel    Objective:    Observation:  Pt alert and oriented. Treatment, including education:  Transfers  Supine to sit :Sup  Sit to supine :SUp  Scooting :SUp  Rolling :Sup  Sit to stand :CGA  Stand to sit :CGA  SPT:CGA      Self Care Training:   Cues were given for safety, sequence, UE/LE placement, visual cues, and balance. Activities performed today included the following:      Grooming Sup to wash/dry face. Bathing   CGA with bathing in stance at RW. UB Dress  Sup with donning shirt    LB Dress  CGA in stance to pull up clothes, pt able to lenora socks, pants and underwear over B feet and pull up clothes. Therapeutic Activity Training:   Therapeutic activity training was instructed today. Cues were given for safety, sequence, UE/LE placement, awareness, and balance. Activities performed today included bed mobility training, sup-sit, sit-stand, SPT. Pt completed functional mobility with RW with CGA. Pt stood x 10 min with CGA with RW to facilitate increased endurance/strength for ADL tasks and transfers. Pt completed   6' and 10' step x 3x's with CGA before requiring mod rest break for recovery.       Safety Measures: Gait belt used, Left in bed, Pull/Bed Alarm activated and call light left in reach        Assessment / Impression:        Patient's tolerance of treatment: Good   Adverse Reaction: None  Significant change in status and impact:  None Barriers to improvement:  Dec strength and endurance    Plan for Next Session:    Continue with POC. Time in:  5083 [id number]  Time out:  8779 1020  Timed treatment minutes:  40 + 28  Total treatment time: 76  Electronically signed by:    RUSLAN Enrique/TIFFANY JENSEN 1992 2/24/2021, 10:42 AM    Previously filed values:  Patient Goals   Patient goals : to return home  Short term goals  Time Frame for Short term goals: Until goals met or discharge  Short term goal 1: Pt will complete all UB ADLs mod I for increased functional I  Short term goal 2: Pt will complete all LB ADLs min A for increased functional I  Short term goal 3: Pt will complete all aspects of toileting SBA for increased functional I  Short term goal 4: Pt will complete all functional transfers and bed mobility SBA in prep for EOB/OOB ADL tasks.   Short term goal 5: Pt will participate in therex/theract for 5+ minutes with emphasis on UE strengthning and dynamic balance for increased I with IADL/home management tasks

## 2021-02-24 NOTE — PROGRESS NOTES
Physical Therapy    Physical Therapy Treatment Note  Name: Yaquelin Turner MRN: [id number] :   1945   Date:  2021   Admission Date: 2021 Room:  [address] [place] [address]     Restrictions/Precautions:  Restrictions/Precautions  Restrictions/Precautions: Fall Risk  Required Braces or Orthoses?: No         Communication with other providers:  RN approves tx session.      Subjective:  Patient states:  Agreeable to tx session;      Pain:   Location, Type, Intensity (0/10 to 10/10):  No c/o pain    Objective:    Observation:  Patient semi reclined in bed upon entering    Treatment, including education/measures:   Exercises:   Nustep 8mins  Transfers:  Sit<>stand SBA/CGA  Gait: Amb with RW and CGA/SBA of 1 80ft x2  Therapeutic Activity: In // bars patient amb up/down a 4' step and a 6\" step w/SBA/CGA      Safety  Patient left safely in the bed, with call light/phone in reach        Assessment / Impression:       Patient's tolerance of treatment:  good   Adverse Reaction: none  Significant change in status and impact:  none  Barriers to improvement:  Fatigue    Plan for Next Session:    Will cont to work towards pt's goals per his tolerance    Time in:  1025   Time out: 1105    Timed treatment minutes:   15gt 15ta 10te   Total treatment time:  40      Previously filed items:  Social/Functional History  Lives With: Spouse  Type of Home: House  Home Layout: Two level, Performs ADL's on one level, Able to Live on Main level with bedroom/bathroom  Home Access: Stairs to enter with rails  Entrance Stairs - Number of Steps: 2 steps  Entrance Stairs - Rails: Right(going up)  Bathroom Shower/Tub: Walk-in shower, Shower chair without back  Bathroom Toilet: Standard  Bathroom Equipment: Grab bars in shower, Hand-held shower  Home Equipment: 4 wheeled walker, Cane(pt reports he was using no AD prior to falling.)  Receives Help From: Family  ADL Assistance: Independent  Homemaking Assistance: Independent

## 2021-02-24 NOTE — PLAN OF CARE
Problem: Pain:  Description: Pain management should include both nonpharmacologic and pharmacologic interventions.   Goal: Pain level will decrease  Description: Pain level will decrease  Outcome: Ongoing  Goal: Control of acute pain  Description: Control of acute pain  Outcome: Ongoing  Goal: Control of chronic pain  Description: Control of chronic pain  Outcome: Ongoing     Problem: OXYGENATION/RESPIRATORY FUNCTION  Goal: Patient will maintain patent airway  Outcome: Ongoing  Goal: Patient will achieve/maintain normal respiratory rate/effort  Description: Respiratory rate and effort will be within normal limits for the patient  Outcome: Ongoing     Problem: IP MOBILITY  Goal: LTG - patient will demonstrate kitchen mobility  Outcome: Ongoing  Goal: LTG - patient will ambulate community distance  Outcome: Ongoing  Goal: LTG - patient will ambulate household distance  Outcome: Ongoing  Goal: LTG - Patient will navigate 4-6 steps with rails/device  Outcome: Ongoing  Goal: LTG - patient will demonstrate safe mobility requirements  Outcome: Ongoing  Goal: STG - Patient will ambulate  Outcome: Ongoing     Problem: Falls - Risk of:  Goal: Will remain free from falls  Description: Will remain free from falls  Outcome: Ongoing  Goal: Absence of physical injury  Description: Absence of physical injury  Outcome: Ongoing

## 2021-02-24 NOTE — PROGRESS NOTES
Occupational Therapy  SWING BED WEEKLY TEAM SHEET     Elvis Lightning   2/24/2021   WEEK # 1    Occupational Therapy    Feeding  [x] Independ [] SBA [] MIN assist  [] mod assist  [] max assist [] tot assist  Grooming [x] Independ [] SBA [] MIN assist  [] mod assist  [] max assist [] tot assist   Bathing upper body [x] Independ [] SBA [] MIN assist  [] mod assist  [] max assist              [] tot assist    Dressing upper body [x] Independ [] SBA [] MIN assist  [] mod assist  [] max assist              [] tot assist    Dressing lower body [] Independ [] SBA [] MIN assist  [x] mod assist  [] max assist              [] tot assist   Toileting [] Independ [] SBA [x] MIN assist  [] mod assist  [] max assist [] tot assist    Home Management: Has help at home from wife    Cognitive/Perception: impulsive    Upper extremity motor control: WNL    Other   Goals of previous week:   [] 1st team   [] met   [x] partially met    [] not met             Why goals were not met Decrease strength and endurance     Issues to be resolved before discharge:      Occupational Therapy Signature: Faylene Frankel COTA/TIFFANY CAMILA [id number]

## 2021-02-25 LAB
GLUCOSE BLD-MCNC: 123 MG/DL (ref 70–99)
GLUCOSE BLD-MCNC: 130 MG/DL (ref 70–99)
GLUCOSE BLD-MCNC: 182 MG/DL (ref 70–99)
GLUCOSE BLD-MCNC: 193 MG/DL (ref 70–99)

## 2021-02-25 PROCEDURE — 6370000000 HC RX 637 (ALT 250 FOR IP): Performed by: INTERNAL MEDICINE

## 2021-02-25 PROCEDURE — 97110 THERAPEUTIC EXERCISES: CPT

## 2021-02-25 PROCEDURE — 1200000002 HC SEMI PRIVATE SWING BED

## 2021-02-25 PROCEDURE — 97530 THERAPEUTIC ACTIVITIES: CPT

## 2021-02-25 PROCEDURE — 2700000000 HC OXYGEN THERAPY PER DAY

## 2021-02-25 PROCEDURE — 94761 N-INVAS EAR/PLS OXIMETRY MLT: CPT

## 2021-02-25 PROCEDURE — 97116 GAIT TRAINING THERAPY: CPT

## 2021-02-25 PROCEDURE — 82962 GLUCOSE BLOOD TEST: CPT

## 2021-02-25 PROCEDURE — 94640 AIRWAY INHALATION TREATMENT: CPT

## 2021-02-25 RX ADMIN — LISINOPRIL 10 MG: 10 TABLET ORAL at 12:03

## 2021-02-25 RX ADMIN — PRIMIDONE 50 MG: 50 TABLET ORAL at 09:17

## 2021-02-25 RX ADMIN — CHOLESTYRAMINE 4 G: 4 POWDER, FOR SUSPENSION ORAL at 09:16

## 2021-02-25 RX ADMIN — CARVEDILOL 3.12 MG: 3.12 TABLET, FILM COATED ORAL at 16:52

## 2021-02-25 RX ADMIN — GABAPENTIN 300 MG: 300 CAPSULE ORAL at 21:07

## 2021-02-25 RX ADMIN — GABAPENTIN 300 MG: 300 CAPSULE ORAL at 14:35

## 2021-02-25 RX ADMIN — INSULIN LISPRO 2 UNITS: 100 INJECTION, SOLUTION INTRAVENOUS; SUBCUTANEOUS at 16:54

## 2021-02-25 RX ADMIN — IPRATROPIUM BROMIDE AND ALBUTEROL SULFATE 1 AMPULE: .5; 3 SOLUTION RESPIRATORY (INHALATION) at 16:30

## 2021-02-25 RX ADMIN — Medication 400 MG: at 09:17

## 2021-02-25 RX ADMIN — PRIMIDONE 50 MG: 50 TABLET ORAL at 21:07

## 2021-02-25 RX ADMIN — ASPIRIN 81 MG: 81 TABLET, COATED ORAL at 09:16

## 2021-02-25 RX ADMIN — GUAIFENESIN 1200 MG: 600 TABLET, EXTENDED RELEASE ORAL at 09:16

## 2021-02-25 RX ADMIN — IPRATROPIUM BROMIDE AND ALBUTEROL SULFATE 1 AMPULE: .5; 3 SOLUTION RESPIRATORY (INHALATION) at 19:34

## 2021-02-25 RX ADMIN — ATORVASTATIN CALCIUM 40 MG: 40 TABLET, FILM COATED ORAL at 21:07

## 2021-02-25 RX ADMIN — GABAPENTIN 300 MG: 300 CAPSULE ORAL at 09:16

## 2021-02-25 RX ADMIN — CARVEDILOL 3.12 MG: 3.12 TABLET, FILM COATED ORAL at 10:08

## 2021-02-25 RX ADMIN — GLIPIZIDE 2.5 MG: 5 TABLET ORAL at 06:05

## 2021-02-25 RX ADMIN — TRAZODONE HYDROCHLORIDE 50 MG: 50 TABLET ORAL at 22:07

## 2021-02-25 RX ADMIN — AMIODARONE HYDROCHLORIDE 200 MG: 200 TABLET ORAL at 21:07

## 2021-02-25 RX ADMIN — GUAIFENESIN 1200 MG: 600 TABLET, EXTENDED RELEASE ORAL at 21:07

## 2021-02-25 RX ADMIN — AMIODARONE HYDROCHLORIDE 200 MG: 200 TABLET ORAL at 09:17

## 2021-02-25 RX ADMIN — IPRATROPIUM BROMIDE AND ALBUTEROL SULFATE 1 AMPULE: .5; 3 SOLUTION RESPIRATORY (INHALATION) at 09:10

## 2021-02-25 ASSESSMENT — PAIN DESCRIPTION - FREQUENCY: FREQUENCY: CONTINUOUS

## 2021-02-25 ASSESSMENT — PAIN DESCRIPTION - DESCRIPTORS: DESCRIPTORS: DISCOMFORT

## 2021-02-25 ASSESSMENT — PAIN DESCRIPTION - ORIENTATION: ORIENTATION: RIGHT;LEFT

## 2021-02-25 ASSESSMENT — PAIN DESCRIPTION - LOCATION: LOCATION: BUTTOCKS

## 2021-02-25 ASSESSMENT — PAIN SCALES - GENERAL
PAINLEVEL_OUTOF10: 0

## 2021-02-25 ASSESSMENT — PAIN DESCRIPTION - PROGRESSION: CLINICAL_PROGRESSION: NOT CHANGED

## 2021-02-25 NOTE — PROGRESS NOTES
Skin assessment completed with Anabelle Craven RN. Redness noted in groin area and bruising on top of right foot. No other skin issues seen.

## 2021-02-25 NOTE — PLAN OF CARE
Problem: Pain:  Description: Pain management should include both nonpharmacologic and pharmacologic interventions.   Goal: Pain level will decrease  Description: Pain level will decrease  2/25/2021 1313 by Leobardo Mar RN  Outcome: Ongoing  2/25/2021 0059 by Eduardo Sanchez RN  Outcome: Ongoing  Goal: Control of acute pain  Description: Control of acute pain  2/25/2021 1313 by Leobardo Mar RN  Outcome: Ongoing  2/25/2021 0059 by Eduardo Sanchez RN  Outcome: Ongoing  Goal: Control of chronic pain  Description: Control of chronic pain  2/25/2021 1313 by Leobardo Mar RN  Outcome: Ongoing  2/25/2021 0059 by Eduardo Sanchez RN  Outcome: Ongoing     Problem: OXYGENATION/RESPIRATORY FUNCTION  Goal: Patient will maintain patent airway  2/25/2021 1313 by Leobardo Mar RN  Outcome: Ongoing  2/25/2021 0059 by Eduardo Sanchez RN  Outcome: Ongoing  Goal: Patient will achieve/maintain normal respiratory rate/effort  Description: Respiratory rate and effort will be within normal limits for the patient  2/25/2021 1313 by Leobardo Mar RN  Outcome: Ongoing  2/25/2021 0059 by Eduardo Sanchez RN  Outcome: Ongoing     Problem: IP MOBILITY  Goal: LTG - patient will demonstrate kitchen mobility  Outcome: Ongoing  Goal: LTG - patient will ambulate community distance  Outcome: Ongoing  Goal: LTG - patient will ambulate household distance  Outcome: Ongoing  Goal: LTG - Patient will navigate 4-6 steps with rails/device  Outcome: Ongoing  Goal: LTG - patient will demonstrate safe mobility requirements  Outcome: Ongoing  Goal: STG - Patient will ambulate  Outcome: Ongoing     Problem: Falls - Risk of:  Goal: Will remain free from falls  Description: Will remain free from falls  2/25/2021 1313 by Leobardo Mar RN  Outcome: Ongoing  2/25/2021 0059 by Eduardo Sanchez RN  Outcome: Ongoing  Goal: Absence of physical injury  Description: Absence of physical injury  2/25/2021 1313 by Leobardo Mar RN  Outcome: Ongoing  2/25/2021 0059 by Eduardo Sanchez RN Outcome: Ongoing

## 2021-02-25 NOTE — PROGRESS NOTES
Physical Therapy    Physical Therapy Treatment Note  Name: Theresa Delong MRN: [id number] :   1945   Date:  2021   Admission Date: 2021 Room:  [address] [place] [address]     Restrictions/Precautions:  Restrictions/Precautions  Restrictions/Precautions: Fall Risk  Required Braces or Orthoses?: No     .    Communication with other providers:  RN approves tx session. Partial co-treat with [facility]. Subjective:  Patient states:  Agreeable to tx session;     Pain:   Location, Type, Intensity (0/10 to 10/10):  No C/O pain at this time    Objective:    Observation:  Patient semi reclined in bed upon entering. Treatment, including education/measures:  Transfers  Sit to supine : Mod I  Scooting : Mod I  Rolling : Mod I  Sit to stand :SBA from bed - RW  From chair - RW  Stand to sit : SBA from RW - chair  From RW - bed    Gait:  Pt amb with RW for 160 ft and 182ft with CG/SB assist; pt amb with decreased lissette, forward posture        Safety  Patient left safely in the bed, with call light/phone in reach with alarm applied. Gait belt and mask were used for transfers and gait.     Assessment / Impression:       Patient's tolerance of treatment:  fair   Adverse Reaction: none  Significant change in status and impact:  none  Barriers to improvement:  Fatigue     Plan for Next Session:    Will cont to work towards pt's goals per his tolerance    Time in:  1335  Time out:  1400   Timed treatment minutes:   25  10ta 15gt  Total treatment time:  25     Previously filed items:  Social/Functional History  Lives With: Spouse  Type of Home: House  Home Layout: Two level, Performs ADL's on one level, Able to Live on Main level with bedroom/bathroom  Home Access: Stairs to enter with rails  Entrance Stairs - Number of Steps: 2 steps  Entrance Stairs - Rails: Right(going up)  Bathroom Shower/Tub: Walk-in shower, Shower chair without back  Bathroom Toilet: Standard  Bathroom Equipment: Grab bars in shower, Hand-held shower Home Equipment: 4 wheeled walker, Cane(pt reports he was using no AD prior to falling.)  Receives Help From: Family  ADL Assistance: Independent  Homemaking Assistance: Independent  Homemaking Responsibilities: Yes  Meal Prep Responsibility: Secondary(pt reports they do the cooking 50/50)  Laundry Responsibility: No  Cleaning Responsibility: Secondary(pt reports he does the dishes.)  Shopping Responsibility: Primary(pt reports he was doing the grocery shopping. he reports he gets worn out walking through store.)  Ambulation Assistance: Independent  Transfer Assistance: Independent  Active : Yes  Mode of Transportation: Car  Occupation: Retired  Type of occupation: pt is retired from [employer]. Leisure & Hobbies: Pt reports he shoots troublesome animals. IADL Comments: pt reports he was I with dressing and bathing. Additional Comments: Pt reports he has a hx of falls. Reports he is normally walking when he falls and wasn't using an AD  Short term goals  Time Frame for Short term goals: 1 week or until discharge:  Short term goal 1: Pt will demonstrate the ability to safely perform bed mobility mod I with no HR and HOB flat. Short term goal 2: Pt will demonstrate the ability to safely stand x8 consecutive mintues while performing dynamic balance activities with supervision and no more than 1 hand for support. Short term goal 3: pt will demonstrate the ability to safely ambulate 150 feet consecutive feet with step through gait and  the least restrictive AD and superivsion. Short term goal 4: Pt will demonstrate the ability to safely ambulate up and down 2 steps with a R HR and supervision in 2/2 trials.        Electronically signed by:    Mallory Luis PTA  2/25/2021, 2:30 PM

## 2021-02-25 NOTE — PLAN OF CARE
Problem: Falls - Risk of:  Goal: Will remain free from falls  Description: Will remain free from falls  Outcome: Ongoing  Goal: Absence of physical injury  Description: Absence of physical injury  Outcome: Ongoing     Problem: OXYGENATION/RESPIRATORY FUNCTION  Goal: Patient will maintain patent airway  Outcome: Ongoing  Goal: Patient will achieve/maintain normal respiratory rate/effort  Description: Respiratory rate and effort will be within normal limits for the patient  Outcome: Ongoing     Problem: Pain:  Goal: Pain level will decrease  Description: Pain level will decrease  Outcome: Ongoing  Goal: Control of acute pain  Description: Control of acute pain  Outcome: Ongoing  Goal: Control of chronic pain  Description: Control of chronic pain  Outcome: Ongoing

## 2021-02-25 NOTE — PROGRESS NOTES
RUSLAN Javier/L CAMILA [id number]    2/25/2021, 1:41 PM    Previously filed values:  Patient Goals   Patient goals : to return home  Short term goals  Time Frame for Short term goals: Until goals met or discharge  Short term goal 1: Pt will complete all UB ADLs mod I for increased functional I  Short term goal 2: Pt will complete all LB ADLs min A for increased functional I  Short term goal 3: Pt will complete all aspects of toileting SBA for increased functional I  Short term goal 4: Pt will complete all functional transfers and bed mobility SBA in prep for EOB/OOB ADL tasks.   Short term goal 5: Pt will participate in therex/theract for 5+ minutes with emphasis on UE strengthning and dynamic balance for increased I with IADL/home management tasks

## 2021-02-25 NOTE — PROGRESS NOTES
Physical Therapy    Physical Therapy Treatment Note  Name: Rosendo Prado MRN: [id number] :   1945   Date:  2021   Admission Date: 2021 Room:  [address] [place] [address]     Restrictions/Precautions:  Restrictions/Precautions  Restrictions/Precautions: Fall Risk  Required Braces or Orthoses?: No     .    Communication with other providers:  RN approves tx session. Partial co-treat with [facility]. Subjective:  Patient states:  Agreeable to tx session;     Pain:   Location, Type, Intensity (0/10 to 10/10):  No C/O pain at this time    Objective:    Observation:  Pt in gym with [facility] upon arrival.    Treatment, including education/measures:  Transfers  Sit to supine :SBA  Scooting :SBA  Rolling :SBA  Sit to stand :CGA from chair and Nu-step  Stand to sit :CGA from chair and Nu-step    Pt amb up and down 4 steps with //bars and CG assist using step to pattern    Pt only able to tolerate 5 min L2 on Nu-step this date due to pt not having slept well last night. Gait:  Pt amb with RW for 65 ft with CG/SB assist; pt amb with decreased lissette, forward posture   Pt needed VC's for pathway, posture    Safety  Patient left safely in the bed, with call light/phone in reach with alarm applied. Gait belt and mask were used for transfers and gait.     Assessment / Impression:       Patient's tolerance of treatment:  fair   Adverse Reaction: none  Significant change in status and impact:  none  Barriers to improvement:  Fatigue     Plan for Next Session:    Will cont to work towards pt's goals per his tolerance    Time in:  940  Time out:  1003  Timed treatment minutes:  23  Total treatment time:  23    Previously filed items:  Social/Functional History  Lives With: Spouse  Type of Home: House  Home Layout: Two level, Performs ADL's on one level, Able to Live on Main level with bedroom/bathroom  Home Access: Stairs to enter with rails  Entrance Stairs - Number of Steps: 2 steps  Entrance Stairs - Rails: Right(going up) Bathroom Shower/Tub: Walk-in shower, Shower chair without back  Bathroom Toilet: Standard  Bathroom Equipment: Grab bars in shower, Hand-held shower  Home Equipment: 4 wheeled walker, Cane(pt reports he was using no AD prior to falling.)  Receives Help From: Family  ADL Assistance: Independent  Homemaking Assistance: Independent  Homemaking Responsibilities: Yes  Meal Prep Responsibility: Secondary(pt reports they do the cooking 50/50)  Laundry Responsibility: No  Cleaning Responsibility: Secondary(pt reports he does the dishes.)  Shopping Responsibility: Primary(pt reports he was doing the grocery shopping. he reports he gets worn out walking through store.)  Ambulation Assistance: Independent  Transfer Assistance: Independent  Active : Yes  Mode of Transportation: Car  Occupation: Retired  Type of occupation: pt is retired from [employer]. Leisure & Hobbies: Pt reports he shoots troublesome animals. IADL Comments: pt reports he was I with dressing and bathing. Additional Comments: Pt reports he has a hx of falls. Reports he is normally walking when he falls and wasn't using an AD  Short term goals  Time Frame for Short term goals: 1 week or until discharge:  Short term goal 1: Pt will demonstrate the ability to safely perform bed mobility mod I with no HR and HOB flat. Short term goal 2: Pt will demonstrate the ability to safely stand x8 consecutive mintues while performing dynamic balance activities with supervision and no more than 1 hand for support. Short term goal 3: pt will demonstrate the ability to safely ambulate 150 feet consecutive feet with step through gait and  the least restrictive AD and superivsion. Short term goal 4: Pt will demonstrate the ability to safely ambulate up and down 2 steps with a R HR and supervision in 2/2 trials.        Electronically signed by:    Elva Coronado PTA  2/25/2021, 11:36 AM

## 2021-02-26 LAB
GLUCOSE BLD-MCNC: 131 MG/DL (ref 70–99)
GLUCOSE BLD-MCNC: 147 MG/DL (ref 70–99)
GLUCOSE BLD-MCNC: 159 MG/DL (ref 70–99)
GLUCOSE BLD-MCNC: 74 MG/DL (ref 70–99)

## 2021-02-26 PROCEDURE — 97116 GAIT TRAINING THERAPY: CPT

## 2021-02-26 PROCEDURE — 97112 NEUROMUSCULAR REEDUCATION: CPT

## 2021-02-26 PROCEDURE — 97530 THERAPEUTIC ACTIVITIES: CPT

## 2021-02-26 PROCEDURE — 94640 AIRWAY INHALATION TREATMENT: CPT

## 2021-02-26 PROCEDURE — 97110 THERAPEUTIC EXERCISES: CPT

## 2021-02-26 PROCEDURE — 6370000000 HC RX 637 (ALT 250 FOR IP): Performed by: INTERNAL MEDICINE

## 2021-02-26 PROCEDURE — 1200000002 HC SEMI PRIVATE SWING BED

## 2021-02-26 PROCEDURE — 82962 GLUCOSE BLOOD TEST: CPT

## 2021-02-26 PROCEDURE — 94761 N-INVAS EAR/PLS OXIMETRY MLT: CPT

## 2021-02-26 PROCEDURE — 2700000000 HC OXYGEN THERAPY PER DAY

## 2021-02-26 PROCEDURE — 97535 SELF CARE MNGMENT TRAINING: CPT

## 2021-02-26 RX ORDER — TRAZODONE HYDROCHLORIDE 100 MG/1
100 TABLET ORAL NIGHTLY PRN
Status: DISCONTINUED | OUTPATIENT
Start: 2021-02-26 | End: 2021-03-05 | Stop reason: HOSPADM

## 2021-02-26 RX ADMIN — GUAIFENESIN 1200 MG: 600 TABLET, EXTENDED RELEASE ORAL at 10:44

## 2021-02-26 RX ADMIN — GUAIFENESIN 1200 MG: 600 TABLET, EXTENDED RELEASE ORAL at 21:11

## 2021-02-26 RX ADMIN — LISINOPRIL 10 MG: 10 TABLET ORAL at 10:44

## 2021-02-26 RX ADMIN — TRAZODONE HYDROCHLORIDE 100 MG: 100 TABLET ORAL at 21:11

## 2021-02-26 RX ADMIN — GABAPENTIN 300 MG: 300 CAPSULE ORAL at 10:44

## 2021-02-26 RX ADMIN — AMIODARONE HYDROCHLORIDE 200 MG: 200 TABLET ORAL at 21:11

## 2021-02-26 RX ADMIN — ASPIRIN 81 MG: 81 TABLET, COATED ORAL at 10:43

## 2021-02-26 RX ADMIN — GABAPENTIN 300 MG: 300 CAPSULE ORAL at 13:32

## 2021-02-26 RX ADMIN — CHOLESTYRAMINE 4 G: 4 POWDER, FOR SUSPENSION ORAL at 21:11

## 2021-02-26 RX ADMIN — PRIMIDONE 50 MG: 50 TABLET ORAL at 10:44

## 2021-02-26 RX ADMIN — CARVEDILOL 3.12 MG: 3.12 TABLET, FILM COATED ORAL at 10:44

## 2021-02-26 RX ADMIN — IPRATROPIUM BROMIDE AND ALBUTEROL SULFATE 1 AMPULE: .5; 3 SOLUTION RESPIRATORY (INHALATION) at 16:10

## 2021-02-26 RX ADMIN — GLIPIZIDE 2.5 MG: 5 TABLET ORAL at 10:45

## 2021-02-26 RX ADMIN — ATORVASTATIN CALCIUM 40 MG: 40 TABLET, FILM COATED ORAL at 21:11

## 2021-02-26 RX ADMIN — CARVEDILOL 3.12 MG: 3.12 TABLET, FILM COATED ORAL at 17:19

## 2021-02-26 RX ADMIN — Medication 400 MG: at 10:43

## 2021-02-26 RX ADMIN — IPRATROPIUM BROMIDE AND ALBUTEROL SULFATE 1 AMPULE: .5; 3 SOLUTION RESPIRATORY (INHALATION) at 11:15

## 2021-02-26 RX ADMIN — IPRATROPIUM BROMIDE AND ALBUTEROL SULFATE 1 AMPULE: .5; 3 SOLUTION RESPIRATORY (INHALATION) at 07:30

## 2021-02-26 RX ADMIN — PRIMIDONE 50 MG: 50 TABLET ORAL at 21:11

## 2021-02-26 RX ADMIN — GABAPENTIN 300 MG: 300 CAPSULE ORAL at 21:11

## 2021-02-26 RX ADMIN — AMIODARONE HYDROCHLORIDE 200 MG: 200 TABLET ORAL at 10:43

## 2021-02-26 RX ADMIN — INSULIN LISPRO 2 UNITS: 100 INJECTION, SOLUTION INTRAVENOUS; SUBCUTANEOUS at 12:28

## 2021-02-26 ASSESSMENT — PAIN SCALES - GENERAL
PAINLEVEL_OUTOF10: 0

## 2021-02-26 NOTE — PROGRESS NOTES
Physical Therapy    Physical Therapy Treatment Note  Name: Joe Sadler MRN: [id number] :   1945   Date:  2021   Admission Date: 2021 Room:  [address] [place] [address]     Restrictions/Precautions:  Restrictions/Precautions  Restrictions/Precautions: Fall Risk  Required Braces or Orthoses?: No     .    Communication with other providers:  RN approves tx session. Co-Treat with [facility]. Subjective:  Patient states:  Agreeable to tx session;     Pain:   Location, Type, Intensity (0/10 to 10/10):  No C/O pain at this time    Objective:    Observation:  Pt in bed upon arrival.    Treatment, including education/measures:  Transfers  Sit to supine : Mod I   Scooting : Mod I  Rolling : Mod I  Sit to stand :CG/SB from bed, BSC and Nu-step. Stand to sit :CG/SB from bed, BSC and Nu-step. Pt completes dynamic standing ball toss activity with CGA, no UE and no major LOB. Pt able to perform activity x 2 trials for ~2min each before fatiguing. Pt able to tolerate 6 min L4 on Nu-step this date. Gait:  Pt amb with RW for 80 ft x 2 with CG/SB assist; pt amb with decreased lissette, forward posture. Pt able to amb without AD for ~15ft with Sukumar; no LOB noted, pt amb with wide CRISTINO without AD. Pt needed VC's for pathway, posture. Safety  Patient left safely in the bed, with call light/phone in reach with alarm applied. Gait belt and mask were used for transfers and gait.     Assessment / Impression:       Patient's tolerance of treatment:  good   Adverse Reaction: none  Significant change in status and impact:  none  Barriers to improvement:  Fatigue     Plan for Next Session:    Will cont to work towards pt's goals per his tolerance    Time in:  855  Time out:  950  Timed treatment minutes:  55  Total treatment time:  54    Previously filed items:  Social/Functional History  Lives With: Spouse  Type of Home: House  Home Layout: Two level, Performs ADL's on one level, Able to Live on Main level with bedroom/bathroom Home Access: Stairs to enter with rails  Entrance Stairs - Number of Steps: 2 steps  Entrance Stairs - Rails: Right(going up)  Bathroom Shower/Tub: Walk-in shower, Shower chair without back  Bathroom Toilet: Standard  Bathroom Equipment: Grab bars in shower, Hand-held shower  Home Equipment: 4 wheeled walker, Cane(pt reports he was using no AD prior to falling.)  Receives Help From: Family  ADL Assistance: Independent  Homemaking Assistance: Independent  Homemaking Responsibilities: Yes  Meal Prep Responsibility: Secondary(pt reports they do the cooking 50/50)  Laundry Responsibility: No  Cleaning Responsibility: Secondary(pt reports he does the dishes.)  Shopping Responsibility: Primary(pt reports he was doing the grocery shopping. he reports he gets worn out walking through store.)  Ambulation Assistance: Independent  Transfer Assistance: Independent  Active : Yes  Mode of Transportation: Car  Occupation: Retired  Type of occupation: pt is retired from [employer]. Leisure & Hobbies: Pt reports he shoots troublesome animals. IADL Comments: pt reports he was I with dressing and bathing. Additional Comments: Pt reports he has a hx of falls. Reports he is normally walking when he falls and wasn't using an AD  Short term goals  Time Frame for Short term goals: 1 week or until discharge:  Short term goal 1: Pt will demonstrate the ability to safely perform bed mobility mod I with no HR and HOB flat. Short term goal 2: Pt will demonstrate the ability to safely stand x8 consecutive mintues while performing dynamic balance activities with supervision and no more than 1 hand for support. Short term goal 3: pt will demonstrate the ability to safely ambulate 150 feet consecutive feet with step through gait and  the least restrictive AD and superivsion. Short term goal 4: Pt will demonstrate the ability to safely ambulate up and down 2 steps with a R HR and supervision in 2/2 trials. Electronically signed by:    Mathieu Shah PTA  2/26/2021, 11:50 AM

## 2021-02-26 NOTE — PROGRESS NOTES
Physical Therapy    Physical Therapy Treatment Note  Name: Zoila Salomon MRN: [id number] :   1945   Date:  2021   Admission Date: 2021 Room:  [address] [place] [address]     Restrictions/Precautions:  Restrictions/Precautions  Restrictions/Precautions: Fall Risk  Required Braces or Orthoses?: No     .    Communication with other providers:     Subjective:  Patient states:  Agreeable to tx session;     Pain:   Location, Type, Intensity (0/10 to 10/10):  No C/O pain at this time    Objective:    Observation:  Pt in bed upon arrival.    Treatment, including education/measures:  Transfers  Sit to supine : Mod I   Scooting : Mod I  Rolling : Mod I  Sit to stand :CG/SB from bed, BSC and Nu-step. Stand to sit :CG/SB from bed, BSC and Nu-step. Pt able to tolerate 8 min L4 on Nu-step this date. Gait:  Pt amb with RW for 80 ft x 2 with CG/SB assist; pt amb with decreased lissette, forward posture. Pt able to amb holding onto hallway rail or single RW handle  SBA  Pt needed VC's for pathway, posture. Safety  Patient left safely in the bed, with call light/phone in reach with alarm applied. Gait belt and mask were used for transfers and gait.     Assessment / Impression:       Patient's tolerance of treatment:  good   Adverse Reaction: none  Significant change in status and impact:  none  Barriers to improvement:  Fatigue     Plan for Next Session:    Will cont to work towards pt's goals per his tolerance    Time in:  1510  Time out: 1535  Timed treatment minutes:  35  Total treatment time: 35      Previously filed items:  Social/Functional History  Lives With: Spouse  Type of Home: House  Home Layout: Two level, Performs ADL's on one level, Able to Live on Main level with bedroom/bathroom  Home Access: Stairs to enter with rails  Entrance Stairs - Number of Steps: 2 steps  Entrance Stairs - Rails: Right(going up)  Bathroom Shower/Tub: Walk-in shower, Shower chair without back  Bathroom Toilet: Standard Bathroom Equipment: Grab bars in shower, Hand-held shower  Home Equipment: 4 wheeled walker, Cane(pt reports he was using no AD prior to falling.)  Receives Help From: Family  ADL Assistance: Independent  Homemaking Assistance: Independent  Homemaking Responsibilities: Yes  Meal Prep Responsibility: Secondary(pt reports they do the cooking 50/50)  Laundry Responsibility: No  Cleaning Responsibility: Secondary(pt reports he does the dishes.)  Shopping Responsibility: Primary(pt reports he was doing the grocery shopping. he reports he gets worn out walking through store.)  Ambulation Assistance: Independent  Transfer Assistance: Independent  Active : Yes  Mode of Transportation: Car  Occupation: Retired  Type of occupation: pt is retired from [employer]. Leisure & Hobbies: Pt reports he shoots troublesome animals. IADL Comments: pt reports he was I with dressing and bathing. Additional Comments: Pt reports he has a hx of falls. Reports he is normally walking when he falls and wasn't using an AD  Short term goals  Time Frame for Short term goals: 1 week or until discharge:  Short term goal 1: Pt will demonstrate the ability to safely perform bed mobility mod I with no HR and HOB flat. Short term goal 2: Pt will demonstrate the ability to safely stand x8 consecutive mintues while performing dynamic balance activities with supervision and no more than 1 hand for support. Short term goal 3: pt will demonstrate the ability to safely ambulate 150 feet consecutive feet with step through gait and  the least restrictive AD and superivsion. Short term goal 4: Pt will demonstrate the ability to safely ambulate up and down 2 steps with a R HR and supervision in 2/2 trials.        Electronically signed by:    Jonathan Herrera PT  2/26/2021, 5:22 PM

## 2021-02-26 NOTE — PROGRESS NOTES
SWING BED WEEKLY TEAM SHEET     WEEK#     1  NUTRITION   Diet:    Carb controlled                TF:      no    TPN:   no  Appropriate/Adequate [X] yes [ ] no   Meal intakes: %    Weight:  151#  BMI:      24.44      Significant Change: yes  Recommendations: start diabetic oral supplement twice daily

## 2021-02-26 NOTE — PLAN OF CARE
Problem: Falls - Risk of:  Goal: Will remain free from falls  Description: Will remain free from falls  2/26/2021 0100 by Frank Birch RN  Outcome: Ongoing  2/25/2021 2111 by Elisabet Bhatia RN  Outcome: Ongoing  2/25/2021 1313 by Esteban North RN  Outcome: Ongoing  Goal: Absence of physical injury  Description: Absence of physical injury  2/26/2021 0100 by Frank Birch RN  Outcome: Ongoing  2/25/2021 2111 by Elisabet Bhatia RN  Outcome: Ongoing  2/25/2021 1313 by Esteban North RN  Outcome: Ongoing     Problem: IP MOBILITY  Goal: LTG - patient will demonstrate kitchen mobility  2/26/2021 0100 by Frank Birch RN  Outcome: Ongoing  2/25/2021 2111 by Elisabet Bhatia RN  Outcome: Ongoing  2/25/2021 1313 by Esteban North RN  Outcome: Ongoing  Goal: LTG - patient will ambulate community distance  2/26/2021 0100 by Frank Birch RN  Outcome: Ongoing  2/25/2021 2111 by Elisabet Bhatia RN  Outcome: Ongoing  2/25/2021 1313 by Esteban North RN  Outcome: Ongoing  Goal: LTG - patient will ambulate household distance  2/26/2021 0100 by Frank Birch RN  Outcome: Ongoing  2/25/2021 2111 by Elisabet Bhatia RN  Outcome: Ongoing  2/25/2021 1313 by Esteban North RN  Outcome: Ongoing  Goal: LTG - Patient will navigate 4-6 steps with rails/device  2/26/2021 0100 by Frank Birch RN  Outcome: Ongoing  2/25/2021 2111 by Elisabet Bhatia RN  Outcome: Ongoing  2/25/2021 1313 by Esteban North RN  Outcome: Ongoing  Goal: LTG - patient will demonstrate safe mobility requirements  2/26/2021 0100 by Frank Birch RN  Outcome: Ongoing  2/25/2021 2111 by Elisabet Bhatia RN  Outcome: Ongoing  2/25/2021 1313 by Esteban North RN  Outcome: Ongoing  Goal: STG - Patient will ambulate  2/26/2021 0100 by Frank Birch RN  Outcome: Ongoing  2/25/2021 2111 by [place], RN  Outcome: Ongoing  2/25/2021 1313 by Esteban North RN  Outcome: Ongoing Problem: OXYGENATION/RESPIRATORY FUNCTION  Goal: Patient will maintain patent airway  2/26/2021 0100 by Juanis Irving RN  Outcome: Ongoing  2/25/2021 2111 by Baylee Yan RN  Outcome: Ongoing  2/25/2021 1313 by Vernal Leyden, RN  Outcome: Ongoing  Goal: Patient will achieve/maintain normal respiratory rate/effort  Description: Respiratory rate and effort will be within normal limits for the patient  2/26/2021 0100 by Juanis Irving RN  Outcome: Ongoing  2/25/2021 2111 by Baylee Yan RN  Outcome: Ongoing  2/25/2021 1313 by Vernal Leyden, RN  Outcome: Ongoing     Problem: Pain:  Description: Pain management should include both nonpharmacologic and pharmacologic interventions.   Goal: Pain level will decrease  Description: Pain level will decrease  2/26/2021 0100 by Juanis Irving RN  Outcome: Ongoing  2/25/2021 2111 by Baylee Yan RN  Outcome: Ongoing  2/25/2021 1313 by Vernal Leyden, RN  Outcome: Ongoing  Goal: Control of acute pain  Description: Control of acute pain  2/26/2021 0100 by Juanis Irving RN  Outcome: Ongoing  2/25/2021 2111 by Baylee Yan RN  Outcome: Ongoing  2/25/2021 1313 by Vernal Leyden, RN  Outcome: Ongoing  Goal: Control of chronic pain  Description: Control of chronic pain  2/26/2021 0100 by Juanis Irving RN  Outcome: Ongoing  2/25/2021 2111 by Baylee Yan RN  Outcome: Ongoing  2/25/2021 1313 by Vernal Leyden, RN  Outcome: Ongoing

## 2021-02-26 NOTE — PLAN OF CARE
Problem: Pain:  Description: Pain management should include both nonpharmacologic and pharmacologic interventions.   Goal: Pain level will decrease  Description: Pain level will decrease  2/26/2021 1231 by Veronica Yan RN  Outcome: Ongoing  2/26/2021 0100 by Cameron Moya RN  Outcome: Ongoing  Goal: Control of acute pain  Description: Control of acute pain  2/26/2021 1231 by Veronica Yan RN  Outcome: Ongoing  2/26/2021 0100 by Cameron Moya RN  Outcome: Ongoing  Goal: Control of chronic pain  Description: Control of chronic pain  2/26/2021 1231 by Veronica Yan RN  Outcome: Ongoing  2/26/2021 0100 by Cameron Moya RN  Outcome: Ongoing     Problem: OXYGENATION/RESPIRATORY FUNCTION  Goal: Patient will maintain patent airway  2/26/2021 1231 by Veronica Yan RN  Outcome: Ongoing  2/26/2021 0100 by Cameron Moya RN  Outcome: Ongoing  Goal: Patient will achieve/maintain normal respiratory rate/effort  Description: Respiratory rate and effort will be within normal limits for the patient  2/26/2021 1231 by Veronica Yan RN  Outcome: Ongoing  2/26/2021 0100 by Cameron Moya RN  Outcome: Ongoing     Problem: IP MOBILITY  Goal: LTG - patient will demonstrate kitchen mobility  2/26/2021 1231 by Veronica Yan RN  Outcome: Ongoing  2/26/2021 0100 by Cameron Moya RN  Outcome: Ongoing  Goal: LTG - patient will ambulate community distance  2/26/2021 1231 by Veronica Yan RN  Outcome: Ongoing  2/26/2021 0100 by Cameron Moya RN  Outcome: Ongoing  Goal: LTG - patient will ambulate household distance  2/26/2021 1231 by Veronica Yan RN  Outcome: Ongoing  2/26/2021 0100 by Cameron Moya RN  Outcome: Ongoing  Goal: LTG - Patient will navigate 4-6 steps with rails/device  2/26/2021 1231 by Veronica Yan RN  Outcome: Ongoing  2/26/2021 0100 by Cameron Moya RN  Outcome: Ongoing  Goal: LTG - patient will demonstrate safe mobility requirements  2/26/2021 1231 by Veronica Yan RN  Outcome: Ongoing 2/26/2021 0100 by Minh Crowell RN  Outcome: Ongoing  Goal: STG - Patient will ambulate  2/26/2021 1231 by Roni Beckett RN  Outcome: Ongoing  2/26/2021 0100 by Minh Crowell RN  Outcome: Ongoing     Problem: Falls - Risk of:  Goal: Will remain free from falls  Description: Will remain free from falls  2/26/2021 1231 by Roni Beckett RN  Outcome: Ongoing  2/26/2021 0100 by Minh Crowell RN  Outcome: Ongoing  Goal: Absence of physical injury  Description: Absence of physical injury  2/26/2021 1231 by Roni Beckett RN  Outcome: Ongoing  2/26/2021 0100 by Minh Crowell RN  Outcome: Ongoing     Problem: OXYGENATION/RESPIRATORY FUNCTION  Goal: Patient will maintain patent airway  2/26/2021 1231 by Roni Beckett RN  Outcome: Ongoing  2/26/2021 0100 by Minh Crowell RN  Outcome: Ongoing  Goal: Patient will achieve/maintain normal respiratory rate/effort  Description: Respiratory rate and effort will be within normal limits for the patient  2/26/2021 1231 by Roni Beckett RN  Outcome: Ongoing  2/26/2021 0100 by Minh Crowell RN  Outcome: Ongoing     Problem: Pain:  Description: Pain management should include both nonpharmacologic and pharmacologic interventions.   Goal: Pain level will decrease  Description: Pain level will decrease  2/26/2021 1231 by Roni Beckett RN  Outcome: Ongoing  2/26/2021 0100 by Minh Crowell RN  Outcome: Ongoing  Goal: Control of acute pain  Description: Control of acute pain  2/26/2021 1231 by Roni Beckett RN  Outcome: Ongoing  2/26/2021 0100 by Minh Crowell RN  Outcome: Ongoing  Goal: Control of chronic pain  Description: Control of chronic pain  2/26/2021 1231 by Roni Beckett RN  Outcome: Ongoing  2/26/2021 0100 by Mihn Crowell RN  Outcome: Ongoing

## 2021-02-26 NOTE — PROGRESS NOTES
Occupational Therapy    Occupational Therapy Treatment Note  Name: Elvis Meneses MRN: [id number] :   1945   Date:  2021   Admission Date: 2021 Room:  008008-01   Restrictions/Precautions:  Restrictions/Precautions  Restrictions/Precautions: Fall Risk  Required Braces or Orthoses?: No     Communication with other providers:   Cleared for treatment by RN. Subjective:  Patient states:  \"I have a little more balance than I thought I did\"  Pain:   Location, Type, Intensity (0/10 to 10/10): No pain noted    Objective:    Observation:  Pt alert and oriented. Treatment, including education:  Transfers  Supine to sit :SUp  Sit to supine :SUp  Scooting :SUp  Rolling :SUp  Sit to stand CGA  Stand to sit :CGA  SPT:CGA    Toilet:Min A from low surface. Self Care Training:   Cues were given for safety, sequence, UE/LE placement, visual cues, and balance. Activities performed today included the following: Toileting  SBA for clothing management and toilet hygiene. Therapeutic Exercise:  Cues were given for technique, safety, recruitment, and rationale. Cues were verbal and/or tactile. For BUE strengthening for ADL & functional mobility Indep pt performed BUE strengthening HEP c 3# hand weights x 20 reps x 6 exercises with Minimal difficulty. For BUE strengthening for ADL & functional mobility Indep pt performed BUE strengthening HEP using Medium strength theraband  X 3 exer for R/L UE x 10 reps  c minimal rest breaks. Pt completed Nustep on level 4 x 6 min. Pt completed 80' x 2 with CGA of functional mobility with RW. Therapeutic Activity Training:   Therapeutic activity training was instructed today. Cues were given for safety, sequence, UE/LE placement, awareness, and balance. Activities performed today included bed mobility training, sup-sit, sit-stand, SPT. Pt stood x 5 min at RW x 2 attempts with CGA while completing dynamic stand task while reaching outside base of support to facilitate increased balance for ADL tasks and transfers. Safety Measures: Gait belt used, Left in bed, Pull/Bed Alarm activated and call light left in reach        Assessment / Impression:        Patient's tolerance of treatment: Good   Adverse Reaction: None  Significant change in status and impact:  None  Barriers to improvement:  Dec strength and endurance. Plan for Next Session:    Continue with POC. Time in:  0020 9446  Time out:  9853 8250  Timed treatment minutes:  42+27  Total treatment time:  71  Electronically signed by:    RUSLAN Alicea/L CAMILA 1992 2/26/2021, 12:42 PM    Previously filed values:  Patient Goals   Patient goals : to return home  Short term goals  Time Frame for Short term goals: Until goals met or discharge  Short term goal 1: Pt will complete all UB ADLs mod I for increased functional I  Short term goal 2: Pt will complete all LB ADLs min A for increased functional I  Short term goal 3: Pt will complete all aspects of toileting SBA for increased functional I  Short term goal 4: Pt will complete all functional transfers and bed mobility SBA in prep for EOB/OOB ADL tasks.   Short term goal 5: Pt will participate in therex/theract for 5+ minutes with emphasis on UE strengthning and dynamic balance for increased I with IADL/home management tasks

## 2021-02-27 LAB
GLUCOSE BLD-MCNC: 115 MG/DL (ref 70–99)
GLUCOSE BLD-MCNC: 129 MG/DL (ref 70–99)
GLUCOSE BLD-MCNC: 136 MG/DL (ref 70–99)
GLUCOSE BLD-MCNC: 151 MG/DL (ref 70–99)

## 2021-02-27 PROCEDURE — 82962 GLUCOSE BLOOD TEST: CPT

## 2021-02-27 PROCEDURE — 94640 AIRWAY INHALATION TREATMENT: CPT

## 2021-02-27 PROCEDURE — 97110 THERAPEUTIC EXERCISES: CPT

## 2021-02-27 PROCEDURE — 6370000000 HC RX 637 (ALT 250 FOR IP): Performed by: INTERNAL MEDICINE

## 2021-02-27 PROCEDURE — 97530 THERAPEUTIC ACTIVITIES: CPT

## 2021-02-27 PROCEDURE — 6370000000 HC RX 637 (ALT 250 FOR IP): Performed by: NURSE PRACTITIONER

## 2021-02-27 PROCEDURE — 94761 N-INVAS EAR/PLS OXIMETRY MLT: CPT

## 2021-02-27 PROCEDURE — 2700000000 HC OXYGEN THERAPY PER DAY

## 2021-02-27 PROCEDURE — 1200000002 HC SEMI PRIVATE SWING BED

## 2021-02-27 RX ADMIN — TRAMADOL HYDROCHLORIDE 25 MG: 50 TABLET, COATED ORAL at 20:54

## 2021-02-27 RX ADMIN — CARVEDILOL 3.12 MG: 3.12 TABLET, FILM COATED ORAL at 09:49

## 2021-02-27 RX ADMIN — GUAIFENESIN 1200 MG: 600 TABLET, EXTENDED RELEASE ORAL at 20:51

## 2021-02-27 RX ADMIN — GABAPENTIN 300 MG: 300 CAPSULE ORAL at 20:50

## 2021-02-27 RX ADMIN — GUAIFENESIN 1200 MG: 600 TABLET, EXTENDED RELEASE ORAL at 09:39

## 2021-02-27 RX ADMIN — ASPIRIN 81 MG: 81 TABLET, COATED ORAL at 09:38

## 2021-02-27 RX ADMIN — GABAPENTIN 300 MG: 300 CAPSULE ORAL at 15:23

## 2021-02-27 RX ADMIN — IPRATROPIUM BROMIDE AND ALBUTEROL SULFATE 1 AMPULE: .5; 3 SOLUTION RESPIRATORY (INHALATION) at 11:45

## 2021-02-27 RX ADMIN — PRIMIDONE 50 MG: 50 TABLET ORAL at 20:50

## 2021-02-27 RX ADMIN — AMIODARONE HYDROCHLORIDE 200 MG: 200 TABLET ORAL at 09:39

## 2021-02-27 RX ADMIN — AMIODARONE HYDROCHLORIDE 200 MG: 200 TABLET ORAL at 20:50

## 2021-02-27 RX ADMIN — IPRATROPIUM BROMIDE AND ALBUTEROL SULFATE 1 AMPULE: .5; 3 SOLUTION RESPIRATORY (INHALATION) at 20:50

## 2021-02-27 RX ADMIN — IPRATROPIUM BROMIDE AND ALBUTEROL SULFATE 1 AMPULE: .5; 3 SOLUTION RESPIRATORY (INHALATION) at 07:50

## 2021-02-27 RX ADMIN — GLIPIZIDE 2.5 MG: 5 TABLET ORAL at 09:38

## 2021-02-27 RX ADMIN — Medication 400 MG: at 09:38

## 2021-02-27 RX ADMIN — CARVEDILOL 3.12 MG: 3.12 TABLET, FILM COATED ORAL at 19:15

## 2021-02-27 RX ADMIN — PRIMIDONE 50 MG: 50 TABLET ORAL at 09:39

## 2021-02-27 RX ADMIN — GABAPENTIN 300 MG: 300 CAPSULE ORAL at 09:38

## 2021-02-27 RX ADMIN — ATORVASTATIN CALCIUM 40 MG: 40 TABLET, FILM COATED ORAL at 20:50

## 2021-02-27 RX ADMIN — LISINOPRIL 10 MG: 10 TABLET ORAL at 09:39

## 2021-02-27 RX ADMIN — TRAZODONE HYDROCHLORIDE 100 MG: 100 TABLET ORAL at 20:54

## 2021-02-27 RX ADMIN — CHOLESTYRAMINE 4 G: 4 POWDER, FOR SUSPENSION ORAL at 09:40

## 2021-02-27 RX ADMIN — IPRATROPIUM BROMIDE AND ALBUTEROL SULFATE 1 AMPULE: .5; 3 SOLUTION RESPIRATORY (INHALATION) at 16:40

## 2021-02-27 RX ADMIN — BENZOCAINE AND MENTHOL 1 LOZENGE: 15; 3.6 LOZENGE ORAL at 21:26

## 2021-02-27 RX ADMIN — CHOLESTYRAMINE 4 G: 4 POWDER, FOR SUSPENSION ORAL at 20:50

## 2021-02-27 ASSESSMENT — PAIN SCALES - GENERAL: PAINLEVEL_OUTOF10: 0

## 2021-02-27 ASSESSMENT — PAIN DESCRIPTION - PAIN TYPE: TYPE: CHRONIC PAIN

## 2021-02-27 ASSESSMENT — PAIN DESCRIPTION - DESCRIPTORS: DESCRIPTORS: ACHING

## 2021-02-27 NOTE — PLAN OF CARE
Description: Pain level will decrease  2/27/2021 1200 by Henreitta Pump  Outcome: Ongoing  2/27/2021 0131 by Marianela Wang RN  Outcome: Ongoing  Goal: Control of acute pain  Description: Control of acute pain  2/27/2021 1200 by Henreitta Pump  Outcome: Ongoing  2/27/2021 0131 by Marianela Wang RN  Outcome: Ongoing  Goal: Control of chronic pain  Description: Control of chronic pain  2/27/2021 1200 by Henreitta Pump  Outcome: Ongoing  2/27/2021 0131 by Marianela Wang RN  Outcome: Ongoing

## 2021-02-27 NOTE — PROGRESS NOTES
Occupational Therapy Treatment Note  Name: Theresa Pang MRN: [id number] :   1945   Date:  2021   Admission Date: 2021 Room:  [address] [place] [address]   Restrictions/Precautions:  Restrictions/Precautions  Restrictions/Precautions: Fall Risk  Required Braces or Orthoses?: No   Communication with other providers:  Jil Fowler to see per nurse  Subjective:  Patient states:  \"I'm ready to get up again\"  Pain:   Location, Type, Intensity (0/10 to 10/10):  0/10  Objective:    Observation:  Pt awake supine in bed with HOB elevated  Objective Measures:  Pt seen for therax, functional mobility  Treatment, including education:  Supine to sit :S  Sit to supine :S  Scooting :S  Rolling :S  Sit to stand CGA  Stand to sit :CGA  SPT:CGA    Therapeutic Activity:  Pt amb from room down sun to therapy gym with RW CGA. In gym Pt trf to // bars and holding onto bars completed stepping up/down over step 4x with CGA. Standing in place holding onto bars Pt completed marching in place 20x. Placed chair by // bars for Pt to sit and during trf with CGA, Pt lowered and sat down onto ramp of // bars with therapist CGA. Pt reported he felt he might lose his balance so he just down . Pt was assisted to chair after 2 attempts with max A. Pt reported denied any pain or injury. Pt stood up from chair to walker CGA and trialed going up ramp and down and Pt performed with CGA holding walker. Pt amb back to room with RW CGA. Safety:  Pt left supine in bed with bed alarm activated, call light within reach and nurse notified. Gait was used during tx. Physican and Pt's nurse were notified of incident and Pt reported he felt fine. A [facility] report was submitted.       Assessment / Impression:        Patient's tolerance of treatment: Good   Adverse Reaction: Pt had controlled fall to floor  Significant change in status and impact:  None  Barriers to improvement:  None  Plan for Next Session:    Continue current POC  Time in: 8425 Time out:  1515  Timed treatment minutes:  25  Total treatment time:  25  Electronically signed by:    Ddii Smalls [id number]  2/27/2021, 3:17 PM    Previously filed values:  Patient Goals   Patient goals : to return home  Short term goals  Time Frame for Short term goals: Until goals met or discharge  Short term goal 1: Pt will complete all UB ADLs mod I for increased functional I  Short term goal 2: Pt will complete all LB ADLs min A for increased functional I  Short term goal 3: Pt will complete all aspects of toileting SBA for increased functional I  Short term goal 4: Pt will complete all functional transfers and bed mobility SBA in prep for EOB/OOB ADL tasks.   Short term goal 5: Pt will participate in therex/theract for 5+ minutes with emphasis on UE strengthning and dynamic balance for increased I with IADL/home management tasks

## 2021-02-27 NOTE — PLAN OF CARE
Problem: Falls - Risk of:  Goal: Will remain free from falls  Description: Will remain free from falls  2/27/2021 0131 by Deven Dee RN  Outcome: Ongoing  2/26/2021 1231 by Shavonne Sheriff RN  Outcome: Ongoing  Goal: Absence of physical injury  Description: Absence of physical injury  2/27/2021 0131 by Deven Dee RN  Outcome: Ongoing  2/26/2021 1231 by Shavonne Sheriff RN  Outcome: Ongoing     Problem: IP MOBILITY  Goal: LTG - patient will demonstrate kitchen mobility  2/27/2021 0131 by Deven Dee RN  Outcome: Ongoing  2/26/2021 1231 by Shavonne Sheriff RN  Outcome: Ongoing  Goal: LTG - patient will ambulate community distance  2/27/2021 0131 by Deven Dee RN  Outcome: Ongoing  2/26/2021 1231 by Shavonne Sheriff RN  Outcome: Ongoing  Goal: LTG - patient will ambulate household distance  2/27/2021 0131 by Deven Dee RN  Outcome: Ongoing  2/26/2021 1231 by Shavonne Sheriff RN  Outcome: Ongoing  Goal: LTG - Patient will navigate 4-6 steps with rails/device  2/27/2021 0131 by Deven Dee RN  Outcome: Ongoing  2/26/2021 1231 by Shavonne Sheriff RN  Outcome: Ongoing  Goal: LTG - patient will demonstrate safe mobility requirements  2/27/2021 0131 by Deven Dee RN  Outcome: Ongoing  2/26/2021 1231 by Shavonne Sheriff RN  Outcome: Ongoing  Goal: STG - Patient will ambulate  2/27/2021 0131 by Deven Dee RN  Outcome: Ongoing  2/26/2021 1231 by Shavonne Sheriff RN  Outcome: Ongoing     Problem: OXYGENATION/RESPIRATORY FUNCTION  Goal: Patient will maintain patent airway  2/27/2021 0131 by Deven Dee RN  Outcome: Ongoing  2/26/2021 1231 by Shavonne Sheriff RN  Outcome: Ongoing  Goal: Patient will achieve/maintain normal respiratory rate/effort  Description: Respiratory rate and effort will be within normal limits for the patient  2/27/2021 0131 by Deven Dee RN  Outcome: Ongoing  2/26/2021 1231 by Shavonne Sheriff RN  Outcome: Ongoing     Problem: Pain: Description: Pain management should include both nonpharmacologic and pharmacologic interventions.   Goal: Pain level will decrease  Description: Pain level will decrease  2/27/2021 0131 by Liliana Conroy RN  Outcome: Ongoing  2/26/2021 1231 by Benjamin Ames RN  Outcome: Ongoing  Goal: Control of acute pain  Description: Control of acute pain  2/27/2021 0131 by Liliana Conroy RN  Outcome: Ongoing  2/26/2021 1231 by Benjamin Ames RN  Outcome: Ongoing  Goal: Control of chronic pain  Description: Control of chronic pain  2/27/2021 0131 by Liliana Conroy RN  Outcome: Ongoing  2/26/2021 1231 by Benjamin Ames RN  Outcome: Ongoing

## 2021-02-27 NOTE — PROGRESS NOTES
Plan for Next Session:    Continue current POC    Time in:  1220  Time out:  1130  Timed treatment minutes: 45  Total treatment time:  45  Electronically signed by:    Didi Smalls [id number]  2/27/2021, 10:48 AM    Previously filed values:  Patient Goals   Patient goals : to return home  Short term goals  Time Frame for Short term goals: Until goals met or discharge  Short term goal 1: Pt will complete all UB ADLs mod I for increased functional I  Short term goal 2: Pt will complete all LB ADLs min A for increased functional I  Short term goal 3: Pt will complete all aspects of toileting SBA for increased functional I  Short term goal 4: Pt will complete all functional transfers and bed mobility SBA in prep for EOB/OOB ADL tasks.   Short term goal 5: Pt will participate in therex/theract for 5+ minutes with emphasis on UE strengthning and dynamic balance for increased I with IADL/home management tasks

## 2021-02-28 LAB
GLUCOSE BLD-MCNC: 141 MG/DL (ref 70–99)
GLUCOSE BLD-MCNC: 150 MG/DL (ref 70–99)
GLUCOSE BLD-MCNC: 172 MG/DL (ref 70–99)
GLUCOSE BLD-MCNC: 92 MG/DL (ref 70–99)

## 2021-02-28 PROCEDURE — 6370000000 HC RX 637 (ALT 250 FOR IP): Performed by: INTERNAL MEDICINE

## 2021-02-28 PROCEDURE — 1200000002 HC SEMI PRIVATE SWING BED

## 2021-02-28 PROCEDURE — 82962 GLUCOSE BLOOD TEST: CPT

## 2021-02-28 PROCEDURE — 2700000000 HC OXYGEN THERAPY PER DAY

## 2021-02-28 PROCEDURE — 94640 AIRWAY INHALATION TREATMENT: CPT

## 2021-02-28 RX ADMIN — GUAIFENESIN 1200 MG: 600 TABLET, EXTENDED RELEASE ORAL at 20:23

## 2021-02-28 RX ADMIN — GABAPENTIN 300 MG: 300 CAPSULE ORAL at 08:54

## 2021-02-28 RX ADMIN — CARVEDILOL 3.12 MG: 3.12 TABLET, FILM COATED ORAL at 08:53

## 2021-02-28 RX ADMIN — Medication 400 MG: at 08:53

## 2021-02-28 RX ADMIN — IPRATROPIUM BROMIDE AND ALBUTEROL SULFATE 1 AMPULE: .5; 3 SOLUTION RESPIRATORY (INHALATION) at 11:11

## 2021-02-28 RX ADMIN — AMIODARONE HYDROCHLORIDE 200 MG: 200 TABLET ORAL at 08:54

## 2021-02-28 RX ADMIN — GUAIFENESIN 1200 MG: 600 TABLET, EXTENDED RELEASE ORAL at 08:53

## 2021-02-28 RX ADMIN — GABAPENTIN 300 MG: 300 CAPSULE ORAL at 20:23

## 2021-02-28 RX ADMIN — PRIMIDONE 50 MG: 50 TABLET ORAL at 20:23

## 2021-02-28 RX ADMIN — LISINOPRIL 10 MG: 10 TABLET ORAL at 08:54

## 2021-02-28 RX ADMIN — CARVEDILOL 3.12 MG: 3.12 TABLET, FILM COATED ORAL at 17:04

## 2021-02-28 RX ADMIN — ASPIRIN 81 MG: 81 TABLET, COATED ORAL at 08:54

## 2021-02-28 RX ADMIN — IPRATROPIUM BROMIDE AND ALBUTEROL SULFATE 1 AMPULE: .5; 3 SOLUTION RESPIRATORY (INHALATION) at 07:31

## 2021-02-28 RX ADMIN — INSULIN LISPRO 2 UNITS: 100 INJECTION, SOLUTION INTRAVENOUS; SUBCUTANEOUS at 08:54

## 2021-02-28 RX ADMIN — GABAPENTIN 300 MG: 300 CAPSULE ORAL at 14:22

## 2021-02-28 RX ADMIN — TRAZODONE HYDROCHLORIDE 100 MG: 100 TABLET ORAL at 22:31

## 2021-02-28 RX ADMIN — IPRATROPIUM BROMIDE AND ALBUTEROL SULFATE 1 AMPULE: .5; 3 SOLUTION RESPIRATORY (INHALATION) at 15:03

## 2021-02-28 RX ADMIN — ATORVASTATIN CALCIUM 40 MG: 40 TABLET, FILM COATED ORAL at 20:23

## 2021-02-28 RX ADMIN — GLIPIZIDE 2.5 MG: 5 TABLET ORAL at 08:58

## 2021-02-28 RX ADMIN — AMIODARONE HYDROCHLORIDE 200 MG: 200 TABLET ORAL at 20:23

## 2021-02-28 RX ADMIN — IPRATROPIUM BROMIDE AND ALBUTEROL SULFATE 1 AMPULE: .5; 3 SOLUTION RESPIRATORY (INHALATION) at 18:36

## 2021-02-28 RX ADMIN — PRIMIDONE 50 MG: 50 TABLET ORAL at 08:54

## 2021-02-28 NOTE — PLAN OF CARE
Problem: Falls - Risk of:  Goal: Will remain free from falls  Description: Will remain free from falls  2/27/2021 2143 by Linn Tay RN  Outcome: Ongoing  2/27/2021 1200 by Jorge Garcia  Outcome: Ongoing  Goal: Absence of physical injury  Description: Absence of physical injury  2/27/2021 2143 by Linn Tay RN  Outcome: Ongoing  2/27/2021 1200 by Jorge Garcia  Outcome: Ongoing     Problem: IP MOBILITY  Goal: LTG - patient will demonstrate kitchen mobility  2/27/2021 2143 by Linn Tay RN  Outcome: Ongoing  2/27/2021 1200 by Jorge Garcia  Outcome: Ongoing  Goal: LTG - patient will ambulate community distance  2/27/2021 2143 by Linn Tay RN  Outcome: Ongoing  2/27/2021 1200 by Jorge Garcia  Outcome: Ongoing  Goal: LTG - patient will ambulate household distance  2/27/2021 2143 by Linn Tay RN  Outcome: Ongoing  2/27/2021 1200 by Jorge Garcia  Outcome: Ongoing  Goal: LTG - Patient will navigate 4-6 steps with rails/device  2/27/2021 2143 by Linn Tay RN  Outcome: Ongoing  2/27/2021 1200 by Jorge Garcai  Outcome: Ongoing  Goal: LTG - patient will demonstrate safe mobility requirements  2/27/2021 2143 by Linn Tay RN  Outcome: Ongoing  2/27/2021 1200 by Jorge Garcia  Outcome: Ongoing  Goal: STG - Patient will ambulate  2/27/2021 2143 by Linn Tay RN  Outcome: Ongoing  2/27/2021 1200 by Jorge Garcia  Outcome: Ongoing     Problem: OXYGENATION/RESPIRATORY FUNCTION  Goal: Patient will achieve/maintain normal respiratory rate/effort  Description: Respiratory rate and effort will be within normal limits for the patient  2/27/2021 2143 by Linn Tay RN  Outcome: Ongoing  2/27/2021 1200 by Jorge Garcia  Outcome: Ongoing     Problem: Pain:  Goal: Pain level will decrease  Description: Pain level will decrease  2/27/2021 2143 by Linn Tay RN  Outcome: Ongoing  2/27/2021 1200 by Jorge Garcia  Outcome: Ongoing Goal: Control of acute pain  Description: Control of acute pain  2/27/2021 2143 by Jennifer Bright RN  Outcome: Ongoing  2/27/2021 1200 by Alan Taylor  Outcome: Ongoing  Goal: Control of chronic pain  Description: Control of chronic pain  2/27/2021 2143 by Jennifer Bright RN  Outcome: Ongoing  2/27/2021 1200 by Alan Taylor  Outcome: Ongoing

## 2021-03-01 LAB
GLUCOSE BLD-MCNC: 122 MG/DL (ref 70–99)
GLUCOSE BLD-MCNC: 138 MG/DL (ref 70–99)
GLUCOSE BLD-MCNC: 167 MG/DL (ref 70–99)
GLUCOSE BLD-MCNC: 189 MG/DL (ref 70–99)

## 2021-03-01 PROCEDURE — 94640 AIRWAY INHALATION TREATMENT: CPT

## 2021-03-01 PROCEDURE — 97116 GAIT TRAINING THERAPY: CPT

## 2021-03-01 PROCEDURE — 1200000002 HC SEMI PRIVATE SWING BED

## 2021-03-01 PROCEDURE — 97530 THERAPEUTIC ACTIVITIES: CPT

## 2021-03-01 PROCEDURE — 6370000000 HC RX 637 (ALT 250 FOR IP): Performed by: INTERNAL MEDICINE

## 2021-03-01 PROCEDURE — 82962 GLUCOSE BLOOD TEST: CPT

## 2021-03-01 PROCEDURE — 97110 THERAPEUTIC EXERCISES: CPT

## 2021-03-01 PROCEDURE — 97535 SELF CARE MNGMENT TRAINING: CPT

## 2021-03-01 RX ADMIN — BENZOCAINE AND MENTHOL 1 LOZENGE: 15; 3.6 LOZENGE ORAL at 13:39

## 2021-03-01 RX ADMIN — GUAIFENESIN 1200 MG: 600 TABLET, EXTENDED RELEASE ORAL at 07:53

## 2021-03-01 RX ADMIN — GUAIFENESIN 1200 MG: 600 TABLET, EXTENDED RELEASE ORAL at 20:55

## 2021-03-01 RX ADMIN — GABAPENTIN 300 MG: 300 CAPSULE ORAL at 07:54

## 2021-03-01 RX ADMIN — IPRATROPIUM BROMIDE AND ALBUTEROL SULFATE 1 AMPULE: .5; 3 SOLUTION RESPIRATORY (INHALATION) at 14:37

## 2021-03-01 RX ADMIN — CARVEDILOL 3.12 MG: 3.12 TABLET, FILM COATED ORAL at 17:56

## 2021-03-01 RX ADMIN — IPRATROPIUM BROMIDE AND ALBUTEROL SULFATE 1 AMPULE: .5; 3 SOLUTION RESPIRATORY (INHALATION) at 10:47

## 2021-03-01 RX ADMIN — Medication 400 MG: at 07:54

## 2021-03-01 RX ADMIN — CARVEDILOL 3.12 MG: 3.12 TABLET, FILM COATED ORAL at 07:54

## 2021-03-01 RX ADMIN — PRIMIDONE 50 MG: 50 TABLET ORAL at 20:55

## 2021-03-01 RX ADMIN — ASPIRIN 81 MG: 81 TABLET, COATED ORAL at 07:54

## 2021-03-01 RX ADMIN — ATORVASTATIN CALCIUM 40 MG: 40 TABLET, FILM COATED ORAL at 20:56

## 2021-03-01 RX ADMIN — AMIODARONE HYDROCHLORIDE 200 MG: 200 TABLET ORAL at 07:54

## 2021-03-01 RX ADMIN — INSULIN LISPRO 2 UNITS: 100 INJECTION, SOLUTION INTRAVENOUS; SUBCUTANEOUS at 12:23

## 2021-03-01 RX ADMIN — GABAPENTIN 300 MG: 300 CAPSULE ORAL at 20:55

## 2021-03-01 RX ADMIN — GABAPENTIN 300 MG: 300 CAPSULE ORAL at 13:39

## 2021-03-01 RX ADMIN — AMIODARONE HYDROCHLORIDE 200 MG: 200 TABLET ORAL at 20:56

## 2021-03-01 RX ADMIN — PRIMIDONE 50 MG: 50 TABLET ORAL at 07:53

## 2021-03-01 RX ADMIN — LISINOPRIL 10 MG: 10 TABLET ORAL at 07:54

## 2021-03-01 RX ADMIN — TRAZODONE HYDROCHLORIDE 100 MG: 100 TABLET ORAL at 22:22

## 2021-03-01 RX ADMIN — IPRATROPIUM BROMIDE AND ALBUTEROL SULFATE 1 AMPULE: .5; 3 SOLUTION RESPIRATORY (INHALATION) at 19:31

## 2021-03-01 RX ADMIN — IPRATROPIUM BROMIDE AND ALBUTEROL SULFATE 1 AMPULE: .5; 3 SOLUTION RESPIRATORY (INHALATION) at 07:30

## 2021-03-01 RX ADMIN — GLIPIZIDE 2.5 MG: 5 TABLET ORAL at 07:54

## 2021-03-01 NOTE — PLAN OF CARE
Problem: Falls - Risk of:  Goal: Will remain free from falls  Description: Will remain free from falls  3/1/2021 0746 by Shay Nielsen RN  Outcome: Ongoing  2/28/2021 2015 by Priscila Schofield RN  Outcome: Ongoing  Goal: Absence of physical injury  Description: Absence of physical injury  3/1/2021 0746 by Shay Nielsen RN  Outcome: Ongoing  2/28/2021 2015 by Priscila Schofield RN  Outcome: Ongoing

## 2021-03-01 NOTE — CARE COORDINATION
CM met with the patient for follow-up discussion regarding discharge planning. CM spoke with the patient regarding possibility of discharge Friday 3/5. Patient stated that he still feels much weaker than his baseline. CM advised the patient that the PT note dated Friday 2/26 indicates that he was able to ambulate 80 feet x 2 and that he was near the point that he could be transitioned home and continue PT/OT with [facility]. Patient advised that he would like to continue with [place] [facility] [id number] after discharge and has no preference regarding agency selected. Patient stated that he would be agreeable to anticipated discharge date of Friday 3/5 as long as he continues to progress. Patient has 2 steps at home and this CM advised the patient to try to work on steps during therapy this week. Patient stated that he would like to be able to ambulate without the aid of a walker, this CM advised the patient that he may need to begin using his walker all the time but encouraged him st speak with his physical and occupational therapists. Patient advised that he has 2 different styles of walkers and a cane at home. CM will follow.

## 2021-03-01 NOTE — PLAN OF CARE
Problem: Falls - Risk of:  Goal: Will remain free from falls  Description: Will remain free from falls  2/28/2021 2015 by Tanesha Coffman RN  Outcome: Ongoing  2/28/2021 1015 by Elicia Harden RN  Outcome: Ongoing  Goal: Absence of physical injury  Description: Absence of physical injury  2/28/2021 2015 by Tanesha Coffman RN  Outcome: Ongoing  2/28/2021 1015 by Elicia Harden RN  Outcome: Ongoing     Problem: IP MOBILITY  Goal: LTG - patient will demonstrate kitchen mobility  2/28/2021 2015 by Tanesha Coffman RN  Outcome: Ongoing  2/28/2021 1015 by Elicia Harden RN  Outcome: Ongoing  Goal: LTG - patient will ambulate community distance  2/28/2021 2015 by Tanesha Coffman RN  Outcome: Ongoing  2/28/2021 1015 by Elicia Harden RN  Outcome: Ongoing  Goal: LTG - patient will ambulate household distance  2/28/2021 2015 by Tanesha Coffman RN  Outcome: Ongoing  2/28/2021 1015 by Elicia Harden RN  Outcome: Ongoing  Goal: LTG - Patient will navigate 4-6 steps with rails/device  2/28/2021 2015 by Tanesha Coffman RN  Outcome: Ongoing  2/28/2021 1015 by Elicia Harden RN  Outcome: Ongoing  Goal: LTG - patient will demonstrate safe mobility requirements  2/28/2021 2015 by Tanesha Coffman RN  Outcome: Ongoing  2/28/2021 1015 by Elicia Harden RN  Outcome: Ongoing  Goal: STG - Patient will ambulate  2/28/2021 2015 by Tanesha Coffman RN  Outcome: Ongoing  2/28/2021 1015 by Elicia Harden RN  Outcome: Ongoing     Problem: OXYGENATION/RESPIRATORY FUNCTION  Goal: Patient will achieve/maintain normal respiratory rate/effort  Description: Respiratory rate and effort will be within normal limits for the patient  2/28/2021 2015 by Tanesha Coffman RN  Outcome: Ongoing  2/28/2021 1015 by Elicia Harden RN  Outcome: Ongoing     Problem: Pain:  Goal: Pain level will decrease  Description: Pain level will decrease  2/28/2021 2015 by Tanesha Coffman RN  Outcome: Ongoing

## 2021-03-01 NOTE — PROGRESS NOTES
Occupational Therapy    Occupational Therapy Treatment Note  Name: Brandie Rodriguez MRN: [id number] :   1945   Date:  3/1/2021   Admission Date: 2021 Room:  [address] [place] [address]   Restrictions/Precautions:  Restrictions/Precautions  Restrictions/Precautions: Fall Risk  Required Braces or Orthoses?: No     Communication with other providers:   Cleared for treatment by RN. Subjective:  Patient states:  \"my back is hurting today\"  Pain:   Location, Type, Intensity (0/10 to 10/10):  5/10 back    Objective:    Observation:  Pt alert and orietned      Treatment, including education:  Transfers  Supine to sit :Sup  Sit to supine :Sup  Scooting :SUp  Rolling :Sup  Sit to stand :SBA  Stand to sit :SBA  SPT:SBA  Toilet:SBA    Self Care Training:   Cues were given for safety, sequence, UE/LE placement, visual cues, and balance. Activities performed today included the following: Toileting  Sup with clothing management and toilet hygiene. Grooming  Sup with wash/rinse/dry face. Pt declined oral care. Bathing   SBA to complete bathing sitting at EOB and stood to wash kj area and buttocks while standing with RW.    UB Dress  Donned gown      Therapeutic Exercise:  Cues were given for technique, safety, recruitment, and rationale. Cues were verbal and/or tactile. Pt completed Nustep on level 4 x 8 min. Therapeutic Activity Training:   Therapeutic activity training was instructed today. Cues were given for safety, sequence, UE/LE placement, awareness, and balance. Activities performed today included bed mobility training, sup-sit, sit-stand, SPT. Pt completed functional mobility in hallway x 150' with RW with CGA with mod standing rest breaks.       Safety Measures: Gait belt used, Left in bed, Pull/Bed Alarm activated and call light left in reach        Assessment / Impression:        Patient's tolerance of treatment: Good   Adverse Reaction: None  Significant change in status and impact:  None Barriers to improvement:  Dec strength and endurance. Plan for Next Session:    Continue with POC. Time in:  7246 1538  Time out:  4264 1550  Timed treatment minutes:  75  Total treatment time:  76  Electronically signed by:    Lexie Falcon, [address]    3/1/2021, 10:08 AM    Previously filed values:  Patient Goals   Patient goals : to return home  Short term goals  Time Frame for Short term goals: Until goals met or discharge  Short term goal 1: Pt will complete all UB ADLs mod I for increased functional I  Short term goal 2: Pt will complete all LB ADLs min A for increased functional I  Short term goal 3: Pt will complete all aspects of toileting SBA for increased functional I  Short term goal 4: Pt will complete all functional transfers and bed mobility SBA in prep for EOB/OOB ADL tasks.   Short term goal 5: Pt will participate in therex/theract for 5+ minutes with emphasis on UE strengthning and dynamic balance for increased I with IADL/home management tasks

## 2021-03-01 NOTE — PROGRESS NOTES
Physical Therapy    Physical Therapy Treatment Note  Name: Alonso Trivedi MRN: [id number] :   1945   Date:  3/1/2021   Admission Date: 2021 Room:  [address] [place], [place] [id number]     Restrictions/Precautions:  Restrictions/Precautions  Restrictions/Precautions: Fall Risk  Required Braces or Orthoses?: No     .    Communication with other providers:     Subjective:  Patient states:  Agreeable to tx session;     Pain:   Location, Type, Intensity (0/10 to 10/10):  No C/O pain initially,  At end of session patient c/o lower abdominal pain    Objective:    Observation:  Pt in bed upon arrival.    Treatment, including education/measures:  Transfers  Sit to supine : Mod I   Scooting : Mod I  Rolling : Mod I  Sit to stand :CG/SB from bed, BSC and Nu-step. Stand to sit :CG/SB from bed, BSC and Nu-step. Pt able to tolerate 10min L4 on Nu-step this date. Gait:  Pt amb with RW for 80 ft x 2 with CG/SB assist; pt amb with decreased lissette, forward posture. Pt needed VC's for pathway, posture. Safety  Patient left safely in thechair, with call light/phone in reach with alarm applied. Gait belt and mask were used for transfers and gait.     Assessment / Impression:       Patient's tolerance of treatment:  good   Adverse Reaction: none  Significant change in status and impact:  none  Barriers to improvement:  Fatigue     Plan for Next Session:    Will cont to work towards pt's goals per his tolerance    Time in:  1115  Time out: 1145  Timed treatment minutes:  30  Total treatment time: 30      Previously filed items:  Social/Functional History  Lives With: Spouse  Type of Home: House  Home Layout: Two level, Performs ADL's on one level, Able to Live on Main level with bedroom/bathroom  Home Access: Stairs to enter with rails  Entrance Stairs - Number of Steps: 2 steps  Entrance Stairs - Rails: Right(going up)  Bathroom Shower/Tub: Walk-in shower, Shower chair without back  Bathroom Toilet: Standard Bathroom Equipment: Grab bars in shower, Hand-held shower  Home Equipment: 4 wheeled walker, Cane(pt reports he was using no AD prior to falling.)  Receives Help From: Family  ADL Assistance: Independent  Homemaking Assistance: Independent  Homemaking Responsibilities: Yes  Meal Prep Responsibility: Secondary(pt reports they do the cooking 50/50)  Laundry Responsibility: No  Cleaning Responsibility: Secondary(pt reports he does the dishes.)  Shopping Responsibility: Primary(pt reports he was doing the grocery shopping. he reports he gets worn out walking through store.)  Ambulation Assistance: Independent  Transfer Assistance: Independent  Active : Yes  Mode of Transportation: Car  Occupation: Retired  Type of occupation: pt is retired from [employer]. Leisure & Hobbies: Pt reports he shoots troublesome animals. IADL Comments: pt reports he was I with dressing and bathing. Additional Comments: Pt reports he has a hx of falls. Reports he is normally walking when he falls and wasn't using an AD  Short term goals  Time Frame for Short term goals: 1 week or until discharge:  Short term goal 1: Pt will demonstrate the ability to safely perform bed mobility mod I with no HR and HOB flat. Short term goal 2: Pt will demonstrate the ability to safely stand x8 consecutive mintues while performing dynamic balance activities with supervision and no more than 1 hand for support. Short term goal 3: pt will demonstrate the ability to safely ambulate 150 feet consecutive feet with step through gait and  the least restrictive AD and superivsion. Short term goal 4: Pt will demonstrate the ability to safely ambulate up and down 2 steps with a R HR and supervision in 2/2 trials.        Electronically signed by:    Erica Tucker PTA  3/1/2021, 11:49 AM

## 2021-03-02 LAB
GLUCOSE BLD-MCNC: 120 MG/DL (ref 70–99)
GLUCOSE BLD-MCNC: 145 MG/DL (ref 70–99)
GLUCOSE BLD-MCNC: 148 MG/DL (ref 70–99)
GLUCOSE BLD-MCNC: 151 MG/DL (ref 70–99)
MAGNESIUM: 1.9 MG/DL (ref 1.8–2.4)

## 2021-03-02 PROCEDURE — 97110 THERAPEUTIC EXERCISES: CPT

## 2021-03-02 PROCEDURE — 97535 SELF CARE MNGMENT TRAINING: CPT

## 2021-03-02 PROCEDURE — 6370000000 HC RX 637 (ALT 250 FOR IP): Performed by: INTERNAL MEDICINE

## 2021-03-02 PROCEDURE — 36415 COLL VENOUS BLD VENIPUNCTURE: CPT

## 2021-03-02 PROCEDURE — 97530 THERAPEUTIC ACTIVITIES: CPT

## 2021-03-02 PROCEDURE — 97116 GAIT TRAINING THERAPY: CPT

## 2021-03-02 PROCEDURE — 6370000000 HC RX 637 (ALT 250 FOR IP): Performed by: NURSE PRACTITIONER

## 2021-03-02 PROCEDURE — 2700000000 HC OXYGEN THERAPY PER DAY

## 2021-03-02 PROCEDURE — 94640 AIRWAY INHALATION TREATMENT: CPT

## 2021-03-02 PROCEDURE — 1200000002 HC SEMI PRIVATE SWING BED

## 2021-03-02 PROCEDURE — 82962 GLUCOSE BLOOD TEST: CPT

## 2021-03-02 PROCEDURE — 94761 N-INVAS EAR/PLS OXIMETRY MLT: CPT

## 2021-03-02 PROCEDURE — 83735 ASSAY OF MAGNESIUM: CPT

## 2021-03-02 RX ORDER — GUAIFENESIN/DEXTROMETHORPHAN 100-10MG/5
5 SYRUP ORAL EVERY 4 HOURS PRN
Status: DISCONTINUED | OUTPATIENT
Start: 2021-03-02 | End: 2021-03-05 | Stop reason: HOSPADM

## 2021-03-02 RX ORDER — AMLODIPINE BESYLATE 5 MG/1
5 TABLET ORAL DAILY
Status: DISCONTINUED | OUTPATIENT
Start: 2021-03-03 | End: 2021-03-05 | Stop reason: HOSPADM

## 2021-03-02 RX ADMIN — GLIPIZIDE 2.5 MG: 5 TABLET ORAL at 08:34

## 2021-03-02 RX ADMIN — INSULIN LISPRO 2 UNITS: 100 INJECTION, SOLUTION INTRAVENOUS; SUBCUTANEOUS at 08:37

## 2021-03-02 RX ADMIN — GUAIFENESIN 1200 MG: 600 TABLET, EXTENDED RELEASE ORAL at 08:36

## 2021-03-02 RX ADMIN — AMIODARONE HYDROCHLORIDE 200 MG: 200 TABLET ORAL at 08:33

## 2021-03-02 RX ADMIN — IPRATROPIUM BROMIDE AND ALBUTEROL SULFATE 1 AMPULE: .5; 3 SOLUTION RESPIRATORY (INHALATION) at 20:38

## 2021-03-02 RX ADMIN — AMIODARONE HYDROCHLORIDE 200 MG: 200 TABLET ORAL at 20:45

## 2021-03-02 RX ADMIN — TRAMADOL HYDROCHLORIDE 25 MG: 50 TABLET, COATED ORAL at 22:22

## 2021-03-02 RX ADMIN — GABAPENTIN 300 MG: 300 CAPSULE ORAL at 18:49

## 2021-03-02 RX ADMIN — GUAIFENESIN AND DEXTROMETHORPHAN 5 ML: 100; 10 SYRUP ORAL at 18:51

## 2021-03-02 RX ADMIN — GUAIFENESIN 1200 MG: 600 TABLET, EXTENDED RELEASE ORAL at 20:45

## 2021-03-02 RX ADMIN — ASPIRIN 81 MG: 81 TABLET, COATED ORAL at 08:34

## 2021-03-02 RX ADMIN — IPRATROPIUM BROMIDE AND ALBUTEROL SULFATE 1 AMPULE: .5; 3 SOLUTION RESPIRATORY (INHALATION) at 15:03

## 2021-03-02 RX ADMIN — PRIMIDONE 50 MG: 50 TABLET ORAL at 08:33

## 2021-03-02 RX ADMIN — LISINOPRIL 10 MG: 10 TABLET ORAL at 08:36

## 2021-03-02 RX ADMIN — IPRATROPIUM BROMIDE AND ALBUTEROL SULFATE 1 AMPULE: .5; 3 SOLUTION RESPIRATORY (INHALATION) at 10:50

## 2021-03-02 RX ADMIN — CARVEDILOL 3.12 MG: 3.12 TABLET, FILM COATED ORAL at 08:34

## 2021-03-02 RX ADMIN — IPRATROPIUM BROMIDE AND ALBUTEROL SULFATE 1 AMPULE: .5; 3 SOLUTION RESPIRATORY (INHALATION) at 07:39

## 2021-03-02 RX ADMIN — PRIMIDONE 50 MG: 50 TABLET ORAL at 20:45

## 2021-03-02 RX ADMIN — TRAZODONE HYDROCHLORIDE 100 MG: 100 TABLET ORAL at 22:21

## 2021-03-02 RX ADMIN — ATORVASTATIN CALCIUM 40 MG: 40 TABLET, FILM COATED ORAL at 20:45

## 2021-03-02 RX ADMIN — GABAPENTIN 300 MG: 300 CAPSULE ORAL at 20:45

## 2021-03-02 RX ADMIN — CARVEDILOL 3.12 MG: 3.12 TABLET, FILM COATED ORAL at 18:49

## 2021-03-02 RX ADMIN — GABAPENTIN 300 MG: 300 CAPSULE ORAL at 08:33

## 2021-03-02 ASSESSMENT — PAIN SCALES - GENERAL
PAINLEVEL_OUTOF10: 0

## 2021-03-02 ASSESSMENT — PAIN DESCRIPTION - PAIN TYPE: TYPE: ACUTE PAIN

## 2021-03-02 ASSESSMENT — PAIN DESCRIPTION - DESCRIPTORS: DESCRIPTORS: ACHING;DISCOMFORT

## 2021-03-02 NOTE — PROGRESS NOTES
Occupational Therapy    Occupational Therapy Treatment Note  Name: Vilma Decker MRN: [id number] :   1945   Date:  3/2/2021   Admission Date: 2021 Room:  008008-01   Restrictions/Precautions:  Restrictions/Precautions  Restrictions/Precautions: Fall Risk  Required Braces or Orthoses?: No     Communication with other providers:   Cleared for treatment by RN. Subjective:  Patient states:  Agreeable to complete therapy  Pain:   Location, Type, Intensity (0/10 to 10/10): Back pain    Objective:    Observation:  Pt alert and oriented    Treatment, including education:  Transfers  Supine to sit :Sup  Sit to supine :Sup  Scooting :SUp  Sit to stand :SBA  Stand to sit :SBA  SPT:SBA  Toilet:SBA    Self Care Training:   Cues were given for safety, sequence, UE/LE placement, visual cues, and balance. Activities performed today included the following: Toileting  Urinated in toilet while standing with supervision. Therapeutic Exercise:  Cues were given for technique, safety, recruitment, and rationale. Cues were verbal and/or tactile. Pt completed Nustep on level 4 x 8 min        Therapeutic Activity Training:   Therapeutic activity training was instructed today. Cues were given for safety, sequence, UE/LE placement, awareness, and balance. Activities performed today included bed mobility training, sup-sit, sit-stand, SPT. Pt stood x 5 min with SBA with RW to facilitate increased endurance/strength for ADL tasks and transfers. Pt completed functional mobility with RW x 70' x 2. Safety Measures: Gait belt used, Left in bed, Pull/Bed Alarm activated and call light left in reach        Assessment / Impression:        Patient's tolerance of treatment: Good   Adverse Reaction: None  Significant change in status and impact:  None  Barriers to improvement:Dec strength and endurance. Plan for Next Session:    Continue with POC.     Time in: 1415  Time out:  1453  Timed treatment minutes:  38 Total treatment time:  45  Electronically signed by:    Tasneem Rodriguez, [address]    3/2/2021, 3:32 PM    Previously filed values:  Patient Goals   Patient goals : to return home  Short term goals  Time Frame for Short term goals: Until goals met or discharge  Short term goal 1: Pt will complete all UB ADLs mod I for increased functional I  Short term goal 2: Pt will complete all LB ADLs min A for increased functional I  Short term goal 3: Pt will complete all aspects of toileting SBA for increased functional I  Short term goal 4: Pt will complete all functional transfers and bed mobility SBA in prep for EOB/OOB ADL tasks.   Short term goal 5: Pt will participate in therex/theract for 5+ minutes with emphasis on UE strengthning and dynamic balance for increased I with IADL/home management tasks

## 2021-03-02 NOTE — PLAN OF CARE
Goal: Control of acute pain  Description: Control of acute pain  3/1/2021 1957 by Jennifer Bright RN  Outcome: Ongoing  3/1/2021 0746 by Duane Jackman RN  Outcome: Ongoing  Goal: Control of chronic pain  Description: Control of chronic pain  3/1/2021 1957 by Jennifer Bright RN  Outcome: Ongoing  3/1/2021 0746 by Duane Jackman RN  Outcome: Ongoing

## 2021-03-02 NOTE — PROGRESS NOTES
Physical Therapy    Physical Therapy Treatment Note  Name: Madelin Schmidt MRN: [id number] :   1945   Date:  3/2/2021   Admission Date: 2021 Room:  [address] [place], [place] [id number]     Restrictions/Precautions:  Restrictions/Precautions  Restrictions/Precautions: Fall Risk  Required Braces or Orthoses?: No     .    Communication with other providers:     Subjective:  Patient states:  Agreeable to tx session;     Pain:   Location, Type, Intensity (0/10 to 10/10):  No C/O pain     Objective:    Observation:  Pt in bed upon arrival.    Treatment, including education/measures:  Transfers  Sit to supine : Mod I   Scooting : Mod I  Rolling : Mod I  Sit to stand :CG/SB from bed, BSC and Nu-step. Stand to sit :CG/SB from bed, BSC and Nu-step. Pt requests to use bathroom before beginning tx session. Pt required increased time and effort in bathroom. Pt able to tolerate 10min L4 on Nu-step this date. Pt completes dynamic standing ring toss activity with CGA, with single UE support and no major LOB. Pt able to perform activity x 2 trials for ~2min each before fatiguing; requires seated rest break in between sets. Gait:  Pt amb with RW for 80 ft x 2 with CG/SB assist; pt amb with decreased lissette, forward posture. Pt needed VC's for pathway, posture. Safety  Patient left safely in bed, with call light/phone in reach with alarm applied. Gait belt and mask were used for transfers and gait.     Assessment / Impression:       Patient's tolerance of treatment:  good   Adverse Reaction: none  Significant change in status and impact:  none  Barriers to improvement:  Fatigue     Plan for Next Session:    Will cont to work towards pt's goals per his tolerance    Time in:  900  Time out: 955  Timed treatment minutes:  55  Total treatment time: 54      Previously filed items:  Social/Functional History  Lives With: Spouse  Type of Home: [employer] Home Layout: Two level, Performs ADL's on one level, Able to Live on Main level with bedroom/bathroom  Home Access: Stairs to enter with rails  Entrance Stairs - Number of Steps: 2 steps  Entrance Stairs - Rails: Right(going up)  Bathroom Shower/Tub: Walk-in shower, Shower chair without back  Bathroom Toilet: Standard  Bathroom Equipment: Grab bars in shower, Hand-held shower  Home Equipment: 4 wheeled walker, Cane(pt reports he was using no AD prior to falling.)  Receives Help From: Family  ADL Assistance: Independent  Homemaking Assistance: Independent  Homemaking Responsibilities: Yes  Meal Prep Responsibility: Secondary(pt reports they do the cooking 50/50)  Laundry Responsibility: No  Cleaning Responsibility: Secondary(pt reports he does the dishes.)  Shopping Responsibility: Primary(pt reports he was doing the grocery shopping. he reports he gets worn out walking through store.)  Ambulation Assistance: Independent  Transfer Assistance: Independent  Active : Yes  Mode of Transportation: Car  Occupation: Retired  Type of occupation: pt is retired from [employer]. Leisure & Hobbies: Pt reports he shoots troublesome animals. IADL Comments: pt reports he was I with dressing and bathing. Additional Comments: Pt reports he has a hx of falls. Reports he is normally walking when he falls and wasn't using an AD  Short term goals  Time Frame for Short term goals: 1 week or until discharge:  Short term goal 1: Pt will demonstrate the ability to safely perform bed mobility mod I with no HR and HOB flat. Short term goal 2: Pt will demonstrate the ability to safely stand x8 consecutive mintues while performing dynamic balance activities with supervision and no more than 1 hand for support. Short term goal 3: pt will demonstrate the ability to safely ambulate 150 feet consecutive feet with step through gait and  the least restrictive AD and superivsion. Short term goal 4: Pt will demonstrate the ability to safely ambulate up and down 2 steps with a R HR and supervision in 2/2 trials.        Electronically signed by:    Harriet Anderson PTA   3/2/2021, 11:37 AM

## 2021-03-03 LAB
GLUCOSE BLD-MCNC: 124 MG/DL (ref 70–99)
GLUCOSE BLD-MCNC: 133 MG/DL (ref 70–99)
GLUCOSE BLD-MCNC: 139 MG/DL (ref 70–99)
GLUCOSE BLD-MCNC: 140 MG/DL (ref 70–99)

## 2021-03-03 PROCEDURE — 6370000000 HC RX 637 (ALT 250 FOR IP): Performed by: INTERNAL MEDICINE

## 2021-03-03 PROCEDURE — 97112 NEUROMUSCULAR REEDUCATION: CPT

## 2021-03-03 PROCEDURE — 97116 GAIT TRAINING THERAPY: CPT

## 2021-03-03 PROCEDURE — 94640 AIRWAY INHALATION TREATMENT: CPT

## 2021-03-03 PROCEDURE — 6370000000 HC RX 637 (ALT 250 FOR IP): Performed by: NURSE PRACTITIONER

## 2021-03-03 PROCEDURE — 82962 GLUCOSE BLOOD TEST: CPT

## 2021-03-03 PROCEDURE — 2700000000 HC OXYGEN THERAPY PER DAY

## 2021-03-03 PROCEDURE — 1200000002 HC SEMI PRIVATE SWING BED

## 2021-03-03 PROCEDURE — 97530 THERAPEUTIC ACTIVITIES: CPT

## 2021-03-03 PROCEDURE — 94761 N-INVAS EAR/PLS OXIMETRY MLT: CPT

## 2021-03-03 PROCEDURE — 97110 THERAPEUTIC EXERCISES: CPT

## 2021-03-03 RX ADMIN — GABAPENTIN 300 MG: 300 CAPSULE ORAL at 22:25

## 2021-03-03 RX ADMIN — ASPIRIN 81 MG: 81 TABLET, COATED ORAL at 08:46

## 2021-03-03 RX ADMIN — CARVEDILOL 3.12 MG: 3.12 TABLET, FILM COATED ORAL at 17:30

## 2021-03-03 RX ADMIN — TRAZODONE HYDROCHLORIDE 100 MG: 100 TABLET ORAL at 22:25

## 2021-03-03 RX ADMIN — PRIMIDONE 50 MG: 50 TABLET ORAL at 22:25

## 2021-03-03 RX ADMIN — IPRATROPIUM BROMIDE AND ALBUTEROL SULFATE 1 AMPULE: .5; 3 SOLUTION RESPIRATORY (INHALATION) at 08:05

## 2021-03-03 RX ADMIN — IPRATROPIUM BROMIDE AND ALBUTEROL SULFATE 1 AMPULE: .5; 3 SOLUTION RESPIRATORY (INHALATION) at 11:45

## 2021-03-03 RX ADMIN — AMLODIPINE BESYLATE 5 MG: 5 TABLET ORAL at 08:46

## 2021-03-03 RX ADMIN — PRIMIDONE 50 MG: 50 TABLET ORAL at 08:46

## 2021-03-03 RX ADMIN — GLIPIZIDE 2.5 MG: 5 TABLET ORAL at 09:22

## 2021-03-03 RX ADMIN — GABAPENTIN 300 MG: 300 CAPSULE ORAL at 08:46

## 2021-03-03 RX ADMIN — GUAIFENESIN AND DEXTROMETHORPHAN 5 ML: 100; 10 SYRUP ORAL at 14:23

## 2021-03-03 RX ADMIN — CARVEDILOL 3.12 MG: 3.12 TABLET, FILM COATED ORAL at 08:46

## 2021-03-03 RX ADMIN — AMIODARONE HYDROCHLORIDE 200 MG: 200 TABLET ORAL at 08:46

## 2021-03-03 RX ADMIN — CHOLESTYRAMINE 4 G: 4 POWDER, FOR SUSPENSION ORAL at 22:26

## 2021-03-03 RX ADMIN — TRAMADOL HYDROCHLORIDE 25 MG: 50 TABLET, COATED ORAL at 22:26

## 2021-03-03 RX ADMIN — ATORVASTATIN CALCIUM 40 MG: 40 TABLET, FILM COATED ORAL at 22:25

## 2021-03-03 RX ADMIN — IPRATROPIUM BROMIDE AND ALBUTEROL SULFATE 1 AMPULE: .5; 3 SOLUTION RESPIRATORY (INHALATION) at 18:35

## 2021-03-03 RX ADMIN — GUAIFENESIN 1200 MG: 600 TABLET, EXTENDED RELEASE ORAL at 08:46

## 2021-03-03 RX ADMIN — AMIODARONE HYDROCHLORIDE 200 MG: 200 TABLET ORAL at 22:25

## 2021-03-03 RX ADMIN — GABAPENTIN 300 MG: 300 CAPSULE ORAL at 14:23

## 2021-03-03 ASSESSMENT — PAIN DESCRIPTION - DESCRIPTORS: DESCRIPTORS: ACHING

## 2021-03-03 ASSESSMENT — PAIN DESCRIPTION - LOCATION: LOCATION: BACK;BUTTOCKS

## 2021-03-03 ASSESSMENT — PAIN SCALES - GENERAL
PAINLEVEL_OUTOF10: 0
PAINLEVEL_OUTOF10: 7
PAINLEVEL_OUTOF10: 0
PAINLEVEL_OUTOF10: 2

## 2021-03-03 ASSESSMENT — PAIN DESCRIPTION - PAIN TYPE: TYPE: CHRONIC PAIN

## 2021-03-03 ASSESSMENT — PAIN DESCRIPTION - FREQUENCY: FREQUENCY: INTERMITTENT

## 2021-03-03 NOTE — PROGRESS NOTES
Physical Therapy    Physical Therapy Treatment Note  Name: Caprice Mandujano MRN: [id number] :   1945   Date:  3/3/2021   Admission Date: 2021 Room:  [address] [place] [address]     Restrictions/Precautions:  Restrictions/Precautions  Restrictions/Precautions: Fall Risk  Required Braces or Orthoses?: No     .    Communication with other providers:     Subjective:  Patient states:  Agreeable to tx session;     Pain:   Location, Type, Intensity (0/10 to 10/10):  No C/O pain     Objective:    Observation:   Patient up amb in room with aide present    Treatment, including education/measures:  Transfers  Sit to supine : Mod I   Scooting : Mod I  Rolling : Mod I  Sit to stand : SB from Nustep, chair in gym  Stand to sit : SB from bed, chair in gym and Nu-step. Pt able to tolerate 8min L4 on Nu-step this date. Patient amb in the // bars with use of handrail preforming steps (4\" and 6\") with CG/SB assist      Gait:  Pt amb with RW for 100 ft and 80ft with CG/SB assist; pt amb with decreased lissette, forward posture. Pt needed VC's for pathway, posture. Safety  Patient left safely in bed, with call light/phone in reach with alarm applied. Gait belt and mask were used for transfers and gait.     Assessment / Impression:       Patient's tolerance of treatment:  good   Adverse Reaction: none  Significant change in status and impact:  none  Barriers to improvement:  Fatigue     Plan for Next Session:    Will cont to work towards pt's goals per his tolerance    Time in:   1000  Time out:  1030  Timed treatment minutes:  30  Total treatment time: 30      Previously filed items:  Social/Functional History  Lives With: Spouse  Type of Home: House  Home Layout: Two level, Performs ADL's on one level, Able to Live on Main level with bedroom/bathroom  Home Access: Stairs to enter with rails  Entrance Stairs - Number of Steps: 2 steps  Entrance Stairs - Rails: Right(going up) Bathroom Shower/Tub: Walk-in shower, Shower chair without back  Bathroom Toilet: Standard  Bathroom Equipment: Grab bars in shower, Hand-held shower  Home Equipment: 4 wheeled walker, Cane(pt reports he was using no AD prior to falling.)  Receives Help From: Family  ADL Assistance: Independent  Homemaking Assistance: Independent  Homemaking Responsibilities: Yes  Meal Prep Responsibility: Secondary(pt reports they do the cooking 50/50)  Laundry Responsibility: No  Cleaning Responsibility: Secondary(pt reports he does the dishes.)  Shopping Responsibility: Primary(pt reports he was doing the grocery shopping. he reports he gets worn out walking through store.)  Ambulation Assistance: Independent  Transfer Assistance: Independent  Active : Yes  Mode of Transportation: Car  Occupation: Retired  Type of occupation: pt is retired from [employer]. Leisure & Hobbies: Pt reports he shoots troublesome animals. IADL Comments: pt reports he was I with dressing and bathing. Additional Comments: Pt reports he has a hx of falls. Reports he is normally walking when he falls and wasn't using an AD  Short term goals  Time Frame for Short term goals: 1 week or until discharge:  Short term goal 1: Pt will demonstrate the ability to safely perform bed mobility mod I with no HR and HOB flat. Short term goal 2: Pt will demonstrate the ability to safely stand x8 consecutive mintues while performing dynamic balance activities with supervision and no more than 1 hand for support. Short term goal 3: pt will demonstrate the ability to safely ambulate 150 feet consecutive feet with step through gait and  the least restrictive AD and superivsion. Short term goal 4: Pt will demonstrate the ability to safely ambulate up and down 2 steps with a R HR and supervision in 2/2 trials.        Electronically signed by:    Joshua Redding PTA  3/3/2021, 1:05 PM

## 2021-03-03 NOTE — PLAN OF CARE
Problem: Falls - Risk of:  Goal: Will remain free from falls  3/3/2021 0840 by Truong Mccollum RN  Outcome: Ongoing  3/2/2021 2339 by Maritza Mckeon RN  Outcome: Ongoing  Goal: Absence of physical injury  3/3/2021 0840 by Truong Mccollum RN  Outcome: Ongoing  3/2/2021 2339 by Maritza Mckeon RN  Outcome: Ongoing     Problem: IP MOBILITY  Goal: LTG - patient will demonstrate kitchen mobility  3/3/2021 0840 by Truong Mccollum RN  Outcome: Ongoing  3/2/2021 2339 by Maritza Mckeon RN  Outcome: Ongoing  Goal: LTG - patient will ambulate community distance  3/3/2021 0840 by Truong Mccollum RN  Outcome: Ongoing  3/2/2021 2339 by Maritza Mckeon RN  Outcome: Ongoing  Goal: LTG - patient will ambulate household distance  3/3/2021 0840 by Truong Mccollum RN  Outcome: Ongoing  3/2/2021 2339 by Maritza Mckeon RN  Outcome: Ongoing  Goal: LTG - Patient will navigate 4-6 steps with rails/device  3/3/2021 0840 by Truong Mccollum RN  Outcome: Ongoing  3/2/2021 2339 by Maritza Mckeon RN  Outcome: Ongoing  Goal: LTG - patient will demonstrate safe mobility requirements  3/3/2021 0840 by Truong Mccollum RN  Outcome: Ongoing  3/2/2021 2339 by Maritza Mckeon RN  Outcome: Ongoing  Goal: STG - Patient will ambulate  3/3/2021 0840 by Truong Mccollum RN  Outcome: Ongoing  3/2/2021 2339 by Maritza Mckeon RN  Outcome: Ongoing     Problem: OXYGENATION/RESPIRATORY FUNCTION  Goal: Patient will achieve/maintain normal respiratory rate/effort  3/3/2021 0840 by Truong Mccollum RN  Outcome: Ongoing  3/2/2021 2339 by Maritza Mckeon RN  Outcome: Ongoing     Problem: Pain:  Goal: Pain level will decrease  3/3/2021 0840 by Truong Mccollum RN  Outcome: Ongoing  3/2/2021 2339 by Maritza Mckeon RN  Outcome: Ongoing  Goal: Control of acute pain  3/3/2021 0840 by Raynaldo Mccollum, RN  Outcome: Ongoing  3/2/2021 2339 by Maritza Mckeon RN  Outcome: Ongoing  Goal: Control of chronic pain  3/3/2021 0840 by Truong Mccollum RN  Outcome: Ongoing

## 2021-03-03 NOTE — PROGRESS NOTES
Hospitalist Progress Note       Tamy Swenson M.D.  3/3/2021 5:03 PM  Admit Date: 2/18/2021    PCP: Mary Hazel MD     Assessment and Plan:   1. Weakness and debility: Patient continues work with PT/OT. Improving with his strength endurance and distance of walking.   - will check about home eval for oxygen but in meantime intend to dc on Friday     2. Acute hypoxic respiratory failure: Patient still needs supplemental O2 with PT/OT as well as nighttime or sleeping.       3.  Multifocal pneumonia: Inpatient admission diagnosis. Interval improvement on radiographs. completed course of antibiotics     4. History of falls: Continues with PT/OT. Has walker at baseline. Also: With a history of lumbar spinal stenosis.      5.  Non-insulin-dependent diabetes: Continue glipizide. Low-dose sliding scale with hypoglycemic parameters.       6. CAD:Continue carvedilol 3.125 mg twice daily, continue atorvastatin 40 mg daily, aspirin 81 mg daily at this time.     7. Hypertension: Controlled, dt persistent nature of cough have changed lisinopril to norvasc.     8. History of pulmonary nodule. CT lung screen 0.5 cm solid nodule in the left lower base was not present on 12/12/20/2019. Recommend short-term follow-up low-dose CT in 6 months.     9. Arrhythmia: Patient could not quantify but has been on amiodarone 200 mg twice daily. Record review demonstrates this is a chronic medication. Problem list does not identify same.   Recommend quantifying with cardiology versus PCP    Patient Active Problem List:     Abdominal aortic aneurysm (AAA) without rupture (HCC)     Tobacco abuse     Essential hypertension     Smoker     Non-alcoholic fatty liver disease     Basal cell carcinoma of nose     Type 2 diabetes mellitus without complication, without long-term current use of insulin (HCC)     Other hyperlipidemia     Nonrheumatic aortic valve stenosis     CAD in native artery Iliac artery aneurysm, right (HCC)     Other constipation     Spondylosis of lumbosacral region     Status post AAA (abdominal aortic aneurysm) repair     AAA (abdominal aortic aneurysm) (HCC)     Liver nodule     Thrombocytopenia (HCC)     Tremor, physiological     Pulmonary nodule     Fall at home, initial encounter     Type 2 diabetes mellitus (HCC)     Pneumonia     Weakness     Acute and chr resp failure, unsp w hypoxia or hypercapnia (HCC)      Subjective:     No chief complaint on file. F/U:  Interval History:feels well cough is improving has some pain in low back no numbness or urinary retension    Objective: Intake/Output Summary (Last 24 hours) at 3/3/2021 1703  Last data filed at 3/3/2021 1630  Gross per 24 hour   Intake 120 ml   Output 825 ml   Net -705 ml      Vitals:   Vitals:    03/03/21 0706   BP: 139/63   Pulse: 69   Resp: 18   Temp: 96.1 °F (35.6 °C)   SpO2: (!) 87%     Physical Exam:  Gen:  awake, alert, cooperative, no apparent distress, EYES:  Lids and lashes normal, pupils equal, round and reactive to light, extra ocular muscles intact, sclera clear, conjunctiva normal  ENT:  Normocephalic, oral pharynx with moist mucus membranes, tonsils without erythema or exudates,  NECK:  Supple, symmetrical, trachea midline, no adenopathy,  LUNGS:  Clear to auscultate bilaterally, no rales ronchi or wheezing noted. CARDIOVASCULAR:  regular rate and rhythm, normal S1 and S2, no S3 or S4, and no murmur noted  ABDOMEN: Normal BS, Non tender, non distended, no HSM noted. MUSCULOSKELETAL:  ROM of all extremities grossly wnl  NEUROLOGIC: AOx 3,  Cranial nerves II-XII are grossly intact. Motor is 5 out of 5 bilaterally.   Sensory is intact  SKIN:  no bruising or bleeding, normal skin color, texture, turgor and no redness, warmth, or swelling    Xr Chest (2 Vw)    Result Date: 2/21/2021 EXAMINATION: TWO XRAY VIEWS OF THE CHEST 2/21/2021 12:10 pm COMPARISON: 02/18/2021. HISTORY: ORDERING SYSTEM PROVIDED HISTORY: Interval radiograph for multifocal PNA. Thank you. TECHNOLOGIST PROVIDED HISTORY: Reason for exam:->Interval radiograph for multifocal PNA. Thank you. Reason for Exam: Interval radiograph for multifocal PNA. Acuity: Acute Type of Exam: Initial Additional signs and symptoms: Interval radiograph for multifocal PNA. FINDINGS: There are postsurgical changes of median sternotomy. The heart size and pulmonary vasculature are stable. Redemonstrated are patchy infiltrates bilaterally. There is new curvilinear density involving the right lung base. No pneumothoraces are seen. There are scattered calcified granulomas and lymph nodes. 1. Right basilar subsegmental atelectasis. Otherwise, stable chest x-ray with findings likely reflecting multifocal pneumonia. Xr Chest (2 Vw)    Result Date: 2/18/2021  EXAMINATION: TWO XRAY VIEWS OF THE CHEST 2/18/2021 10:05 am COMPARISON: February 14, 2021 HISTORY: ORDERING SYSTEM PROVIDED HISTORY: interval, Dx multifocal PNA. Thank you TECHNOLOGIST PROVIDED HISTORY: Reason for exam:->interval, Dx multifocal PNA. Thank you Additional signs and symptoms: sob FINDINGS: Stable cardiomediastinal silhouette. There has been interval improvement in bilateral airspace opacities. There is no pleural effusion or pneumothorax. The osseous structures are stable. Bilateral airspace opacities slightly improved since prior examination. Continued follow-up to resolution is recommended.      Xr Elbow Left (min 3 Views)    Result Date: 2/6/2021 EXAMINATION: THREE XRAY VIEWS OF THE LEFT ELBOW 2/6/2021 5:38 pm COMPARISON: None. HISTORY: ORDERING SYSTEM PROVIDED HISTORY: fall TECHNOLOGIST PROVIDED HISTORY: Reason for exam:->fall Reason for Exam: left elbow pain Acuity: Acute Type of Exam: Initial Mechanism of Injury: fall FINDINGS: No fracture, dislocation, or joint effusion. Moderate enthesophyte at the triceps insertion. The joint spaces are preserved. The soft tissues are unremarkable. No acute osseous abnormality. Xr Elbow Right (min 3 Views)    Result Date: 2/6/2021  EXAMINATION: THREE XRAY VIEWS OF THE RIGHT ELBOW 2/6/2021 5:38 pm COMPARISON: None. HISTORY: ORDERING SYSTEM PROVIDED HISTORY: fall TECHNOLOGIST PROVIDED HISTORY: Reason for exam:->fall Reason for Exam: right elbow pain Acuity: Acute Type of Exam: Initial Mechanism of Injury: fall FINDINGS: A peripheral venous catheter is present in the antecubital fossa. No joint effusion. No fracture or dislocation identified. The soft tissues are unremarkable. No acute osseous abnormality. Xr Knee Right (3 Views)    Result Date: 2/6/2021  EXAMINATION: THREE XRAY VIEWS OF THE RIGHT KNEE 2/6/2021 5:39 pm COMPARISON: None. HISTORY: ORDERING SYSTEM PROVIDED HISTORY: fall TECHNOLOGIST PROVIDED HISTORY: Reason for exam:->fall Reason for Exam: right knee pain Acuity: Acute Type of Exam: Initial Mechanism of Injury: fall FINDINGS: No fracture, dislocation, or joint effusion. Mild medial compartment degenerative changes. Atherosclerotic vascular calcifications. No acute osseous abnormality.      Ct Head Wo Contrast    Result Date: 2/9/2021 EXAMINATION: CT OF THE CHEST WITHOUT CONTRAST 2/16/2021 9:47 am TECHNIQUE: CT of the chest was performed without the administration of intravenous contrast. Multiplanar reformatted images are provided for review. Dose modulation, iterative reconstruction, and/or weight based adjustment of the mA/kV was utilized to reduce the radiation dose to as low as reasonably achievable. COMPARISON: Chest x-ray February 14, 2021 and CT chest December 26, 2020 HISTORY: ORDERING SYSTEM PROVIDED HISTORY: bilater pulmonary opacities, infiltrate vs nodules on CXR. Recommending CT. Thank you TECHNOLOGIST PROVIDED HISTORY: Reason for exam:->bilater pulmonary opacities, infiltrate vs nodules on CXR. Recommending CT. Thank you Acuity: Acute Type of Exam: Initial Additional signs and symptoms: bilater pulmonary opacities, infiltrate vs nodules on CXR FINDINGS: Mediastinum: There is no axillary lymphadenopathy. Subcentimeter mediastinal nodes are new since prior examination, likely reactive in etiology. Hilar adenopathy is difficult to assess on this noncontrast examination. The heart is normal in size. There is no pericardial effusion. Atherosclerotic changes of the aorta are noted. Coronary artery calcifications are noted. Lungs/pleura: Small bilateral pleural effusions are noted. Diffuse bilateral ground-glass opacities are noted throughout the lungs. There is no pneumothorax. Stable 0.5 cm left lower lobe pulmonary nodule is noted. Upper Abdomen: No acute upper abdominal pathology is noted. Stable 1.7 cm right adrenal adenoma is noted. Soft Tissues/Bones: Multilevel degenerative changes of the spine are noted. 1.  Diffuse bilateral ground-glass opacities throughout the lungs and small bilateral pleural effusions. Findings may be related to multifocal pneumonia versus pulmonary edema. 2.  Stable 0.5 cm left lower lobe pulmonary nodule. RECOMMENDATIONS: Fleischner Society guidelines for follow-up and management of incidentally detected pulmonary nodules: Single Solid Nodule: Nodule size less than 6 mm In a low-risk patient, no routine follow-up. In a high-risk patient, optional CT at 12 months. - Low risk patients include individuals with minimal or absent history of smoking and other known risk factors. - High risk patients include individuals with a history or smoking or known risk factors. Radiology 2017 http://pubs. rsna.org/doi/full/10.1148/radiol. 5970146397     Ct Cervical Spine Wo Contrast    Result Date: 2/9/2021  EXAMINATION: CT OF THE CERVICAL SPINE WITHOUT CONTRAST 2/9/2021 7:27 pm TECHNIQUE: CT of the cervical spine was performed without the administration of intravenous contrast. Multiplanar reformatted images are provided for review. Dose modulation, iterative reconstruction, and/or weight based adjustment of the mA/kV was utilized to reduce the radiation dose to as low as reasonably achievable. COMPARISON: None. HISTORY: ORDERING SYSTEM PROVIDED HISTORY: pain TECHNOLOGIST PROVIDED HISTORY: Reason for exam:->pain Reason for Exam: Trauma, fall Acuity: Acute Type of Exam: Initial Mechanism of Injury: Trauma, fall FINDINGS: BONES/ALIGNMENT: There is no acute fracture or traumatic malalignment. DEGENERATIVE CHANGES: Multilevel cervical spondylosis noted, most prominent at C5-C6 SOFT TISSUES: There is no prevertebral soft tissue swelling. No apical pneumothorax. Ground-glass densities in bilateral upper lobes, due to multifocal pneumonia (such as COVID-19), contusion, or aspiration. No acute abnormality of the cervical spine. RECOMMENDATIONS: Ground-glass densities in the bilateral upper lobes, due to multifocal pneumonia (such as COVID-19), contusion, or aspiration. Ct Lumbar Spine Wo Contrast    Result Date: 2/6/2021  EXAMINATION: CT OF THE LUMBAR SPINE WITHOUT CONTRAST  2/6/2021 TECHNIQUE: CT of the lumbar spine was performed without the administration of intravenous contrast. Multiplanar reformatted images are provided for review. Dose modulation, iterative reconstruction, and/or weight based adjustment of the mA/kV was utilized to reduce the radiation dose to as low as reasonably achievable. COMPARISON: August 10, 2020. HISTORY: ORDERING SYSTEM PROVIDED HISTORY: fall TECHNOLOGIST PROVIDED HISTORY: Reason for exam:->fall Decision Support Exception->Emergency Medical Condition (MA) Reason for Exam: low back pain Acuity: Acute Type of Exam: Initial Mechanism of Injury: fall Relevant Medical/Surgical History: hx of falls FINDINGS: BONES/ALIGNMENT: There is normal alignment of the spine. The vertebral body heights are maintained. No osseous destructive lesion is seen. DEGENERATIVE CHANGES: No significant degenerative changes of the lumbar spine. SOFT TISSUES/RETROPERITONEUM: Stable appearance to aortic stent graft. No acute fracture no change in alignment compared to prior study. Josefina Aver Chest Portable    Result Date: 2/14/2021 EXAMINATION: ONE XRAY VIEW OF THE CHEST 2/14/2021 6:24 am COMPARISON: February 13, 2021 HISTORY: ORDERING SYSTEM PROVIDED HISTORY: acute hypoxic respiratory failure, pneumonia TECHNOLOGIST PROVIDED HISTORY: Reason for exam:->acute hypoxic respiratory failure, pneumonia Reason for Exam: Pt c/o:  Acute hypoxic respiratory failure, pneumonia Acuity: Acute Type of Exam: Initial FINDINGS: Cardiac silhouette is enlarged. No pneumothorax. No pleural effusion. Granular somewhat ground-glass appearing opacities diffusely throughout the lungs. Overall, this appears similar to previous exams. No significant change in diffuse ground-glass opacities throughout the lungs. Xr Chest Portable    Result Date: 2/13/2021  EXAMINATION: ONE XRAY VIEW OF THE CHEST 2/13/2021 9:29 am COMPARISON: 02/12/2021 HISTORY: ORDERING SYSTEM PROVIDED HISTORY: acute hypoxic respiratory failure, pneumonia TECHNOLOGIST PROVIDED HISTORY: Reason for exam:->acute hypoxic respiratory failure, pneumonia Acuity: Acute Type of Exam: Initial Additional signs and symptoms: acute hypoxic respiratory failure, pneumonia, hx HTN FINDINGS: Diffuse hazy airspace opacities, similar to slightly worsened bilaterally. No dense consolidations. No pleural effusions or pneumothoraces. Cardiac and mediastinal silhouettes are stable. Stable appearance to bony structures. Diffuse hazy airspace opacities, similar to slightly worsened bilaterally may be on the basis of pneumonia or pulmonary edema.      Xr Chest Portable    Result Date: 2/12/2021 EXAMINATION: ONE XRAY VIEW OF THE CHEST 2/12/2021 9:50 am COMPARISON: 02/09/2021 HISTORY: ORDERING SYSTEM PROVIDED HISTORY: fever sob TECHNOLOGIST PROVIDED HISTORY: Reason for exam:->fever sob Additional signs and symptoms: SOB FINDINGS: Median sternotomy wires are identified. Cardiac mediastinal silhouettes appear within normal limits for size. Multifocal infiltrates are seen throughout the lungs bilaterally, with areas of bronchial thickening and increasing consolidation. Osteopenia. Degenerative changes in the shoulders and spine. No pneumothorax is identified. Worsening interstitial and alveolar infiltrates with bronchial thickening as well as some early consolidation concerning for multifocal pneumonia. Xr Chest Portable    Result Date: 2/9/2021  EXAMINATION: ONE XRAY VIEW OF THE CHEST 2/9/2021 7:39 pm COMPARISON: 09/01/2020 HISTORY: ORDERING SYSTEM PROVIDED HISTORY: chest pain TECHNOLOGIST PROVIDED HISTORY: Reason for exam:->chest pain Reason for Exam: Chest pain Acuity: Acute Type of Exam: Initial Additional signs and symptoms: Chest pain Initial evaluation FINDINGS: The patient has had a median sternotomy. Monitor wires overlie the chest. The trachea is midline. The cardiac silhouette is unremarkable. There are bilateral pulmonary airspace changes that are nonspecific and could represent infiltrates. Pulmonary nodules cannot be entirely excluded. There is no pleural fluid. Follow up to resolution is suggested. CT could better evaluate lung parenchyma if indicated. Bilateral pulmonary opacities in the mid lung fields and lower lung fields which may represent infiltrates. Pulmonary nodules cannot be excluded. Follow up to resolution is suggested. CT could better evaluate lung parenchyma if indicated.      Mri Brain Wo Contrast    Result Date: 2/10/2021 EXAMINATION: MRI OF THE BRAIN WITHOUT CONTRAST  2/10/2021 3:59 pm TECHNIQUE: Multiplanar multisequence MRI of the brain was performed without the administration of intravenous contrast. COMPARISON: None. HISTORY: ORDERING SYSTEM PROVIDED HISTORY: weakness TECHNOLOGIST PROVIDED HISTORY: Reason for exam:->weakness Reason for Exam: weakness Acuity: Acute Type of Exam: Subsequent/Follow-up Additional signs and symptoms: limited mobility FINDINGS: INTRACRANIAL STRUCTURES/VENTRICLES: There is no acute infarct or mass. Parenchymal volume is commensurate with age. Mild chronic white matter microvascular ischemic changes are present. No mass effect or midline shift. No evidence of an acute intracranial hemorrhage. The ventricles and sulci are normal in size and configuration. The sellar/suprasellar regions appear unremarkable. The normal signal voids within the major intracranial vessels appear maintained. ORBITS: The visualized portion of the orbits demonstrate no acute abnormality. SINUSES: Mild left maxillary sinus mucoperiosteal thickening with frothy fluid in the axillary antrum. No mastoid effusion. BONES/SOFT TISSUES: The bone marrow signal intensity appears normal. The soft tissues demonstrate no acute abnormality. 1. No acute intracranial abnormality. 2. Mild chronic white matter microvascular ischemic changes. 3. Mild left maxillary sinus disease.   -    DATA:    CBC No results for input(s): WBC, HGB, HCT, PLT in the last 72 hours. BMP No results for input(s): NA, K, CL, CO2, PHOS, BUN, CREATININE in the last 72 hours. Invalid input(s): CA  LFT'S No results for input(s): AST, ALT, ALB, BILIDIR, BILITOT, ALKPHOS in the last 72 hours. COAG No results for input(s): INR in the last 72 hours. CARDIAC ENZYMES  No results for input(s): CKTOTAL, CKMB, CKMBINDEX, TROPONINI in the last 72 hours.   U/A:    Lab Results   Component Value Date    NITRITE NEG 07/22/2020    COLORU YELLOW 02/09/2021 WBCUA 1 TO 2 02/09/2021    RBCUA NO CELLS SEEN 02/09/2021    MUCUS 1+ 02/09/2021    BACTERIA MODERATE 02/09/2021    CLARITYU CLEAR 02/09/2021    SPECGRAV 1.020 02/09/2021    LEUKOCYTESUR NEGATIVE 02/09/2021    BLOODU NEGATIVE 02/09/2021    GLUCOSEU NEG 07/22/2020         Jaylene Teague MD  Rounding Hospitalist

## 2021-03-03 NOTE — PLAN OF CARE
Problem: Falls - Risk of:  Goal: Will remain free from falls  Description: Will remain free from falls  Outcome: Ongoing  Goal: Absence of physical injury  Description: Absence of physical injury  Outcome: Ongoing     Problem: IP MOBILITY  Goal: LTG - patient will demonstrate kitchen mobility  Outcome: Ongoing  Goal: LTG - patient will ambulate community distance  Outcome: Ongoing  Goal: LTG - patient will ambulate household distance  Outcome: Ongoing  Goal: LTG - Patient will navigate 4-6 steps with rails/device  Outcome: Ongoing  Goal: LTG - patient will demonstrate safe mobility requirements  Outcome: Ongoing  Goal: STG - Patient will ambulate  Outcome: Ongoing     Problem: OXYGENATION/RESPIRATORY FUNCTION  Goal: Patient will achieve/maintain normal respiratory rate/effort  Description: Respiratory rate and effort will be within normal limits for the patient  Outcome: Ongoing     Problem: OXYGENATION/RESPIRATORY FUNCTION  Goal: Patient will achieve/maintain normal respiratory rate/effort  Description: Respiratory rate and effort will be within normal limits for the patient  Outcome: Ongoing     Problem: Pain:  Goal: Pain level will decrease  Description: Pain level will decrease  Outcome: Ongoing  Goal: Control of acute pain  Description: Control of acute pain  Outcome: Ongoing  Goal: Control of chronic pain  Description: Control of chronic pain  Outcome: Ongoing

## 2021-03-03 NOTE — PROGRESS NOTES
Occupational Therapy    Occupational Therapy Treatment Note  Name: Guicho Gardner MRN: [id number] :   1945   Date:  3/3/2021   Admission Date: 2021 Room:  [address] [place] [address]   Restrictions/Precautions:  Restrictions/Precautions  Restrictions/Precautions: Fall Risk  Required Braces or Orthoses?: No     Communication with other providers:   Cleared for treatment by RN. Subjective:  Patient states:  \"I tired this afternoon\"  Pain:   Location, Type, Intensity (0/10 to 10/10): No pain noted     Objective:    Observation:  Pt alert and oriented. Treatment, including education:  Transfers  Supine to sit :Sup  Sit to supine :Sup  Scooting :SUp  Rolling :SUp  Sit to stand :CGA  Stand to sit :CGA  SPT:CGA      Therapeutic Exercise:  Cues were given for technique, safety, recruitment, and rationale. Cues were verbal and/or tactile. For BUE strengthening for ADL & functional mobility Indep pt performed BUE strengthening HEP c 3# hand weights x 20 reps x 6 exercises with Minimal difficulty. For BUE strengthening for ADL & functional mobility Indep pt performed BUE strengthening HEP using Medium strength theraband  X 5 exer for R/L UE x 15 reps c minimal rest breaks. Therapeutic Activity Training:   Therapeutic activity training was instructed today. Cues were given for safety, sequence, UE/LE placement, awareness, and balance. Activities performed today included bed mobility training, sup-sit, sit-stand, SPT. Pt stood x 7 min with CGA with RW to facilitate increased endurance/strength for ADL tasks and transfers. Pt completed 100' of functional mobility with RW with CGA. Safety Measures: Gait belt used, Left in bed, Pull/Bed Alarm activated and call light left in reach        Assessment / Impression:        Patient's tolerance of treatment: Good   Adverse Reaction: None  Significant change in status and impact:  None  Barriers to improvement:  Dec strength and endurance.

## 2021-03-03 NOTE — PROGRESS NOTES
Occupational Therapy  SWING BED WEEKLY TEAM SHEET                 Rosalie Mckenna              2/24/2021   WEEK # 3     Occupational Therapy     Feeding  [x]? Independ []? SBA []? MIN assist  []? mod assist  []? max assist []? tot assist  Grooming [x]? Independ []? SBA []? MIN assist  []? mod assist  []? max assist []? tot assist   Bathing upper body [x]? Independ []? SBA []? MIN assist  []? mod assist  []? max assist              []? tot assist    Dressing upper body [x]? Independ []? SBA []? MIN assist  []? mod assist  []? max assist              []? tot assist    Dressing lower body []? Independ []? SBA [x]? MIN assist  []? mod assist  []? max assist              []? tot assist   Toileting []? Independ [x]? SBA []? MIN assist  []? mod assist  []? max assist []? tot assist     Home Management: Has help at home from wife     Cognitive/Perception: impulsive     Upper extremity motor control: WNL     Other   Goals of previous week:   []? 1st team   []? met   [x]?  partially met    []? not met                                              Why goals were not met Decrease strength and endurance     Issues to be resolved before discharge:mobility and ADL I      Occupational Therapy Signature: Gillian MERCER/TIFFANY CAMILA [id number]

## 2021-03-03 NOTE — PATIENT CARE CONFERENCE
SWING BED WEEKLY TEAM SHEET      Alicia Mock   3/3/2021             WEEK# 2    PHYSICAL THERAPY       Ambulation: Distance:  100 ft    Device:  RW   Assist:  CGA    Wt bearing:      W/C skills:  Propulsion: n/a      W/C parts management:  n/a   Stairs:  Amount/size:n/a       Assist:  n/a      Device:n/a      Pain:     Transfers:   Sit to stand:SBA  Stand to sit:      SBA         Bed Mobility:  Rolls right: SBA     Rolls left:SBA  Positioning:     Supine to sit: Mod I      Sit to supine: Mod I     Strength/ROM:  B leg weakness- strength ranges from 2/5 to 4/5     Balance:     Static sitting    [] P  [] F-   [] F    [x] F+    [] G               Dynamic Sitting           [] P  [] F-   [] F    [x] F+    [] G                     Static standing            [] P  [] F-   [] F    [x] F+    [] G   [x]  With  [] Without device Dynamic standing       [] P  [] F-   [] F    [x] F+    [] G   [x]  With  [] Without device  (P= poor   F= fair   G= good)    Issues to be resolved before discharge    Goals of previous week  [] 1st team   [] met   [x] partially met    [] not met                                          Why the goals were not met -endurance  Goals:   Short term goal 1: Pt will demonstrate the ability to safely perform bed mobility mod I with no HR and HOB flat. Short term goal 2: Pt will demonstrate the ability to safely stand x8 consecutive mintues while performing dynamic balance activities with supervision and no more than 1 hand for support. Short term goal 3: pt will demonstrate the ability to safely ambulate 150 feet consecutive feet with step through gait and  the least restrictive AD and superivsion. Short term goal 4: Pt will demonstrate the ability to safely ambulate up and down 2 steps with a R HR and supervision in 2/2 trials. Patient Goals   Patient goals : to strengthen LE so he can return home.   Updated Goals:        Physical Therapy Signature:  Paige Haq, PT

## 2021-03-03 NOTE — PROGRESS NOTES
Nutrition Assessment     Type and Reason for Visit: Reassess(Swing Bed)    Nutrition Recommendations/Plan:continue with diet and supplements as ordered    Nutrition Assessment:  Pt with improving oral intakes % of most meals. Will continue with diabetic oral supplement at this time. Current body weight up 1#. Malnutrition Assessment:  Malnutrition Status: No malnutrition    Estimated Daily Nutrient Needs:  Energy (kcal): 1988-6495; Weight Used for Energy Requirements:  Current(22-25)     Protein (g): 65-77; Weight Used for Protein Requirements:  Ideal(1-1.2)        Fluid (ml/day): 3887-5785; Weight Used for Fluid Requirements:  1 ml/kcal      Nutrition Related Findings: labs reviewed      Current Nutrition Therapies:    DIET CARB CONTROL;   Dietary Nutrition Supplements: Diabetic Oral Supplement    Anthropometric Measures:  · Height: 5' 6\" (167.6 cm)  · Current Body Wt: 152 lb (68.9 kg)   · BMI: 24.5    Nutrition Diagnosis:   · In context of acute illness or injury related to pain as evidenced by intake 26-50%, intake 51-75%      Nutrition Interventions:   Food and/or Nutrient Delivery:  Continue Current Diet, Continue Oral Nutrition Supplement  Nutrition Education/Counseling:  No recommendation at this time   Coordination of Nutrition Care:  Continue to monitor while inpatient    Goals:  Oral intakes will improve to % of most meals       Nutrition Monitoring and Evaluation:   Behavioral-Environmental Outcomes:  None Identified   Food/Nutrient Intake Outcomes:  Food and Nutrient Intake, Supplement Intake  Physical Signs/Symptoms Outcomes:  Biochemical Data, Meal Time Behavior     Discharge Planning:    Continue current diet     Electronically signed by Sudeep Matthews RD, LD on 3/3/21 at 6:17 PM EST    Contact: [phone]

## 2021-03-03 NOTE — PROGRESS NOTES
SWING BED WEEKLY TEAM SHEET     WEEK#     2  NUTRITION   Diet:    Carb controlled                TF:      no    TPN:   no  Appropriate/Adequate [X] yes [ ] no   Meal intakes: %    Weight:  152#  BMI:      24.55    Significant Change: yes  Recommendations:continue with supplement as ordered

## 2021-03-04 LAB
GLUCOSE BLD-MCNC: 123 MG/DL (ref 70–99)
GLUCOSE BLD-MCNC: 149 MG/DL (ref 70–99)
GLUCOSE BLD-MCNC: 165 MG/DL (ref 70–99)
GLUCOSE BLD-MCNC: 206 MG/DL (ref 70–99)
GLUCOSE BLD-MCNC: 218 MG/DL (ref 70–99)
GLUCOSE BLD-MCNC: 55 MG/DL (ref 70–99)

## 2021-03-04 PROCEDURE — 6370000000 HC RX 637 (ALT 250 FOR IP): Performed by: NURSE PRACTITIONER

## 2021-03-04 PROCEDURE — 97110 THERAPEUTIC EXERCISES: CPT

## 2021-03-04 PROCEDURE — 2700000000 HC OXYGEN THERAPY PER DAY

## 2021-03-04 PROCEDURE — 1200000002 HC SEMI PRIVATE SWING BED

## 2021-03-04 PROCEDURE — 94640 AIRWAY INHALATION TREATMENT: CPT

## 2021-03-04 PROCEDURE — 97116 GAIT TRAINING THERAPY: CPT

## 2021-03-04 PROCEDURE — 6370000000 HC RX 637 (ALT 250 FOR IP): Performed by: INTERNAL MEDICINE

## 2021-03-04 PROCEDURE — 97530 THERAPEUTIC ACTIVITIES: CPT

## 2021-03-04 PROCEDURE — 82962 GLUCOSE BLOOD TEST: CPT

## 2021-03-04 RX ADMIN — INSULIN LISPRO 2 UNITS: 100 INJECTION, SOLUTION INTRAVENOUS; SUBCUTANEOUS at 09:16

## 2021-03-04 RX ADMIN — GABAPENTIN 300 MG: 300 CAPSULE ORAL at 13:39

## 2021-03-04 RX ADMIN — IPRATROPIUM BROMIDE AND ALBUTEROL SULFATE 1 AMPULE: .5; 3 SOLUTION RESPIRATORY (INHALATION) at 07:20

## 2021-03-04 RX ADMIN — AMIODARONE HYDROCHLORIDE 200 MG: 200 TABLET ORAL at 21:20

## 2021-03-04 RX ADMIN — AMIODARONE HYDROCHLORIDE 200 MG: 200 TABLET ORAL at 09:21

## 2021-03-04 RX ADMIN — AMLODIPINE BESYLATE 5 MG: 5 TABLET ORAL at 09:22

## 2021-03-04 RX ADMIN — IPRATROPIUM BROMIDE AND ALBUTEROL SULFATE 1 AMPULE: .5; 3 SOLUTION RESPIRATORY (INHALATION) at 15:18

## 2021-03-04 RX ADMIN — TRAZODONE HYDROCHLORIDE 100 MG: 100 TABLET ORAL at 22:13

## 2021-03-04 RX ADMIN — GABAPENTIN 300 MG: 300 CAPSULE ORAL at 09:20

## 2021-03-04 RX ADMIN — PRIMIDONE 50 MG: 50 TABLET ORAL at 21:21

## 2021-03-04 RX ADMIN — IPRATROPIUM BROMIDE AND ALBUTEROL SULFATE 1 AMPULE: .5; 3 SOLUTION RESPIRATORY (INHALATION) at 11:35

## 2021-03-04 RX ADMIN — GLIPIZIDE 2.5 MG: 5 TABLET ORAL at 11:11

## 2021-03-04 RX ADMIN — INSULIN LISPRO 4 UNITS: 100 INJECTION, SOLUTION INTRAVENOUS; SUBCUTANEOUS at 12:16

## 2021-03-04 RX ADMIN — CARVEDILOL 3.12 MG: 3.12 TABLET, FILM COATED ORAL at 09:22

## 2021-03-04 RX ADMIN — GABAPENTIN 300 MG: 300 CAPSULE ORAL at 21:21

## 2021-03-04 RX ADMIN — PRIMIDONE 50 MG: 50 TABLET ORAL at 09:21

## 2021-03-04 RX ADMIN — ATORVASTATIN CALCIUM 40 MG: 40 TABLET, FILM COATED ORAL at 21:21

## 2021-03-04 RX ADMIN — TRAMADOL HYDROCHLORIDE 25 MG: 50 TABLET, COATED ORAL at 21:21

## 2021-03-04 RX ADMIN — ASPIRIN 81 MG: 81 TABLET, COATED ORAL at 09:20

## 2021-03-04 RX ADMIN — CARVEDILOL 3.12 MG: 3.12 TABLET, FILM COATED ORAL at 18:16

## 2021-03-04 ASSESSMENT — PAIN SCALES - GENERAL
PAINLEVEL_OUTOF10: 0
PAINLEVEL_OUTOF10: 0
PAINLEVEL_OUTOF10: 1
PAINLEVEL_OUTOF10: 0
PAINLEVEL_OUTOF10: 7
PAINLEVEL_OUTOF10: 0

## 2021-03-04 ASSESSMENT — PAIN DESCRIPTION - FREQUENCY: FREQUENCY: INTERMITTENT

## 2021-03-04 ASSESSMENT — PAIN DESCRIPTION - PROGRESSION: CLINICAL_PROGRESSION: GRADUALLY WORSENING

## 2021-03-04 ASSESSMENT — PAIN DESCRIPTION - ONSET: ONSET: PROGRESSIVE

## 2021-03-04 NOTE — CARE COORDINATION
[facility] BED WEEKLY TEAM SHEET     Clint Garnett   3/4/2021 WEEK # 2    Care Management    Issues to be resolved before discharge: Increased strength, balance, and mobility     Family Education: Patient/staff to update     Discharge Plan: Home with [facility]   Patient/Family Adjustment: N/A     Goals of previous week:   [] 1st team   [] met   [] partially met    [x] not met             Why goals were not met: Continued weakness    Skilled Level of Care Remains   [x] yes   [] no    Estimated length of stay: Anticipated discharge 3/5/2021  Patient/Family Concerns/input: None at this time    Patient/Family  Signature _________________  3/4/2021       Care Management Signature:  Mya Gray RN

## 2021-03-04 NOTE — PROGRESS NOTES
Occupational Therapy    Occupational Therapy Treatment Note  Name: Elvis Meneses MRN: [id number] :   1945   Date:  3/4/2021   Admission Date: 2021 Room:  [address] [place] [address]   Restrictions/Precautions:  Restrictions/Precautions  Restrictions/Precautions: Fall Risk  Required Braces or Orthoses?: No     Communication with other providers:   Cleared for treatment by RN. Subjective:  Patient states: \"I don't know when I will get out of here tomorrow. No one tells me a damn thing\"  Pain:   Location, Type, Intensity (0/10 to 10/10): None noted    Objective:    Observation:  Pt alert and oriented. Treatment, including education:  Transfers  Supine to sit :SBA  Sit to supine :MIn A  Scooting :SBA  Rolling :SBA  Sit to stand :CGA  Stand to sit :CGA  SPT:CGA to mod A x 2        Therapeutic Exercise:  Cues were given for technique, safety, recruitment, and rationale. Cues were verbal and/or tactile. For BUE strengthening for ADL & functional mobility Indep pt performed BUE strengthening HEP c 3# hand weights x 20 reps x 6 exercises with Minimal difficulty. Pt completed Nustep x 8 min on level 4          Therapeutic Activity Training:   Therapeutic activity training was instructed today. Cues were given for safety, sequence, UE/LE placement, awareness, and balance. Activities performed today included bed mobility training, sup-sit, sit-stand, SPT. Pt completed functional mobility with RW with pt having mod difficulty with moving LEs due to back/hip problems per pt stating \"that bed is hard on my back\". On return to room pt became weak and pt reported \"my legs are turning into rubber\" Pt required Mod A x 2 for SPT to bed and pt was diaphoretic and BP was taken at 182/74 and therapist reported to nursing and had nursing check glucose level and glucose level was 55.       Safety Measures: Gait belt used, Left in bed, Pull/Bed Alarm activated and call light left in reach        Assessment / Impression: Patient's tolerance of treatment: Good   Adverse Reaction: None  Significant change in status and impact:  None  Barriers to improvement:  Dec strength and endurance. Plan for Next Session:    Continue with POC. Time in:  4047 6832  Time out:  8330 0967  Timed treatment minutes:  30 +33  Total treatment time:  61  Electronically signed by:    Gabriele Turcios, [address]    3/4/2021, 2:01 PM    Previously filed values:  Patient Goals   Patient goals : to return home  Short term goals  Time Frame for Short term goals: Until goals met or discharge  Short term goal 1: Pt will complete all UB ADLs mod I for increased functional I  Short term goal 2: Pt will complete all LB ADLs min A for increased functional I  Short term goal 3: Pt will complete all aspects of toileting SBA for increased functional I  Short term goal 4: Pt will complete all functional transfers and bed mobility SBA in prep for EOB/OOB ADL tasks.   Short term goal 5: Pt will participate in therex/theract for 5+ minutes with emphasis on UE strengthning and dynamic balance for increased I with IADL/home management tasks

## 2021-03-04 NOTE — CARE COORDINATION
[facility] and supporting documentation to [facility] to initiate the referral.

## 2021-03-04 NOTE — PLAN OF CARE
Problem: Falls - Risk of:  Goal: Will remain free from falls  Description: Will remain free from falls  Outcome: Ongoing  Goal: Absence of physical injury  Description: Absence of physical injury  Outcome: Ongoing     Problem: IP MOBILITY  Goal: LTG - patient will demonstrate kitchen mobility  Outcome: Ongoing  Goal: LTG - patient will ambulate community distance  Outcome: Ongoing  Goal: LTG - patient will ambulate household distance  Outcome: Ongoing  Goal: LTG - Patient will navigate 4-6 steps with rails/device  Outcome: Ongoing  Goal: LTG - patient will demonstrate safe mobility requirements  Outcome: Ongoing  Goal: STG - Patient will ambulate  Outcome: Ongoing     Problem: IP MOBILITY  Goal: LTG - patient will ambulate household distance  Outcome: Ongoing     Problem: OXYGENATION/RESPIRATORY FUNCTION  Goal: Patient will achieve/maintain normal respiratory rate/effort  Description: Respiratory rate and effort will be within normal limits for the patient  Outcome: Ongoing     Problem: Pain:  Goal: Pain level will decrease  Description: Pain level will decrease  Outcome: Ongoing  Goal: Control of acute pain  Description: Control of acute pain  Outcome: Ongoing  Goal: Control of chronic pain  Description: Control of chronic pain  Outcome: Ongoing

## 2021-03-04 NOTE — PROGRESS NOTES
Physical Therapy    Physical Therapy Treatment Note  Name: Alex Nelson MRN: [id number] :   1945   Date:  3/4/2021   Admission Date: 2021 Room:  008008-01     Restrictions/Precautions:  Restrictions/Precautions  Restrictions/Precautions: Fall Risk  Required Braces or Orthoses?: No     .    Communication with other providers:     Subjective:  Patient states:  Agreeable to tx session. Pain:   Location, Type, Intensity (0/10 to 10/10):  Pain in the LB and buttock, but did not rate    Objective:    Observation:   During ambulation initially patient demonstrated right lateral shift that eased after Nustep. On the walk back to his room, patient was shuffling his feet and stating that he did not feel well. BP:182/74 after walk. RN notified    Treatment, including education/measures:  Transfers  Sit to supine : Mod I   Scooting : Mod I  Rolling : Mod I  Sit to stand : SB from Nustep  Stand to sit : SB from bed  Nu-step. Pt able to tolerate 8min L4 on Nu-step this date. Gait:  Pt amb with RW for 100 ft and 80ft with CG/SB assist; pt amb with decreased lissette, forward posture. Safety  Patient left safely in bed, with call light/phone in reach with alarm applied. RN in room with patient. Gait belt and mask were used for transfers and gait. Assessment / Impression:  RN checked patient's blood sugar and was found to be 55. Patient was given Ensure and ice cream by RN. Patient's tolerance of treatment:  Fair-poor.     Adverse Reaction: none  Significant change in status and impact:  none  Barriers to improvement:  Fatigue     Plan for Next Session:    Will cont to work towards pt's goals per his tolerance    Time in:   1405  Time out:  1438  Timed treatment minutes:  33  Total treatment time: 33      Previously filed items:  Social/Functional History  Lives With: Spouse  Type of Home: [employer] Home Layout: Two level, Performs ADL's on one level, Able to Live on Main level with bedroom/bathroom  Home Access: Stairs to enter with rails  Entrance Stairs - Number of Steps: 2 steps  Entrance Stairs - Rails: Right(going up)  Bathroom Shower/Tub: Walk-in shower, Shower chair without back  Bathroom Toilet: Standard  Bathroom Equipment: Grab bars in shower, Hand-held shower  Home Equipment: 4 wheeled walker, Cane(pt reports he was using no AD prior to falling.)  Receives Help From: Family  ADL Assistance: Independent  Homemaking Assistance: Independent  Homemaking Responsibilities: Yes  Meal Prep Responsibility: Secondary(pt reports they do the cooking 50/50)  Laundry Responsibility: No  Cleaning Responsibility: Secondary(pt reports he does the dishes.)  Shopping Responsibility: Primary(pt reports he was doing the grocery shopping. he reports he gets worn out walking through store.)  Ambulation Assistance: Independent  Transfer Assistance: Independent  Active : Yes  Mode of Transportation: Car  Occupation: Retired  Type of occupation: pt is retired from [employer]. Leisure & Hobbies: Pt reports he shoots troublesome animals. IADL Comments: pt reports he was I with dressing and bathing. Additional Comments: Pt reports he has a hx of falls. Reports he is normally walking when he falls and wasn't using an AD  Short term goals  Time Frame for Short term goals: 1 week or until discharge:  Short term goal 1: Pt will demonstrate the ability to safely perform bed mobility mod I with no HR and HOB flat. Short term goal 2: Pt will demonstrate the ability to safely stand x8 consecutive mintues while performing dynamic balance activities with supervision and no more than 1 hand for support. Short term goal 3: pt will demonstrate the ability to safely ambulate 150 feet consecutive feet with step through gait and  the least restrictive AD and superivsion. Short term goal 4: Pt will demonstrate the ability to safely ambulate up and down 2 steps with a R HR and supervision in 2/2 trials.        Electronically signed by:    Atif Nunez PTA  3/4/2021, 3:56 PM

## 2021-03-04 NOTE — PLAN OF CARE
Problem: Falls - Risk of:  Goal: Will remain free from falls  Outcome: Ongoing  Goal: Absence of physical injury  Outcome: Ongoing     Problem: IP MOBILITY  Goal: LTG - patient will demonstrate kitchen mobility  Outcome: Ongoing  Goal: LTG - patient will ambulate community distance  Outcome: Ongoing  Goal: LTG - patient will ambulate household distance  Outcome: Ongoing  Goal: LTG - Patient will navigate 4-6 steps with rails/device  Outcome: Ongoing  Goal: LTG - patient will demonstrate safe mobility requirements  Outcome: Ongoing  Goal: STG - Patient will ambulate  Outcome: Ongoing     Problem: OXYGENATION/RESPIRATORY FUNCTION  Goal: Patient will achieve/maintain normal respiratory rate/effort  Outcome: Ongoing     Problem: Pain:  Goal: Pain level will decrease  Outcome: Ongoing  Goal: Control of acute pain  Outcome: Ongoing  Goal: Control of chronic pain  Outcome: Ongoing

## 2021-03-05 ENCOUNTER — TELEPHONE (OUTPATIENT)
Dept: FAMILY MEDICINE CLINIC | Age: 76
End: 2021-03-05

## 2021-03-05 VITALS
RESPIRATION RATE: 18 BRPM | HEIGHT: 66 IN | DIASTOLIC BLOOD PRESSURE: 56 MMHG | TEMPERATURE: 98.2 F | SYSTOLIC BLOOD PRESSURE: 108 MMHG | BODY MASS INDEX: 24.44 KG/M2 | HEART RATE: 64 BPM | WEIGHT: 152.1 LBS | OXYGEN SATURATION: 91 %

## 2021-03-05 LAB
GLUCOSE BLD-MCNC: 144 MG/DL (ref 70–99)
GLUCOSE BLD-MCNC: 160 MG/DL (ref 70–99)

## 2021-03-05 PROCEDURE — 82962 GLUCOSE BLOOD TEST: CPT

## 2021-03-05 PROCEDURE — 6370000000 HC RX 637 (ALT 250 FOR IP): Performed by: INTERNAL MEDICINE

## 2021-03-05 PROCEDURE — 94640 AIRWAY INHALATION TREATMENT: CPT

## 2021-03-05 PROCEDURE — 97116 GAIT TRAINING THERAPY: CPT

## 2021-03-05 RX ADMIN — AMLODIPINE BESYLATE 5 MG: 5 TABLET ORAL at 08:20

## 2021-03-05 RX ADMIN — AMIODARONE HYDROCHLORIDE 200 MG: 200 TABLET ORAL at 08:20

## 2021-03-05 RX ADMIN — IPRATROPIUM BROMIDE AND ALBUTEROL SULFATE 1 AMPULE: .5; 3 SOLUTION RESPIRATORY (INHALATION) at 08:00

## 2021-03-05 RX ADMIN — GABAPENTIN 300 MG: 300 CAPSULE ORAL at 08:20

## 2021-03-05 RX ADMIN — PRIMIDONE 50 MG: 50 TABLET ORAL at 08:19

## 2021-03-05 RX ADMIN — ASPIRIN 81 MG: 81 TABLET, COATED ORAL at 08:20

## 2021-03-05 RX ADMIN — GLIPIZIDE 2.5 MG: 5 TABLET ORAL at 08:20

## 2021-03-05 RX ADMIN — CARVEDILOL 3.12 MG: 3.12 TABLET, FILM COATED ORAL at 08:20

## 2021-03-05 ASSESSMENT — PAIN SCALES - GENERAL
PAINLEVEL_OUTOF10: 0

## 2021-03-05 NOTE — DISCHARGE SUMMARY
Carrol Cordero 1945 [id number]  PCP:  Elizabeth Zhang MD    Admit date: 2/18/2021  Admitting Physician: Darlene Valiente MD    Discharge date: 3/5/2021 Discharge Physician: Mahesh Russell MD      Reason for admission: No chief complaint on file. Present on Admission:   Weakness       Discharge Diagnoses Include:  1.  Weakness and debility: Patient continues work with PT/OT.  Improving with his strength endurance and distance of walking.        2.  Acute hypoxic respiratory failure: Patient still needs supplemental O2 with PT/OT as well as nighttime or sleeping.    - resolved     3.  Multifocal pneumonia: Inpatient admission diagnosis.  Interval improvement on radiographs.  completed course of antibiotics     4.  History of falls: Continues with PT/OT.  Has walker at baseline.  Also: With a history of lumbar spinal stenosis.      5.  Non-insulin-dependent diabetes: Continue glipizide.  Low-dose sliding scale discontinued no need as bg well controlled      6.  CAD:Continue carvedilol 3.125 mg twice daily, continue atorvastatin 40 mg daily, aspirin 81 mg daily at this time.     7.  Hypertension: Controlled, dt persistent nature of cough have changed lisinopril to norvasc.     8.  History of pulmonary nodule.  CT lung screen 0.5 cm solid nodule in the left lower base was not present on 12/12/20/2019.  Recommend short-term follow-up low-dose CT in 6 months.     9.  Arrhythmia: Patient could not quantify but has been on amiodarone 200 mg twice daily.  Record review demonstrates this is a chronic medication.  Problem list does not identify same.  Recommend quantifying with cardiology versus PCP     Hospital Course[de-identified]   Patient presented with weakness and debility which is now improved, no longer has an oxygen requirement and is stable for discharge home.     Pt was personally examined by me on the day of discharge with the following findings: no new issues  Vitals:    03/05/21 0730   BP: (!) 108/56 Pulse: 64   Resp: 18   Temp: 98.2 °F (36.8 °C)   SpO2: 91%     Gen: ao nad  Heent: ncat  Lungs: ctabl  Car:nl s1s2  Abd: soft ntnd  Ext: rom wnl  Skin: warm+dry  Neuro: cn 2-12 grossly intact  Patient Instructions:   Zane Harden   [place] Medication Instructions LO    Printed on:21 0880   Medication Information                      amiodarone (CORDARONE) 200 MG tablet  TAKE 1 TABLET BY MOUTH 2 TIMES DAILY             aspirin 81 MG tablet  Take 81 mg by mouth daily              atorvastatin (LIPITOR) 40 MG tablet  Take 1 tablet by mouth daily             blood glucose monitor kit and supplies  Test 3 times a day & as needed for symptoms of irregular blood glucose. blood glucose test strips (TRUE METRIX BLOOD GLUCOSE TEST) strip  1 each by In Vitro route daily As needed. Blood Pressure Monitoring (BLOOD PRESSURE KIT) NASH  Use as directed             carvedilol (COREG) 3.125 MG tablet  TAKE 1 TABLET BY MOUTH 2 TIMES DAILY             furosemide (LASIX) 20 MG tablet  Take 1 tablet by mouth 2 times daily             gabapentin (NEURONTIN) 300 MG capsule  Take 1 capsule by mouth 3 times daily for 30 days. glipiZIDE (GLUCOTROL XL) 2.5 MG extended release tablet  Take 1 tablet by mouth daily             lisinopril (PRINIVIL;ZESTRIL) 10 MG tablet  Take 1 tablet by mouth daily             polyethylene glycol (GLYCOLAX) 17 GM/SCOOP powder  Take 17 g by mouth daily             primidone (MYSOLINE) 50 MG tablet  Take 1 tablet by mouth 2 times daily                  Code Status: DNR-CCA     Consults:   IP CONSULT TO SPIRITUAL SERVICES  IP CONSULT TO HOME CARE NEEDS    Diet: carb controlled diet    Activity: activity as tolerated   Work:    Discharged Condition: fair    Prognosis: Fair    Disposition: home      Follow-up with   1.  PCP within   5-7    Days           Discharge Physician Signed: Cassandra Glez M.D. The patient was seen and examined on day of discharge and this discharge summary is in conjunction with any daily progress note from day of discharge.   Time spent on discharge in the examination, evaluation, counseling and review of medications and discharge plan: 32 minutes

## 2021-03-05 NOTE — CARE COORDINATION
CM arranged transportation at 12:30 PM through [employer]. 11:37 AM  CM faxed the patient's discharge instructions to [facility].

## 2021-03-05 NOTE — PLAN OF CARE
Goal: Control of acute pain  Description: Control of acute pain  3/4/2021 2210 by Danika Larson RN  Outcome: Ongoing  3/4/2021 1524 by Jarad Berry RN  Outcome: Ongoing  Goal: Control of chronic pain  Description: Control of chronic pain  3/4/2021 2210 by Danika Larson RN  Outcome: Ongoing  3/4/2021 1524 by Jarad Berry RN  Outcome: Ongoing

## 2021-03-05 NOTE — PROGRESS NOTES
"Subjective     History provided by:  Patient   used: No    Lower Extremity Issue   Location:  Ankle and foot  Time since incident:  3 days  Injury: yes    Mechanism of injury: fall    Fall:     Impact surface:  Hard floor    Point of impact:  Feet    Entrapped after fall: no    Ankle location:  R ankle  Foot location:  R foot  Pain details:     Quality:  Aching    Radiates to:  Does not radiate    Severity:  Moderate    Onset quality:  Gradual    Timing:  Constant    Progression:  Worsening  Chronicity:  New  Dislocation: no    Foreign body present:  No foreign bodies  Relieved by:  Nothing  Worsened by:  Activity  Ineffective treatments:  None tried  Associated symptoms: decreased ROM    patient is here today because of ankle and foot pain which she sustain trying to step down from the bus. Patient went to urgent care and x ray showed no fracture but the area looks dark and swollen.     Review of Systems   All other systems reviewed and are negative.      Past Medical History:   Diagnosis Date   • Arthritis    • Fibromyalgia    • Hypertension        Allergies   Allergen Reactions   • Biaxin [Clarithromycin]    • Penicillins        History reviewed. No pertinent surgical history.    History reviewed. No pertinent family history.    Social History     Socioeconomic History   • Marital status:      Spouse name: Not on file   • Number of children: Not on file   • Years of education: Not on file   • Highest education level: Not on file   Tobacco Use   • Smoking status: Never Smoker   • Smokeless tobacco: Never Used   Substance and Sexual Activity   • Alcohol use: No   • Drug use: No       /98 (BP Location: Right arm, Patient Position: Sitting)   Pulse 65   Temp 97.9 °F (36.6 °C) (Temporal)   Resp 20   Ht 149.9 cm (59\")   Wt 77.1 kg (170 lb)   SpO2 98%   BMI 34.34 kg/m²     Objective   Physical Exam   Constitutional: She is oriented to person, place, and time. She appears " Physical Therapy    Physical Therapy Treatment Note  Name: Robyn Lainez MRN: [id number] :   1945   Date:  3/5/2021   Admission Date: 2021 Room:  [address] [place] [address]     Restrictions/Precautions:  Restrictions/Precautions  Restrictions/Precautions: Fall Risk  Required Braces or Orthoses?: No     .    Communication with other providers:     Subjective:  Patient states:  Agreeable to tx session. Feeling better today. \"I don't want to do the Nustep today since I am going home, I just want to walk. \"  \"I feel like I am dragging my foot. \"    Pain:   Location, Type, Intensity (0/10 to 10/10):  Pain in the LB and buttock, but did not rate    Objective:    Observation:  Patient lying in bed upon therapist arrival.        Treatment, including education/measures:  Transfers  Sit to supine : Mod I   Scooting : Mod I  Sit to stand : SB from bed  Stand to sit : SB to bed           Gait:  Pt amb with RW for 42 ft x 2 with CG/SB assist; pt amb with decreased lissette, forward posture. Safety  Patient left safely in bed, with call light/phone in reach with alarm applied. Gait belt and mask were used for transfers and gait. Assessment / Impression:     Patient's tolerance of treatment:  Fair-poor.     Adverse Reaction: none  Significant change in status and impact:  none  Barriers to improvement:  Fatigue     Plan for Next Session:    Will cont to work towards pt's goals per his tolerance    Time in:   1014  Time out:  1029  Timed treatment minutes:  15  Total treatment time: 15      Previously filed items:  Social/Functional History  Lives With: Spouse  Type of Home: House  Home Layout: Two level, Performs ADL's on one level, Able to Live on Main level with bedroom/bathroom  Home Access: Stairs to enter with rails  Entrance Stairs - Number of Steps: 2 steps  Entrance Stairs - Rails: Right(going up)  Bathroom Shower/Tub: Walk-in shower, Shower chair without back  Bathroom Toilet: Standard Bathroom Equipment: Grab bars in shower, Hand-held shower  Home Equipment: 4 wheeled walker, Cane(pt reports he was using no AD prior to falling.)  Receives Help From: Family  ADL Assistance: Independent  Homemaking Assistance: Independent  Homemaking Responsibilities: Yes  Meal Prep Responsibility: Secondary(pt reports they do the cooking 50/50)  Laundry Responsibility: No  Cleaning Responsibility: Secondary(pt reports he does the dishes.)  Shopping Responsibility: Primary(pt reports he was doing the grocery shopping. he reports he gets worn out walking through store.)  Ambulation Assistance: Independent  Transfer Assistance: Independent  Active : Yes  Mode of Transportation: Car  Occupation: Retired  Type of occupation: pt is retired from [employer]. Leisure & Hobbies: Pt reports he shoots troublesome animals. IADL Comments: pt reports he was I with dressing and bathing. Additional Comments: Pt reports he has a hx of falls. Reports he is normally walking when he falls and wasn't using an AD  Short term goals  Time Frame for Short term goals: 1 week or until discharge:  Short term goal 1: Pt will demonstrate the ability to safely perform bed mobility mod I with no HR and HOB flat. Short term goal 2: Pt will demonstrate the ability to safely stand x8 consecutive mintues while performing dynamic balance activities with supervision and no more than 1 hand for support. Short term goal 3: pt will demonstrate the ability to safely ambulate 150 feet consecutive feet with step through gait and  the least restrictive AD and superivsion. Short term goal 4: Pt will demonstrate the ability to safely ambulate up and down 2 steps with a R HR and supervision in 2/2 trials.        Electronically signed by:    Bonnie Steinberg PTA  3/5/2021, 10:49 AM well-developed and well-nourished.   HENT:   Head: Normocephalic.   Right Ear: External ear normal.   Left Ear: External ear normal.   Nose: Nose normal.   Mouth/Throat: Oropharynx is clear and moist.   Neck: Normal range of motion. Neck supple.   Cardiovascular: Normal rate, regular rhythm, normal heart sounds and intact distal pulses.   Pulmonary/Chest: Effort normal and breath sounds normal.   Abdominal: Soft. Bowel sounds are normal.   Musculoskeletal:        Right ankle: She exhibits decreased range of motion, swelling and ecchymosis. Tenderness.        Neurological: She is alert and oriented to person, place, and time.   Skin: Skin is warm. Capillary refill takes less than 2 seconds.   Nursing note and vitals reviewed.      Procedures           ED Course      Labs Reviewed - No data to display    XR Foot 3+ View Right   Final Result   CONCLUSION:             1. No radiographic evidence of acute osseous pathology.                Note:  if pain or symptoms persist beyond reasonable expectations   and follow-up imaging is anticipated,  cross sectional imaging   (MRI) is suggested, as is deemed clinically appropriate.                           Electronically signed by:  ARBEN Bauer MD  1/5/2019 2:57 PM   CST Workstation: [id number]      XR Ankle 3+ View Right   Final Result   CONCLUSION:             1. No radiographic evidence of acute osseous pathology.                Note:  if pain or symptoms persist beyond reasonable expectations   and follow-up imaging is anticipated,  cross sectional imaging   (MRI) is suggested, as is deemed clinically appropriate.                           Electronically signed by:  ARBEN Bauer MD  1/5/2019 2:56 PM   CST Workstation: [id number]        Result discussed with patient and patient told to follow up with orthopaedic in 3 days. Come back if worse.          MDM      Final diagnoses:   Sprain of right ankle, unspecified ligament, initial encounter            Viet Francisco  MD Justin  01/05/19 [id number]

## 2021-03-06 DIAGNOSIS — R25.1 TREMOR, PHYSIOLOGICAL: ICD-10-CM

## 2021-03-08 RX ORDER — PRIMIDONE 50 MG/1
TABLET ORAL
Qty: 60 TABLET | Refills: 2 | Status: SHIPPED | OUTPATIENT
Start: 2021-03-08 | End: 2021-05-04 | Stop reason: SDUPTHER

## 2021-03-09 RX ORDER — CARVEDILOL 3.12 MG/1
3.12 TABLET ORAL 2 TIMES DAILY
Qty: 60 TABLET | Refills: 3 | OUTPATIENT
Start: 2021-03-09

## 2021-03-09 RX ORDER — AMIODARONE HYDROCHLORIDE 200 MG/1
200 TABLET ORAL 2 TIMES DAILY
Qty: 60 TABLET | Refills: 3 | OUTPATIENT
Start: 2021-03-09

## 2021-03-11 ENCOUNTER — HOSPITAL ENCOUNTER (OUTPATIENT)
Age: 76
Setting detail: SPECIMEN
Discharge: HOME | End: 2021-03-11
Payer: MEDICARE

## 2021-03-11 ENCOUNTER — TELEPHONE (OUTPATIENT)
Dept: FAMILY MEDICINE CLINIC | Age: 76
End: 2021-03-11

## 2021-03-11 ENCOUNTER — VIRTUAL VISIT (OUTPATIENT)
Dept: FAMILY MEDICINE CLINIC | Age: 76
End: 2021-03-11
Payer: MEDICARE

## 2021-03-11 DIAGNOSIS — Z92.89 HISTORY OF RECENT HOSPITALIZATION: Primary | ICD-10-CM

## 2021-03-11 DIAGNOSIS — R53.81 PHYSICAL DECONDITIONING: ICD-10-CM

## 2021-03-11 DIAGNOSIS — R53.1 WEAKNESS: ICD-10-CM

## 2021-03-11 DIAGNOSIS — M47.27 OSTEOARTHRITIS OF SPINE WITH RADICULOPATHY, LUMBOSACRAL REGION: ICD-10-CM

## 2021-03-11 DIAGNOSIS — E11.9 TYPE 2 DIABETES MELLITUS WITHOUT COMPLICATION, WITHOUT LONG-TERM CURRENT USE OF INSULIN (HCC): ICD-10-CM

## 2021-03-11 DIAGNOSIS — E78.49 OTHER HYPERLIPIDEMIA: ICD-10-CM

## 2021-03-11 DIAGNOSIS — J18.9 PNEUMONIA DUE TO INFECTIOUS ORGANISM, UNSPECIFIED LATERALITY, UNSPECIFIED PART OF LUNG: ICD-10-CM

## 2021-03-11 DIAGNOSIS — I10 ESSENTIAL HYPERTENSION: ICD-10-CM

## 2021-03-11 LAB
ALBUMIN SERPL-MCNC: 3.9 GM/DL (ref 3.4–5)
ALP BLD-CCNC: 141 IU/L (ref 40–129)
ALT SERPL-CCNC: 48 U/L (ref 10–40)
ANION GAP SERPL CALCULATED.3IONS-SCNC: 10 MMOL/L (ref 4–16)
AST SERPL-CCNC: 32 IU/L (ref 15–37)
BILIRUB SERPL-MCNC: 0.4 MG/DL (ref 0–1)
BUN BLDV-MCNC: 13 MG/DL (ref 6–23)
CALCIUM SERPL-MCNC: 9.5 MG/DL (ref 8.3–10.6)
CHLORIDE BLD-SCNC: 101 MMOL/L (ref 99–110)
CO2: 27 MMOL/L (ref 21–32)
CREAT SERPL-MCNC: 0.9 MG/DL (ref 0.9–1.3)
ESTIMATED AVERAGE GLUCOSE: 140 MG/DL
GFR AFRICAN AMERICAN: >60 ML/MIN/1.73M2
GFR NON-AFRICAN AMERICAN: >60 ML/MIN/1.73M2
GLUCOSE FASTING: 187 MG/DL (ref 70–99)
HBA1C MFR BLD: 6.5 % (ref 4.2–6.3)
HCT VFR BLD CALC: 38.2 % (ref 42–52)
HEMOGLOBIN: 12.2 GM/DL (ref 13.5–18)
MCH RBC QN AUTO: 29.7 PG (ref 27–31)
MCHC RBC AUTO-ENTMCNC: 31.9 % (ref 32–36)
MCV RBC AUTO: 92.9 FL (ref 78–100)
PDW BLD-RTO: 16.6 % (ref 11.7–14.9)
PLATELET # BLD: 214 K/CU MM (ref 140–440)
PMV BLD AUTO: 9.4 FL (ref 7.5–11.1)
POTASSIUM SERPL-SCNC: 4.1 MMOL/L (ref 3.5–5.1)
RBC # BLD: 4.11 M/CU MM (ref 4.6–6.2)
SODIUM BLD-SCNC: 138 MMOL/L (ref 135–145)
TOTAL PROTEIN: 5.9 GM/DL (ref 6.4–8.2)
WBC # BLD: 9.9 K/CU MM (ref 4–10.5)

## 2021-03-11 PROCEDURE — G8482 FLU IMMUNIZE ORDER/ADMIN: HCPCS | Performed by: FAMILY MEDICINE

## 2021-03-11 PROCEDURE — G8427 DOCREV CUR MEDS BY ELIG CLIN: HCPCS | Performed by: FAMILY MEDICINE

## 2021-03-11 PROCEDURE — 80053 COMPREHEN METABOLIC PANEL: CPT

## 2021-03-11 PROCEDURE — 1111F DSCHRG MED/CURRENT MED MERGE: CPT | Performed by: FAMILY MEDICINE

## 2021-03-11 PROCEDURE — 3046F HEMOGLOBIN A1C LEVEL >9.0%: CPT | Performed by: FAMILY MEDICINE

## 2021-03-11 PROCEDURE — G8420 CALC BMI NORM PARAMETERS: HCPCS | Performed by: FAMILY MEDICINE

## 2021-03-11 PROCEDURE — 1123F ACP DISCUSS/DSCN MKR DOCD: CPT | Performed by: FAMILY MEDICINE

## 2021-03-11 PROCEDURE — 4040F PNEUMOC VAC/ADMIN/RCVD: CPT | Performed by: FAMILY MEDICINE

## 2021-03-11 PROCEDURE — 4004F PT TOBACCO SCREEN RCVD TLK: CPT | Performed by: FAMILY MEDICINE

## 2021-03-11 PROCEDURE — 3017F COLORECTAL CA SCREEN DOC REV: CPT | Performed by: FAMILY MEDICINE

## 2021-03-11 PROCEDURE — 85027 COMPLETE CBC AUTOMATED: CPT

## 2021-03-11 PROCEDURE — 2022F DILAT RTA XM EVC RTNOPTHY: CPT | Performed by: FAMILY MEDICINE

## 2021-03-11 PROCEDURE — 83036 HEMOGLOBIN GLYCOSYLATED A1C: CPT

## 2021-03-11 PROCEDURE — 80061 LIPID PANEL: CPT

## 2021-03-11 PROCEDURE — 99443 PR PHYS/QHP TELEPHONE EVALUATION 21-30 MIN: CPT | Performed by: FAMILY MEDICINE

## 2021-03-11 NOTE — PROGRESS NOTES
Madelin Schmidt is a 70 y.o. male evaluated via telephone on 3/11/2021. Consent:  He and/or health care decision maker is aware that that he may receive a bill for this telephone service, depending on his insurance coverage, and has provided verbal consent to proceed: Yes      Documentation:  I communicated with the patient and/or health care decision maker about recent hospitalization at [facility] from 2/18 through 3/5/2021. Patient had a fall was found to have pneumonia and generalized weakness with deconditioning. He required a lot of the ICU time and attention to overcome the respiratory failure and pneumonia. He was quite deconditioned and has been working with therapy -he requested DNR CCA CODE STATUS which is is what he has currently. Patient has a past history of hypertension, smoking abuse, diabetes mellitus type 2, hyperlipidemia, and known coronary disease. He denies increased shortness of breath or chest pain at this point. RA oximetry readings are now at 97% . He does have home care and home care PT starting with him. He admits appetite is increasing, but he still quite weak. He is slowly improving -he is walking with a walker has not had further falls. He and his wife are both at high risk, but have not had Covid shots. Because his wife does not drive and they [place] they will need help getting their shots obtained- we will try to contact [facility] to help with that. In addition we will check a CBC, CMP, lipid panel and an A1c via labs drawn through hospital home care in the next week or so and have him follow-up for results. He is to call with questions or problems- otherwise I would like to see him within 2 months. Hopefully he and wife can get the Covid vaccine  by then. Details of this discussion including any medical advice provided as above.       I affirm this is a Patient Initiated Episode with a Patient who has not had a related appointment within my department in the past 7 days or scheduled within the next 24 hours. Patient identification was verified at the start of the visit: Yes    Total Time: minutes: 21-30 minutes    The visit was conducted pursuant to the emergency declaration under the [address] [place] [address], [address] [place] [address] and the [employer] Act. Patient identification was verified, and a caregiver was present when appropriate. The patient was located in a state where the provider was credentialed to provide care.     Note: not billable if this call serves to triage the patient into an appointment for the relevant concern      [address] [place] [address]

## 2021-03-12 LAB
CHOLESTEROL, FASTING: 154 MG/DL
HDLC SERPL-MCNC: 36 MG/DL
LDL CHOLESTEROL DIRECT: 88 MG/DL
TRIGLYCERIDE, FASTING: 237 MG/DL

## 2021-03-23 ENCOUNTER — PARAMEDICINE (OUTPATIENT)
Dept: OTHER | Age: 76
End: 2021-03-23

## 2021-03-23 NOTE — PROGRESS NOTES
Raysa Jules was referred to the CP Program on 2/9/21 by [facility]. Raysa Jules came to the ED shortly after and I spoke with him then. Patient was admitted and was not discharged until 03/05/21. According to the notes it appears that he has had physical therapy and has home health coming to his home. He is also making virtual visits with his PCP. Because he has several resources working with him at this time I will take him out of the CP Program and advise [facility] to submit another referral if the squad runs continue.

## 2021-04-02 ENCOUNTER — TELEPHONE (OUTPATIENT)
Dept: FAMILY MEDICINE CLINIC | Age: 76
End: 2021-04-02

## 2021-04-02 NOTE — TELEPHONE ENCOUNTER
Mag[facility] ph  c/m home care    - fall 4/1/21  - bruising left elbow  - bp 180/90 p 70 pt didn't have 2nd dose coreg so nurse had pt take 2nd coreg, nurse hoped that helped. - pt taking lasix qd instead of bi as directed d/t has to urinate too often, also pt not doing daily weights  - nurse was able to educate pt on importance of daily weights, pt still refusing lasix bid  - nurse stated 4/1/21 pt had no edema.

## 2021-04-05 DIAGNOSIS — R25.1 TREMOR, PHYSIOLOGICAL: ICD-10-CM

## 2021-04-05 RX ORDER — GABAPENTIN 300 MG/1
300 CAPSULE ORAL 3 TIMES DAILY
Qty: 90 CAPSULE | Refills: 0 | Status: SHIPPED | OUTPATIENT
Start: 2021-04-05 | End: 2021-05-19 | Stop reason: SDUPTHER

## 2021-04-05 RX ORDER — FUROSEMIDE 20 MG/1
TABLET ORAL
Qty: 60 TABLET | Refills: 2 | Status: SHIPPED | OUTPATIENT
Start: 2021-04-05

## 2021-04-06 ENCOUNTER — CARE COORDINATION (OUTPATIENT)
Dept: CARE COORDINATION | Age: 76
End: 2021-04-06

## 2021-04-06 SDOH — SOCIAL STABILITY: SOCIAL NETWORK: IN A TYPICAL WEEK, HOW MANY TIMES DO YOU TALK ON THE PHONE WITH FAMILY, FRIENDS, OR NEIGHBORS?: THREE TIMES A WEEK

## 2021-04-06 SDOH — HEALTH STABILITY: PHYSICAL HEALTH: ON AVERAGE, HOW MANY MINUTES DO YOU ENGAGE IN EXERCISE AT THIS LEVEL?: 60 MIN

## 2021-04-06 SDOH — SOCIAL STABILITY: SOCIAL NETWORK: HOW OFTEN DO YOU ATTENT MEETINGS OF THE CLUB OR ORGANIZATION YOU BELONG TO?: NEVER

## 2021-04-06 SDOH — ECONOMIC STABILITY: TRANSPORTATION INSECURITY
IN THE PAST 12 MONTHS, HAS LACK OF TRANSPORTATION KEPT YOU FROM MEETINGS, WORK, OR FROM GETTING THINGS NEEDED FOR DAILY LIVING?: NO

## 2021-04-06 SDOH — HEALTH STABILITY: PHYSICAL HEALTH: ON AVERAGE, HOW MANY DAYS PER WEEK DO YOU ENGAGE IN MODERATE TO STRENUOUS EXERCISE (LIKE A BRISK WALK)?: 7 DAYS

## 2021-04-06 SDOH — SOCIAL STABILITY: SOCIAL NETWORK
DO YOU BELONG TO ANY CLUBS OR ORGANIZATIONS SUCH AS CHURCH GROUPS UNIONS, FRATERNAL OR ATHLETIC GROUPS, OR SCHOOL GROUPS?: NO

## 2021-04-06 SDOH — SOCIAL STABILITY: SOCIAL NETWORK: ARE YOU MARRIED, WIDOWED, DIVORCED, SEPARATED, NEVER MARRIED, OR LIVING WITH A PARTNER?: MARRIED

## 2021-04-06 NOTE — CARE COORDINATION
Ambulatory Care Coordination Note  CM Risk Score: 9  Charlson 10 Year Mortality Risk Score: 98%     ACC: Lorena Garcia RN    Summary Note: Call to pt for acm f/u. Reports he has had some Pain (not new) that has been ongoing due to performing home physical therapy twice a week. He has been doing PT on his own other days, lower back and hip area sore (dull pain) after exercising. Denies symptoms or concerns to report to pcp. Denies cough, fever, sob. Denies issues with Sugars and that they have been  \"real good\" and today FBS-100. No low bs or hypo/hyper symptoms. Reports Lost balance and fell about a week ago. unsure if he was using walker or cane at time of fall. Encouraged to use amb assist devices as recommended by PT to help prevent falls. Had difficulty getting in and out of car in past due to lack of strength but now ok and driving. Reports he is trying to gain weight, lost 35 lbs in the last year. Discussed dietitian referral, pt reports he would like to gain some muscle. Reports Hx of open heart surgery, and lost 16 lbs on last hospitalization, advised of possibility of some water weight loss included in this as on water pill. Pt agreeable to dieitian referral. Plan: refer to dieitian, continue to assess for needs/barrriers. Pt advsied to call if any symptoms/concerns arise. Care Coordination Interventions    Program Enrollment: Complex Care  Referral from Primary Care Provider: No  Suggested Interventions and Community Resources  Diabetes Education:  In Process  Home Health Services: Completed  Physical Therapy: Completed  Registered Dietician: Completed         Goals Addressed                 This Visit's Progress     Reduce Falls         I will reduce my risk of falls by the following: Use walking aids like cane or walker  Follow through on orders for PT    Barriers: lack of motivation  Plan for overcoming my barriers: support from ac  Confidence: 8/10  Anticipated Goal Completion Date: 6/7/21            Prior to Admission medications    Medication Sig Start Date End Date Taking? Authorizing Provider   furosemide (LASIX) 20 MG tablet TAKE 1 TABLET BY MOUTH TWICE A DAY 4/5/21   Bhaskar Mcfarlane MD   gabapentin (NEURONTIN) 300 MG capsule Take 1 capsule by mouth 3 times daily for 30 days. 4/5/21 5/5/21  Bhaskar Mcfarlane MD   primidone (MYSOLINE) 50 MG tablet TAKE 1 TABLET BY MOUTH TWICE A DAY 3/8/21   Bhaskar Mcfarlane MD   glipiZIDE (GLUCOTROL XL) 2.5 MG extended release tablet Take 1 tablet by mouth daily 1/15/21   Angeles Biswas PA-C   carvedilol (COREG) 3.125 MG tablet TAKE 1 TABLET BY MOUTH 2 TIMES DAILY 1/11/21   Bhaskar Mcfarlane MD   amiodarone (CORDARONE) 200 MG tablet TAKE 1 TABLET BY MOUTH 2 TIMES DAILY 1/11/21   Bhaskar Mcfarlane MD   lisinopril (PRINIVIL;ZESTRIL) 10 MG tablet Take 1 tablet by mouth daily 12/8/20   Adri Trejo PA-C   atorvastatin (LIPITOR) 40 MG tablet Take 1 tablet by mouth daily 10/14/20   Bhaskar Mcfarlane MD   Blood Pressure Monitoring (BLOOD PRESSURE KIT) NASH Use as directed 8/12/20   Bhaskar Mcfarlane MD   blood glucose test strips (TRUE METRIX BLOOD GLUCOSE TEST) strip 1 each by In Vitro route daily As needed. 8/7/20   Bhaskar Mcfarlane MD   polyethylene glycol [facility]) 17 GM/SCOOP powder Take 17 g by mouth daily    Historical MD Fide   blood glucose monitor kit and supplies Test 3 times a day & as needed for symptoms of irregular blood glucose.  3/9/20   Kristopher Allen PA-C   aspirin 81 MG tablet Take 81 mg by mouth daily     Historical Provider, MD       Future Appointments   Date Time Provider Cristian Oakes   4/26/2021  3:20 PM MADDY Gold - CNP [facility]   5/19/2021  2:30 PM Bhaskar Mcfarlane MD [facility]      and   Diabetes Assessment    Meal Planning: Avoidance of concentrated sweets   How often do you test your blood sugar?: Daily   Do you have barriers with adherence to non-pharmacologic self-management interventions?  (Nutrition/Exercise/Self-Monitoring): No   Have you ever had to go to the ED for symptoms of low blood sugar?: No       No patient-reported symptoms

## 2021-04-08 ENCOUNTER — CARE COORDINATION (OUTPATIENT)
Dept: CARE COORDINATION | Age: 76
End: 2021-04-08

## 2021-04-08 NOTE — CARE COORDINATION
Chris Vasquez  4/8/2021    Registered Dietitian Note for Care Coordination    Assessment: Juan Manuel Elise is a 70 y.o. male. Weight gain/increasing protein intake for muscle growth      Action:  Spoke with patient who preferred mailed handouts/information be sent to him. Briefly discussed inclusion of nutrition supplement - patient is drinking Ensure drink - encouraged Ensure High Protein for additional blood glucose management as it is lower in CHO than regular Ensure. Will provide information pertaining to increasing protein in diet, ways to increase calories and healthful meal planning handouts. Plan of Care/Follow Up:    Expressed RD contact information would be included with handouts for patient to reach out for future nutrition related questions or concerns.        Frida Cabrera MS, RDN, LD, [facility]  [phone]

## 2021-04-09 ENCOUNTER — OFFICE VISIT (OUTPATIENT)
Dept: FAMILY MEDICINE CLINIC | Age: 76
End: 2021-04-09
Payer: MEDICARE

## 2021-04-09 VITALS
OXYGEN SATURATION: 96 % | TEMPERATURE: 98.2 F | HEART RATE: 66 BPM | SYSTOLIC BLOOD PRESSURE: 142 MMHG | HEIGHT: 66 IN | BODY MASS INDEX: 25.76 KG/M2 | DIASTOLIC BLOOD PRESSURE: 60 MMHG | WEIGHT: 160.3 LBS

## 2021-04-09 DIAGNOSIS — E11.9 TYPE 2 DIABETES MELLITUS WITHOUT COMPLICATION, WITHOUT LONG-TERM CURRENT USE OF INSULIN (HCC): Primary | ICD-10-CM

## 2021-04-09 DIAGNOSIS — E16.2 HYPOGLYCEMIA: ICD-10-CM

## 2021-04-09 DIAGNOSIS — R25.1 TREMOR, PHYSIOLOGICAL: ICD-10-CM

## 2021-04-09 PROCEDURE — 99213 OFFICE O/P EST LOW 20 MIN: CPT | Performed by: FAMILY MEDICINE

## 2021-04-09 PROCEDURE — 2022F DILAT RTA XM EVC RTNOPTHY: CPT | Performed by: FAMILY MEDICINE

## 2021-04-09 PROCEDURE — 3017F COLORECTAL CA SCREEN DOC REV: CPT | Performed by: FAMILY MEDICINE

## 2021-04-09 PROCEDURE — 1123F ACP DISCUSS/DSCN MKR DOCD: CPT | Performed by: FAMILY MEDICINE

## 2021-04-09 PROCEDURE — 4040F PNEUMOC VAC/ADMIN/RCVD: CPT | Performed by: FAMILY MEDICINE

## 2021-04-09 PROCEDURE — 4004F PT TOBACCO SCREEN RCVD TLK: CPT | Performed by: FAMILY MEDICINE

## 2021-04-09 PROCEDURE — G8417 CALC BMI ABV UP PARAM F/U: HCPCS | Performed by: FAMILY MEDICINE

## 2021-04-09 PROCEDURE — 3044F HG A1C LEVEL LT 7.0%: CPT | Performed by: FAMILY MEDICINE

## 2021-04-09 PROCEDURE — G8427 DOCREV CUR MEDS BY ELIG CLIN: HCPCS | Performed by: FAMILY MEDICINE

## 2021-04-09 ASSESSMENT — ENCOUNTER SYMPTOMS
WHEEZING: 0
EYE PAIN: 0
TROUBLE SWALLOWING: 0
DIARRHEA: 0
NAUSEA: 0
SHORTNESS OF BREATH: 0
CHEST TIGHTNESS: 0
BLOOD IN STOOL: 0
BACK PAIN: 1
VOMITING: 0
ABDOMINAL PAIN: 0

## 2021-04-09 NOTE — PROGRESS NOTES
4/9/2021    Aminah Kahn    Chief Complaint   Patient presents with    Blood Sugar Problem     bs was wednesday it was at a 60, seems to be on the low side. Nurse comes once a week, would like to dicsuss changing dosage on meds. HPI  History was obtained from patient. Elmer Nieto is a 70 y.o. male who presents today with follow-up on diabetes, tremor, and affect he is having some low blood sugars with a little bit of hypoglycemic symptoms. Appetite is fair but he does not eat a lot not admits he used to says he is not skipping meals. He has been on glipizide taking 2.5 mg twice daily after discussion he is to be sure to not skip meals have Insta-Glucose available and decrease glipizide to 2.5 mg daily. Monitor sugars and symptoms let me how he is doing in the next couple weeks please note his last A1c was 6.5 other labs look to be stable. He has had one of his Covid shots he remains in active as possible stopping a lot of back pain ambulating with a cane. He had recent pneumonia that really sapped his strength he is trying to recuperate from that. Tremor has been about the same not sure that the current dose of primidone is helping as much as he would like. REVIEW OF SYMPTOMS    Review of Systems   Constitutional: Negative for activity change and fatigue. HENT: Negative for congestion, hearing loss, mouth sores and trouble swallowing. Eyes: Negative for pain and visual disturbance. Respiratory: Negative for chest tightness, shortness of breath and wheezing. Cardiovascular: Negative for chest pain and palpitations. Gastrointestinal: Negative for abdominal pain, blood in stool, diarrhea, nausea and vomiting. Endocrine: Negative for cold intolerance, polydipsia and polyuria. Genitourinary: Negative for dysuria, frequency and urgency. Musculoskeletal: Positive for arthralgias and back pain. Negative for gait problem and neck stiffness. Skin: Negative for rash.    Allergic/Immunologic: Negative for environmental allergies. Neurological: Positive for tremors and weakness. Negative for dizziness, seizures and speech difficulty. Hematological: Does not bruise/bleed easily. Psychiatric/Behavioral: Negative for agitation, confusion, hallucinations, sleep disturbance and suicidal ideas. The patient is not nervous/anxious. PAST MEDICAL HISTORY  Past Medical History:   Diagnosis Date    AAA (abdominal aortic aneurysm) ([facility] [address] 2018    Infrarenal    Basal cell carcinoma of nose 2016    Dr Ever Lange CAD (coronary artery disease)     Dr. Ene Welch - severe aortic stenosis with a bicuspid aortic valve    Constipation     echocardiogram 10/29/2020    EF 55-60% normally functioning valve in aortic position, mild mitral regurg with two jets mild pulm htn.      Essential hypertension     History of alcohol abuse     Hyperlipidemia     Hypertension     Follows with PCP    Missing teeth, acquired     Non-alcoholic fatty liver disease     Osteoarthritis     Knees, lower back, shoulders,    Spinal stenosis     had epidural steroid injection 1/8/2020 - lumbar    Tremor     Left arm only    Type 2 diabetes mellitus without complication ([facility] [address]     Controlling with diet    Wears glasses        FAMILY HISTORY  Family History   Problem Relation Age of Onset    Lung Cancer Mother     Heart Disease Father     High Blood Pressure Father     High Cholesterol Father     Lung Cancer Father        SOCIAL HISTORY  Social History     Socioeconomic History    Marital status:      Spouse name: Not on file    Number of children: Not on file    Years of education: Not on file    Highest education level: Not on file   Occupational History    Not on file   Social Needs    Financial resource strain: Not on file    Food insecurity     Worry: Never true     Inability: Never true    Transportation needs     Medical: No     Non-medical: No   Tobacco Use    Smoking status: Current Every Day Smoker Packs/day: 1.50     Years: 58.00     Pack years: 87.00     Types: Cigarettes     Start date: 1962    Smokeless tobacco: Never Used   Substance and Sexual Activity    Alcohol use: Not Currently     Comment: 1 glass wine a month    Drug use: No    Sexual activity: Not Currently   Lifestyle    Physical activity     Days per week: 7 days     Minutes per session: 60 min    Stress: Not at all   Relationships    Social connections     Talks on phone: Three times a week     Gets together: Once a week     Attends Jewish service: Never     Active member of club or organization: No     Attends meetings of clubs or organizations: Never     Relationship status:     Intimate partner violence     Fear of current or ex partner: Not on file     Emotionally abused: Not on file     Physically abused: Not on file     Forced sexual activity: Not on file   Other Topics Concern    Not on file   Social History Narrative    Not on file        SURGICAL HISTORY  Past Surgical History:   Procedure Laterality Date    CABG WITH AORTIC VALVE REPLACEMENT N/A 3/4/2020    CABG CORONARY ARTERY BYPASS x1 SALEEM TO LAD, AORTIC VALVE REPACEMENT performed by Jeaneth Smith MD at [address] [place] [address]  02/26/2020    critical LAD lesion and moderate to severe right coronary artery lesion    CARPAL TUNNEL RELEASE Right 1970's    KNEE SURGERY  2015    quad injury from a fall    OTHER SURGICAL HISTORY  12/09/2016    excision of basal cell carcinoma of nose with complex repair    OTHER SURGICAL HISTORY  01/2020    epidual steroid injections L3-L4    SINUS SURGERY  1970's    x 2    WISDOM TOOTH EXTRACTION                   CURRENT MEDICATIONS  Current Outpatient Medications   Medication Sig Dispense Refill    furosemide (LASIX) 20 MG tablet TAKE 1 TABLET BY MOUTH TWICE A DAY 60 tablet 2    gabapentin (NEURONTIN) 300 MG capsule Take 1 capsule by mouth 3 times daily for 30 days.  90 capsule 0    primidone (MYSOLINE) 50 MG tablet TAKE 1 TABLET BY MOUTH TWICE A DAY 60 tablet 2    glipiZIDE (GLUCOTROL XL) 2.5 MG extended release tablet Take 1 tablet by mouth daily 90 tablet 2    carvedilol (COREG) 3.125 MG tablet TAKE 1 TABLET BY MOUTH 2 TIMES DAILY 60 tablet 3    amiodarone (CORDARONE) 200 MG tablet TAKE 1 TABLET BY MOUTH 2 TIMES DAILY 60 tablet 3    lisinopril (PRINIVIL;ZESTRIL) 10 MG tablet Take 1 tablet by mouth daily 90 tablet 1    atorvastatin (LIPITOR) 40 MG tablet Take 1 tablet by mouth daily 90 tablet 1    Blood Pressure Monitoring (BLOOD PRESSURE KIT) NASH Use as directed 1 Device 0    blood glucose test strips (TRUE METRIX BLOOD GLUCOSE TEST) strip 1 each by In Vitro route daily As needed. 100 each 5    polyethylene glycol (GLYCOLAX) 17 GM/SCOOP powder Take 17 g by mouth daily      blood glucose monitor kit and supplies Test 3 times a day & as needed for symptoms of irregular blood glucose. 1 kit 0    aspirin 81 MG tablet Take 81 mg by mouth daily        No current facility-administered medications for this visit. ALLERGIES  Allergies   Allergen Reactions    Metformin And Related Diarrhea     Severe diarrhea, abd pain, and nausea    Amoxicillin Diarrhea    Other Nausea And Vomiting     ANTI-INFLAMMATORIES  Severe stomach pain--diarrhea  hypertension       PHYSICAL EXAM    BP (!) 142/60 (Site: Right Upper Arm, Position: Sitting, Cuff Size: Medium Adult)   Pulse 66   Temp 98.2 °F (36.8 °C)   Ht 5' 6\" (1.676 m)   Wt 160 lb 4.8 oz (72.7 kg)   SpO2 96%   BMI 25.87 kg/m²     Physical Exam  Vitals signs and nursing note reviewed. Constitutional:       General: He is not in acute distress. Appearance: He is well-developed. He is not ill-appearing or toxic-appearing. HENT:      Head: Normocephalic and atraumatic. Nose: Nose normal.      Mouth/Throat:      Mouth: Mucous membranes are moist.   Eyes:      Pupils: Pupils are equal, round, and reactive to light.    Neck:      Musculoskeletal: Normal

## 2021-04-26 ENCOUNTER — TELEPHONE (OUTPATIENT)
Dept: FAMILY MEDICINE CLINIC | Age: 76
End: 2021-04-26

## 2021-04-26 NOTE — TELEPHONE ENCOUNTER
PT HAS FALLEN 2 X TODAY. HIS R LEG JUST WENT OUT ON HIM. PT ALSO HAS THE SHAKES. PT'S WIFE DOESN'T KNOW WHAT TO DO FOR OR WITH HIM.  PLEASE ADVISE

## 2021-04-26 NOTE — TELEPHONE ENCOUNTER
Patient advised to go to ER for further evaluation. Patient states he will talk it over with his wife and se where it goes.

## 2021-04-26 NOTE — TELEPHONE ENCOUNTER
The safest and probably most expedient thing would be have him taken to the emergency room for squad to be on safe side.

## 2021-04-27 ENCOUNTER — TELEPHONE (OUTPATIENT)
Dept: FAMILY MEDICINE CLINIC | Age: 76
End: 2021-04-27

## 2021-04-27 NOTE — TELEPHONE ENCOUNTER
Patient called and stated he had 2 falls yesterday. Patient did not go to the ER. Patient has some scrapes and bruises . Scheduled an appt with Ingris Stevenson 5-4-21.

## 2021-04-30 ENCOUNTER — TELEPHONE (OUTPATIENT)
Dept: FAMILY MEDICINE CLINIC | Age: 76
End: 2021-04-30

## 2021-04-30 NOTE — TELEPHONE ENCOUNTER
Dottie Cheung from [facility] needs verbal to re-cert for  weekly nurse visit and needs order for PT. Patient had 2 falls this week with no injury.  Call Dottie Cheung with verbal

## 2021-05-04 ENCOUNTER — OFFICE VISIT (OUTPATIENT)
Dept: FAMILY MEDICINE CLINIC | Age: 76
End: 2021-05-04
Payer: MEDICARE

## 2021-05-04 VITALS
DIASTOLIC BLOOD PRESSURE: 80 MMHG | OXYGEN SATURATION: 96 % | HEART RATE: 62 BPM | HEIGHT: 66 IN | SYSTOLIC BLOOD PRESSURE: 130 MMHG | WEIGHT: 163.4 LBS | BODY MASS INDEX: 26.26 KG/M2

## 2021-05-04 DIAGNOSIS — R25.1 TREMOR, PHYSIOLOGICAL: ICD-10-CM

## 2021-05-04 DIAGNOSIS — R53.1 GENERALIZED WEAKNESS: Primary | ICD-10-CM

## 2021-05-04 DIAGNOSIS — R29.6 FREQUENT FALLS: ICD-10-CM

## 2021-05-04 DIAGNOSIS — E78.49 OTHER HYPERLIPIDEMIA: ICD-10-CM

## 2021-05-04 DIAGNOSIS — R74.8 ELEVATED ALKALINE PHOSPHATASE LEVEL: ICD-10-CM

## 2021-05-04 DIAGNOSIS — R53.1 GENERALIZED WEAKNESS: ICD-10-CM

## 2021-05-04 LAB
A/G RATIO: 2 (ref 1.1–2.2)
ALBUMIN SERPL-MCNC: 4.3 G/DL (ref 3.4–5)
ALP BLD-CCNC: 148 U/L (ref 40–129)
ALT SERPL-CCNC: 61 U/L (ref 10–40)
ANION GAP SERPL CALCULATED.3IONS-SCNC: 10 MMOL/L (ref 3–16)
AST SERPL-CCNC: 31 U/L (ref 15–37)
BILIRUB SERPL-MCNC: 0.3 MG/DL (ref 0–1)
BUN BLDV-MCNC: 12 MG/DL (ref 7–20)
CALCIUM SERPL-MCNC: 9.5 MG/DL (ref 8.3–10.6)
CHLORIDE BLD-SCNC: 105 MMOL/L (ref 99–110)
CO2: 26 MMOL/L (ref 21–32)
CREAT SERPL-MCNC: 0.7 MG/DL (ref 0.8–1.3)
GFR AFRICAN AMERICAN: >60
GFR NON-AFRICAN AMERICAN: >60
GLOBULIN: 2.1 G/DL
GLUCOSE BLD-MCNC: 141 MG/DL (ref 70–99)
HCT VFR BLD CALC: 39 % (ref 40.5–52.5)
HEMOGLOBIN: 13.6 G/DL (ref 13.5–17.5)
MAGNESIUM: 1.8 MG/DL (ref 1.8–2.4)
MCH RBC QN AUTO: 31.3 PG (ref 26–34)
MCHC RBC AUTO-ENTMCNC: 34.7 G/DL (ref 31–36)
MCV RBC AUTO: 90.1 FL (ref 80–100)
PDW BLD-RTO: 15.8 % (ref 12.4–15.4)
PLATELET # BLD: 142 K/UL (ref 135–450)
PMV BLD AUTO: 9.1 FL (ref 5–10.5)
POTASSIUM SERPL-SCNC: 4.1 MMOL/L (ref 3.5–5.1)
RBC # BLD: 4.33 M/UL (ref 4.2–5.9)
SODIUM BLD-SCNC: 141 MMOL/L (ref 136–145)
TOTAL PROTEIN: 6.4 G/DL (ref 6.4–8.2)
TSH REFLEX FT4: 2.13 UIU/ML (ref 0.27–4.2)
VITAMIN B-12: 739 PG/ML (ref 211–911)
WBC # BLD: 6.9 K/UL (ref 4–11)

## 2021-05-04 PROCEDURE — 4040F PNEUMOC VAC/ADMIN/RCVD: CPT | Performed by: PHYSICIAN ASSISTANT

## 2021-05-04 PROCEDURE — G8427 DOCREV CUR MEDS BY ELIG CLIN: HCPCS | Performed by: PHYSICIAN ASSISTANT

## 2021-05-04 PROCEDURE — 4004F PT TOBACCO SCREEN RCVD TLK: CPT | Performed by: PHYSICIAN ASSISTANT

## 2021-05-04 PROCEDURE — 1123F ACP DISCUSS/DSCN MKR DOCD: CPT | Performed by: PHYSICIAN ASSISTANT

## 2021-05-04 PROCEDURE — G8417 CALC BMI ABV UP PARAM F/U: HCPCS | Performed by: PHYSICIAN ASSISTANT

## 2021-05-04 PROCEDURE — 99214 OFFICE O/P EST MOD 30 MIN: CPT | Performed by: PHYSICIAN ASSISTANT

## 2021-05-04 PROCEDURE — 3017F COLORECTAL CA SCREEN DOC REV: CPT | Performed by: PHYSICIAN ASSISTANT

## 2021-05-04 RX ORDER — PRIMIDONE 50 MG/1
150 TABLET ORAL 2 TIMES DAILY
Qty: 60 TABLET | Refills: 2 | Status: SHIPPED | OUTPATIENT
Start: 2021-05-04

## 2021-05-04 RX ORDER — ATORVASTATIN CALCIUM 40 MG/1
40 TABLET, FILM COATED ORAL DAILY
Qty: 90 TABLET | Refills: 1 | Status: CANCELLED | OUTPATIENT
Start: 2021-05-04

## 2021-05-04 RX ORDER — ATORVASTATIN CALCIUM 40 MG/1
40 TABLET, FILM COATED ORAL DAILY
Qty: 90 TABLET | Refills: 1 | Status: SHIPPED | OUTPATIENT
Start: 2021-05-04

## 2021-05-04 NOTE — TELEPHONE ENCOUNTER
Alis Cool pharmacist called and stated that a script was received today for Primidone 50 mg 3 tabs by mouth twice daily. Previously was on Primidone 50 mg 1 tab daily and that was a month  Ago.   Call pharmacy and advise

## 2021-05-04 NOTE — PROGRESS NOTES
5/4/2021    [place]le    Chief Complaint   Patient presents with   Marie Nettie Fall     pt states that he fell 2 times trying to do things around the house . He said the second time he fell he could hardly get up . He said that his legs just give out on him and he can not walk a long way . He states that he does therapy at home everyday . HPI  History was obtained from the patient. Ashwini Cifuentes is a 70 y.o. male who presents today for sick office visit. Patient here primarily with complaints of falling at home. Fell twice at home around 4/26/2021. Notes that he got weak, dizzy, and \"legs went to rubber. \" The first time it took 20-30 min to get up, the second time couldn't get up at all. Weakness is generalized, not specific to the legs. He went to the hospital for this in February and was found to have really bad bilateral pneumonia. He had amnesia for 2 days and was in the ICU. His symptoms had been better after the pneumonia was treated in but now is declining again. He never had a hospital follow-up with us. He was discharged with PT and OT referrals which he did do to help with strength and balance and recovery. He is doing the PT exercises at home now. He also complains that his tremors are getting worse. He already tried doubling up on the primidone for a week and did not notice a significant amount of improvement, only a little bit.       SOCIAL HISTORY  Social History     Socioeconomic History    Marital status:      Spouse name: None    Number of children: None    Years of education: None    Highest education level: None   Occupational History    None   Social Needs    Financial resource strain: None    Food insecurity     Worry: Never true     Inability: Never true    Transportation needs     Medical: No     Non-medical: No   Tobacco Use    Smoking status: Current Every Day Smoker     Packs/day: 1.50     Years: 58.00     Pack years: 87.00     Types: Cigarettes     Start date: Gilma Vargas Smokeless tobacco: Never Used   Substance and Sexual Activity    Alcohol use: Not Currently     Comment: 1 glass wine a month    Drug use: No    Sexual activity: Not Currently   Lifestyle    Physical activity     Days per week: 7 days     Minutes per session: 60 min    Stress: Not at all   Relationships    Social connections     Talks on phone: Three times a week     Gets together: Once a week     Attends Scientology service: Never     Active member of club or organization: No     Attends meetings of clubs or organizations: Never     Relationship status:     Intimate partner violence     Fear of current or ex partner: None     Emotionally abused: None     Physically abused: None     Forced sexual activity: None   Other Topics Concern    None   Social History Narrative    None        CURRENT MEDICATIONS  Current Outpatient Medications   Medication Sig Dispense Refill    atorvastatin (LIPITOR) 40 MG tablet Take 1 tablet by mouth daily 90 tablet 1    primidone (MYSOLINE) 50 MG tablet Take 3 tablets by mouth 2 times daily 60 tablet 2    furosemide (LASIX) 20 MG tablet TAKE 1 TABLET BY MOUTH TWICE A DAY 60 tablet 2    gabapentin (NEURONTIN) 300 MG capsule Take 1 capsule by mouth 3 times daily for 30 days. 90 capsule 0    glipiZIDE (GLUCOTROL XL) 2.5 MG extended release tablet Take 1 tablet by mouth daily 90 tablet 2    carvedilol (COREG) 3.125 MG tablet TAKE 1 TABLET BY MOUTH 2 TIMES DAILY 60 tablet 3    amiodarone (CORDARONE) 200 MG tablet TAKE 1 TABLET BY MOUTH 2 TIMES DAILY 60 tablet 3    lisinopril (PRINIVIL;ZESTRIL) 10 MG tablet Take 1 tablet by mouth daily 90 tablet 1    Blood Pressure Monitoring (BLOOD PRESSURE KIT) NASH Use as directed 1 Device 0    blood glucose test strips (TRUE METRIX BLOOD GLUCOSE TEST) strip 1 each by In Vitro route daily As needed.  100 each 5    polyethylene glycol (GLYCOLAX) 17 GM/SCOOP powder Take 17 g by mouth daily      blood glucose monitor kit and supplies Test 3 times a day & as needed for symptoms of irregular blood glucose. 1 kit 0    aspirin 81 MG tablet Take 81 mg by mouth daily        No current facility-administered medications for this visit. PHYSICAL EXAM    /80   Pulse 62   Ht 5' 6\" (1.676 m)   Wt 163 lb 6.4 oz (74.1 kg)   SpO2 96%   BMI 26.37 kg/m²     Physical Exam  Vitals signs and nursing note reviewed. Constitutional:       Appearance: Normal appearance. HENT:      Head: Normocephalic and atraumatic. Cardiovascular:      Rate and Rhythm: Normal rate and regular rhythm. Pulses: Normal pulses. Heart sounds: Normal heart sounds. Pulmonary:      Effort: Pulmonary effort is normal.      Breath sounds: Normal breath sounds. Musculoskeletal:      Right lower leg: No edema. Left lower leg: No edema. Neurological:      General: No focal deficit present. Mental Status: He is alert and oriented to person, place, and time. Comments: Intention tremor noted, goes away at rest.   Psychiatric:         Mood and Affect: Mood normal.         Behavior: Behavior normal.     Notes and studies reviewed from the hospital stay. No repeat chest x-ray has done since he got discharged. He did have a couple of low magnesium levels, had anemia, and also had some abnormal potassium levels and abnormal protein levels. Hospital did MRI of the brain which was unremarkable aside from chronic vascular changes. ASSESSMENT & PLAN    1. Generalized weakness  2. Frequent falls  Reevaluate electrolyte levels, protein level, iron levels and evaluate thyroid and B12 level. Get chest x-ray to rule out any recurrence of pneumonia. Some of this may be related to his back, he has a lot of arthritis and stenosis. He does note that he is going to go back for some injections to help with pain but I asked him to consider consulting with a neurosurgeon if that does not improve his symptoms. - MAGNESIUM;  Future  - COMPREHENSIVE METABOLIC PANEL; Future  - CBC; Future  - XR CHEST STANDARD (2 VW); Future  - TSH WITH REFLEX TO FT4; Future  - VITAMIN B12; Future    3. Tremor, physiological  Try increasing the primidone 250 mg twice daily. - primidone (MYSOLINE) 50 MG tablet; Take 3 tablets by mouth 2 times daily  Dispense: 60 tablet; Refill: 2  - TSH WITH REFLEX TO FT4; Future  - VITAMIN B12; Future    4. Other hyperlipidemia  Refills provided of his atorvastatin. - atorvastatin (LIPITOR) 40 MG tablet; Take 1 tablet by mouth daily  Dispense: 90 tablet; Refill: 1    35 min spent with patient face-to-face, reviewing chart, coming up with treatment plan, and documenting. This time also includes any time spent consulting with attending physician. Electronically signed by Sophie Ahmadi PA-C on 5/4/2021    Please note that this chart was generated using dragon dictation software. Although every effort was made to ensure the accuracy of this automated transcription, some errors in transcription may have occurred.

## 2021-05-04 NOTE — TELEPHONE ENCOUNTER
PATIENT CAME TO WINDOW AFTER APPT. HE STATES HIS ATORVASTATIN WAS IN HIS POCKET AND NEEDS REFILLED TO [facility].

## 2021-05-05 RX ORDER — CALCIUM CITRATE/VITAMIN D3 200MG-6.25
1 TABLET ORAL 3 TIMES DAILY
Qty: 300 EACH | Refills: 5 | Status: SHIPPED | OUTPATIENT
Start: 2021-05-05

## 2021-05-06 ENCOUNTER — HOSPITAL ENCOUNTER (OUTPATIENT)
Dept: GENERAL RADIOLOGY | Age: 76
Discharge: HOME | End: 2021-05-06
Payer: MEDICARE

## 2021-05-06 ENCOUNTER — CARE COORDINATION (OUTPATIENT)
Dept: CARE COORDINATION | Age: 76
End: 2021-05-06

## 2021-05-06 ENCOUNTER — HOSPITAL ENCOUNTER (OUTPATIENT)
Age: 76
Discharge: HOME | End: 2021-05-06
Payer: MEDICARE

## 2021-05-06 DIAGNOSIS — R29.6 FREQUENT FALLS: ICD-10-CM

## 2021-05-06 DIAGNOSIS — R53.1 GENERALIZED WEAKNESS: ICD-10-CM

## 2021-05-06 PROCEDURE — 71046 X-RAY EXAM CHEST 2 VIEWS: CPT

## 2021-05-07 DIAGNOSIS — J18.9 PNEUMONIA OF BOTH LOWER LOBES DUE TO INFECTIOUS ORGANISM: Primary | ICD-10-CM

## 2021-05-07 RX ORDER — AZITHROMYCIN 250 MG/1
250 TABLET, FILM COATED ORAL SEE ADMIN INSTRUCTIONS
Qty: 6 TABLET | Refills: 0 | Status: SHIPPED | OUTPATIENT
Start: 2021-05-07 | End: 2021-05-12

## 2021-05-07 RX ORDER — CEFDITOREN PIVOXIL 400 MG/1
400 TABLET, FILM COATED ORAL 2 TIMES DAILY
Qty: 14 TABLET | Refills: 0 | Status: SHIPPED | OUTPATIENT
Start: 2021-05-07 | End: 2021-05-14

## 2021-05-10 ENCOUNTER — OFFICE VISIT (OUTPATIENT)
Dept: CARDIOLOGY CLINIC | Age: 76
End: 2021-05-10

## 2021-05-10 VITALS
WEIGHT: 163.8 LBS | BODY MASS INDEX: 26.33 KG/M2 | SYSTOLIC BLOOD PRESSURE: 126 MMHG | HEIGHT: 66 IN | DIASTOLIC BLOOD PRESSURE: 90 MMHG | HEART RATE: 66 BPM

## 2021-05-10 DIAGNOSIS — F17.200 SMOKER: ICD-10-CM

## 2021-05-10 DIAGNOSIS — Z98.890 S/P PERCUTANEOUS ABDOMINAL AORTIC ANEURYSM REPAIR: ICD-10-CM

## 2021-05-10 DIAGNOSIS — I10 ESSENTIAL HYPERTENSION: ICD-10-CM

## 2021-05-10 DIAGNOSIS — Z95.2 S/P AORTIC VALVE REPLACEMENT: ICD-10-CM

## 2021-05-10 DIAGNOSIS — Z86.79 S/P PERCUTANEOUS ABDOMINAL AORTIC ANEURYSM REPAIR: ICD-10-CM

## 2021-05-10 DIAGNOSIS — I25.10 CAD IN NATIVE ARTERY: Primary | ICD-10-CM

## 2021-05-10 PROBLEM — I72.3 ILIAC ARTERY ANEURYSM, RIGHT (HCC): Status: RESOLVED | Noted: 2020-06-12 | Resolved: 2021-05-10

## 2021-05-10 PROBLEM — I71.40 AAA (ABDOMINAL AORTIC ANEURYSM): Status: RESOLVED | Noted: 2020-07-11 | Resolved: 2021-05-10

## 2021-05-10 PROCEDURE — 3017F COLORECTAL CA SCREEN DOC REV: CPT | Performed by: NURSE PRACTITIONER

## 2021-05-10 PROCEDURE — 4040F PNEUMOC VAC/ADMIN/RCVD: CPT | Performed by: NURSE PRACTITIONER

## 2021-05-10 PROCEDURE — 1123F ACP DISCUSS/DSCN MKR DOCD: CPT | Performed by: NURSE PRACTITIONER

## 2021-05-10 PROCEDURE — G8427 DOCREV CUR MEDS BY ELIG CLIN: HCPCS | Performed by: NURSE PRACTITIONER

## 2021-05-10 PROCEDURE — G8417 CALC BMI ABV UP PARAM F/U: HCPCS | Performed by: NURSE PRACTITIONER

## 2021-05-10 PROCEDURE — 99214 OFFICE O/P EST MOD 30 MIN: CPT | Performed by: NURSE PRACTITIONER

## 2021-05-10 PROCEDURE — 4004F PT TOBACCO SCREEN RCVD TLK: CPT | Performed by: NURSE PRACTITIONER

## 2021-05-10 RX ORDER — AMOXICILLIN AND CLAVULANATE POTASSIUM 562.5; 437.5; 62.5 MG/1; MG/1; MG/1
1 TABLET, FILM COATED, EXTENDED RELEASE ORAL 2 TIMES DAILY
Qty: 28 TABLET | Refills: 0 | Status: SHIPPED | OUTPATIENT
Start: 2021-05-10 | End: 2021-05-17 | Stop reason: DRUGHIGH

## 2021-05-10 NOTE — PROGRESS NOTES
Encounters:   05/10/21 (!) 126/90   05/04/21 130/80   04/09/21 (!) 142/60       Prior to Admission medications    Medication Sig Start Date End Date Taking? Authorizing Provider   cefditoren (SPECTRACEF) 400 MG TABS tablet Take 1 tablet by mouth 2 times daily for 7 days 5/7/21 5/14/21 Yes Oskar Fuentes PA-C   azithromycin (ZITHROMAX) 250 MG tablet Take 1 tablet by mouth See Admin Instructions for 5 days 500mg on day 1 followed by 250mg on days 2 - 5 5/7/21 5/12/21 Yes Oskar Fuentes PA-C   blood glucose test strips (TRUE METRIX BLOOD GLUCOSE TEST) strip 1 each by In Vitro route 3 times daily As needed. 5/5/21  Yes Carli Gonzalez MD   primidone (MYSOLINE) 50 MG tablet Take 3 tablets by mouth 2 times daily 5/4/21  Yes Oskar Fuentes PA-C   furosemide (LASIX) 20 MG tablet TAKE 1 TABLET BY MOUTH TWICE A DAY 4/5/21  Yes Carli Gonzalez MD   gabapentin (NEURONTIN) 300 MG capsule Take 1 capsule by mouth 3 times daily for 30 days. 4/5/21 5/10/21 Yes Carli Gonzalez MD   glipiZIDE (GLUCOTROL XL) 2.5 MG extended release tablet Take 1 tablet by mouth daily 1/15/21  Yes Kyleigh Childress PA-C   carvedilol (COREG) 3.125 MG tablet TAKE 1 TABLET BY MOUTH 2 TIMES DAILY 1/11/21  Yes Carli Gonzalez MD   lisinopril (PRINIVIL;ZESTRIL) 10 MG tablet Take 1 tablet by mouth daily 12/8/20  Yes Oskar Fuentes PA-C   Blood Pressure Monitoring (BLOOD PRESSURE KIT) NASH Use as directed 8/12/20  Yes Carli Gonzalez MD   polyethylene glycol [facility]) 17 GM/SCOOP powder Take 17 g by mouth daily   Yes Historical Provider, MD   blood glucose monitor kit and supplies Test 3 times a day & as needed for symptoms of irregular blood glucose.  3/9/20  Yes Israel Monson PA-C   amoxicillin-clavulanate (AUGMENTIN XR) 1000-62.5 MG per extended release tablet Take 1 tablet by mouth 2 times daily for 7 days 5/10/21 5/17/21  Oskar Fuentes PA-C   atorvastatin (LIPITOR) 40 MG tablet Take 1 tablet by mouth daily 5/4/21 Bryson Arvizu PA-C   amiodarone (CORDARONE) 200 MG tablet TAKE 1 TABLET BY MOUTH 2 TIMES DAILY 1/11/21   Marko Vu MD   aspirin 81 MG tablet Take 81 mg by mouth daily     Historical Provider, MD       Physical Exam  Vitals signs reviewed. Constitutional:       General: He is not in acute distress. Appearance: Normal appearance. He is not ill-appearing. HENT:      Head: Atraumatic. Neck:      Musculoskeletal: Neck supple. No muscular tenderness. Vascular: No carotid bruit. Cardiovascular:      Rate and Rhythm: Normal rate and regular rhythm. Pulses: Normal pulses. Heart sounds: Murmur present. Pulmonary:      Effort: Pulmonary effort is normal. No respiratory distress. Breath sounds: Rhonchi present. Musculoskeletal:         General: No swelling or deformity. Neurological:      Mental Status: He is alert. Health Maintenance   Topic Date Due    Hepatitis C screen  Never done    Diabetic retinal exam  Never done    Diabetic microalbuminuria test  Never done    Colon cancer screen colonoscopy  Never done    Annual Wellness Visit (AWV)  05/21/2021    Diabetic foot exam  08/14/2021    Low dose CT lung screening  02/16/2022    A1C test (Diabetic or Prediabetic)  03/11/2022    Lipid screen  03/11/2022    TSH testing  05/04/2022    Potassium monitoring  05/04/2022    Creatinine monitoring  05/04/2022    DTaP/Tdap/Td vaccine (3 - Td) 04/20/2030    Flu vaccine  Completed    Shingles Vaccine  Completed    Pneumococcal 65+ years Vaccine  Completed    COVID-19 Vaccine  Completed    Hepatitis A vaccine  Aged Out    Hib vaccine  Aged Out    Meningococcal (ACWY) vaccine  Aged Out       3/2020 [facility]  Procedure Summary   SEVERE LAD STENOSIS   MILD CX AND RCA STENOSIS   MODERATE TO SEVERE AS    10/2020 Echo  Summary   Left ventricular systolic function is normal with an ejection fraction of   55-60%. Mild concentric left ventricular hypertrophy.    Grade I diastolic dysfunction. Mild bilateral atrial enlargement. Normally functioning bioprosthetic valve in aortic position. Calcific mitral valve. Mild mitral regurgitation is present with two jets. Moderate tricuspid regurgitation. Mild pulmonary hypertension at 37 mmHg. No evidence of pericardial effusion. ASSESSMENT/PLAN:    1. CAD in native artery  Assessment & Plan:   Patient had CABG CABG x 1, LIMA to LAD   Patient is not having cardiac symptoms   continue Coreg, lipitor, ASA   Low salt, Low cholesterol diet  2. Essential hypertension    Controlled   Continue lisinopril, coreg,    advised low salt diet   3. S/P aortic valve replacement  Assessment & Plan: Aortic valve replacement using a 25mm Medtronic Mosaic valve  Functioning well per last echo. Will recheck echo in 5 months (yearly)  -     ECHO Complete 2D W Doppler W Color; Future  4. S/P percutaneous abdominal aortic aneurysm repair  SP EVAR July 2020   Monitor yearly. He is waiting for CTS to contact him to have US/CT for monitoring. 5. Smoker   Patient is using tobacco at this time   Encouraged to stop  [email or web address], medicinal, and social support   Patient is  ready working on quitting at this time      Return in about 6 months (around 11/10/2021) for with Dr. Kandis Florian, or theresa if needed. An electronic signature was used to authenticate this note.     Electronically signed by MADDY Ribeiro CNP on 5/10/2021 at 2:53 PM

## 2021-05-17 DIAGNOSIS — J18.9 PNEUMONIA OF BOTH LOWER LOBES DUE TO INFECTIOUS ORGANISM: Primary | ICD-10-CM

## 2021-05-17 RX ORDER — AMOXICILLIN AND CLAVULANATE POTASSIUM 875; 125 MG/1; MG/1
1 TABLET, FILM COATED ORAL 2 TIMES DAILY
Qty: 20 TABLET | Refills: 0 | Status: SHIPPED | OUTPATIENT
Start: 2021-05-17 | End: 2021-05-27

## 2021-05-19 ENCOUNTER — OFFICE VISIT (OUTPATIENT)
Dept: FAMILY MEDICINE CLINIC | Age: 76
End: 2021-05-19
Payer: MEDICARE

## 2021-05-19 VITALS
DIASTOLIC BLOOD PRESSURE: 70 MMHG | SYSTOLIC BLOOD PRESSURE: 170 MMHG | HEART RATE: 62 BPM | OXYGEN SATURATION: 94 % | WEIGHT: 149 LBS | HEIGHT: 66 IN | BODY MASS INDEX: 23.95 KG/M2

## 2021-05-19 DIAGNOSIS — R25.1 TREMOR, PHYSIOLOGICAL: ICD-10-CM

## 2021-05-19 DIAGNOSIS — R53.83 FATIGUE, UNSPECIFIED TYPE: ICD-10-CM

## 2021-05-19 DIAGNOSIS — I10 ESSENTIAL HYPERTENSION: ICD-10-CM

## 2021-05-19 DIAGNOSIS — E11.9 TYPE 2 DIABETES MELLITUS WITHOUT COMPLICATION, WITHOUT LONG-TERM CURRENT USE OF INSULIN (HCC): ICD-10-CM

## 2021-05-19 DIAGNOSIS — J18.9 PNEUMONIA DUE TO INFECTIOUS ORGANISM, UNSPECIFIED LATERALITY, UNSPECIFIED PART OF LUNG: Primary | ICD-10-CM

## 2021-05-19 DIAGNOSIS — I25.10 CAD IN NATIVE ARTERY: ICD-10-CM

## 2021-05-19 PROCEDURE — G8427 DOCREV CUR MEDS BY ELIG CLIN: HCPCS | Performed by: FAMILY MEDICINE

## 2021-05-19 PROCEDURE — 3044F HG A1C LEVEL LT 7.0%: CPT | Performed by: FAMILY MEDICINE

## 2021-05-19 PROCEDURE — 99213 OFFICE O/P EST LOW 20 MIN: CPT | Performed by: FAMILY MEDICINE

## 2021-05-19 PROCEDURE — 4040F PNEUMOC VAC/ADMIN/RCVD: CPT | Performed by: FAMILY MEDICINE

## 2021-05-19 PROCEDURE — 1123F ACP DISCUSS/DSCN MKR DOCD: CPT | Performed by: FAMILY MEDICINE

## 2021-05-19 PROCEDURE — 4004F PT TOBACCO SCREEN RCVD TLK: CPT | Performed by: FAMILY MEDICINE

## 2021-05-19 PROCEDURE — 3017F COLORECTAL CA SCREEN DOC REV: CPT | Performed by: FAMILY MEDICINE

## 2021-05-19 PROCEDURE — 2022F DILAT RTA XM EVC RTNOPTHY: CPT | Performed by: FAMILY MEDICINE

## 2021-05-19 PROCEDURE — G8420 CALC BMI NORM PARAMETERS: HCPCS | Performed by: FAMILY MEDICINE

## 2021-05-19 RX ORDER — CEPHALEXIN 500 MG/1
500 CAPSULE ORAL 4 TIMES DAILY
Qty: 28 CAPSULE | Refills: 0 | Status: SHIPPED | OUTPATIENT
Start: 2021-05-19 | End: 2021-05-26

## 2021-05-19 RX ORDER — GABAPENTIN 300 MG/1
300 CAPSULE ORAL 3 TIMES DAILY
Qty: 90 CAPSULE | Refills: 0 | Status: SHIPPED | OUTPATIENT
Start: 2021-05-19 | End: 2021-06-18

## 2021-05-19 ASSESSMENT — ENCOUNTER SYMPTOMS
VOMITING: 0
DIARRHEA: 0
SHORTNESS OF BREATH: 0
ABDOMINAL PAIN: 0
COUGH: 1
EYE PAIN: 0
NAUSEA: 0
WHEEZING: 0
BLOOD IN STOOL: 0
TROUBLE SWALLOWING: 0
CHEST TIGHTNESS: 1

## 2021-05-19 NOTE — PROGRESS NOTES
seizures, speech difficulty and weakness. Hematological: Does not bruise/bleed easily. Psychiatric/Behavioral: Negative for agitation, confusion and hallucinations. PAST MEDICAL HISTORY  Past Medical History:   Diagnosis Date    AAA (abdominal aortic aneurysm) ([facility] [address] 2018    Infrarenal    Basal cell carcinoma of nose 2016    Dr Lupe Man CAD (coronary artery disease)     Dr. Deejay Lew - severe aortic stenosis with a bicuspid aortic valve    Constipation     echocardiogram 10/29/2020    EF 55-60% normally functioning valve in aortic position, mild mitral regurg with two jets mild pulm htn.      Essential hypertension     History of alcohol abuse     Hyperlipidemia     Hypertension     Follows with PCP    Missing teeth, acquired     Non-alcoholic fatty liver disease     Osteoarthritis     Knees, lower back, shoulders,    Spinal stenosis     had epidural steroid injection 1/8/2020 - lumbar    Tremor     Left arm only    Type 2 diabetes mellitus without complication ([facility] [address]     Controlling with diet    Wears glasses        FAMILY HISTORY  Family History   Problem Relation Age of Onset    Lung Cancer Mother     Heart Disease Father     High Blood Pressure Father     High Cholesterol Father     Lung Cancer Father        SOCIAL HISTORY  Social History     Socioeconomic History    Marital status:      Spouse name: None    Number of children: None    Years of education: None    Highest education level: None   Occupational History    None   Tobacco Use    Smoking status: Current Every Day Smoker     Packs/day: 1.50     Years: 58.00     Pack years: 87.00     Types: Cigarettes     Start date: 1962    Smokeless tobacco: Never Used   Vaping Use    Vaping Use: Never used   Substance and Sexual Activity    Alcohol use: Not Currently     Comment: 1 glass wine a month    Drug use: No    Sexual activity: Not Currently   Other Topics Concern    None   Social History Narrative    None     Social Determinants of Health     Financial Resource Strain:     Difficulty of Paying Living Expenses:    Food Insecurity: No Food Insecurity    Worried About Running Out of Food in the Last Year: Never true    Ran Out of Food in the Last Year: Never true   Transportation Needs: No Transportation Needs    Lack of Transportation (Medical): No    Lack of Transportation (Non-Medical): No   Physical Activity: Sufficiently Active    Days of Exercise per Week: 7 days    Minutes of Exercise per Session: 60 min   Stress: No Stress Concern Present    Feeling of Stress : Not at all   Social Connections: Moderately Isolated    Frequency of Communication with Friends and Family: Three times a week    Frequency of Social Gatherings with Friends and Family: Once a week    Attends [employer] Services: Never    Active Member of Clubs or Organizations: No    Attends Club or Organization Meetings: Never    Marital Status:    Intimate Partner Violence:     Fear of Current or Ex-Partner:     Emotionally Abused:     Physically Abused:     Sexually Abused:         SURGICAL HISTORY  Past Surgical History:   Procedure Laterality Date    CABG WITH AORTIC VALVE REPLACEMENT N/A 3/4/2020    CABG CORONARY ARTERY BYPASS x1 SALEEM TO LAD, AORTIC VALVE REPACEMENT performed by Dayday Mathis MD at [address] [facility]  02/26/2020    critical LAD lesion and moderate to severe right coronary artery lesion    CARPAL TUNNEL RELEASE Right 1970's    KNEE SURGERY  2015    quad injury from a fall    OTHER SURGICAL HISTORY  12/09/2016    excision of basal cell carcinoma of nose with complex repair    OTHER SURGICAL HISTORY  01/2020    epidual steroid injections L3-L4    SINUS SURGERY  1970's    x 2    WISDOM TOOTH EXTRACTION                   CURRENT MEDICATIONS  Current Outpatient Medications   Medication Sig Dispense Refill    gabapentin (NEURONTIN) 300 MG capsule Take 1 capsule by mouth 3 times daily for 30 days. 90 capsule 0    cephALEXin (KEFLEX) 500 MG capsule Take 1 capsule by mouth 4 times daily for 7 days 28 capsule 0    amoxicillin-clavulanate (AUGMENTIN) 875-125 MG per tablet Take 1 tablet by mouth 2 times daily for 10 days 20 tablet 0    blood glucose test strips (TRUE METRIX BLOOD GLUCOSE TEST) strip 1 each by In Vitro route 3 times daily As needed. 300 each 5    atorvastatin (LIPITOR) 40 MG tablet Take 1 tablet by mouth daily 90 tablet 1    primidone (MYSOLINE) 50 MG tablet Take 3 tablets by mouth 2 times daily 60 tablet 2    furosemide (LASIX) 20 MG tablet TAKE 1 TABLET BY MOUTH TWICE A DAY 60 tablet 2    glipiZIDE (GLUCOTROL XL) 2.5 MG extended release tablet Take 1 tablet by mouth daily 90 tablet 2    carvedilol (COREG) 3.125 MG tablet TAKE 1 TABLET BY MOUTH 2 TIMES DAILY 60 tablet 3    amiodarone (CORDARONE) 200 MG tablet TAKE 1 TABLET BY MOUTH 2 TIMES DAILY 60 tablet 3    lisinopril (PRINIVIL;ZESTRIL) 10 MG tablet Take 1 tablet by mouth daily 90 tablet 1    Blood Pressure Monitoring (BLOOD PRESSURE KIT) NASH Use as directed 1 Device 0    polyethylene glycol (GLYCOLAX) 17 GM/SCOOP powder Take 17 g by mouth daily      blood glucose monitor kit and supplies Test 3 times a day & as needed for symptoms of irregular blood glucose. 1 kit 0    aspirin 81 MG tablet Take 81 mg by mouth daily        No current facility-administered medications for this visit. ALLERGIES  Allergies   Allergen Reactions    Metformin And Related Diarrhea     Severe diarrhea, abd pain, and nausea    Amoxicillin Diarrhea    Other Nausea And Vomiting     ANTI-INFLAMMATORIES  Severe stomach pain--diarrhea  hypertension       PHYSICAL EXAM    BP (!) 170/70 (Site: Right Upper Arm, Position: Sitting, Cuff Size: Medium Adult)   Pulse 62   Ht 5' 6\" (1.676 m)   Wt 149 lb (67.6 kg)   SpO2 94%   BMI 24.05 kg/m²     Physical Exam  Vitals and nursing note reviewed.    Constitutional:       General: He is not in acute Hopefully continuing on the 150 of primidone twice a day and gabapentin 303 times a day as tremor will continue to be well controlled if not we may need referral to neurology. Return in about 3 weeks (around 6/9/2021).          Electronically signed by Nina Hurt MD on 5/19/2021

## 2021-05-22 ASSESSMENT — ENCOUNTER SYMPTOMS
SHORTNESS OF BREATH: 1
COUGH: 1

## 2021-05-23 ENCOUNTER — APPOINTMENT (OUTPATIENT)
Dept: CT IMAGING | Age: 76
DRG: 194 | End: 2021-05-23
Payer: MEDICARE

## 2021-05-23 ENCOUNTER — HOSPITAL ENCOUNTER (INPATIENT)
Age: 76
LOS: 1 days | Discharge: NURSING, REHAB OR LONG-TERM CARE | DRG: 194 | End: 2021-05-24
Attending: EMERGENCY MEDICINE | Admitting: INTERNAL MEDICINE
Payer: MEDICARE

## 2021-05-23 ENCOUNTER — APPOINTMENT (OUTPATIENT)
Dept: GENERAL RADIOLOGY | Age: 76
DRG: 194 | End: 2021-05-23
Payer: MEDICARE

## 2021-05-23 DIAGNOSIS — R26.2 IMPAIRED AMBULATION: ICD-10-CM

## 2021-05-23 DIAGNOSIS — R53.1 GENERAL WEAKNESS: Primary | ICD-10-CM

## 2021-05-23 LAB
ALBUMIN SERPL-MCNC: 3.5 GM/DL (ref 3.4–5)
ALP BLD-CCNC: 106 IU/L (ref 40–129)
ALT SERPL-CCNC: 100 U/L (ref 10–40)
ANION GAP SERPL CALCULATED.3IONS-SCNC: 8 MMOL/L (ref 4–16)
AST SERPL-CCNC: 49 IU/L (ref 15–37)
BASOPHILS ABSOLUTE: 0 K/CU MM
BASOPHILS RELATIVE PERCENT: 0.4 % (ref 0–1)
BILIRUB SERPL-MCNC: 0.2 MG/DL (ref 0–1)
BUN BLDV-MCNC: 19 MG/DL (ref 6–23)
CALCIUM SERPL-MCNC: 9.1 MG/DL (ref 8.3–10.6)
CHLORIDE BLD-SCNC: 98 MMOL/L (ref 99–110)
CO2: 28 MMOL/L (ref 21–32)
CREAT SERPL-MCNC: 0.7 MG/DL (ref 0.9–1.3)
DIFFERENTIAL TYPE: ABNORMAL
EOSINOPHILS ABSOLUTE: 0.1 K/CU MM
EOSINOPHILS RELATIVE PERCENT: 0.7 % (ref 0–3)
GFR AFRICAN AMERICAN: >60 ML/MIN/1.73M2
GFR NON-AFRICAN AMERICAN: >60 ML/MIN/1.73M2
GLUCOSE BLD-MCNC: 191 MG/DL (ref 70–99)
GLUCOSE BLD-MCNC: 219 MG/DL (ref 70–99)
HCT VFR BLD CALC: 41.5 % (ref 42–52)
HEMOGLOBIN: 13.5 GM/DL (ref 13.5–18)
IMMATURE NEUTROPHIL %: 0.6 % (ref 0–0.43)
LYMPHOCYTES ABSOLUTE: 1.1 K/CU MM
LYMPHOCYTES RELATIVE PERCENT: 11 % (ref 24–44)
MCH RBC QN AUTO: 29 PG (ref 27–31)
MCHC RBC AUTO-ENTMCNC: 32.5 % (ref 32–36)
MCV RBC AUTO: 89.2 FL (ref 78–100)
MONOCYTES ABSOLUTE: 0.6 K/CU MM
MONOCYTES RELATIVE PERCENT: 6.4 % (ref 0–4)
PDW BLD-RTO: 14.7 % (ref 11.7–14.9)
PLATELET # BLD: 117 K/CU MM (ref 140–440)
PMV BLD AUTO: 9.7 FL (ref 7.5–11.1)
POTASSIUM SERPL-SCNC: 4.4 MMOL/L (ref 3.5–5.1)
RBC # BLD: 4.65 M/CU MM (ref 4.6–6.2)
SEGMENTED NEUTROPHILS ABSOLUTE COUNT: 8.1 K/CU MM
SEGMENTED NEUTROPHILS RELATIVE PERCENT: 80.9 % (ref 36–66)
SODIUM BLD-SCNC: 134 MMOL/L (ref 135–145)
TOTAL IMMATURE NEUTOROPHIL: 0.06 K/CU MM
TOTAL PROTEIN: 5.3 GM/DL (ref 6.4–8.2)
WBC # BLD: 10 K/CU MM (ref 4–10.5)

## 2021-05-23 PROCEDURE — 1200000000 HC SEMI PRIVATE

## 2021-05-23 PROCEDURE — 72125 CT NECK SPINE W/O DYE: CPT

## 2021-05-23 PROCEDURE — 2580000003 HC RX 258: Performed by: NURSE PRACTITIONER

## 2021-05-23 PROCEDURE — 72170 X-RAY EXAM OF PELVIS: CPT

## 2021-05-23 PROCEDURE — 71045 X-RAY EXAM CHEST 1 VIEW: CPT

## 2021-05-23 PROCEDURE — 72128 CT CHEST SPINE W/O DYE: CPT

## 2021-05-23 PROCEDURE — 6370000000 HC RX 637 (ALT 250 FOR IP): Performed by: NURSE PRACTITIONER

## 2021-05-23 PROCEDURE — 70450 CT HEAD/BRAIN W/O DYE: CPT

## 2021-05-23 PROCEDURE — 80053 COMPREHEN METABOLIC PANEL: CPT

## 2021-05-23 PROCEDURE — 99284 EMERGENCY DEPT VISIT MOD MDM: CPT

## 2021-05-23 PROCEDURE — 85025 COMPLETE CBC W/AUTO DIFF WBC: CPT

## 2021-05-23 PROCEDURE — 82962 GLUCOSE BLOOD TEST: CPT

## 2021-05-23 PROCEDURE — 72131 CT LUMBAR SPINE W/O DYE: CPT

## 2021-05-23 RX ORDER — POLYETHYLENE GLYCOL 3350 17 G/17G
17 POWDER, FOR SOLUTION ORAL DAILY PRN
Status: DISCONTINUED | OUTPATIENT
Start: 2021-05-23 | End: 2021-05-24 | Stop reason: HOSPADM

## 2021-05-23 RX ORDER — ONDANSETRON 2 MG/ML
4 INJECTION INTRAMUSCULAR; INTRAVENOUS EVERY 6 HOURS PRN
Status: DISCONTINUED | OUTPATIENT
Start: 2021-05-23 | End: 2021-05-24 | Stop reason: HOSPADM

## 2021-05-23 RX ORDER — NICOTINE POLACRILEX 4 MG
15 LOZENGE BUCCAL PRN
Status: DISCONTINUED | OUTPATIENT
Start: 2021-05-23 | End: 2021-05-24 | Stop reason: HOSPADM

## 2021-05-23 RX ORDER — CARVEDILOL 3.12 MG/1
3.12 TABLET ORAL 2 TIMES DAILY
Status: DISCONTINUED | OUTPATIENT
Start: 2021-05-23 | End: 2021-05-24 | Stop reason: HOSPADM

## 2021-05-23 RX ORDER — PROMETHAZINE HYDROCHLORIDE 12.5 MG/1
12.5 TABLET ORAL EVERY 6 HOURS PRN
Status: DISCONTINUED | OUTPATIENT
Start: 2021-05-23 | End: 2021-05-24 | Stop reason: HOSPADM

## 2021-05-23 RX ORDER — SODIUM CHLORIDE 0.9 % (FLUSH) 0.9 %
5-40 SYRINGE (ML) INJECTION PRN
Status: DISCONTINUED | OUTPATIENT
Start: 2021-05-23 | End: 2021-05-24 | Stop reason: HOSPADM

## 2021-05-23 RX ORDER — SODIUM CHLORIDE 0.9 % (FLUSH) 0.9 %
5-40 SYRINGE (ML) INJECTION EVERY 12 HOURS SCHEDULED
Status: DISCONTINUED | OUTPATIENT
Start: 2021-05-23 | End: 2021-05-24 | Stop reason: HOSPADM

## 2021-05-23 RX ORDER — AMIODARONE HYDROCHLORIDE 200 MG/1
200 TABLET ORAL 2 TIMES DAILY
Status: DISCONTINUED | OUTPATIENT
Start: 2021-05-23 | End: 2021-05-24 | Stop reason: HOSPADM

## 2021-05-23 RX ORDER — DEXTROSE MONOHYDRATE 50 MG/ML
100 INJECTION, SOLUTION INTRAVENOUS PRN
Status: DISCONTINUED | OUTPATIENT
Start: 2021-05-23 | End: 2021-05-24 | Stop reason: HOSPADM

## 2021-05-23 RX ORDER — ACETAMINOPHEN 650 MG/1
650 SUPPOSITORY RECTAL EVERY 6 HOURS PRN
Status: DISCONTINUED | OUTPATIENT
Start: 2021-05-23 | End: 2021-05-24 | Stop reason: HOSPADM

## 2021-05-23 RX ORDER — SODIUM CHLORIDE 9 MG/ML
INJECTION, SOLUTION INTRAVENOUS CONTINUOUS
Status: DISCONTINUED | OUTPATIENT
Start: 2021-05-23 | End: 2021-05-24

## 2021-05-23 RX ORDER — DEXTROSE MONOHYDRATE 25 G/50ML
12.5 INJECTION, SOLUTION INTRAVENOUS PRN
Status: DISCONTINUED | OUTPATIENT
Start: 2021-05-23 | End: 2021-05-24 | Stop reason: HOSPADM

## 2021-05-23 RX ORDER — GLIPIZIDE 2.5 MG/1
2.5 TABLET, EXTENDED RELEASE ORAL DAILY
Status: DISCONTINUED | OUTPATIENT
Start: 2021-05-24 | End: 2021-05-24 | Stop reason: HOSPADM

## 2021-05-23 RX ORDER — ASPIRIN 81 MG/1
81 TABLET, CHEWABLE ORAL DAILY
Status: DISCONTINUED | OUTPATIENT
Start: 2021-05-24 | End: 2021-05-24 | Stop reason: HOSPADM

## 2021-05-23 RX ORDER — POLYETHYLENE GLYCOL 3350 17 G/17G
17 POWDER, FOR SOLUTION ORAL DAILY
Status: DISCONTINUED | OUTPATIENT
Start: 2021-05-24 | End: 2021-05-24 | Stop reason: HOSPADM

## 2021-05-23 RX ORDER — ACETAMINOPHEN 325 MG/1
650 TABLET ORAL EVERY 6 HOURS PRN
Status: DISCONTINUED | OUTPATIENT
Start: 2021-05-23 | End: 2021-05-24 | Stop reason: HOSPADM

## 2021-05-23 RX ORDER — PRIMIDONE 50 MG/1
150 TABLET ORAL 2 TIMES DAILY
Status: DISCONTINUED | OUTPATIENT
Start: 2021-05-23 | End: 2021-05-24 | Stop reason: HOSPADM

## 2021-05-23 RX ORDER — LISINOPRIL 10 MG/1
10 TABLET ORAL DAILY
Status: DISCONTINUED | OUTPATIENT
Start: 2021-05-24 | End: 2021-05-24 | Stop reason: HOSPADM

## 2021-05-23 RX ORDER — SODIUM CHLORIDE 9 MG/ML
25 INJECTION, SOLUTION INTRAVENOUS PRN
Status: DISCONTINUED | OUTPATIENT
Start: 2021-05-23 | End: 2021-05-24 | Stop reason: HOSPADM

## 2021-05-23 RX ORDER — GABAPENTIN 300 MG/1
300 CAPSULE ORAL 3 TIMES DAILY
Status: DISCONTINUED | OUTPATIENT
Start: 2021-05-23 | End: 2021-05-24 | Stop reason: HOSPADM

## 2021-05-23 RX ADMIN — GABAPENTIN 300 MG: 300 CAPSULE ORAL at 22:47

## 2021-05-23 RX ADMIN — PRIMIDONE 150 MG: 50 TABLET ORAL at 22:47

## 2021-05-23 RX ADMIN — SODIUM CHLORIDE, PRESERVATIVE FREE 10 ML: 5 INJECTION INTRAVENOUS at 22:47

## 2021-05-23 RX ADMIN — SODIUM CHLORIDE: 9 INJECTION, SOLUTION INTRAVENOUS at 22:46

## 2021-05-23 RX ADMIN — INSULIN LISPRO 1 UNITS: 100 INJECTION, SOLUTION INTRAVENOUS; SUBCUTANEOUS at 22:47

## 2021-05-23 RX ADMIN — CARVEDILOL 3.12 MG: 3.12 TABLET, FILM COATED ORAL at 22:47

## 2021-05-23 RX ADMIN — AMIODARONE HYDROCHLORIDE 200 MG: 200 TABLET ORAL at 22:47

## 2021-05-23 ASSESSMENT — ENCOUNTER SYMPTOMS
ABDOMINAL PAIN: 0
NAUSEA: 0
RHINORRHEA: 0
BACK PAIN: 1
VOMITING: 0
COUGH: 0
EYE PAIN: 0
SORE THROAT: 0
SHORTNESS OF BREATH: 0
EYE DISCHARGE: 0

## 2021-05-23 ASSESSMENT — PAIN DESCRIPTION - FREQUENCY: FREQUENCY: CONTINUOUS

## 2021-05-23 ASSESSMENT — PAIN SCALES - GENERAL: PAINLEVEL_OUTOF10: 5

## 2021-05-23 ASSESSMENT — PAIN DESCRIPTION - PAIN TYPE: TYPE: CHRONIC PAIN

## 2021-05-23 ASSESSMENT — PAIN DESCRIPTION - DESCRIPTORS: DESCRIPTORS: ACHING

## 2021-05-23 ASSESSMENT — PAIN DESCRIPTION - ORIENTATION: ORIENTATION: LOWER

## 2021-05-23 ASSESSMENT — PAIN DESCRIPTION - LOCATION: LOCATION: BACK

## 2021-05-23 NOTE — ED PROVIDER NOTES
Emergency Department Encounter  Location: [address]    Patient: Gerald Fuller  MRN: [id number]  : 1945  Date of evaluation: 2021  ED Provider: Ramana Akers MD    1900:p.mCarol Fuller was checked out to me by Dr. Anum Davila. Please see his/her initial documentation for details of the patient's initial ED presentation, physical exam and completed studies. In brief, Gerald Fuller is a 70 y.o. male that presented to the emergency department with worsening debility/general weakness at home and falls. Cindi Micronesian down while he was walking up 2 steps in his home today. States that he has some lower back pain.     I have reviewed and interpreted all of the currently available lab results and diagnostics from this visit:  Results for orders placed or performed during the hospital encounter of 21   CBC Auto Differential   Result Value Ref Range    WBC 10.0 4.0 - 10.5 K/CU MM    RBC 4.65 4.6 - 6.2 M/CU MM    Hemoglobin 13.5 13.5 - 18.0 GM/DL    Hematocrit 41.5 (L) 42 - 52 %    MCV 89.2 78 - 100 FL    MCH 29.0 27 - 31 PG    MCHC 32.5 32.0 - 36.0 %    RDW 14.7 11.7 - 14.9 %    Platelets 112 (L) 432 - 440 K/CU MM    MPV 9.7 7.5 - 11.1 FL    Differential Type AUTOMATED DIFFERENTIAL     Segs Relative 80.9 (H) 36 - 66 %    Lymphocytes % 11.0 (L) 24 - 44 %    Monocytes % 6.4 (H) 0 - 4 %    Eosinophils % 0.7 0 - 3 %    Basophils % 0.4 0 - 1 %    Segs Absolute 8.1 K/CU MM    Lymphocytes Absolute 1.1 K/CU MM    Monocytes Absolute 0.6 K/CU MM    Eosinophils Absolute 0.1 K/CU MM    Basophils Absolute 0.0 K/CU MM    Immature Neutrophil % 0.6 (H) 0 - 0.43 %    Total Immature Neutrophil 0.06 K/CU MM     XR PELVIS (1-2 VIEWS)    Result Date: 2021  EXAMINATION: ONE XRAY VIEW OF THE PELVIS 2021 5:52 pm COMPARISON: CTA abdomen pelvis August 10, 2020 HISTORY: ORDERING SYSTEM PROVIDED HISTORY: fall, concern for traumatic injury TECHNOLOGIST PROVIDED HISTORY: Reason for exam:->fall, concern for traumatic injury Reason for Exam: pelvic pain Acuity: Acute Type of Exam: Initial Mechanism of Injury: fall FINDINGS: Single view of the pelvis was reviewed. No acute fracture identified. Anatomic alignment of the hips. Degenerative changes of the bilateral hips which are mild-to-moderate. Postoperative changes of aorto bi-iliac grafting. Atherosclerotic vascular calcifications present. 1. No acute fracture identified. CT HEAD WO CONTRAST    Result Date: 5/23/2021  EXAMINATION: CT OF THE HEAD WITHOUT CONTRAST  5/23/2021 5:52 pm TECHNIQUE: CT of the head was performed without the administration of intravenous contrast. Dose modulation, iterative reconstruction, and/or weight based adjustment of the mA/kV was utilized to reduce the radiation dose to as low as reasonably achievable. COMPARISON: CT head February 9, 2021; MRI brain February 10, 2021 HISTORY: ORDERING SYSTEM PROVIDED HISTORY: fall, reports worsened weakness, concern for traumatic injury TECHNOLOGIST PROVIDED HISTORY: Reason for exam:->fall, reports worsened weakness, concern for traumatic injury Has a \"code stroke\" or \"stroke alert\" been called? ->No Decision Support Exception - unselect if not a suspected or confirmed emergency medical condition->Emergency Medical Condition (MA) Reason for Exam: fall, reports worsened weakness, concern for traumatic injury Acuity: Acute Type of Exam: Initial Mechanism of Injury: fall, reports worsened weakness, concern for traumatic injury FINDINGS: BRAIN/VENTRICLES: There is no acute intracranial hemorrhage, mass effect or midline shift. No abnormal extra-axial fluid collection. The gray-white differentiation is maintained without evidence of an acute infarct. There is no evidence of hydrocephalus. Atherosclerotic change of the intracranial vasculature. There is mild periventricular and subcortical white matter hypoattenuation most consistent with microvascular ischemic changes.   There is mild age appropriate global cerebral atrophy. ORBITS: The visualized portion of the orbits demonstrate no acute abnormality. SINUSES: The visualized paranasal sinuses and mastoid air cells demonstrate no acute abnormality. SOFT TISSUES/SKULL:  No acute abnormality of the visualized skull or soft tissues. No acute intracranial abnormality. Chronic microvascular ischemic changes and global cerebral atrophy. CT CERVICAL SPINE WO CONTRAST    Result Date: 5/23/2021  EXAMINATION: CT OF THE CERVICAL SPINE WITHOUT CONTRAST 5/23/2021 5:45 pm TECHNIQUE: CT of the cervical spine was performed without the administration of intravenous contrast. Multiplanar reformatted images are provided for review. Dose modulation, iterative reconstruction, and/or weight based adjustment of the mA/kV was utilized to reduce the radiation dose to as low as reasonably achievable. COMPARISON: 02/09/2021 HISTORY: ORDERING SYSTEM PROVIDED HISTORY: fall, reports worsened weakness, concern for traumatic injury TECHNOLOGIST PROVIDED HISTORY: Reason for exam:->fall, reports worsened weakness, concern for traumatic injury Decision Support Exception - unselect if not a suspected or confirmed emergency medical condition->Emergency Medical Condition (MA) Reason for Exam: neck pain Acuity: Acute Type of Exam: Initial Mechanism of Injury: fall FINDINGS: BONES/ALIGNMENT: There is no acute fracture or traumatic malalignment. DEGENERATIVE CHANGES: Moderate degenerative disease at C5-C6. SOFT TISSUES: There is no prevertebral soft tissue swelling. No acute abnormality of the cervical spine. CT THORACIC SPINE WO CONTRAST    Result Date: 5/23/2021  EXAMINATION: CT OF THE THORACIC SPINE WITHOUT CONTRAST  5/23/2021 5:52 pm: TECHNIQUE: CT of the thoracic spine was performed without the administration of intravenous contrast. Multiplanar reformatted images are provided for review.  Dose modulation, iterative reconstruction, and/or weight based adjustment of the mA/kV was utilized to reduce the radiation dose to as low as reasonably achievable. COMPARISON: None. HISTORY: ORDERING SYSTEM PROVIDED HISTORY: fall, reports increased weakness and pack pain TECHNOLOGIST PROVIDED HISTORY: Reason for exam:->fall, reports increased weakness and pack pain Reason for Exam: fall, back pain, reports increased weakness; back pain Acuity: Acute Type of Exam: Initial Mechanism of Injury: fall, back pain, reports increased weakness; back pain FINDINGS: BONES/ALIGNMENT: The bones are osteopenic which reduces sensitivity for nondisplaced fracture. No acute fracture or traumatic malalignment. There is diffuse idiopathic skeletal hyperostosis. DEGENERATIVE CHANGES: No gross spinal canal stenosis or bony neural foraminal narrowing of the thoracic spine. SOFT TISSUES: No paraspinal mass is seen. No acute thoracic spine trauma. CT LUMBAR SPINE WO CONTRAST    Result Date: 5/23/2021  EXAMINATION: CT OF THE LUMBAR SPINE WITHOUT CONTRAST  5/23/2021 TECHNIQUE: CT of the lumbar spine was performed without the administration of intravenous contrast. Multiplanar reformatted images are provided for review. Dose modulation, iterative reconstruction, and/or weight based adjustment of the mA/kV was utilized to reduce the radiation dose to as low as reasonably achievable. COMPARISON: None HISTORY: ORDERING SYSTEM PROVIDED HISTORY: fall, back pain, reports increased weakness TECHNOLOGIST PROVIDED HISTORY: Reason for exam:->fall, back pain, reports increased weakness Decision Support Exception - unselect if not a suspected or confirmed emergency medical condition->Emergency Medical Condition (MA) Reason for Exam: fall, back pain, reports increased weakness; back pain Acuity: Acute Type of Exam: Initial Mechanism of Injury: fall, back pain, reports increased weakness; back pain FINDINGS: BONES/ALIGNMENT: There is normal alignment of the spine. The vertebral body heights are maintained.  No osseous prosthetic aortic valve. No cardiomegaly, interstitial edema or pleural effusions. Persistent central peribronchial thickening was noted, unchanged from 05/06/2021 compatible with bronchitis. No acute rib fracture or pneumothorax. Final ED Course and MDM: In brief, Aminah Kahn is a 70 y.o. male whose care was signed out to me by the outgoing provider. Patient's imaging here does not show any evidence of acute traumatic injury. He has significant difficulty even standing up with assistance here in the emergency department. Wife is having a hard time taking care of him at home. Do not suspect other acute medical emergency at this time but patient will need to be admitted for physical therapy evaluation and likely placement. ED Medication Orders (From admission, onward)    None          Final Impression      1. General weakness    2.  Impaired ambulation        DISPOSITION       (Please note that portions of this note may have been completed with a voice recognition program. Efforts were made to edit the dictations but occasionally words are mis-transcribed.)    Laura Gallego MD  [address] [place] [address] Street, MD  05/23/21 2004

## 2021-05-23 NOTE — ED NOTES
Patient tried to use urinal his self while in bed. Patient urinated all over him self and the bed. Upon trying to clean up the patient and bed, the patient was unable to bear his own weight safely. Patient was unable to use his upper body to raise him self to a sitting position. The patient could not adjust him self in the bed on his own. Patient is very weak and shaky. 2nd fall in 2 days.       Pricila Nevarez RN  05/23/21 1951

## 2021-05-23 NOTE — ED TRIAGE NOTES
Pt to ED with c/o fall. Pt states he was walking up two steps and fall. Denies hitting head or LOC. Pt states he has frequent falls. Denies dizziness prior to falling, and states that he is, \"clumbsy. \" Noted abrasions/skin tears on bilat arms, bleeding controlled. Pt is alert and oriented x3. Respirations equal and unlabored.

## 2021-05-23 NOTE — ED PROVIDER NOTES
shortness of breath. Cardiovascular: Negative for chest pain and palpitations. Gastrointestinal: Negative for abdominal pain, nausea and vomiting. Endocrine: Negative for polydipsia and polyuria. Genitourinary: Negative for dysuria. Musculoskeletal: Positive for back pain. Negative for neck pain. Skin: Negative for pallor and wound. Neurological: Positive for weakness. Psychiatric/Behavioral: Negative for confusion. Except as noted above the remainder of the review of systems was reviewed and negative. PAST MEDICAL HISTORY     Past Medical History:   Diagnosis Date    AAA (abdominal aortic aneurysm) ([facility] [address] 2018    Infrarenal    Basal cell carcinoma of nose 2016    Dr Beryle Double CAD (coronary artery disease)     Dr. Piyush Fuchs - severe aortic stenosis with a bicuspid aortic valve    Constipation     echocardiogram 10/29/2020    EF 55-60% normally functioning valve in aortic position, mild mitral regurg with two jets mild pulm htn.  Essential hypertension     History of alcohol abuse     Hyperlipidemia     Hypertension     Follows with PCP    Missing teeth, acquired     Non-alcoholic fatty liver disease     Osteoarthritis     Knees, lower back, shoulders,    Spinal stenosis     had epidural steroid injection 1/8/2020 - lumbar    Tremor     Left arm only    Type 2 diabetes mellitus without complication ([facility] [address]     Controlling with diet    Wears glasses        Prior to Admission medications    Medication Sig Start Date End Date Taking? Authorizing Provider   gabapentin (NEURONTIN) 300 MG capsule Take 1 capsule by mouth 3 times daily for 30 days.  5/19/21 6/18/21  Dru Jerez MD   cephALEXin [facility]) 500 MG capsule Take 1 capsule by mouth 4 times daily for 7 days 5/19/21 5/26/21  Dru Jerez MD   amoxicillin-clavulanate (AUGMENTIN) 716-469 MG per tablet Take 1 tablet by mouth 2 times daily for 10 days 5/17/21 5/27/21  Lalitha Delaney PA-C   blood glucose test strips (TRUE METRIX BLOOD GLUCOSE TEST) strip 1 each by In Vitro route 3 times daily As needed. 5/5/21   Lori Sharpe MD   atorvastatin (LIPITOR) 40 MG tablet Take 1 tablet by mouth daily 5/4/21   Octavio Batista PA-C   primidone (MYSOLINE) 50 MG tablet Take 3 tablets by mouth 2 times daily 5/4/21   Octavio Batista PA-C   furosemide (LASIX) 20 MG tablet TAKE 1 TABLET BY MOUTH TWICE A DAY 4/5/21   Lori Sharpe MD   glipiZIDE (GLUCOTROL XL) 2.5 MG extended release tablet Take 1 tablet by mouth daily 1/15/21   Madelaine Ca PA-C   carvedilol (COREG) 3.125 MG tablet TAKE 1 TABLET BY MOUTH 2 TIMES DAILY 1/11/21   Lori Sharpe MD   amiodarone (CORDARONE) 200 MG tablet TAKE 1 TABLET BY MOUTH 2 TIMES DAILY 1/11/21   Lori Sharpe MD   lisinopril (PRINIVIL;ZESTRIL) 10 MG tablet Take 1 tablet by mouth daily 12/8/20   Octavio Batista PA-C   Blood Pressure Monitoring (BLOOD PRESSURE KIT) NASH Use as directed 8/12/20   Lori Sharpe MD   polyethylene glycol [facility]) 17 GM/SCOOP powder Take 17 g by mouth daily    Historical Provider, MD   blood glucose monitor kit and supplies Test 3 times a day & as needed for symptoms of irregular blood glucose.  3/9/20   Milton Hung PA-C   aspirin 81 MG tablet Take 81 mg by mouth daily     Historical Provider, MD        Patient Active Problem List   Diagnosis    S/P percutaneous abdominal aortic aneurysm repair    Tobacco abuse    Essential hypertension    Smoker    Non-alcoholic fatty liver disease    Basal cell carcinoma of nose    Type 2 diabetes mellitus without complication, without long-term current use of insulin (HCC)    Other hyperlipidemia    S/P aortic valve replacement    CAD in native artery    Other constipation    Spondylosis of lumbosacral region    Status post AAA (abdominal aortic aneurysm) repair    Liver nodule    Thrombocytopenia (HCC)    Tremor, physiological    Pulmonary nodule    Fall at home, initial encounter  Type 2 diabetes mellitus (HCC)    Pneumonia    Weakness    Acute and chr resp failure, unsp w hypoxia or hypercapnia (HCC)         SURGICAL HISTORY       Past Surgical History:   Procedure Laterality Date    CABG WITH AORTIC VALVE REPLACEMENT N/A 3/4/2020    CABG CORONARY ARTERY BYPASS x1 SALEEM TO LAD, AORTIC VALVE REPACEMENT performed by Wilmer Short MD at [address]  02/26/2020    critical LAD lesion and moderate to severe right coronary artery lesion    CARPAL TUNNEL RELEASE Right 1970's    KNEE SURGERY  2015    quad injury from a fall    OTHER SURGICAL HISTORY  12/09/2016    excision of basal cell carcinoma of nose with complex repair    OTHER SURGICAL HISTORY  01/2020    epidual steroid injections L3-L4    SINUS SURGERY  1970's    x 2    WISDOM TOOTH EXTRACTION           CURRENT MEDICATIONS       Previous Medications    AMIODARONE (CORDARONE) 200 MG TABLET    TAKE 1 TABLET BY MOUTH 2 TIMES DAILY    AMOXICILLIN-CLAVULANATE (AUGMENTIN) 875-125 MG PER TABLET    Take 1 tablet by mouth 2 times daily for 10 days    ASPIRIN 81 MG TABLET    Take 81 mg by mouth daily     ATORVASTATIN (LIPITOR) 40 MG TABLET    Take 1 tablet by mouth daily    BLOOD GLUCOSE MONITOR KIT AND SUPPLIES    Test 3 times a day & as needed for symptoms of irregular blood glucose. BLOOD GLUCOSE TEST STRIPS (TRUE METRIX BLOOD GLUCOSE TEST) STRIP    1 each by In Vitro route 3 times daily As needed. BLOOD PRESSURE MONITORING (BLOOD PRESSURE KIT) NASH    Use as directed    CARVEDILOL (COREG) 3.125 MG TABLET    TAKE 1 TABLET BY MOUTH 2 TIMES DAILY    CEPHALEXIN (KEFLEX) 500 MG CAPSULE    Take 1 capsule by mouth 4 times daily for 7 days    FUROSEMIDE (LASIX) 20 MG TABLET    TAKE 1 TABLET BY MOUTH TWICE A DAY    GABAPENTIN (NEURONTIN) 300 MG CAPSULE    Take 1 capsule by mouth 3 times daily for 30 days.     GLIPIZIDE (GLUCOTROL XL) 2.5 MG EXTENDED RELEASE TABLET    Take 1 tablet by mouth daily    LISINOPRIL (PRINIVIL;ZESTRIL) 10 MG TABLET    Take 1 tablet by mouth daily    POLYETHYLENE GLYCOL (GLYCOLAX) 17 GM/SCOOP POWDER    Take 17 g by mouth daily    PRIMIDONE (MYSOLINE) 50 MG TABLET    Take 3 tablets by mouth 2 times daily       ALLERGIES     Metformin and related, Amoxicillin, and Other    FAMILY HISTORY       Family History   Problem Relation Age of Onset    Lung Cancer Mother     Heart Disease Father     High Blood Pressure Father     High Cholesterol Father     Lung Cancer Father           SOCIAL HISTORY       Social History     Socioeconomic History    Marital status:      Spouse name: None    Number of children: None    Years of education: None    Highest education level: None   Occupational History    None   Tobacco Use    Smoking status: Current Every Day Smoker     Packs/day: 1.50     Years: 58.00     Pack years: 87.00     Types: Cigarettes     Start date: 1962    Smokeless tobacco: Never Used   Vaping Use    Vaping Use: Never used   Substance and Sexual Activity    Alcohol use: Not Currently     Comment: 1 glass wine a month    Drug use: No    Sexual activity: Not Currently   Other Topics Concern    None   Social History Narrative    None     Social Determinants of Health     Financial Resource Strain:     Difficulty of Paying Living Expenses:    Food Insecurity: No Food Insecurity    Worried About Running Out of Food in the Last Year: Never true    Raissa of Food in the Last Year: Never true   Transportation Needs: No Transportation Needs    Lack of Transportation (Medical): No    Lack of Transportation (Non-Medical): No   Physical Activity: Sufficiently Active    Days of Exercise per Week: 7 days    Minutes of Exercise per Session: 60 min   Stress: No Stress Concern Present    Feeling of Stress : Not at all   Social Connections: Moderately Isolated    Frequency of Communication with Friends and Family:  Three times a week    Frequency of Social Gatherings with Friends Mental status is at baseline. Comments: Resists gravity with all extremities; moves extremities spontanoeusly         DIAGNOSTIC RESULTS     Labs Reviewed - No data to display       RADIOLOGY:     Non-plain film images such as CT, Ultrasound and MRI are read by the radiologist. Plain radiographic images are visualized and preliminarily interpreted by the emergency physician. Interpretation per the Radiologist below, if available at the time of this note:    [address]    (Results Pending)   CT CERVICAL SPINE WO CONTRAST    (Results Pending)   XR CHEST PORTABLE    (Results Pending)   XR PELVIS (1-2 VIEWS)    (Results Pending)   CT LUMBAR SPINE WO CONTRAST    (Results Pending)   CT THORACIC SPINE WO CONTRAST    (Results Pending)         ED BEDSIDE ULTRASOUND:   Performed by ED Physician Yamini Argueta MD       LABS:  Labs Reviewed - No data to display    All other labs were within normal range or not returned as of this dictation. EMERGENCY DEPARTMENT COURSE and DIFFERENTIAL DIAGNOSIS/MDM:   Vitals:    Vitals:    05/23/21 1727   BP: 132/61   Pulse: 69   Resp: 16   Temp: 99.3 °F (37.4 °C)   TempSrc: Oral   SpO2: 94%   Weight: 149 lb (67.6 kg)   Height: 5' 6\" (1.676 m)           MDM  Number of Diagnoses or Management Options  General weakness  Impaired ambulation  Diagnosis management comments: 29-year-old male with a history of impaired ambulation and chronic debility presents after mechanical fall at home. He does have a hospitalization in March, 2021 for weakness and debility. He has been in rehab previously. He states he is getting home therapies now. He reports that today he has been having more difficulty walking due to generalized weakness. Denies any focal deficits. He states that he was walking up 2 steps in his home when he had a mechanical fall. Does endorse some worsening back pain. He states he has had even more difficulty walking since the fall.   He presents with over unremarkable vitals. He is resisting gravity with all his extremities. He is moving all extremities spontaneously. No signs of respiratory distress. He speaking full sentences. GCS of 15. CT scans of head, C-spine, lumbar and thoracic spine as well as chest and pelvis x-rays were obtained. Once imaging is obtained he will undergo a trial of ambulation in the emergency department. CT scans are currently pending. He will be signed out to oncoming physician who will follow the results of the CT scans as well as follow his trial of ambulation and will make final disposition.      -  Patient seen and evaluated in the emergency department. -  Triage and nursing notes reviewed and incorporated. -  Old chart records reviewed and incorporated. -  Work-up included:  See above  -  Results discussed with patient. CONSULTS:  None    PROCEDURES:  None performed unless otherwise noted below     Procedures        FINAL IMPRESSION      1. General weakness    2. Impaired ambulation          DISPOSITION/PLAN   DISPOSITION        PATIENT REFERRED TO:  No follow-up provider specified. DISCHARGE MEDICATIONS:  New Prescriptions    No medications on file       ED Provider Disposition Time  DISPOSITION        Appropriate personal protective equipment was worn during the patient's evaluation. These included surgical, eye protection, surgical mask or in 95 respirator and gloves. The patient was also placed in a surgical mask for the prevention of possible spread of respiratory viral illnesses. The Patient was instructed to read the package inserts with any medication that was prescribed. Major potential reactions and medication interactions were discussed. The Patient understands that there are numerous possible adverse reactions not covered.     The patient was also instructed to arrange follow-up with his or her primary care provider for review of any pending labwork or incidental findings on any radiology results

## 2021-05-24 ENCOUNTER — CARE COORDINATION (OUTPATIENT)
Dept: CARE COORDINATION | Age: 76
End: 2021-05-24

## 2021-05-24 VITALS
OXYGEN SATURATION: 92 % | TEMPERATURE: 99.2 F | HEIGHT: 66 IN | DIASTOLIC BLOOD PRESSURE: 63 MMHG | BODY MASS INDEX: 24.33 KG/M2 | RESPIRATION RATE: 16 BRPM | WEIGHT: 151.4 LBS | HEART RATE: 65 BPM | SYSTOLIC BLOOD PRESSURE: 138 MMHG

## 2021-05-24 LAB
ANION GAP SERPL CALCULATED.3IONS-SCNC: 1 MMOL/L (ref 4–16)
BASOPHILS ABSOLUTE: 0 K/CU MM
BASOPHILS RELATIVE PERCENT: 0.4 % (ref 0–1)
BUN BLDV-MCNC: 16 MG/DL (ref 6–23)
CALCIUM SERPL-MCNC: 8.5 MG/DL (ref 8.3–10.6)
CHLORIDE BLD-SCNC: 98 MMOL/L (ref 99–110)
CO2: 31 MMOL/L (ref 21–32)
CREAT SERPL-MCNC: 0.6 MG/DL (ref 0.9–1.3)
DIFFERENTIAL TYPE: ABNORMAL
EOSINOPHILS ABSOLUTE: 0.1 K/CU MM
EOSINOPHILS RELATIVE PERCENT: 1.1 % (ref 0–3)
GFR AFRICAN AMERICAN: >60 ML/MIN/1.73M2
GFR NON-AFRICAN AMERICAN: >60 ML/MIN/1.73M2
GLUCOSE BLD-MCNC: 140 MG/DL (ref 70–99)
GLUCOSE BLD-MCNC: 145 MG/DL (ref 70–99)
GLUCOSE BLD-MCNC: 146 MG/DL (ref 70–99)
HCT VFR BLD CALC: 36.4 % (ref 42–52)
HEMOGLOBIN: 12.1 GM/DL (ref 13.5–18)
IMMATURE NEUTROPHIL %: 0.4 % (ref 0–0.43)
LYMPHOCYTES ABSOLUTE: 1.3 K/CU MM
LYMPHOCYTES RELATIVE PERCENT: 13.8 % (ref 24–44)
MCH RBC QN AUTO: 29.3 PG (ref 27–31)
MCHC RBC AUTO-ENTMCNC: 33.2 % (ref 32–36)
MCV RBC AUTO: 88.1 FL (ref 78–100)
MONOCYTES ABSOLUTE: 0.7 K/CU MM
MONOCYTES RELATIVE PERCENT: 7 % (ref 0–4)
PDW BLD-RTO: 14.7 % (ref 11.7–14.9)
PLATELET # BLD: 105 K/CU MM (ref 140–440)
PMV BLD AUTO: 9.4 FL (ref 7.5–11.1)
POTASSIUM SERPL-SCNC: 4.2 MMOL/L (ref 3.5–5.1)
RBC # BLD: 4.13 M/CU MM (ref 4.6–6.2)
SARS-COV-2, NAAT: NOT DETECTED
SEGMENTED NEUTROPHILS ABSOLUTE COUNT: 7.2 K/CU MM
SEGMENTED NEUTROPHILS RELATIVE PERCENT: 77.3 % (ref 36–66)
SODIUM BLD-SCNC: 130 MMOL/L (ref 135–145)
SOURCE: NORMAL
TOTAL IMMATURE NEUTOROPHIL: 0.04 K/CU MM
WBC # BLD: 9.3 K/CU MM (ref 4–10.5)

## 2021-05-24 PROCEDURE — 82962 GLUCOSE BLOOD TEST: CPT

## 2021-05-24 PROCEDURE — 97166 OT EVAL MOD COMPLEX 45 MIN: CPT

## 2021-05-24 PROCEDURE — 6360000002 HC RX W HCPCS: Performed by: NURSE PRACTITIONER

## 2021-05-24 PROCEDURE — 6370000000 HC RX 637 (ALT 250 FOR IP): Performed by: INTERNAL MEDICINE

## 2021-05-24 PROCEDURE — 85025 COMPLETE CBC W/AUTO DIFF WBC: CPT

## 2021-05-24 PROCEDURE — 97116 GAIT TRAINING THERAPY: CPT

## 2021-05-24 PROCEDURE — 87635 SARS-COV-2 COVID-19 AMP PRB: CPT

## 2021-05-24 PROCEDURE — 97530 THERAPEUTIC ACTIVITIES: CPT

## 2021-05-24 PROCEDURE — 6370000000 HC RX 637 (ALT 250 FOR IP): Performed by: NURSE PRACTITIONER

## 2021-05-24 PROCEDURE — 36415 COLL VENOUS BLD VENIPUNCTURE: CPT

## 2021-05-24 PROCEDURE — 97162 PT EVAL MOD COMPLEX 30 MIN: CPT

## 2021-05-24 PROCEDURE — 80048 BASIC METABOLIC PNL TOTAL CA: CPT

## 2021-05-24 RX ORDER — SODIUM CHLORIDE 1000 MG
1 TABLET, SOLUBLE MISCELLANEOUS 2 TIMES DAILY WITH MEALS
Status: DISCONTINUED | OUTPATIENT
Start: 2021-05-24 | End: 2021-05-24 | Stop reason: HOSPADM

## 2021-05-24 RX ADMIN — GABAPENTIN 300 MG: 300 CAPSULE ORAL at 14:09

## 2021-05-24 RX ADMIN — ASPIRIN 81 MG CHEWABLE TABLET 81 MG: 81 TABLET CHEWABLE at 07:58

## 2021-05-24 RX ADMIN — Medication 1 G: at 08:45

## 2021-05-24 RX ADMIN — ENOXAPARIN SODIUM 40 MG: 40 INJECTION SUBCUTANEOUS at 07:59

## 2021-05-24 RX ADMIN — PRIMIDONE 150 MG: 50 TABLET ORAL at 07:58

## 2021-05-24 RX ADMIN — CARVEDILOL 3.12 MG: 3.12 TABLET, FILM COATED ORAL at 07:58

## 2021-05-24 RX ADMIN — AMIODARONE HYDROCHLORIDE 200 MG: 200 TABLET ORAL at 07:58

## 2021-05-24 RX ADMIN — GLIPIZIDE 2.5 MG: 2.5 TABLET, FILM COATED, EXTENDED RELEASE ORAL at 07:58

## 2021-05-24 RX ADMIN — LISINOPRIL 10 MG: 10 TABLET ORAL at 07:58

## 2021-05-24 RX ADMIN — GABAPENTIN 300 MG: 300 CAPSULE ORAL at 07:58

## 2021-05-24 ASSESSMENT — PAIN DESCRIPTION - LOCATION
LOCATION: BACK
LOCATION: BACK

## 2021-05-24 ASSESSMENT — PAIN DESCRIPTION - PROGRESSION
CLINICAL_PROGRESSION: NOT CHANGED

## 2021-05-24 ASSESSMENT — PAIN DESCRIPTION - ONSET
ONSET: ON-GOING
ONSET: ON-GOING

## 2021-05-24 ASSESSMENT — PAIN SCALES - GENERAL
PAINLEVEL_OUTOF10: 5
PAINLEVEL_OUTOF10: 0
PAINLEVEL_OUTOF10: 5

## 2021-05-24 ASSESSMENT — PAIN DESCRIPTION - FREQUENCY
FREQUENCY: CONTINUOUS
FREQUENCY: CONTINUOUS

## 2021-05-24 ASSESSMENT — PAIN DESCRIPTION - PAIN TYPE
TYPE: CHRONIC PAIN
TYPE: CHRONIC PAIN

## 2021-05-24 ASSESSMENT — PAIN DESCRIPTION - ORIENTATION
ORIENTATION: LOWER
ORIENTATION: LOWER

## 2021-05-24 ASSESSMENT — PAIN DESCRIPTION - DESCRIPTORS
DESCRIPTORS: ACHING
DESCRIPTORS: ACHING

## 2021-05-24 NOTE — CARE COORDINATION
NURSING: The patient has been accepted by [facility] and will be discharged today. Please call report, arrange transportation with [facility] staff, and notify the patient's family.

## 2021-05-24 NOTE — DISCHARGE SUMMARY
Discharge Summary    Name:  Rodrigue Amaya /Age/Sex: 1945  (41 y.o. male)   MRN & CSN:  [id number] & [id number] Admission Date/Time: 2021  5:28 PM   Attending:  Phil Huizar MD Discharging Physician: Phil Huizar MD     HPI:   Chief complaint: Fall  HPI: 31-year-old elderly white male past medical history significant for AAA, hypertension, osteoarthritis, CAD, non-insulin-dependent diabetes, VHD with AV repair and spinal stenosis presents to the ED after a fall. Patient states that he is fallen twice on the day prior to arrival while ambulating. Reported increased weakness in his legs. Wife is of advanced age not able to care for him. Patient looking for long term placement. Order worsening back pain without numbness or tingling or incontinence. Please see H&P for full details    Hospital Course:     1. Generalized weakness: Secondary to falls at home yesterday. Patient states unable to lift put full weight on lower extremities. Patient previously here in the swing bed program for same. Discharged with rolling walker is doing well. However, patient endorses not using walker at home progressively worsened needing admission. Patient did not lose consciousness or strike head during fall. Neuro imaging without acute findings. Patient work with PT/OT today. Anticipate continued PT/OT and SNF. CT imaging of the spine resulted below. Most likely contributing to weakness/falls and ambulation difficulty. Covid negative patient accepted to [facility]. Plan for discharge from inpatient transferred to SNF. 2.  Hypertension: Controlled for age 138/65 continue lisinopril 10 mg daily and Coreg 3.125 mg twice daily Lasix 20 mg twice daily    3. Hyponatremia: On intake 134 at 130. Most likely hypovolemic hyponatremia. BUN/creatinine ratio greater than 20 on intake as well as this a.m. but improved. Also, with diuretic Lasix 20 mg twice daily. Recommend holding evening dose.   Sodium amoxicillin-clavulanate (AUGMENTIN) 875-125 MG per tablet  Take 1 tablet by mouth 2 times daily for 10 days             aspirin 81 MG tablet  Take 81 mg by mouth daily              atorvastatin (LIPITOR) 40 MG tablet  Take 1 tablet by mouth daily             blood glucose monitor kit and supplies  Test 3 times a day & as needed for symptoms of irregular blood glucose. blood glucose test strips (TRUE METRIX BLOOD GLUCOSE TEST) strip  1 each by In Vitro route 3 times daily As needed. Blood Pressure Monitoring (BLOOD PRESSURE KIT) NASH  Use as directed             carvedilol (COREG) 3.125 MG tablet  TAKE 1 TABLET BY MOUTH 2 TIMES DAILY             cephALEXin (KEFLEX) 500 MG capsule  Take 1 capsule by mouth 4 times daily for 7 days             furosemide (LASIX) 20 MG tablet  TAKE 1 TABLET BY MOUTH TWICE A DAY             gabapentin (NEURONTIN) 300 MG capsule  Take 1 capsule by mouth 3 times daily for 30 days. glipiZIDE (GLUCOTROL XL) 2.5 MG extended release tablet  Take 1 tablet by mouth daily             lisinopril (PRINIVIL;ZESTRIL) 10 MG tablet  Take 1 tablet by mouth daily             polyethylene glycol (GLYCOLAX) 17 GM/SCOOP powder  Take 17 g by mouth daily             primidone (MYSOLINE) 50 MG tablet  Take 3 tablets by mouth 2 times daily                 Objective Findings at Discharge:   /63   Pulse 65   Temp 99.2 °F (37.3 °C) (Oral)   Resp 16   Ht 5' 6\" (1.676 m)   Wt 151 lb 6.4 oz (68.7 kg)   SpO2 92%   BMI 24.44 kg/m²            PHYSICAL EXAM   GEN Awake frail-appearing elderly white male, sitting upright in bed in no apparent distress. Appears given age. EYES Pupils are equally round. No scleral erythema, discharge, or conjunctivitis. HENT Mucous membranes are moist.   NECK No apparent thyromegaly or masses. RESP Clear to auscultation but diminished throughout, no wheezes, rales or rhonchi.   Symmetric chest movement while on room air.  CARDIO/VASC S1/S2 auscultated. Regular rate without appreciable murmurs, rubs, or gallops. Peripheral pulses equal bilaterally and palpable. No peripheral edema. GI Abdomen is soft without significant tenderness, masses, or guarding. Bowel sounds are normoactive. Rectal exam deferred.  Moctezuma catheter is not present. HEME/LYMPH No petechiae or ecchymoses. MSK No gross joint deformities. Spontaneous movement of all extremities but diminished AROM lower extremities noted  SKIN Normal coloration, warm, dry. NEURO Cranial nerves appear grossly intact, normal speech, no lateralizing weakness. PSYCH Awake, alert, oriented x 3. Affect flat. BMP/CBC  Recent Labs     05/23/21  1930 05/24/21  0550   * 130*   K 4.4 4.2   CL 98* 98*   CO2 28 31   BUN 19 16   CREATININE 0.7* 0.6*   WBC 10.0 9.3   HCT 41.5* 36.4*   * 105*       IMAGING: Chest x-ray on intake:  Impression   Postoperative changes were noted from prior sternotomy with prosthetic aortic   valve.       No cardiomegaly, interstitial edema or pleural effusions.       Persistent central peribronchial thickening was noted, unchanged from   05/06/2021 compatible with bronchitis.       No acute rib fracture or pneumothorax.         CT of the head without contrast on intake:  FINDINGS:   BRAIN/VENTRICLES: There is no acute intracranial hemorrhage, mass effect or   midline shift.  No abnormal extra-axial fluid collection.  The gray-white   differentiation is maintained without evidence of an acute infarct.  There is   no evidence of hydrocephalus.  Atherosclerotic change of the intracranial   vasculature.  There is mild periventricular and subcortical white matter   hypoattenuation most consistent with microvascular ischemic changes. Josiephine Payer   is mild age appropriate global cerebral atrophy.       ORBITS: The visualized portion of the orbits demonstrate no acute abnormality.       SINUSES: The visualized paranasal sinuses and mastoid air cells demonstrate   no acute abnormality.       SOFT TISSUES/SKULL:  No acute abnormality of the visualized skull or soft   tissues.           Impression   No acute intracranial abnormality.       Chronic microvascular ischemic changes and global cerebral atrophy.         Radiograph of the pelvis on intake:  FINDINGS:   Single view of the pelvis was reviewed.  No acute fracture identified. Anatomic alignment of the hips.  Degenerative changes of the bilateral hips   which are mild-to-moderate.  Postoperative changes of aorto bi-iliac   grafting.  Atherosclerotic vascular calcifications present.           Impression   1. No acute fracture identified.         CT of the cervical spine without contrast on intake:  FINDINGS:   BONES/ALIGNMENT: There is no acute fracture or traumatic malalignment.       DEGENERATIVE CHANGES: Moderate degenerative disease at C5-C6.       SOFT TISSUES: There is no prevertebral soft tissue swelling.           Impression   No acute abnormality of the cervical spine.         CT of the lumbar spine without contrast on intake:  FINDINGS:   BONES/ALIGNMENT: There is normal alignment of the spine. The vertebral body   heights are maintained. No osseous destructive lesion is seen.       DEGENERATIVE CHANGES: Marked decrease in disc height was noted from T12-L1 to   L4-5 discs.  The regional skeleton was osteopenic.  No lumbar compression,   elsie or retro listhesis were noted.  A distal abdominal aortic stent was   noted extending into both common iliac arteries.  Acquired spinal stenosis   was noted at the L2-3 disc due to circumferential bulge and enlargement of   the ligamenta flavum.  Acquired spinal stenosis was noted at the L3-4 disc   due to circumferential bulge and enlargement of the ligamenta flavum.    Acquired spinal stenosis was noted at the L4-5 disc due to circumferential   bulge and enlargement of the ligamenta flavum.  Enlargement of the prostate   was noted.       SOFT TISSUES/RETROPERITONEUM: No paraspinal mass is seen.           Impression   No lumbar compression, elsie or retrolisthesis.       Osteopenia.       Marked decrease in disc height from T12-L1 to the L4-5 disc.       Acquired spinal stenosis at L2-3, L3-4 and L4-5 discs due to circumferential   bulge and enlargement of the ligamenta flavum.  Narrowing of each inferior   neural foramen was noted.         CT of the thoracic spine without contrast on intake:  FINDINGS:   BONES/ALIGNMENT: The bones are osteopenic which reduces sensitivity for   nondisplaced fracture.  No acute fracture or traumatic malalignment.  There   is diffuse idiopathic skeletal hyperostosis.       DEGENERATIVE CHANGES: No gross spinal canal stenosis or bony neural foraminal   narrowing of the thoracic spine.       SOFT TISSUES: No paraspinal mass is seen.           Impression   No acute thoracic spine trauma.         Discharge Time of 33 minutes    Electronically signed by Ondina Werner MD on 5/24/2021 at 1:05 PM

## 2021-05-24 NOTE — H&P
History and Physical      Name:  Schuyler Hernández /Age/Sex: 1945  (47 y.o. male)   MRN & CSN:  [id number] & [id number] Admission Date/Time: 2021  5:28 PM   Location:   PCP: Elieser Ibarra MD       Schuyler Hernández is a 70 y.o.  male  who presents with Fall      Assessment and Plan:   1. Fall at home       Generalized weakness. Had two falls today according to patient    Unable to bear weight on both legs     Denies loss oc consciousness or striking head against object    Patient on 81mg Aspirin at home. Head CT W/O contrast  non concerning    Fall precaution, PT/OT consult, , mild hydration, Consult for [possible placement    GI and DVT prophylaxis. 2. Diabetes Mellitus      POCT glucose,      Hypoglycemic protocol     Home medication and sliding scale insulin with meal coverage. 3. Hypertension     BP= 141/61    Home lisinopril and amiodarone       4. Other Health Concerns    Osteoarthritis, hyperlipidemia, constipation and CAD.           Medications:   Medications:    amiodarone  200 mg Oral BID    [START ON 2021] aspirin  81 mg Oral Daily    carvedilol  3.125 mg Oral BID    gabapentin  300 mg Oral TID    [START ON 2021] glipiZIDE  2.5 mg Oral Daily    [START ON 2021] lisinopril  10 mg Oral Daily    [START ON 2021] polyethylene glycol  17 g Oral Daily    primidone  150 mg Oral BID    sodium chloride flush  5-40 mL Intravenous 2 times per day    [START ON 2021] enoxaparin  30 mg Subcutaneous Daily      Infusions:    sodium chloride      sodium chloride       PRN Meds: sodium chloride flush, 5-40 mL, PRN  sodium chloride, 25 mL, PRN  promethazine, 12.5 mg, Q6H PRN   Or  ondansetron, 4 mg, Q6H PRN  polyethylene glycol, 17 g, Daily PRN  acetaminophen, 650 mg, Q6H PRN   Or  acetaminophen, 650 mg, Q6H PRN        Current Facility-Administered Medications:     amiodarone (CORDARONE) tablet 200 mg, 200 mg, Oral, BID, Tam Mcguire APRN - CNP    [START ON 5/24/2021] aspirin tablet 81 mg, 81 mg, Oral, Daily, Tam Akaeze, APRN - CNP    carvedilol (COREG) tablet 3.125 mg, 3.125 mg, Oral, BID, Tam Akaeze, APRN - CNP    gabapentin (NEURONTIN) capsule 300 mg, 300 mg, Oral, TID, Tam Akaeze, APRN - CNP    [START ON 5/24/2021] glipiZIDE (GLUCOTROL XL) extended release tablet 2.5 mg, 2.5 mg, Oral, Daily, Tam Akaeze, APRN - CNP    [START ON 5/24/2021] lisinopril (PRINIVIL;ZESTRIL) tablet 10 mg, 10 mg, Oral, Daily, Tam Akaeze, APRN - CNP    [START ON 5/24/2021] polyethylene glycol (GLYCOLAX) powder 17 g, 17 g, Oral, Daily, Tam Akaeze, APRN - CNP    primidone (MYSOLINE) tablet 150 mg, 150 mg, Oral, BID, Tam Akaeze, APRN - CNP    sodium chloride flush 0.9 % injection 5-40 mL, 5-40 mL, Intravenous, 2 times per day, Tam Akaeze, APRN - CNP    sodium chloride flush 0.9 % injection 5-40 mL, 5-40 mL, Intravenous, PRN, Tam Akaeze, APRN - CNP    0.9 % sodium chloride infusion, 25 mL, Intravenous, PRN, Tam Akaeze, APRN - CNP    [START ON 5/24/2021] enoxaparin (LOVENOX) injection 30 mg, 30 mg, Subcutaneous, Daily, Tam Akaeze, APRN - CNP    promethazine (PHENERGAN) tablet 12.5 mg, 12.5 mg, Oral, Q6H PRN **OR** ondansetron (ZOFRAN) injection 4 mg, 4 mg, Intravenous, Q6H PRN, Tam Akaeze, APRN - CNP    polyethylene glycol (GLYCOLAX) packet 17 g, 17 g, Oral, Daily PRN, Tam Akaeze, APRN - CNP    acetaminophen (TYLENOL) tablet 650 mg, 650 mg, Oral, Q6H PRN **OR** acetaminophen (TYLENOL) suppository 650 mg, 650 mg, Rectal, Q6H PRN, Tam Mcguire, APRN - CNP    0.9 % sodium chloride infusion, , Intravenous, Continuous, Tam Akaeze, APRN - CNP    History of present illness     Chief Complaint: Kiet Duque is a 70 y.o.  male  With PMH that include AAA, hypertension. osteoarthritis, diabetes mellitus and spinal stenosis. He  Presents following a fall at home.  Patient endorsed falling twice at home today while ambulating. He reported having increased weakness on his legs. His wife who also is advanced in age not  able to care for him. They will like to explore option of being placed in a long term facility and having physical therapy consultation. He endorsed some back pain. Denies any numbness and tingling. Review of Systems     GENERAL:  Denies fever, chills, night sweats, or changes in weight. EYES:  Denies recent visual changes. ENT:  Denies ear pain, hearing loss or tinnitus  RESP:  Denies any cough, dyspnea, or wheezing. CV:  Denies any chest pain with exertion or at rest, palpitations, syncope, or edema. GI:  Denies any dysphagia, nausea, vomiting, abdominal pain, heartburn, changes in bowel habit, melena or rectal bleeding  MUSCULOSKELETAL:  ambulatory weakness  Denies any joint swelling,or loss of range of motion. NEURO:  Denies any headaches, tremors, dizziness, memory loss, confusion, numbness or tingling. PSYCH:  Denies any sleeping problems, history of abuse, marital discord. HEME/LYMPHATIC/IMMUNO:  Denies , bruising, bleeding abnormalities   ENDO:  Denies any heat or cold intolerance, panemiaolyuria or polydipsia. Objective:   No intake or output data in the 24 hours ending 05/23/21 2141   Vitals:   Vitals:    05/23/21 2115   BP: (!) 141/61   Pulse: 65   Resp: 16   Temp: 98.4 °F (36.9 °C)   SpO2: 95%     Physical Exam:   General :  awake, alert, cooperative, no acute distress  EYES:Lids and lashes normal, pupils equal, round ,extra ocular muscles intact, sclera clear, conjunctiva normal  ENT:  Normocephalic, oral pharynx with moist mucus membranes  NECK:  Supple, symmetrical, trachea midline, no adenopathy,  LUNGS:  Clear to auscultate bilaterally, no rales ronchi or wheezing noted.   CARDIOVASCULAR:  regular rate and rhythm, normal S1 and S2,no murmur noted, peripheral pulses 2+, no pitting edema  ABDOMEN: Normal BS, Non tender, non distended,   MUSCULOSKELETAL: bilateral lower extremity weakness   ROM of all extremities grossly wnl  NEUROLOGIC: AOx 3,  Cranial nerves II-XII are grossly intact. Motor is 5 out of 5 bilaterally. Sensory is intact, no lateralizing findings. SKIN:  no bruising or bleeding, normal skin color, turgor, no redness,       Past Medical History:      Past Medical History:   Diagnosis Date    AAA (abdominal aortic aneurysm) ([facility] [address] 2018    Infrarenal    Basal cell carcinoma of nose 2016    Dr Nathan Mckeon CAD (coronary artery disease)     Dr. Guevara Barroso - severe aortic stenosis with a bicuspid aortic valve    Constipation     echocardiogram 10/29/2020    EF 55-60% normally functioning valve in aortic position, mild mitral regurg with two jets mild pulm htn.  Essential hypertension     History of alcohol abuse     Hyperlipidemia     Hypertension     Follows with PCP    Missing teeth, acquired     Non-alcoholic fatty liver disease     Osteoarthritis     Knees, lower back, shoulders,    Spinal stenosis     had epidural steroid injection 1/8/2020 - lumbar    Tremor     Left arm only    Type 2 diabetes mellitus without complication ([facility] [address]     Controlling with diet    Wears glasses      PSHX:  has a past surgical history that includes sinus surgery (0827'Q); other surgical history (12/09/2016); other surgical history (01/2020); Coronary artery bypass graft (N/A, 3/4/2020); knee surgery (2015); Carpal tunnel release (Right, 1970's); [place] tooth extraction; and Cardiac catheterization (02/26/2020). Allergies: Allergies   Allergen Reactions    Metformin And Related Diarrhea     Severe diarrhea, abd pain, and nausea    Amoxicillin Diarrhea    Other Nausea And Vomiting     ANTI-INFLAMMATORIES  Severe stomach pain--diarrhea  hypertension       FAM HX: family history includes Heart Disease in his father; High Blood Pressure in his father; High Cholesterol in his father; Kassandra Outlaw in his father and mother.     Soc HX:   Social History     Socioeconomic History  Marital status:      Spouse name: None    Number of children: None    Years of education: None    Highest education level: None   Occupational History    None   Tobacco Use    Smoking status: Current Every Day Smoker     Packs/day: 1.50     Years: 58.00     Pack years: 87.00     Types: Cigarettes     Start date: 1962    Smokeless tobacco: Never Used   Vaping Use    Vaping Use: Never used   Substance and Sexual Activity    Alcohol use: Not Currently     Comment: 1 glass wine a month    Drug use: No    Sexual activity: Not Currently   Other Topics Concern    None   Social History Narrative    None     Social Determinants of Health     Financial Resource Strain:     Difficulty of Paying Living Expenses:    Food Insecurity: No Food Insecurity    Worried About Running Out of Food in the Last Year: Never true    Raissa of Food in the Last Year: Never true   Transportation Needs: No Transportation Needs    Lack of Transportation (Medical): No    Lack of Transportation (Non-Medical): No   Physical Activity: Sufficiently Active    Days of Exercise per Week: 7 days    Minutes of Exercise per Session: 60 min   Stress: No Stress Concern Present    Feeling of Stress : Not at all   Social Connections: Moderately Isolated    Frequency of Communication with Friends and Family:  Three times a week    Frequency of Social Gatherings with Friends and Family: Once a week    Attends Confucianist Services: Never    Active Member of Clubs or Organizations: No    Attends Club or Organization Meetings: Never    Marital Status:    Intimate Partner Violence:     Fear of Current or Ex-Partner:     Emotionally Abused:     Physically Abused:     Sexually Abused:        Electronically signed by MADDY Alvarez CNP on 5/23/2021 at 9:41 PM

## 2021-05-24 NOTE — DISCHARGE INSTR - COC
Continuity of Care Form    Patient Name: Cosme Kennedy   :  1945  MRN:  [id number]    Admit date:  2021  Discharge date:  ***    Code Status Order: Full Code   Advance Directives:   Advance Care Flowsheet Documentation     Date/Time Healthcare Directive Type of Healthcare Directive Copy in [address] Agent's Name Healthcare Agent's Phone Number    21 [phone]  No, patient does not have an advance directive for healthcare treatment -- -- -- -- --          Admitting Physician:  Parul Reyes MD  PCP: Candance Bury, MD    Discharging Nurse: [facility] Unit/Room#: 541/222-26  Discharging Unit Phone Number: [phone]    Emergency Contact:   Extended Emergency Contact Information  Primary Emergency Contact: Shena Hodges  Address: [address] [place], [place] [address], [address] Phone: [phone]  Work Phone: [phone]  Mobile Phone: [phone]  Relation: Spouse    Past Surgical History:  Past Surgical History:   Procedure Laterality Date    CABG WITH AORTIC VALVE REPLACEMENT N/A 3/4/2020    CABG CORONARY ARTERY BYPASS x1 SALEEM TO LAD, AORTIC VALVE REPACEMENT performed by Vic Belle MD at [facility]  2020    critical LAD lesion and moderate to severe right coronary artery lesion    CARPAL TUNNEL RELEASE Right     KNEE SURGERY  2015    quad injury from a fall    OTHER SURGICAL HISTORY  2016    excision of basal cell carcinoma of nose with complex repair    OTHER SURGICAL HISTORY  2020    epidual steroid injections L3-L4    SINUS SURGERY      x 2    WISDOM TOOTH EXTRACTION         Immunization History:   Immunization History   Administered Date(s) Administered    COVID-19, Moderna, PF, 100mcg/0.5mL 2021, 2021    DTaP vaccine 2020    Influenza Virus Vaccine 10/17/2018    Influenza, High Dose (Fluzone 65 yrs and older) (Ordered)   02/12/21 Rule-Out Test Resulted    COVID-19 Rule Out 07/06/20 07/06/20 07/06/20 Covid-19 Ambulatory (Ordered)   07/07/20 Rule-Out Test Resulted    COVID-19 Rule Out 06/09/20 06/09/20 06/09/20 Covid-19 Ambulatory (Ordered)   06/10/20 Rule-Out Test Resulted          Nurse Assessment:  Last Vital Signs: /63   Pulse 65   Temp 99.2 °F (37.3 °C) (Oral)   Resp 16   Ht 5' 6\" (1.676 m)   Wt 151 lb 6.4 oz (68.7 kg)   SpO2 92%   BMI 24.44 kg/m²     Last documented pain score (0-10 scale): Pain Level: 0  Last Weight:   Wt Readings from Last 1 Encounters:   05/23/21 151 lb 6.4 oz (68.7 kg)     Mental Status:  oriented, alert, coherent and logical    IV Access:  - Peripheral IV - site  L forearm, insertion date: 5-23-21    Nursing Mobility/ADLs:  Walking   Assisted-hands on assistance   Transfer  Assisted-hands on assistance   Bathing  Assisted-hands on assistance  Dressing  Assisted-hands on assistance  Toileting  Assisted-hands on assistance  Feeding  Assisted-hands on assistance  Med Admin  Assisted-hands on assistance  Med Delivery   whole-hands on assistance with medication administration     Wound Care Documentation and Therapy:        Elimination:  Continence:   · Bowel: Yes  · Bladder: Yes  Urinary Catheter: None   Colostomy/Ileostomy/Ileal Conduit: No       Date of Last BM: 5-24-21    Intake/Output Summary (Last 24 hours) at 5/24/2021 0959  Last data filed at 5/24/2021 0626  Gross per 24 hour   Intake 690 ml   Output 650 ml   Net 40 ml     I/O last 3 completed shifts: In: 690 [P.O.:120; I.V.:570]  Out: 650 [Urine:650]    Safety Concerns:     History of Falls (last 30 days) and At Risk for Falls    Impairments/Disabilities:      None    Nutrition Therapy:  Current Nutrition Therapy:   Oral: Carb Control    Routes of Feeding: Oral  Liquids:  Thin Liquids  Daily Fluid Restriction: no  Last Modified Barium Swallow with Video (Video Swallowing Test): not done    Treatments at the Time of Hospital Discharge:   Respiratory Treatments: NA  Oxygen Therapy:  is not on home oxygen therapy. Ventilator:    - No ventilator support    Rehab Therapies: Physical Therapy and Occupational Therapy  Weight Bearing Status/Restrictions: No weight bearing restirctions  Other Medical Equipment (for information only, NOT a DME order):  walker  Other Treatments:     Patient's personal belongings (please select all that are sent with patient):  Efraín    RN SIGNATURE:  Electronically signed by Dona Almaraz RN on 5/24/21 at 10:05 AM EDT    CASE MANAGEMENT/SOCIAL WORK SECTION    Inpatient Status Date: ***    Readmission Risk Assessment Score:  Readmission Risk              Risk of Unplanned Readmission:  23           Discharging to Facility/ Agency   · Name:   · Address:  · Phone:  · Fax:    Dialysis Facility (if applicable)   · Name:  · Address:  · Dialysis Schedule:  · Phone:  · Fax:    / signature: {Esignature:[id number]}    PHYSICIAN SECTION    Prognosis: {Prognosis:3647296657}    Condition at Discharge: Aki Tarango Ty Patient Condition:268510779}    Rehab Potential (if transferring to Rehab): {Prognosis:4706340217}    Recommended Labs or Other Treatments After Discharge: ***    Physician Certification: I certify the above information and transfer of Corinne Muhammad  is necessary for the continuing treatment of the diagnosis listed and that he requires {Admit to Appropriate Level of Care:70558} for {GREATER/LESS:340901940} 30 days.      Update Admission H&P: {CHP DME Changes in JJMDV:138336027}    PHYSICIAN SIGNATURE:  {Esignature:[id number]}

## 2021-05-24 NOTE — CARE COORDINATION
This CM is familiar with the patient from previous hospitalizations and swing bed admissions. CM met with the patient for discharge planing. Patient lives in a 1 story home with his wife (2 steps to enter with railing), has insurance with Rx coverage & PCP, stated that he was independent with ADL's prior to admission, and is able to drive. Patient stated that he has a cane and two walkers at home but stated that he was not using them prior to admission. Patient does not require home oxygen. Patient endorsed falling several times over the last couple days. Patient stated that he was receiving [facility] through [facility] but stated \"it stopped a few weeks ago\". Patient feels that he needs to be admitted to a facility for therapy. Patient stated that he would be agreeable to placement at [facility] if they had beds available. PT/OT evaluations are pending at this time. 8:30 AM  CM spoke with James Gao at [facility] to initiate referral.  PT/OT evaluations pending at this time. 10:12 AM  PASRR completed online. 10:32 AM  CM received call from James Gao at [facility] stating that they can accept the patient once the LOC has been approved. CM will follow. 10:56 AM  CM faxed the LOC request to [facility] for determination. CM will follow. 12:24 PM  CM received LOC approval for patient's admission to [facility]. 12:38 PM  CM notified Niesha Jones RN that the patient has been approved for admission to [place] and notified the patient. 12:44 PM  CM notified Dr. Ahmet Cochran via [employer].

## 2021-05-24 NOTE — PROGRESS NOTES
Report called to [facility], spoke with Messi Soriano. Pt's wife, Kenyatta Bustillo, also notified via phone that pt will be discharging to [facility] today.

## 2021-05-24 NOTE — PLAN OF CARE
Problem: Pain:  Goal: Pain level will decrease  Description: Pain level will decrease  5/24/2021 1001 by Gerardo Love RN  Outcome: Ongoing  5/23/2021 2151 by Lo Velasco RN  Outcome: Ongoing  Goal: Control of acute pain  Description: Control of acute pain  5/24/2021 1001 by Gerardo Love RN  Outcome: Ongoing  5/23/2021 2151 by Lo Velasco RN  Outcome: Ongoing  Goal: Control of chronic pain  Description: Control of chronic pain  5/24/2021 1001 by Gerardo Love RN  Outcome: Ongoing  5/23/2021 2151 by Lo Velasco RN  Outcome: Ongoing     Problem: Falls - Risk of:  Goal: Will remain free from falls  Description: Will remain free from falls  5/24/2021 1001 by Gerardo Love RN  Outcome: Ongoing  5/23/2021 2151 by Lo Velasco RN  Outcome: Ongoing  Goal: Absence of physical injury  Description: Absence of physical injury  5/24/2021 1001 by Gerardo Love RN  Outcome: Ongoing  5/23/2021 2151 by Lo Velasco RN  Outcome: Ongoing     Problem: Discharge Planning:  Goal: Discharged to appropriate level of care  Description: Discharged to appropriate level of care  5/24/2021 1001 by Gerardo Love RN  Outcome: Ongoing  5/23/2021 2151 by Lo Velasco RN  Outcome: Ongoing

## 2021-05-24 NOTE — PLAN OF CARE
Problem: Pain:  Goal: Pain level will decrease  Description: Pain level will decrease  5/24/2021 1503 by Axel Gowers, RN  Outcome: Ongoing  5/24/2021 1001 by Axel Gowers, RN  Outcome: Ongoing  Goal: Control of acute pain  Description: Control of acute pain  5/24/2021 1503 by Axel Gowers, RN  Outcome: Ongoing  5/24/2021 1001 by Axel Gowers, RN  Outcome: Ongoing  Goal: Control of chronic pain  Description: Control of chronic pain  5/24/2021 1503 by Axel Gowers, RN  Outcome: Ongoing  5/24/2021 1001 by Axel Gowers, RN  Outcome: Ongoing     Problem: Falls - Risk of:  Goal: Will remain free from falls  Description: Will remain free from falls  5/24/2021 1503 by Axel Gowers, RN  Outcome: Ongoing  5/24/2021 1001 by Axel Gowers, RN  Outcome: Ongoing  Goal: Absence of physical injury  Description: Absence of physical injury  5/24/2021 1503 by Axel Gowers, RN  Outcome: Ongoing  5/24/2021 1001 by Axel Gowers, RN  Outcome: Ongoing     Problem:  Activity:  Goal: Ability to tolerate increased activity will improve  Description: Ability to tolerate increased activity will improve  5/24/2021 1503 by Axel Gowers, RN  Outcome: Ongoing  5/24/2021 1001 by Axel Gowers, RN  Outcome: Ongoing     Problem: Discharge Planning:  Goal: Discharged to appropriate level of care  Description: Discharged to appropriate level of care  5/24/2021 1503 by Axel Gowers, RN  Outcome: Ongoing  5/24/2021 1001 by Axel Gowers, RN  Outcome: Ongoing

## 2021-05-24 NOTE — PLAN OF CARE
Problem: Pain:  Goal: Pain level will decrease  Description: Pain level will decrease  Outcome: Ongoing  Goal: Control of acute pain  Description: Control of acute pain  Outcome: Ongoing  Goal: Control of chronic pain  Description: Control of chronic pain  Outcome: Ongoing     Problem: Falls - Risk of:  Goal: Will remain free from falls  Description: Will remain free from falls  Outcome: Ongoing  Goal: Absence of physical injury  Description: Absence of physical injury  Outcome: Ongoing     Problem:  Activity:  Goal: Ability to tolerate increased activity will improve  Description: Ability to tolerate increased activity will improve  Outcome: Ongoing     Problem: Discharge Planning:  Goal: Discharged to appropriate level of care  Description: Discharged to appropriate level of care  Outcome: Ongoing

## 2021-05-24 NOTE — PROGRESS NOTES
Occupational Therapy   Occupational Therapy Initial Assessment  Date: 2021   Patient Name: Coty Redding  MRN: [id number]     : 1945    Date of Service: 2021    Discharge Recommendations:  Subacute/Skilled Nursing Facility       Assessment   Performance deficits / Impairments: Decreased functional mobility ; Decreased ADL status; Decreased strength;Decreased safe awareness;Decreased ROM; Decreased endurance;Decreased balance;Decreased fine motor control;Decreased cognition  Assessment: 71 yo male admitted to hospital after falls with decreased I with adls, transfers and endurance. OT to see the pt to maximize I with adls, transfers, LUE strength and to improve activity tolerance. He will benefit from skilled OT  Prognosis: Fair  Decision Making: Medium Complexity  OT Education: Plan of Care;OT Role;Transfer Training  REQUIRES OT FOLLOW UP: Yes  Activity Tolerance  Activity Tolerance: Patient limited by fatigue  Safety Devices  Safety Devices in place: Yes  Type of devices: Bed alarm in place;Call light within reach; Left in bed           Patient Diagnosis(es): The primary encounter diagnosis was General weakness. A diagnosis of Impaired ambulation was also pertinent to this visit. has a past medical history of AAA (abdominal aortic aneurysm) (HCC), Basal cell carcinoma of nose, CAD (coronary artery disease), Constipation, echocardiogram, Essential hypertension, History of alcohol abuse, Hyperlipidemia, Hypertension, Missing teeth, acquired, Non-alcoholic fatty liver disease, Osteoarthritis, Spinal stenosis, Tremor, Type 2 diabetes mellitus without complication ([facility] [address] and Wears glasses. has a past surgical history that includes sinus surgery (8932'A); other surgical history (2016); other surgical history (2020); Coronary artery bypass graft (N/A, 3/4/2020); knee surgery (); Carpal tunnel release (Right, 's); [place] tooth extraction; and Cardiac catheterization (2020). Restrictions  Restrictions/Precautions  Restrictions/Precautions: General Precautions, Fall Risk  Required Braces or Orthoses?: No    Subjective   General  Chart Reviewed: Yes  Patient assessed for rehabilitation services?: Yes (Simultaneous filing. User may not have seen previous data.)  Family / Caregiver Present: No  Subjective  Subjective: I am weak. I want to walk better  Patient Currently in Pain: Yes (Simultaneous filing. User may not have seen previous data.)  Pain Assessment  Pain Assessment: 0-10  Pain Level: 0  Patient's Stated Pain Goal: No pain  Pain Type: Chronic pain  Pain Location: Back  Pain Orientation: Lower  Pain Descriptors: Aching  Pain Frequency: Continuous  Pain Onset: On-going  Clinical Progression: Not changed  Functional Pain Assessment: Activities are not prevented  Non-Pharmaceutical Pain Intervention(s): Rest  Vital Signs  Temp: 99.2 °F (37.3 °C)  Temp Source: Oral  Pulse: 65  Heart Rate Source: Monitor  Resp: 16  BP: 138/63  BP Location: Right Arm  MAP (mmHg): 90  Level of Consciousness: Alert (0)  MEWS Score: 1  Patient Currently in Pain: Yes (Simultaneous filing.  User may not have seen previous data.)  Oxygen Therapy  SpO2: 92 %  O2 Device: None (Room air)  Social/Functional History  Social/Functional History  Lives With: Spouse  Type of Home: House  Home Layout: Two level, Able to Live on Main level with bedroom/bathroom  Home Access: Stairs to enter without rails  Bathroom Shower/Tub: Walk-in shower, Shower chair with back  Bathroom Toilet: Standard  Bathroom Equipment: Grab bars around toilet, Grab bars in shower, Shower chair  Home Equipment: Rolling walker, 4 wheeled walker  Receives Help From: Family  ADL Assistance: Independent  Homemaking Assistance: Needs assistance  Homemaking Responsibilities: No  Ambulation Assistance: Independent  Transfer Assistance: Independent  Active : Yes  Occupation: Retired  Type of occupation: autobody repair  Leisure & Hobbies: watching tv, previously enjoyed yardwork  Additional Comments: Patient questionable historian       Objective   Vision Exceptions: Wears glasses at all times  Hearing: Within functional limits    Orientation  Overall Orientation Status: Impaired  Orientation Level: Oriented to place;Oriented to person;Oriented to situation (knew date but not year; thought it was 2001)  Observation/Palpation  Posture: Fair  Observation: Patient supine in bed at arrival and awake. Patient with left IV in.  Balance  Sitting Balance: Minimal assistance  Standing Balance: Moderate assistance  Functional Mobility  Functional - Mobility Device: Rolling Walker  Assist Level: Minimal assistance  Toilet Transfers  Toilet - Technique: Ambulating  Equipment Used: Grab bars  Toilet Transfers Comments: Mod assist to stand on toilet; poor awareness of where toilet is and to have legs touch when backing up  ADL  Feeding:  (states that his tremors at times do cause diffiulties)  UE Bathing: Stand by assistance  LE Bathing: Moderate assistance (difficulties reaching bottom and down to ankles; when sitting dynamic sitting balance was poor)  LE Dressing: Moderate assistance  Toileting: Minimal assistance  Tone RUE  RUE Tone: Normotonic  Tone LUE  LUE Tone: Normotonic  Coordination  Movements Are Fluid And Coordinated: No  Coordination and Movement description: Fine motor impairments; Resting tremors;Tremors     Bed mobility  Supine to Sit: Stand by assistance  Sit to Supine: Minimal assistance  Scooting: Contact guard assistance  Comment: HOB was elevated; needed assist to corect positioning; needed assist to put legs back in bed; needed assist to scoot to [address]  Transfers  Sit to stand: Minimal assistance  Stand to sit: Minimal assistance  Vision - Basic Assessment  Prior Vision: Wears glasses all the time  Cognition  Overall Cognitive Status: Exceptions  Following Commands: Follows one step commands with increased time; Follows one step commands with repetition  Attention Span: Attends with cues to redirect  Memory: Decreased short term memory  Safety Judgement: Decreased awareness of need for assistance  Problem Solving: Assistance required to generate solutions;Assistance required to identify errors made;Assistance required to correct errors made;Decreased awareness of errors  Insights: Decreased awareness of deficits  Initiation: Requires cues for some  Sequencing: Requires cues for some        Sensation  Overall Sensation Status: WFL        LUE AROM (degrees)  LUE General AROM: decreased shoulde flexion, abduction; elbow, wrist WFL  RUE AROM (degrees)  RUE General AROM: decreased shoulder flex, abd elbow, wrist WFL  LUE Strength  LUE Strength Comment: decreased shoulder flex and abd, elbow flex 4-/5  RUE Strength  Gross RUE Strength: WFL                   Plan   Plan  Times per week: 3+  Current Treatment Recommendations: Strengthening, ROM, Balance Training, Functional Mobility Training, Endurance Training, Safety Education & Training, Self-Care / ADL, Positioning, Patient/Caregiver Education & Training    G-Code     OutComes Score                                                  AM-PAC Score    AM-PAC 6 click short form for inpatient daily activity:   How much help from another person does the patient currently need. .. Unable  Dep A Lot  Max A A Lot   Mod A A Little  Min A A Little   CGA  SBA None   Mod I  Indep  Sup   1. Putting on and taking off regular lower body clothing? [] 1    [] 2   [x] 2   [] 3   [] 3   [] 4      2. Bathing (including washing, rinsing, drying)? [] 1   [] 2   [x] 2 [] 3 [] 3 [] 4   3. Toileting, which includes using toilet, bedpan, or urinal? [] 1    [] 2   [] 2   [x] 3   [] 3   [] 4     4. Putting on and taking off regular upper body clothing? [] 1   [] 2   [] 2   [x] 3   [] 3    [] 4      5. Taking care of personal grooming such as brushing teeth? [] 1   [] 2    [] 2 [x] 3    [] 3   [] 4      6. Eating meals?    [] 1   [] 2   []

## 2021-05-24 NOTE — DISCHARGE INSTR - COC
Continuity of Care Form    Patient Name: Kavitha Pollock   :  1945  MRN:  [id number]    Admit date:  2021  Discharge date:  21    Code Status Order: Full Code   Advance Directives:   Advance Care Flowsheet Documentation       Date/Time Healthcare Directive Type of Healthcare Directive Copy in [address] Agent's Name Healthcare Agent's Phone Number    21 [phone]  No, patient does not have an advance directive for healthcare treatment -- -- -- -- --            Admitting Physician:  Lamont Jones MD  PCP: Sandie Damico MD    Discharging Nurse: [facility] Unit/Room#: 763/862-05  Discharging Unit Phone Number: [phone]    Emergency Contact:   Extended Emergency Contact Information  Primary Emergency Contact: Rose Darnell  Address: [address] [place] [address], [address] Phone: [phone]  Work Phone: [phone]  Mobile Phone: [phone]  Relation: Spouse    Past Surgical History:  Past Surgical History:   Procedure Laterality Date    CABG WITH AORTIC VALVE REPLACEMENT N/A 3/4/2020    CABG CORONARY ARTERY BYPASS x1 SALEEM TO LAD, AORTIC VALVE REPACEMENT performed by Alison Moura MD at [address] [place] [address]  2020    critical LAD lesion and moderate to severe right coronary artery lesion    CARPAL TUNNEL RELEASE Right 's    KNEE SURGERY  2015    quad injury from a fall    OTHER SURGICAL HISTORY  2016    excision of basal cell carcinoma of nose with complex repair    OTHER SURGICAL HISTORY  2020    epidual steroid injections L3-L4    SINUS SURGERY      x 2    WISDOM TOOTH EXTRACTION         Immunization History:   Immunization History   Administered Date(s) Administered    COVID-19, Moderna, PF, 100mcg/0.5mL 2021, 2021    DTaP vaccine 2020    Influenza Virus Vaccine 10/17/2018    Influenza, High Dose (Fluzone 65 yrs and older) 10/18/2013, 10/15/2019, 10/02/2020    Influenza, High-dose, Aldean Cheese, 65 yrs +, IM (Fluzone) 10/21/2020    Influenza, Quadv, IM, (6 mo and older Fluzone, Flulaval, Fluarix and 3 yrs and older Afluria) 10/17/2018    Pneumococcal Conjugate 13-valent ([id number]) 08/16/2015    Pneumococcal Polysaccharide (Fsxzocfic77) 10/22/2012, 11/16/2020    Tdap (Boostrix, Adacel) 07/31/2014, 04/20/2020    Zoster Live (Zostavax) 07/18/2016    Zoster Recombinant (Shingrix) 03/15/2019, 06/12/2019       Active Problems:  Patient Active Problem List   Diagnosis Code    S/P percutaneous abdominal aortic aneurysm repair Z98.890, Z86.79    Tobacco abuse Z72.0    Essential hypertension I10    Smoker C93.449    Non-alcoholic fatty liver disease K76.0    Basal cell carcinoma of nose C44.311    Type 2 diabetes mellitus without complication, without long-term current use of insulin (HCC) E11.9    Other hyperlipidemia E78.49    S/P aortic valve replacement Z95.2    CAD in native artery I25.10    Other constipation K59.09    Spondylosis of lumbosacral region M47.817    Status post AAA (abdominal aortic aneurysm) repair Z98.890, Z86.79    Liver nodule K76.89    Thrombocytopenia (HCC) D69.6    Tremor, physiological R25.1    Pulmonary nodule R91.1    Fall at home, initial encounter W19. Escobar Mode, Y92.009    Type 2 diabetes mellitus ([facility] [address] E11.9    Pneumonia J18.9    Weakness R53.1    Acute and chr resp failure, unsp w hypoxia or hypercapnia (HCC) J96.20       Isolation/Infection:   Isolation            No Isolation          Patient Infection Status       Infection Onset Added Last Indicated Last Indicated By Review Planned Expiration Resolved Resolved By    None active    Resolved    COVID-19 Rule Out 02/12/21 02/12/21 02/12/21 Respiratory Panel, Molecular, with COVID-19 (Restricted: peds pts or suitable admitted adults) (Ordered)   02/12/21 Rule-Out Test Resulted    C-diff Rule Out 02/11/21 02/11/21 02/11/21 C difficile Molecular/PCR (Ordered)   02/12/21 Rule-Out Test Resulted    COVID-19 Rule Out 07/06/20 07/06/20 07/06/20 Covid-19 Ambulatory (Ordered)   07/07/20 Rule-Out Test Resulted    COVID-19 Rule Out 06/09/20 06/09/20 06/09/20 Covid-19 Ambulatory (Ordered)   06/10/20 Rule-Out Test Resulted            Nurse Assessment:  Last Vital Signs: /63   Pulse 65   Temp 99.2 °F (37.3 °C) (Oral)   Resp 16   Ht 5' 6\" (1.676 m)   Wt 151 lb 6.4 oz (68.7 kg)   SpO2 92%   BMI 24.44 kg/m²     Last documented pain score (0-10 scale): Pain Level: 0  Last Weight:   Wt Readings from Last 1 Encounters:   05/23/21 151 lb 6.4 oz (68.7 kg)     Mental Status:  oriented, alert, coherent, thought processes intact and able to concentrate and follow conversation    IV Access:  - Peripheral IV - site  L Antecubital, insertion date: 5/23/21    Nursing Mobility/ADLs:  Walking   Assisted-hands on assistance   Transfer  Assisted-hands on assistance  Bathing  Assisted-hands on assistance  Dressing  Assisted-hands on assistance  Toileting  Assisted-hands on assistance  Feeding  Assisted-hands on assistance  Med Admin  Assisted-hands on assistance  Med Delivery   whole-hands on assistance with medication administration     Wound Care Documentation and Therapy:        Elimination:  Continence:   · Bowel: Yes  · Bladder: Yes  Urinary Catheter: None   Colostomy/Ileostomy/Ileal Conduit: No       Date of Last BM: 5/23/21    Intake/Output Summary (Last 24 hours) at 5/24/2021 1000  Last data filed at 5/24/2021 0626  Gross per 24 hour   Intake 690 ml   Output 650 ml   Net 40 ml     I/O last 3 completed shifts: In: 690 [P.O.:120;  I.V.:570]  Out: 650 [Urine:650]    Safety Concerns:     History of Falls (last 30 days) and At Risk for Falls    Impairments/Disabilities:      Vision    Nutrition Therapy:  Current Nutrition Therapy:   - Oral Diet:  Carb Control 3 carbs/meal (1500kcals/day)    Routes of Feeding: Oral  Liquids: No Restrictions  Daily Fluid Restriction: no  Last Modified Barium Swallow with Video (Video Swallowing Test): not done    Treatments at the Time of Hospital Discharge:   Respiratory Treatments: N/A  Oxygen Therapy:  is not on home oxygen therapy. Ventilator:    - No ventilator support    Rehab Therapies: Physical Therapy and Occupational Therapy  Weight Bearing Status/Restrictions: No weight bearing restirctions  Other Medical Equipment (for information only, NOT a DME order):  walker  Other Treatments: N/A    Patient's personal belongings (please select all that are sent with patient):  Glasses    RN SIGNATURE:  Electronically signed by Carol Macias RN on 5/24/21 at 10:51 AM EDT    CASE MANAGEMENT/SOCIAL WORK SECTION    Inpatient Status Date: 5/23/2021    Readmission Risk Assessment Score:  Readmission Risk              Risk of Unplanned Readmission:  23           Discharging to Facility/ Agency   · Name: Kris Hernandez  · Address[address] [place] [address]  · Phone: [phone]  · Fax: [phone]    Dialysis Facility (if applicable)   · Name:  · Address:  · Dialysis Schedule:  · Phone:  · Fax:    / signature: Electronically signed by Carol Macias RN on 5/24/21 at 10:18 AM EDT    PHYSICIAN SECTION    Prognosis: Fair    Condition at Discharge: Stable    Rehab Potential (if transferring to Rehab): Fair    Recommended Labs or Other Treatments After Discharge: PT/OT    Physician Certification: I certify the above information and transfer of Joe Frank  is necessary for the continuing treatment of the diagnosis listed and that he requires an intermediate level of care at [facility] for <> 30 days.      Update Admission H&P: Changes in H&P as follows - Hyponatremia  ,mild    PHYSICIAN SIGNATURE:  Electronically signed by Ondina Werner MD on 5/24/21 at 10:02 AM EDT

## 2021-05-25 ENCOUNTER — OUTSIDE SERVICES (OUTPATIENT)
Dept: INTERNAL MEDICINE CLINIC | Age: 76
End: 2021-05-25

## 2021-05-25 DIAGNOSIS — J18.9 PNEUMONIA DUE TO INFECTIOUS ORGANISM, UNSPECIFIED LATERALITY, UNSPECIFIED PART OF LUNG: ICD-10-CM

## 2021-05-25 DIAGNOSIS — R25.1 TREMOR, PHYSIOLOGICAL: ICD-10-CM

## 2021-05-25 DIAGNOSIS — E11.9 TYPE 2 DIABETES MELLITUS WITHOUT COMPLICATION, WITHOUT LONG-TERM CURRENT USE OF INSULIN (HCC): ICD-10-CM
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
## 2021-05-25 DIAGNOSIS — R53.1 WEAKNESS: ICD-10-CM

## 2021-05-25 PROCEDURE — 99305 1ST NF CARE MODERATE MDM 35: CPT | Performed by: INTERNAL MEDICINE

## 2021-05-26 NOTE — PROGRESS NOTES
History and physical  ___________________________    Youssef Johny Kitchen's  is 1945  SEEN ON 2021 at [facility] AT [facility]  ___________________________    S:   Patient is seen today and discussed with the nurses. Patient is a 82-year-old male with a history of hypertension, osteoarthritis, coronary artery disease, diabetes mellitus abdominal aortic aneurysm and spinal stenosis fell at home and was brought to the emergency room. He had fallen twice at home. Patient was getting weaker in the last 3 weeks or so. He had difficulty ambulating. He was able to stand and pivot. He was here before in swing bed for generalized weakness but he was able to go home on rolling walker but at home he got worse. Work-up did not show any fracture  Patient had pneumonia and is on oral antibiotics for that. Patient is lying in bed appears lethargic. Is awake. Having tremors while eating. Has generalized weakness. On the day of admission he was able to stand and pivot with help but today he is feeling weaker and is unable to get up. Denies any nausea vomiting or diarrhea    ALLERGIES:  Allergies   Allergen Reactions    Metformin And Related Diarrhea     Severe diarrhea, abd pain, and nausea    Amoxicillin Diarrhea    Other Nausea And Vomiting     ANTI-INFLAMMATORIES  Severe stomach pain--diarrhea  hypertension        PAST MEDICAL HISTORY:    has a past medical history of AAA (abdominal aortic aneurysm) (HCC), Basal cell carcinoma of nose, CAD (coronary artery disease), Constipation, echocardiogram, Essential hypertension, History of alcohol abuse, Hyperlipidemia, Hypertension, Missing teeth, acquired, Non-alcoholic fatty liver disease, Osteoarthritis, Spinal stenosis, Tremor, Type 2 diabetes mellitus without complication ([address] and Wears glasses.          PAST SURGICAL HISTORY:   has a past surgical history that includes sinus surgery (1175'N); other surgical history (12/09/2016); other surgical history (01/2020); Coronary artery bypass graft (N/A, 3/4/2020); knee surgery (2015); Carpal tunnel release (Right, 1970's); [place] tooth extraction; and Cardiac catheterization (02/26/2020). SOCIAL HISTORY:   reports that he has been smoking cigarettes. He started smoking about 59 years ago. He has a 87.00 pack-year smoking history. He has never used smokeless tobacco. He reports previous alcohol use. He reports that he does not use drugs. Vital signs: /67. Temp 99.1, pulse 63. RR 16.  O2 sat 93%  GENERAL:  Alert, oriented, pleasant, in no apparent distress. HEENT:  Conjunctiva pink, no scleral icterus. ENT clear. NECK:  Supple. No jugular venous distention noted. No masses felt,  CARDIOVASCULAR:  Normal S1 and S2    PULMONARY:  No respiratory distress. No wheezes or rales. ABDOMEN:  Soft and non-tender,no masses  or organomegaly. EXTREMITIES:  No cyanosis, clubbing, or significant edema. SKIN: Skin is warm and dry. NEUROLOGICAL:  Cranial nerves II through XII are grossly intact. Assessment:  . General weakness   . Fall  . Cardiac arrhythmia: On amiodarone  . Pneumonia: On Augmentin and Keflex  . Coronary artery disease. Zachary Sukhwinder Hyperlipidemia   . DM type II: On glipizide 2.5 mg daily  . Hypertension  . Tremors: On primidone  . Hyponatremia improved         Plan:  Consult PT and OT  Medication were reviewed  Continue primidone , miralax, lisinopril, glipizide, gaapentin 300 mg tid, lasix 20 mg bid, keflex,keflex, atorvastatin 40 mg daily, aspirin 81 mg daily  Continue current treatment.

## 2021-05-27 ENCOUNTER — HOSPITAL ENCOUNTER (OUTPATIENT)
Dept: CT IMAGING | Age: 76
Discharge: HOME | End: 2021-05-27
Payer: MEDICARE

## 2021-05-27 DIAGNOSIS — R41.82 ALTERED MENTAL STATUS, UNSPECIFIED ALTERED MENTAL STATUS TYPE: ICD-10-CM

## 2021-05-27 PROCEDURE — 70450 CT HEAD/BRAIN W/O DYE: CPT

## 2021-05-28 ENCOUNTER — HOSPITAL ENCOUNTER (OUTPATIENT)
Age: 76
Setting detail: SPECIMEN
Discharge: HOME | End: 2021-05-28
Payer: MEDICARE

## 2021-05-28 LAB
ALBUMIN SERPL-MCNC: 3.6 GM/DL (ref 3.4–5)
ALP BLD-CCNC: 94 IU/L (ref 40–129)
ALT SERPL-CCNC: 129 U/L (ref 10–40)
ANION GAP SERPL CALCULATED.3IONS-SCNC: 14 MMOL/L (ref 4–16)
AST SERPL-CCNC: 54 IU/L (ref 15–37)
BASOPHILS ABSOLUTE: 0 K/CU MM
BASOPHILS RELATIVE PERCENT: 0.3 % (ref 0–1)
BILIRUB SERPL-MCNC: 0.5 MG/DL (ref 0–1)
BUN BLDV-MCNC: 38 MG/DL (ref 6–23)
CALCIUM SERPL-MCNC: 9.3 MG/DL (ref 8.3–10.6)
CHLORIDE BLD-SCNC: 102 MMOL/L (ref 99–110)
CO2: 23 MMOL/L (ref 21–32)
CREAT SERPL-MCNC: 0.9 MG/DL (ref 0.9–1.3)
DIFFERENTIAL TYPE: ABNORMAL
EOSINOPHILS ABSOLUTE: 0 K/CU MM
EOSINOPHILS RELATIVE PERCENT: 0.2 % (ref 0–3)
GFR AFRICAN AMERICAN: >60 ML/MIN/1.73M2
GFR NON-AFRICAN AMERICAN: >60 ML/MIN/1.73M2
GLUCOSE FASTING: 148 MG/DL (ref 70–99)
HCT VFR BLD CALC: 45.8 % (ref 42–52)
HEMOGLOBIN: 14.7 GM/DL (ref 13.5–18)
IMMATURE NEUTROPHIL %: 0.6 % (ref 0–0.43)
LYMPHOCYTES ABSOLUTE: 0.9 K/CU MM
LYMPHOCYTES RELATIVE PERCENT: 8.6 % (ref 24–44)
MCH RBC QN AUTO: 28.8 PG (ref 27–31)
MCHC RBC AUTO-ENTMCNC: 32.1 % (ref 32–36)
MCV RBC AUTO: 89.8 FL (ref 78–100)
MONOCYTES ABSOLUTE: 0.6 K/CU MM
MONOCYTES RELATIVE PERCENT: 5.2 % (ref 0–4)
PDW BLD-RTO: 14.7 % (ref 11.7–14.9)
PLATELET # BLD: 115 K/CU MM (ref 140–440)
PMV BLD AUTO: 10.7 FL (ref 7.5–11.1)
POTASSIUM SERPL-SCNC: 4.1 MMOL/L (ref 3.5–5.1)
RBC # BLD: 5.1 M/CU MM (ref 4.6–6.2)
SEGMENTED NEUTROPHILS ABSOLUTE COUNT: 9.2 K/CU MM
SEGMENTED NEUTROPHILS RELATIVE PERCENT: 85.1 % (ref 36–66)
SODIUM BLD-SCNC: 139 MMOL/L (ref 135–145)
TOTAL IMMATURE NEUTOROPHIL: 0.06 K/CU MM
TOTAL PROTEIN: 5.8 GM/DL (ref 6.4–8.2)
WBC # BLD: 10.8 K/CU MM (ref 4–10.5)

## 2021-05-28 PROCEDURE — 85025 COMPLETE CBC W/AUTO DIFF WBC: CPT

## 2021-05-28 PROCEDURE — 80053 COMPREHEN METABOLIC PANEL: CPT

## 2021-05-29 ENCOUNTER — OUTSIDE SERVICES (OUTPATIENT)
Dept: INTERNAL MEDICINE CLINIC | Age: 76
End: 2021-05-29

## 2021-05-29 DIAGNOSIS — R53.1 WEAKNESS: Primary | ICD-10-CM
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
## 2021-05-29 DIAGNOSIS — I25.10 CAD IN NATIVE ARTERY: ICD-10-CM
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
## 2021-05-29 DIAGNOSIS — J18.9 PNEUMONIA DUE TO INFECTIOUS ORGANISM, UNSPECIFIED LATERALITY, UNSPECIFIED PART OF LUNG: ICD-10-CM

## 2021-05-29 DIAGNOSIS — I10 ESSENTIAL HYPERTENSION: ICD-10-CM

## 2021-05-29 PROCEDURE — 99308 SBSQ NF CARE LOW MDM 20: CPT | Performed by: INTERNAL MEDICINE

## 2021-05-29 NOTE — PROGRESS NOTES
Progress note  ___________________________    Som Kitchen's  is 1945  SEEN ON 2021 at [facility] AT [facility]  ___________________________    S:  Patient is seen today and discussed with the nurses. Patient was admitted to nursing home after falling because of generalized weakness. He was getting weaker at home and had fallen therefore was brought to the hospital and eventually admitted. He was also found to have pneumonia prior to admission and was taking antibiotics for that. PT evaluation has shown some improvement from the hospital but after admission he has a functional decline. Having difficulty eating his own he had tremors. He was not able to get up and participate in physical therapy. His speech therapy downgraded the diet. I saw him on 2021 and again today on 2021. His CT scan of the brain was not done to rule out a new stroke but came back negative. Labs were repeated. CBC showed a white count of 10,800. CMP showed elevated liver function tests as before  Patient denies any abdominal pain. No nausea vomiting or diarrhea. He has some shortness of breath. No cough      Vital signs: Temp 98.8. Pulse 77. RR 20 /70. O2 sat 93%  GENERAL: Patient was awake but appeared to be clean. HEENT:  Conjunctiva pink, no scleral icterus. ENT clear. NECK:  Supple. No jugular venous distention noted. No masses felt,  CARDIOVASCULAR:  Normal S1 and S2    PULMONARY:  No respiratory distress. No wheezes or rales. ABDOMEN:  Soft and non-tender,no masses  or organomegaly. EXTREMITIES:  No cyanosis, clubbing, or significant edema. SKIN: Skin is warm and dry. NEUROLOGICAL:  Cranial nerves II through XII are grossly intact. Patient is unable to sit on his own. Able to move the extremities        Assessment:  . Functional decline  . History of fall  . Generalized weakness  . Recent pneumonia  .   Cardiac arrhythmias  . Coronary artery disease  . Hypertension  . Hyperlipidemia  . Tremors     Plan:  Patient is afebrile. O2 saturation is normal.  Patient has a pneumonia and is on oral antibiotics. Monitor closely. Nurses informed patient's wife about his condition. Medication were reviewed  Continue current treatment.

## 2021-05-31 ENCOUNTER — HOSPITAL ENCOUNTER (EMERGENCY)
Age: 76
Discharge: ANOTHER HOSPITAL | End: 2021-05-31
Attending: EMERGENCY MEDICINE
Payer: MEDICARE

## 2021-05-31 ENCOUNTER — APPOINTMENT (OUTPATIENT)
Dept: GENERAL RADIOLOGY | Age: 76
End: 2021-05-31
Payer: MEDICARE

## 2021-05-31 ENCOUNTER — HOSPITAL ENCOUNTER (OUTPATIENT)
Age: 76
Setting detail: SPECIMEN
Discharge: HOME | End: 2021-05-31
Payer: MEDICARE

## 2021-05-31 VITALS
HEIGHT: 66 IN | SYSTOLIC BLOOD PRESSURE: 101 MMHG | OXYGEN SATURATION: 97 % | DIASTOLIC BLOOD PRESSURE: 46 MMHG | HEART RATE: 65 BPM | WEIGHT: 151.25 LBS | RESPIRATION RATE: 21 BRPM | TEMPERATURE: 98.1 F | BODY MASS INDEX: 24.31 KG/M2

## 2021-05-31 DIAGNOSIS — A41.9 SEPTICEMIA (HCC): Primary | ICD-10-CM

## 2021-05-31 DIAGNOSIS — E86.0 DEHYDRATION: ICD-10-CM

## 2021-05-31 DIAGNOSIS — R19.7 DIARRHEA OF PRESUMED INFECTIOUS ORIGIN: ICD-10-CM

## 2021-05-31 PROBLEM — R65.21 SEPTIC SHOCK (HCC): Status: ACTIVE | Noted: 2021-05-31

## 2021-05-31 LAB
ALBUMIN SERPL-MCNC: 3.4 GM/DL (ref 3.4–5)
ALP BLD-CCNC: 90 IU/L (ref 40–129)
ALT SERPL-CCNC: 151 U/L (ref 10–40)
ANION GAP SERPL CALCULATED.3IONS-SCNC: 10 MMOL/L (ref 4–16)
AST SERPL-CCNC: 92 IU/L (ref 15–37)
BASOPHILS ABSOLUTE: 0 K/CU MM
BASOPHILS RELATIVE PERCENT: 0.1 % (ref 0–1)
BILIRUB SERPL-MCNC: 0.5 MG/DL (ref 0–1)
BUN BLDV-MCNC: 102 MG/DL (ref 6–23)
CALCIUM SERPL-MCNC: 10 MG/DL (ref 8.3–10.6)
CHLORIDE BLD-SCNC: 107 MMOL/L (ref 99–110)
CO2: 27 MMOL/L (ref 21–32)
CREAT SERPL-MCNC: 2.7 MG/DL (ref 0.9–1.3)
DIFFERENTIAL TYPE: ABNORMAL
EKG ATRIAL RATE: 70 BPM
EKG DIAGNOSIS: NORMAL
EKG P AXIS: 29 DEGREES
EKG P-R INTERVAL: 242 MS
EKG Q-T INTERVAL: 420 MS
EKG QRS DURATION: 96 MS
EKG QTC CALCULATION (BAZETT): 453 MS
EKG R AXIS: 13 DEGREES
EKG T AXIS: 208 DEGREES
EKG VENTRICULAR RATE: 70 BPM
EOSINOPHILS ABSOLUTE: 0 K/CU MM
EOSINOPHILS RELATIVE PERCENT: 0.1 % (ref 0–3)
GFR AFRICAN AMERICAN: 28 ML/MIN/1.73M2
GFR NON-AFRICAN AMERICAN: 23 ML/MIN/1.73M2
GLUCOSE BLD-MCNC: 191 MG/DL (ref 70–99)
GLUCOSE BLD-MCNC: 207 MG/DL (ref 70–99)
HCT VFR BLD CALC: 46.4 % (ref 42–52)
HEMOGLOBIN: 15.4 GM/DL (ref 13.5–18)
IMMATURE NEUTROPHIL %: 0.9 % (ref 0–0.43)
LACTATE: NORMAL MMOL/L (ref 0.4–2)
LYMPHOCYTES ABSOLUTE: 1.1 K/CU MM
LYMPHOCYTES RELATIVE PERCENT: 6.2 % (ref 24–44)
MCH RBC QN AUTO: 29.4 PG (ref 27–31)
MCHC RBC AUTO-ENTMCNC: 33.2 % (ref 32–36)
MCV RBC AUTO: 88.7 FL (ref 78–100)
MONOCYTES ABSOLUTE: 0.6 K/CU MM
MONOCYTES RELATIVE PERCENT: 3.1 % (ref 0–4)
PDW BLD-RTO: 15.1 % (ref 11.7–14.9)
PH VENOUS: 7.41 (ref 7.32–7.42)
PLATELET # BLD: 132 K/CU MM (ref 140–440)
PMV BLD AUTO: 11.4 FL (ref 7.5–11.1)
POTASSIUM SERPL-SCNC: 4 MMOL/L (ref 3.5–5.1)
PRO-BNP: 1307 PG/ML
RBC # BLD: 5.23 M/CU MM (ref 4.6–6.2)
SARS-COV-2, NAAT: NOT DETECTED
SEGMENTED NEUTROPHILS ABSOLUTE COUNT: 16.5 K/CU MM
SEGMENTED NEUTROPHILS RELATIVE PERCENT: 89.6 % (ref 36–66)
SODIUM BLD-SCNC: 144 MMOL/L (ref 135–145)
SOURCE: NORMAL
TOTAL IMMATURE NEUTOROPHIL: 0.16 K/CU MM
TOTAL PROTEIN: 5.6 GM/DL (ref 6.4–8.2)
TROPONIN T: <0.01 NG/ML
WBC # BLD: 18.4 K/CU MM (ref 4–10.5)

## 2021-05-31 PROCEDURE — 83605 ASSAY OF LACTIC ACID: CPT

## 2021-05-31 PROCEDURE — 96375 TX/PRO/DX INJ NEW DRUG ADDON: CPT

## 2021-05-31 PROCEDURE — 96366 THER/PROPH/DIAG IV INF ADDON: CPT

## 2021-05-31 PROCEDURE — 96361 HYDRATE IV INFUSION ADD-ON: CPT

## 2021-05-31 PROCEDURE — 96368 THER/DIAG CONCURRENT INF: CPT

## 2021-05-31 PROCEDURE — 71045 X-RAY EXAM CHEST 1 VIEW: CPT

## 2021-05-31 PROCEDURE — 83880 ASSAY OF NATRIURETIC PEPTIDE: CPT

## 2021-05-31 PROCEDURE — 94660 CPAP INITIATION&MGMT: CPT

## 2021-05-31 PROCEDURE — 87040 BLOOD CULTURE FOR BACTERIA: CPT

## 2021-05-31 PROCEDURE — 82962 GLUCOSE BLOOD TEST: CPT

## 2021-05-31 PROCEDURE — 2580000003 HC RX 258: Performed by: EMERGENCY MEDICINE

## 2021-05-31 PROCEDURE — 96365 THER/PROPH/DIAG IV INF INIT: CPT

## 2021-05-31 PROCEDURE — 80053 COMPREHEN METABOLIC PANEL: CPT

## 2021-05-31 PROCEDURE — 6370000000 HC RX 637 (ALT 250 FOR IP): Performed by: EMERGENCY MEDICINE

## 2021-05-31 PROCEDURE — 93010 ELECTROCARDIOGRAM REPORT: CPT | Performed by: INTERNAL MEDICINE

## 2021-05-31 PROCEDURE — 85025 COMPLETE CBC W/AUTO DIFF WBC: CPT

## 2021-05-31 PROCEDURE — 84484 ASSAY OF TROPONIN QUANT: CPT

## 2021-05-31 PROCEDURE — 87635 SARS-COV-2 COVID-19 AMP PRB: CPT

## 2021-05-31 PROCEDURE — 82800 BLOOD PH: CPT

## 2021-05-31 PROCEDURE — 6360000002 HC RX W HCPCS: Performed by: EMERGENCY MEDICINE

## 2021-05-31 PROCEDURE — 93005 ELECTROCARDIOGRAM TRACING: CPT | Performed by: EMERGENCY MEDICINE

## 2021-05-31 PROCEDURE — 99285 EMERGENCY DEPT VISIT HI MDM: CPT

## 2021-05-31 PROCEDURE — 87324 CLOSTRIDIUM AG IA: CPT

## 2021-05-31 PROCEDURE — 2500000003 HC RX 250 WO HCPCS: Performed by: EMERGENCY MEDICINE

## 2021-05-31 RX ORDER — SODIUM CHLORIDE 9 MG/ML
INJECTION, SOLUTION INTRAVENOUS CONTINUOUS
Status: DISCONTINUED | OUTPATIENT
Start: 2021-05-31 | End: 2021-05-31

## 2021-05-31 RX ORDER — 0.9 % SODIUM CHLORIDE 0.9 %
1000 INTRAVENOUS SOLUTION INTRAVENOUS ONCE
Status: COMPLETED | OUTPATIENT
Start: 2021-05-31 | End: 2021-05-31

## 2021-05-31 RX ORDER — CEPHALEXIN 500 MG/1
500 CAPSULE ORAL 4 TIMES DAILY
COMMUNITY

## 2021-05-31 RX ORDER — SODIUM CHLORIDE 9 MG/ML
INJECTION, SOLUTION INTRAVENOUS CONTINUOUS
Status: DISCONTINUED | OUTPATIENT
Start: 2021-05-31 | End: 2021-05-31 | Stop reason: HOSPADM

## 2021-05-31 RX ORDER — ACETAMINOPHEN 650 MG/1
650 SUPPOSITORY RECTAL EVERY 4 HOURS PRN
Status: DISCONTINUED | OUTPATIENT
Start: 2021-05-31 | End: 2021-05-31 | Stop reason: HOSPADM

## 2021-05-31 RX ORDER — AMOXICILLIN AND CLAVULANATE POTASSIUM 875; 125 MG/1; MG/1
1 TABLET, FILM COATED ORAL 2 TIMES DAILY
COMMUNITY

## 2021-05-31 RX ORDER — NICOTINE 21 MG/24HR
1 PATCH, TRANSDERMAL 24 HOURS TRANSDERMAL EVERY 24 HOURS
COMMUNITY

## 2021-05-31 RX ADMIN — SODIUM CHLORIDE 1000 ML: 9 INJECTION, SOLUTION INTRAVENOUS at 11:06

## 2021-05-31 RX ADMIN — VASOPRESSIN 0.04 UNITS/MIN: 20 INJECTION INTRAVENOUS at 12:20

## 2021-05-31 RX ADMIN — NOREPINEPHRINE BITARTRATE 11 MCG/MIN: 1 INJECTION INTRAVENOUS at 12:06

## 2021-05-31 RX ADMIN — NOREPINEPHRINE BITARTRATE 16 MCG/MIN: 1 INJECTION INTRAVENOUS at 12:12

## 2021-05-31 RX ADMIN — HYDROCORTISONE SODIUM SUCCINATE 50 MG: 100 INJECTION, POWDER, FOR SOLUTION INTRAMUSCULAR; INTRAVENOUS at 12:16

## 2021-05-31 RX ADMIN — VANCOMYCIN HYDROCHLORIDE 1000 MG: 1 INJECTION, POWDER, LYOPHILIZED, FOR SOLUTION INTRAVENOUS at 13:47

## 2021-05-31 RX ADMIN — NOREPINEPHRINE BITARTRATE 5 MCG/MIN: 1 INJECTION INTRAVENOUS at 11:44

## 2021-05-31 RX ADMIN — NOREPINEPHRINE BITARTRATE 21 MCG/MIN: 1 INJECTION INTRAVENOUS at 12:17

## 2021-05-31 RX ADMIN — ACETAMINOPHEN 650 MG: 650 SUPPOSITORY RECTAL at 11:41

## 2021-05-31 RX ADMIN — METRONIDAZOLE 500 MG: 500 INJECTION, SOLUTION INTRAVENOUS at 12:38

## 2021-05-31 RX ADMIN — PIPERACILLIN AND TAZOBACTAM 3375 MG: 3; .375 INJECTION, POWDER, LYOPHILIZED, FOR SOLUTION INTRAVENOUS at 11:48

## 2021-05-31 RX ADMIN — SODIUM CHLORIDE: 9 INJECTION, SOLUTION INTRAVENOUS at 11:36

## 2021-05-31 ASSESSMENT — PAIN SCALES - GENERAL
PAINLEVEL_OUTOF10: 0
PAINLEVEL_OUTOF10: 0

## 2021-05-31 NOTE — ED NOTES
Tamiko care given due to loose stool. Note redness and bruising to coccyx and left side. Reported fall one week ago prior to going to [place].       Pio Umanzor RN  05/31/21 [phone]

## 2021-05-31 NOTE — ED NOTES
Called access center to have [facility] hospitlaist paged for consult      Kimberly Garcia  05/31/21 [phone]

## 2021-05-31 NOTE — ED NOTES
Frank from [facility] called stating they would not have a bed today because of staffing, let Dr. Charito Zamorano know, Called wife, agreed to going to [address] [place] [address], [address] [place] [address] called waiting on hospitalist to call back      Meeta Garcia  05/31/21 [phone]

## 2021-05-31 NOTE — ED NOTES
Pt more alert and answering questions. When [facility] nurse asked if he knew what was going on with him states (yes, a nightmare). Aware that he was in the hospital. [facility] [address] [facility]       Erica Regan, KALIA  05/31/21 [phone]

## 2021-05-31 NOTE — ED PROVIDER NOTES
Emergency Department Encounter  Location: [facility] [address] [place] [address]    Patient: Mari Gutierrez  MRN: [id number]  : 1945  Date of evaluation: 2021  ED Provider: Marvin Guido DO, FACEP    Chief Complaint:    Altered Mental Status (from the [facility]. Jessenia Shin reports pt has been in the facility for a week for a fall and has a rapid decline. ), Hypotension, and Diarrhea (has had several episodes. Is on several ATBs. Specimen sent to lab from [facility] for C-Diff. )    Ewiiaapaayp:  Mari Gutierrez is a 70 y.o. male that presents to the emergency department from the [facility] where the patient was noticed to \"going downhill\". The patient was noted to have an O2 sat of 85% and blood pressure of 70/40. The patient had recently been admitted for generalized weakness and pneumonia. He has been having diarrhea and concern is that he may have C. difficile. A specimen has been sent to the lab. He was brought to the emergency department because of decreased responsiveness, trouble breathing, and low blood pressure. ROS - see HPI, below listed is current ROS at time of my eval:  Unable to be obtained, patient is responsive to vocal stimulation but will not speak    Past Medical History:   Diagnosis Date    AAA (abdominal aortic aneurysm) ([address] 2018    Infrarenal    Basal cell carcinoma of nose     Dr Gale Dexter CAD (coronary artery disease)     Dr. Escobar Smoker - severe aortic stenosis with a bicuspid aortic valve    Constipation     echocardiogram 10/29/2020    EF 55-60% normally functioning valve in aortic position, mild mitral regurg with two jets mild pulm htn.      Essential hypertension     History of alcohol abuse     Hyperlipidemia     Hypertension     Follows with PCP    Missing teeth, acquired     Non-alcoholic fatty liver disease     Osteoarthritis     Knees, lower back, shoulders,    Spinal stenosis     had epidural steroid injection 2020 - lumbar    Tremor Left arm only    Type 2 diabetes mellitus without complication (HCC)     Controlling with diet    Wears glasses      Past Surgical History:   Procedure Laterality Date    CABG WITH AORTIC VALVE REPLACEMENT N/A 3/4/2020    CABG CORONARY ARTERY BYPASS x1 SALEEM TO LAD, AORTIC VALVE REPACEMENT performed by Dayday Mathis MD at [address]  02/26/2020    critical LAD lesion and moderate to severe right coronary artery lesion    CARPAL TUNNEL RELEASE Right 1970's    KNEE SURGERY  2015    quad injury from a fall    OTHER SURGICAL HISTORY  12/09/2016    excision of basal cell carcinoma of nose with complex repair    OTHER SURGICAL HISTORY  01/2020    epidual steroid injections L3-L4    SINUS SURGERY  1970's    x 2    WISDOM TOOTH EXTRACTION       Family History   Problem Relation Age of Onset    Lung Cancer Mother     Heart Disease Father     High Blood Pressure Father     High Cholesterol Father     Lung Cancer Father      Social History     Socioeconomic History    Marital status:      Spouse name: Not on file    Number of children: Not on file    Years of education: Not on file    Highest education level: Not on file   Occupational History    Not on file   Tobacco Use    Smoking status: Current Every Day Smoker     Packs/day: 1.50     Years: 58.00     Pack years: 87.00     Types: Cigarettes     Start date: 1962    Smokeless tobacco: Never Used   Vaping Use    Vaping Use: Never used   Substance and Sexual Activity    Alcohol use: Not Currently     Comment: 1 glass wine a month    Drug use: No    Sexual activity: Not Currently   Other Topics Concern    Not on file   Social History Narrative    Not on file     Social Determinants of Health     Financial Resource Strain:     Difficulty of Paying Living Expenses:    Food Insecurity: No Food Insecurity    Worried About Running Out of Food in the Last Year: Never true    Raissa of Food in the Last Year: Never true Transportation Needs: No Transportation Needs    Lack of Transportation (Medical): No    Lack of Transportation (Non-Medical): No   Physical Activity: Sufficiently Active    Days of Exercise per Week: 7 days    Minutes of Exercise per Session: 60 min   Stress: No Stress Concern Present    Feeling of Stress : Not at all   Social Connections: Moderately Isolated    Frequency of Communication with Friends and Family: Three times a week    Frequency of Social Gatherings with Friends and Family: Once a week    Attends Jehovah's witness Services: Never    Active Member of Clubs or Organizations: No    Attends Club or Organization Meetings: Never    Marital Status:    Intimate Partner Violence:     Fear of Current or Ex-Partner:     Emotionally Abused:     Physically Abused:     Sexually Abused:      Current Facility-Administered Medications   Medication Dose Route Frequency Provider Last Rate Last Admin    0.9 % sodium chloride infusion   Intravenous Continuous Marielena Clay,  mL/hr at 05/31/21 1136 New Bag at 05/31/21 1136    norepinephrine (LEVOPHED) 16 mg in dextrose 5 % 250 mL infusion  2-100 mcg/min Intravenous Continuous Marielena Clay, DO 19.7 mL/hr at 05/31/21 1217 21 mcg/min at 05/31/21 1217    acetaminophen (TYLENOL) suppository 650 mg  650 mg Rectal Q4H PRN Marielena Clay, DO   650 mg at 05/31/21 1141    vasopressin 20 Units in dextrose 5 % 100 mL infusion  0.04 Units/min Intravenous Continuous Marielena Clay, DO 12 mL/hr at 05/31/21 1220 0.04 Units/min at 05/31/21 1220    hydrocortisone sodium succinate PF (SOLU-CORTEF) injection 50 mg  50 mg Intravenous Q6H Marielena Clay, DO   50 mg at 05/31/21 1216     Current Outpatient Medications   Medication Sig Dispense Refill    gabapentin (NEURONTIN) 300 MG capsule Take 1 capsule by mouth 3 times daily for 30 days. 90 capsule 0    blood glucose test strips (TRUE METRIX BLOOD GLUCOSE TEST) strip 1 each by In Vitro route 3 times daily As needed.  [facility] each 5    atorvastatin (LIPITOR) 40 MG tablet Take 1 tablet by mouth daily 90 tablet 1    primidone (MYSOLINE) 50 MG tablet Take 3 tablets by mouth 2 times daily 60 tablet 2    furosemide (LASIX) 20 MG tablet TAKE 1 TABLET BY MOUTH TWICE A DAY 60 tablet 2    glipiZIDE (GLUCOTROL XL) 2.5 MG extended release tablet Take 1 tablet by mouth daily 90 tablet 2    carvedilol (COREG) 3.125 MG tablet TAKE 1 TABLET BY MOUTH 2 TIMES DAILY 60 tablet 3    amiodarone (CORDARONE) 200 MG tablet TAKE 1 TABLET BY MOUTH 2 TIMES DAILY 60 tablet 3    lisinopril (PRINIVIL;ZESTRIL) 10 MG tablet Take 1 tablet by mouth daily 90 tablet 1    Blood Pressure Monitoring (BLOOD PRESSURE KIT) NASH Use as directed 1 Device 0    polyethylene glycol (GLYCOLAX) 17 GM/SCOOP powder Take 17 g by mouth daily      blood glucose monitor kit and supplies Test 3 times a day & as needed for symptoms of irregular blood glucose. 1 kit 0    aspirin 81 MG tablet Take 81 mg by mouth daily        Allergies   Allergen Reactions    Metformin And Related Diarrhea     Severe diarrhea, abd pain, and nausea    Amoxicillin Diarrhea    Other Nausea And Vomiting     ANTI-INFLAMMATORIES  Severe stomach pain--diarrhea  hypertension       Nursing Notes Reviewed    Physical Exam:  ED Triage Vitals   Enc Vitals Group      BP       Pulse       Resp       Temp       Temp src       SpO2       Weight       Height       Head Circumference       Peak Flow       Pain Score       Pain Loc       Pain Edu? Excl. in [address]? GENERAL APPEARANCE: Awake . Cooperative. He appears ill. HEAD: Normocephalic. Atraumatic. EYES: EOM's grossly intact. Sclera anicteric. ENT: Tolerates saliva. No trismus. NECK: Supple. Trachea midline. CARDIO: RRR. Radial pulse 2+. LUNGS: Respirations unlabored. Rhonchorous respirations bilaterally  ABDOMEN: Soft. Non-distended. Non-tender. EXTREMITIES: No acute deformities. SKIN: Warm and dry.    NEUROLOGICAL: No gross facial drooping. Moves all 4 extremities spontaneously.    PSYCHIATRIC: Unable to assess    Labs:  Results for orders placed or performed during the hospital encounter of 05/31/21   CBC Auto Differential   Result Value Ref Range    WBC 18.4 (H) 4.0 - 10.5 K/CU MM    RBC 5.23 4.6 - 6.2 M/CU MM    Hemoglobin 15.4 13.5 - 18.0 GM/DL    Hematocrit 46.4 42 - 52 %    MCV 88.7 78 - 100 FL    MCH 29.4 27 - 31 PG    MCHC 33.2 32.0 - 36.0 %    RDW 15.1 (H) 11.7 - 14.9 %    Platelets 435 (L) 800 - 440 K/CU MM    MPV 11.4 (H) 7.5 - 11.1 FL    Differential Type AUTOMATED DIFFERENTIAL     Segs Relative 89.6 (H) 36 - 66 %    Lymphocytes % 6.2 (L) 24 - 44 %    Monocytes % 3.1 0 - 4 %    Eosinophils % 0.1 0 - 3 %    Basophils % 0.1 0 - 1 %    Segs Absolute 16.5 K/CU MM    Lymphocytes Absolute 1.1 K/CU MM    Monocytes Absolute 0.6 K/CU MM    Eosinophils Absolute 0.0 K/CU MM    Basophils Absolute 0.0 K/CU MM    Immature Neutrophil % 0.9 (H) 0 - 0.43 %    Total Immature Neutrophil 0.16 K/CU MM   Comprehensive Metabolic Panel   Result Value Ref Range    Sodium 144 135 - 145 MMOL/L    Potassium 4.0 3.5 - 5.1 MMOL/L    Chloride 107 99 - 110 mMol/L    CO2 27 21 - 32 MMOL/L     (H) 6 - 23 MG/DL    CREATININE 2.7 (H) 0.9 - 1.3 MG/DL    Glucose 207 (H) 70 - 99 MG/DL    Calcium 10.0 8.3 - 10.6 MG/DL    Albumin 3.4 3.4 - 5.0 GM/DL    Total Protein 5.6 (L) 6.4 - 8.2 GM/DL    Total Bilirubin 0.5 0.0 - 1.0 MG/DL     (H) 10 - 40 U/L    AST 92 (H) 15 - 37 IU/L    Alkaline Phosphatase 90 40 - 129 IU/L    GFR Non- 23 (L) >60 mL/min/1.73m2    GFR  28 (L) >60 mL/min/1.73m2    Anion Gap 10 4 - 16   Lactic Acid, Plasma   Result Value Ref Range    Lactate  0.4 - 2.0 mMOL/L     LACTIC 2.5 CALLED AND RB TO DTRAILOR  ED [id number]   Troponin   Result Value Ref Range    Troponin T <0.010 <0.01 NG/ML   Brain Natriuretic Peptide   Result Value Ref Range    Pro-BNP 1,307 (H) <300 PG/ML   pH, venous   Result Value Ref Range    pH, Miguel 7.41 7.32 - 7.42   POCT Glucose   Result Value Ref Range    POC Glucose 191 (H) 70 - 99 MG/DL   EKG 12 Lead   Result Value Ref Range    Ventricular Rate 70 BPM    Atrial Rate 70 BPM    P-R Interval 242 ms    QRS Duration 96 ms    Q-T Interval 420 ms    QTc Calculation (Bazett) 453 ms    P Axis 29 degrees    R Axis 13 degrees    T Axis 208 degrees    Diagnosis       Sinus rhythm with 1st degree AV block  ST & T wave abnormality, consider anterolateral ischemia  Abnormal ECG  When compared with ECG of 12-FEB-2021 05:50,  PA interval has increased  T wave inversion more evident in Anterior leads         EKG (if obtained): (All EKG's are interpreted by myself in the absence of a cardiologist)  The Ekg interpreted by me in the absence of a cardiologist shows. normal sinus rhythm with a rate of 70, first-degree AV block noted  Axis is   Normal  QTc is  453  Intervals and Durations are unremarkable. No specific ST-T wave changes appreciated. No evidence of acute ischemia. No significant change from prior EKG dated 12 February 2021  ]    Radiographs (if obtained):  [] The following radiograph was interpreted by myself in the absence of a radiologist:  [x] Radiologist's Report reviewed at time of ED visit:  XR CHEST PORTABLE   Preliminary Result   Mild prominence of the pulmonary vasculature with faint bilateral reticular   opacities and peribronchial cuffing which could indicate pulmonary vascular   congestion and interstitial edema. Procedure Note - Central Line:  The benefits, risks, and alternatives of central venous access were discussed with the  patient and spouse and Verbal consent was obtained for the procedure. Taryn Garnett was prepped and draped in standard bedside fashion in the left inguinal area. Physical landmarks with ultrasound guidance was used to locate the central vein. Local anesthesia with 3ml of 1% lidocaine was injected.  Seldinger technique was used for intensive care unit once the bed is been assigned. This has also been discussed with the patient's wife. She is agreeable to the plan. Final Impression:  1. Septicemia ([address]    2. Dehydration    3. Diarrhea of presumed infectious origin      DISPOSITION Decision To Transfer    Patient referred to: No follow-up provider specified.   Discharge medications:  New Prescriptions    No medications on file     (Please note that portions of this note may have been completed with a voice recognition program. Efforts were made to edit the dictations but occasionally words are mis-transcribed.)    Isatu Macedo DO, [facility]  Board certified in [address]  05/31/21 Tobi Zambrano DO  05/31/21 0960

## 2021-06-02 ENCOUNTER — CARE COORDINATION (OUTPATIENT)
Dept: CARE COORDINATION | Age: 76
End: 2021-06-02

## 2021-06-02 LAB — CLOSTRIDIUM DIFFICILE, PCR: ABNORMAL

## 2021-06-02 NOTE — CARE COORDINATION
spoke to Chan at [facility] who confirmed patient is currently at [facility].

## 2021-06-06 LAB
CULTURE: NORMAL
CULTURE: NORMAL
Lab: NORMAL
Lab: NORMAL
SPECIMEN: NORMAL
SPECIMEN: NORMAL

## 2021-06-08 ENCOUNTER — TELEPHONE (OUTPATIENT)
Dept: FAMILY MEDICINE CLINIC | Age: 76
End: 2021-06-08

## 2021-06-08 NOTE — TELEPHONE ENCOUNTER
Patients wife called and stated that patient is currently admitted to [facility] in the ICU pneumonia and is critical. Will possibly need Hospice Care on release

## 2021-07-13 NOTE — TELEPHONE ENCOUNTER
Date: 07/13/21





D/W Cardiology; the patient has ischemic changes on his current EKG, and they 

are concerned about ability to withstand sedation at present.  Since no 

gross/active bleeding, and hct is stable, will cancel EGD for tomorrow and 

observe. Patient would like refills of the Prednisone and the Azithromycin. He is only \"slightly\" better.     Please call him today to let him know if another round of these meds will be called in.        LV 8/24/20    [place]  11/16/20    [address]

## 2023-09-07 NOTE — PROGRESS NOTES
LOV: 1/27/2023    RTC: 12 Months    FU: No FU Appt Scheduled    Last Refill:     Last Labs: 5/3/2023     Next Labs Due: 9/20/2023    3 Month Supply Pending Check echo, had CABG               -     CORONARY ARTERY DISEASE:  asymptomatic     All available  tests in chart reviewed. Management discussed . Testing ordered  no                                   -  Hypertension: Patients blood pressure is normal. Patient is advised about low sodium diet. Present medical regimen will not be changed. -  LIPID MANAGEMENT:  Available lipid  lab data reviewed  and patient was given dietary advice. NCEP- ATP III guidelines reviewed with patient. -   Changes  in medicines made: No                         -   DIABETES MELLITUS: Available pertinent lab data reviewed   and  patient was given dietary advice . Advised to check blood glucose level on a regular basis. -   Changes  in medicines made:  No                                                 Anjali Ortiz MA  [facility]

## 2024-04-16 NOTE — PROGRESS NOTES
Physical Therapy    Facility/Department: [facility] UNIT  Initial Assessment    NAME: Bao Pretty  : 1945  MRN: [id number]    Date of Service: 2021    Discharge Recommendations:  Continue to assess pending progress, Subacute/Skilled Nursing Facility        Assessment   Body structures, Functions, Activity limitations: Decreased functional mobility ; Decreased ADL status; Decreased balance;Decreased posture;Decreased strength;Decreased high-level IADLs;Decreased safe awareness;Decreased cognition;Decreased coordination;Decreased endurance  Assessment: Patient is a 70year old male admitted s/p fall at home who presents with impaired LE strength, balance, and endurance affecting his ability to safely perform functional mobility. Patient will benefit from continued inpatient PT to address these impairments. Prognosis: Good  Decision Making: Medium Complexity  History: see below  Exam: see below  Clinical Presentation: evolving  PT Education: Gait Training;General Safety;PT Role;Functional Mobility Training;Transfer Training  Barriers to Learning: cognition  REQUIRES PT FOLLOW UP: Yes  Activity Tolerance  Activity Tolerance: Patient limited by fatigue       Patient Diagnosis(es): The primary encounter diagnosis was General weakness. A diagnosis of Impaired ambulation was also pertinent to this visit. has a past medical history of AAA (abdominal aortic aneurysm) (HCC), Basal cell carcinoma of nose, CAD (coronary artery disease), Constipation, echocardiogram, Essential hypertension, History of alcohol abuse, Hyperlipidemia, Hypertension, Missing teeth, acquired, Non-alcoholic fatty liver disease, Osteoarthritis, Spinal stenosis, Tremor, Type 2 diabetes mellitus without complication [address] [address] and Wears glasses. has a past surgical history that includes sinus surgery ([address]); other surgical history (2016); other surgical history (2020);  Coronary artery bypass graft (N/A, 3/4/2020); knee 45.44 41.77% CK        20 47.67 35.83% CJ   Raw Score  13 21 50.25 28.97% CJ   Standardized Score  36.74 22 53.28 20.91% CJ   CMS 0-100% Score  64.91% 23 56.93 11.20% CI   CMS Modifier CL 24 61.14 0.00% CH     CH = 0% impaired  CI = 1-20% impaired  CJ = 20-40% impaired  CK = 40-60% impaired  CL = 60-80% impaired  CM = % impaired  CN = 100% impaired    Goals  Short term goals  Time Frame for Short term goals: 1 week or until discharge  Short term goal 1: Patient will perform all bed mobility with use of bed features with supervision  Short term goal 2: Patient will perform sit to stand transfers from bed, chair, and commode with SBA  Short term goal 3: Patient will stand static/dynamically at rolling walker x5 minutes with SBA  Short term goal 4: Patient will ambulate 25 ft with SBA and 2WW with step through gait pattern  Patient Goals   Patient goals : \"be able to stand up and walk better\"       Therapy Time   Individual Concurrent Group Co-treatment   Time In       0845   Time Out       0911   Minutes       26   Timed Code Treatment Minutes: [address] [facility], PT DPT [id number] no concerns

## (undated) DEVICE — GLOVE SURG SZ 75 CRM LTX FREE POLYISOPRENE POLYMER BEAD ANTI

## (undated) DEVICE — SENSOR OXMTR SM AD DISP FOR INVOS SYS

## (undated) DEVICE — BLADE SAW STRNM 10X35X0.6MM

## (undated) DEVICE — SENSOR PLSE OXMTR AD CBL L3FT ADH TRANSMISSIVE

## (undated) DEVICE — INTENDED FOR TISSUE SEPARATION, AND OTHER PROCEDURES THAT REQUIRE A SHARP SURGICAL BLADE TO PUNCTURE OR CUT.: Brand: BARD-PARKER ® STAINLESS STEEL BLADES

## (undated) DEVICE — CANNULA PERF L15IN DIA29FR VEN 3 STG THN WALL DSGN W  VENT

## (undated) DEVICE — CATHETER URETH 18FR RED RUB INTMIT ALL PURP

## (undated) DEVICE — Z INACTIVE USE 2540311 LEAD PACE L475MM CHN A OR V MYOCARDIAL STEROID ELUT SIL

## (undated) DEVICE — Device: Brand: VIRTUOSAPH PLUS WITH RADIAL INDICATION

## (undated) DEVICE — SUTURE SURGLON SZ 1 L30IN NONABSORBABLE BLK NO NDL NYL PRE 8886191971

## (undated) DEVICE — CHLORAPREP 26ML ORANGE

## (undated) DEVICE — SUTURE VCRL 2-0 L36IN ABSRB UD CTX L48MM 1/2 CIR TAPERPOINT J979H

## (undated) DEVICE — DRAPE,UTILITY,XL,4/PK,STERILE: Brand: MEDLINE

## (undated) DEVICE — Z INACTIVE USE 2660664 SOLUTION IRRIG 3000ML 0.9% SOD CHL USP UROMATIC PLAS CONT

## (undated) DEVICE — GLOVE ORANGE PI 8   MSG9080

## (undated) DEVICE — CANNULA PERF L125IN OD13FR AUTO INFL CUF DEFL TIP RG

## (undated) DEVICE — SET ADMIN 25ML L117IN PMP MOD CK VLV RLER CLMP 2 SMRTSITE

## (undated) DEVICE — SUTURE MCRYL SZ 3-0 L27IN ABSRB UD L24MM PS-1 3/8 CIR PRIM Y936H

## (undated) DEVICE — SET ADMIN PRIMING 7ML L30IN 7.35LB 20 GTT 2ND RLER CLMP

## (undated) DEVICE — 6 FOOT DISPOSABLE EXTENSION CABLE WITH SAFE CONNECT / SCREW-DOWN

## (undated) DEVICE — SUTURE VCRL SZ 4-0 L27IN ABSRB VLT L26MM SH 1/2 CIR J315H

## (undated) DEVICE — PLEDGET VASC W3/16XL3/8IN THK1/16IN PTFE SFT

## (undated) DEVICE — SUTURE PROL 7-0 L18IN NONABSORBABLE BLU L9.3MM BV-1 3/8 CIR M8701

## (undated) DEVICE — OPEN HEART PACK: Brand: MEDLINE INDUSTRIES, INC.

## (undated) DEVICE — BLADE CLIPPER GEN PURP NS

## (undated) DEVICE — MARKER SURG SKIN UTIL REGULAR/FINE 2 TIP W/ RUL AND 9 LBL

## (undated) DEVICE — DRAIN,WOUND,ROUND,24FR,5/16",FULL-FLUTED: Brand: MEDLINE

## (undated) DEVICE — BLADE OPHTH GRN ROUNDED TIP 1 SIDE SHRP GRINDLESS MINI-BLDE

## (undated) DEVICE — DECANTER FLD 9IN ST BG FOR ASEP TRNSF OF FLD

## (undated) DEVICE — COUNTER NDL 30 COUNT FOAM STRP SGL MAG

## (undated) DEVICE — BLADE ES L2.75IN ELASTOMERIC COAT DURABLE BEND UPTO 90DEG

## (undated) DEVICE — SUTURE VCRL SZ 2 L27IN ABSRB UD L65MM TP-1 1/2 CIR J849G

## (undated) DEVICE — Z DUP USE 2257490 ADHESIVE SKIN CLSRE 036ML TPCL 2CTL CNCRLTE HIGH VSCSTY DRMB

## (undated) DEVICE — SUTURE NRLN SZ 1 L18IN NONABSORBABLE BLK L36MM CT-1 1/2 CIR C520D

## (undated) DEVICE — CANNULA VENT 16FR MAL INTRO SIL CONN 0.25IN NVENT BULL TIP

## (undated) DEVICE — SUTURE PROL 3-0 L36IN NONABSORBABLE BLU L26MM SH 1/2 CIR P8522

## (undated) DEVICE — SET ADMIN L105IN 10GTT 3 NDL FREE CK VLV 2 PC M LUERLOCK

## (undated) DEVICE — SUTURE ETHBND EXCEL SZ 2-0 L36IN NONABSORBABLE GRN L26MM SH X523H

## (undated) DEVICE — SUTURE S STL SZ 5 L18IN NONABSORBABLE SIL L48MM CCS 1/4 CIR M657G

## (undated) DEVICE — CONNECTOR PIPE 3/8X1/2IN REDUC STR

## (undated) DEVICE — TOWEL,OR,DSP,ST,BLUE,STD,6/PK,12PK/CS: Brand: MEDLINE

## (undated) DEVICE — ELECTRODE ES L4IN PTFE INSUL BLDE W/ SFTY SL DISP EDGE

## (undated) DEVICE — TOWEL,OR,DSP,ST,WHITE,DLX,XR,4/PK,20PK/C: Brand: MEDLINE

## (undated) DEVICE — SUTURE NONABSORBABLE MONOFILAMENT 6-0 C-1 4X18 IN PROLENE M8718

## (undated) DEVICE — CANNULA PERF L5.5IN DIA9FR AORT ROOT AG STD TIP W/ VENT LN

## (undated) DEVICE — GLOVE SURG SZ 7 CRM LTX FREE POLYISOPRENE POLYMER BEAD ANTI

## (undated) DEVICE — ELECTRODE ES AD CRDLSS PT RET REM POLYHESIVE

## (undated) DEVICE — ROTATING SURGICAL PUNCHES, 1 PER POUCH: Brand: A&E MEDICAL / ROTATING SURGICAL PUNCHES

## (undated) DEVICE — DEVICE RESUS AD TB L40IN SELF INFL MASK TEXT BG DBL SWVL EL

## (undated) DEVICE — BLANKET THER AD W24XL60IN FAB COVERING SUP SFT ULT THN LTWT

## (undated) DEVICE — ADAPTER IV TBNG CLR POLYCARB DBL M LUERLOCK

## (undated) DEVICE — 3M™ STERI-DRAPE™ INSTRUMENT POUCH 1018: Brand: STERI-DRAPE™

## (undated) DEVICE — CONNECTOR DRNGE W3/8-0.5XH3/16XL3/16IN 2:1 SIL CARD STR

## (undated) DEVICE — SUTURE PROL SZ 4-0 L36IN NONABSORBABLE BLU L26MM SH 1/2 CIR 8521H

## (undated) DEVICE — DRAIN SURG SGL COLL PT TB FOR ATS BG OASIS

## (undated) DEVICE — MPS® RETROGRADE EXTENSION PRESSURE LINE W/Y-SET: Brand: MPS

## (undated) DEVICE — SWAN-GANZ THERMODILUTION CATHETER: Brand: SWAN-GANZ

## (undated) DEVICE — CONTAINER,SPECIMEN,OR STERILE,4OZ: Brand: MEDLINE

## (undated) DEVICE — SOLUTION IV IRRIG POUR BRL 0.9% SODIUM CHL 2F7124

## (undated) DEVICE — KIT CVC AD 7FR L20CM POLYUR BLU FLEXTIP ANTIMIC MULTILUMEN

## (undated) DEVICE — FOGARTY - HYDRAGRIP SURGICAL - CLAMP INSERTS: Brand: FOGARTY SOFTJAW

## (undated) DEVICE — CANNULA ART 20FR NVENT 3 8IN CONN EOPA 3D

## (undated) DEVICE — KIT INTRO 8.5FR L4IN PERC POLYUR SHTH RADPQ W/ INTEGR

## (undated) DEVICE — PROTECTOR EYE PT SELF ADH NS OPT GRD LF

## (undated) DEVICE — TUBING SUCT 9 11FR L475IN RIG SHFT MINI SUC TIP DLP

## (undated) DEVICE — CLIP SM RED INTERN HMOCLP TITAN LIGATING

## (undated) DEVICE — SUTURE ETHIB EXCL X BR GRN V-7 DA 2-0 30 PX977 PX977H

## (undated) DEVICE — TOTAL TRAY, 16FR 10ML SIL FOLEY, URN: Brand: MEDLINE

## (undated) DEVICE — Z INACTIVE USE 2641837 CLIP LIG M BLU TI HRT SHP WIRE HORZ 600 PER BX

## (undated) DEVICE — SET EXTN 4ML L32IN 4 W STPCOCK SPIN M LUERLOCK STD BOR

## (undated) DEVICE — BLADE ES L6IN ELASTOMERIC COAT EXT DURABLE BEND UPTO 90DEG

## (undated) DEVICE — SET ADMIN PRIMING 67ML L105IN NVENT 180UM FLTR 3 RLER CLMP

## (undated) DEVICE — TUBING INSUFFLATOR HEAT HI FLO SET PNEUMOCLEAR

## (undated) DEVICE — Device

## (undated) DEVICE — SUTURE S STL SZ 5 L18IN NONABSORBABLE SIL V-40 L48MM 1/2 M650G

## (undated) DEVICE — COR-KNOT MINI® COMBO KITBASE PACKAGE TYPE - KITEACH STERILE PACKAGE KIT CONTAINS (2) SINGLE PATIENT USE COR-KNOT MINI® DEVICES AND (12) COR-KNOT® QUICK LOADS®.: Brand: COR-KNOT MINI®